# Patient Record
Sex: FEMALE | Race: OTHER | HISPANIC OR LATINO | URBAN - METROPOLITAN AREA
[De-identification: names, ages, dates, MRNs, and addresses within clinical notes are randomized per-mention and may not be internally consistent; named-entity substitution may affect disease eponyms.]

---

## 2023-03-10 ENCOUNTER — INPATIENT (INPATIENT)
Facility: HOSPITAL | Age: 53
LOS: 13 days | Discharge: HOME HEALTH | DRG: 622 | End: 2023-03-24
Attending: NEUROLOGICAL SURGERY | Admitting: NEUROLOGICAL SURGERY
Payer: MEDICAID

## 2023-03-10 VITALS
OXYGEN SATURATION: 97 % | HEIGHT: 63 IN | DIASTOLIC BLOOD PRESSURE: 64 MMHG | HEART RATE: 87 BPM | TEMPERATURE: 99 F | RESPIRATION RATE: 18 BRPM | WEIGHT: 154.98 LBS | SYSTOLIC BLOOD PRESSURE: 131 MMHG

## 2023-03-10 DIAGNOSIS — G93.89 OTHER SPECIFIED DISORDERS OF BRAIN: ICD-10-CM

## 2023-03-10 DIAGNOSIS — E11.9 TYPE 2 DIABETES MELLITUS WITHOUT COMPLICATIONS: ICD-10-CM

## 2023-03-10 DIAGNOSIS — I10 ESSENTIAL (PRIMARY) HYPERTENSION: ICD-10-CM

## 2023-03-10 DIAGNOSIS — Z01.818 ENCOUNTER FOR OTHER PREPROCEDURAL EXAMINATION: ICD-10-CM

## 2023-03-10 DIAGNOSIS — E78.5 HYPERLIPIDEMIA, UNSPECIFIED: ICD-10-CM

## 2023-03-10 DIAGNOSIS — R07.9 CHEST PAIN, UNSPECIFIED: ICD-10-CM

## 2023-03-10 LAB
ALBUMIN SERPL ELPH-MCNC: 4.9 G/DL — SIGNIFICANT CHANGE UP (ref 3.3–5)
ALP SERPL-CCNC: 63 U/L — SIGNIFICANT CHANGE UP (ref 40–120)
ALT FLD-CCNC: 29 U/L — SIGNIFICANT CHANGE UP (ref 10–45)
ANION GAP SERPL CALC-SCNC: 10 MMOL/L — SIGNIFICANT CHANGE UP (ref 5–17)
ANION GAP SERPL CALC-SCNC: 13 MMOL/L — SIGNIFICANT CHANGE UP (ref 5–17)
APTT BLD: 37 SEC — HIGH (ref 27.5–35.5)
AST SERPL-CCNC: 44 U/L — HIGH (ref 10–40)
BASOPHILS # BLD AUTO: 0.06 K/UL — SIGNIFICANT CHANGE UP (ref 0–0.2)
BASOPHILS NFR BLD AUTO: 0.8 % — SIGNIFICANT CHANGE UP (ref 0–2)
BILIRUB SERPL-MCNC: 0.5 MG/DL — SIGNIFICANT CHANGE UP (ref 0.2–1.2)
BLD GP AB SCN SERPL QL: NEGATIVE — SIGNIFICANT CHANGE UP
BUN SERPL-MCNC: 14 MG/DL — SIGNIFICANT CHANGE UP (ref 7–23)
BUN SERPL-MCNC: 15 MG/DL — SIGNIFICANT CHANGE UP (ref 7–23)
CALCIUM SERPL-MCNC: 10 MG/DL — SIGNIFICANT CHANGE UP (ref 8.4–10.5)
CALCIUM SERPL-MCNC: 9.6 MG/DL — SIGNIFICANT CHANGE UP (ref 8.4–10.5)
CHLORIDE SERPL-SCNC: 102 MMOL/L — SIGNIFICANT CHANGE UP (ref 96–108)
CHLORIDE SERPL-SCNC: 105 MMOL/L — SIGNIFICANT CHANGE UP (ref 96–108)
CK MB BLD-MCNC: 1.2 % — SIGNIFICANT CHANGE UP (ref 0–3.5)
CK MB CFR SERPL CALC: 5.8 NG/ML — HIGH (ref 0–3.8)
CK SERPL-CCNC: 473 U/L — HIGH (ref 25–170)
CO2 SERPL-SCNC: 26 MMOL/L — SIGNIFICANT CHANGE UP (ref 22–31)
CO2 SERPL-SCNC: 27 MMOL/L — SIGNIFICANT CHANGE UP (ref 22–31)
CREAT SERPL-MCNC: 0.67 MG/DL — SIGNIFICANT CHANGE UP (ref 0.5–1.3)
CREAT SERPL-MCNC: 0.69 MG/DL — SIGNIFICANT CHANGE UP (ref 0.5–1.3)
EGFR: 104 ML/MIN/1.73M2 — SIGNIFICANT CHANGE UP
EGFR: 104 ML/MIN/1.73M2 — SIGNIFICANT CHANGE UP
EOSINOPHIL # BLD AUTO: 0.45 K/UL — SIGNIFICANT CHANGE UP (ref 0–0.5)
EOSINOPHIL NFR BLD AUTO: 5.9 % — SIGNIFICANT CHANGE UP (ref 0–6)
GH SERPL-MCNC: 0.26 NG/ML — SIGNIFICANT CHANGE UP (ref 0.12–9.88)
GLUCOSE BLDC GLUCOMTR-MCNC: 112 MG/DL — HIGH (ref 70–99)
GLUCOSE BLDC GLUCOMTR-MCNC: 162 MG/DL — HIGH (ref 70–99)
GLUCOSE SERPL-MCNC: 125 MG/DL — HIGH (ref 70–99)
GLUCOSE SERPL-MCNC: 161 MG/DL — HIGH (ref 70–99)
HCG SERPL-ACNC: <2 MIU/ML — SIGNIFICANT CHANGE UP
HCT VFR BLD CALC: 37.4 % — SIGNIFICANT CHANGE UP (ref 34.5–45)
HCT VFR BLD CALC: 43.2 % — SIGNIFICANT CHANGE UP (ref 34.5–45)
HGB BLD-MCNC: 12 G/DL — SIGNIFICANT CHANGE UP (ref 11.5–15.5)
HGB BLD-MCNC: 13.8 G/DL — SIGNIFICANT CHANGE UP (ref 11.5–15.5)
IMM GRANULOCYTES NFR BLD AUTO: 0.3 % — SIGNIFICANT CHANGE UP (ref 0–0.9)
INR BLD: 1.14 RATIO — SIGNIFICANT CHANGE UP (ref 0.88–1.16)
LYMPHOCYTES # BLD AUTO: 2.74 K/UL — SIGNIFICANT CHANGE UP (ref 1–3.3)
LYMPHOCYTES # BLD AUTO: 35.8 % — SIGNIFICANT CHANGE UP (ref 13–44)
MCHC RBC-ENTMCNC: 28 PG — SIGNIFICANT CHANGE UP (ref 27–34)
MCHC RBC-ENTMCNC: 28.2 PG — SIGNIFICANT CHANGE UP (ref 27–34)
MCHC RBC-ENTMCNC: 31.9 GM/DL — LOW (ref 32–36)
MCHC RBC-ENTMCNC: 32.1 GM/DL — SIGNIFICANT CHANGE UP (ref 32–36)
MCV RBC AUTO: 87.2 FL — SIGNIFICANT CHANGE UP (ref 80–100)
MCV RBC AUTO: 88.3 FL — SIGNIFICANT CHANGE UP (ref 80–100)
MONOCYTES # BLD AUTO: 0.49 K/UL — SIGNIFICANT CHANGE UP (ref 0–0.9)
MONOCYTES NFR BLD AUTO: 6.4 % — SIGNIFICANT CHANGE UP (ref 2–14)
NEUTROPHILS # BLD AUTO: 3.9 K/UL — SIGNIFICANT CHANGE UP (ref 1.8–7.4)
NEUTROPHILS NFR BLD AUTO: 50.8 % — SIGNIFICANT CHANGE UP (ref 43–77)
NRBC # BLD: 0 /100 WBCS — SIGNIFICANT CHANGE UP (ref 0–0)
NRBC # BLD: 0 /100 WBCS — SIGNIFICANT CHANGE UP (ref 0–0)
PLATELET # BLD AUTO: 254 K/UL — SIGNIFICANT CHANGE UP (ref 150–400)
PLATELET # BLD AUTO: 285 K/UL — SIGNIFICANT CHANGE UP (ref 150–400)
POTASSIUM SERPL-MCNC: 3.6 MMOL/L — SIGNIFICANT CHANGE UP (ref 3.5–5.3)
POTASSIUM SERPL-MCNC: 4.4 MMOL/L — SIGNIFICANT CHANGE UP (ref 3.5–5.3)
POTASSIUM SERPL-SCNC: 3.6 MMOL/L — SIGNIFICANT CHANGE UP (ref 3.5–5.3)
POTASSIUM SERPL-SCNC: 4.4 MMOL/L — SIGNIFICANT CHANGE UP (ref 3.5–5.3)
PROT SERPL-MCNC: 8.1 G/DL — SIGNIFICANT CHANGE UP (ref 6–8.3)
PROTHROM AB SERPL-ACNC: 13.1 SEC — SIGNIFICANT CHANGE UP (ref 10.5–13.4)
RBC # BLD: 4.29 M/UL — SIGNIFICANT CHANGE UP (ref 3.8–5.2)
RBC # BLD: 4.89 M/UL — SIGNIFICANT CHANGE UP (ref 3.8–5.2)
RBC # FLD: 12.8 % — SIGNIFICANT CHANGE UP (ref 10.3–14.5)
RBC # FLD: 12.9 % — SIGNIFICANT CHANGE UP (ref 10.3–14.5)
RH IG SCN BLD-IMP: POSITIVE — SIGNIFICANT CHANGE UP
SARS-COV-2 RNA SPEC QL NAA+PROBE: SIGNIFICANT CHANGE UP
SODIUM SERPL-SCNC: 141 MMOL/L — SIGNIFICANT CHANGE UP (ref 135–145)
SODIUM SERPL-SCNC: 142 MMOL/L — SIGNIFICANT CHANGE UP (ref 135–145)
TROPONIN T, HIGH SENSITIVITY RESULT: 31 NG/L — SIGNIFICANT CHANGE UP (ref 0–51)
WBC # BLD: 7.48 K/UL — SIGNIFICANT CHANGE UP (ref 3.8–10.5)
WBC # BLD: 7.66 K/UL — SIGNIFICANT CHANGE UP (ref 3.8–10.5)
WBC # FLD AUTO: 7.48 K/UL — SIGNIFICANT CHANGE UP (ref 3.8–10.5)
WBC # FLD AUTO: 7.66 K/UL — SIGNIFICANT CHANGE UP (ref 3.8–10.5)

## 2023-03-10 PROCEDURE — 71045 X-RAY EXAM CHEST 1 VIEW: CPT | Mod: 26

## 2023-03-10 PROCEDURE — 99222 1ST HOSP IP/OBS MODERATE 55: CPT

## 2023-03-10 PROCEDURE — 99223 1ST HOSP IP/OBS HIGH 75: CPT

## 2023-03-10 PROCEDURE — 99285 EMERGENCY DEPT VISIT HI MDM: CPT

## 2023-03-10 PROCEDURE — 99254 IP/OBS CNSLTJ NEW/EST MOD 60: CPT | Mod: GC

## 2023-03-10 PROCEDURE — 70553 MRI BRAIN STEM W/O & W/DYE: CPT | Mod: 26,77

## 2023-03-10 PROCEDURE — 70553 MRI BRAIN STEM W/O & W/DYE: CPT | Mod: 26

## 2023-03-10 RX ORDER — INSULIN LISPRO 100/ML
VIAL (ML) SUBCUTANEOUS
Refills: 0 | Status: DISCONTINUED | OUTPATIENT
Start: 2023-03-10 | End: 2023-03-14

## 2023-03-10 RX ORDER — ACETAMINOPHEN 500 MG
650 TABLET ORAL ONCE
Refills: 0 | Status: COMPLETED | OUTPATIENT
Start: 2023-03-10 | End: 2023-03-10

## 2023-03-10 RX ORDER — ATORVASTATIN CALCIUM 80 MG/1
80 TABLET, FILM COATED ORAL AT BEDTIME
Refills: 0 | Status: DISCONTINUED | OUTPATIENT
Start: 2023-03-10 | End: 2023-03-14

## 2023-03-10 RX ADMIN — ATORVASTATIN CALCIUM 80 MILLIGRAM(S): 80 TABLET, FILM COATED ORAL at 21:35

## 2023-03-10 RX ADMIN — Medication 650 MILLIGRAM(S): at 10:59

## 2023-03-10 RX ADMIN — Medication 650 MILLIGRAM(S): at 10:29

## 2023-03-10 RX ADMIN — Medication 2: at 21:35

## 2023-03-10 NOTE — CONSULT NOTE ADULT - ASSESSMENT
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

## 2023-03-10 NOTE — ED ADULT TRIAGE NOTE - CHIEF COMPLAINT QUOTE
Referred my opthalmology for MRI for findings of a "sellar mass" as per outpatient paperwork. 3 months of pressure behind her eyes.

## 2023-03-10 NOTE — CONSULT NOTE ADULT - NSCONSULTADDITIONALINFOA_GEN_ALL_CORE
Ej Agosto MD  [facility] Division of Hospital Medicine  Available via MS Teams  Pager: [phone]

## 2023-03-10 NOTE — CONSULT NOTE ADULT - PROBLEM SELECTOR RECOMMENDATION 4
DENIED Rx Clozaril 100mg   Pharmacy requested TOO SOON.  Last Rx renewed: 7/20/2022 90day/1RF.     f/u a1c in AM  -ISS for now   -nutrition consult  -holding home metformin 1000mg BID

## 2023-03-10 NOTE — H&P ADULT - HISTORY OF PRESENT ILLNESS
52 yr old female with a history of hypertension, hyperlipidemia, dm type 2, no surgeries ever presents with a month of headaches and 3 weeks of blurred vision.  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  Patient was at [facility].  Patient was sent to [facility].  No endocrine work up done.

## 2023-03-10 NOTE — CONSULT NOTE ADULT - PROBLEM SELECTOR RECOMMENDATION 9
Cardiovascular Risk Stratification - RCRI is 0 pts = 3.9% at risk for cardiac death, nonfatal myocardial infarction, and nonfatal cardiac arrest perioperatively  -patient has risk factors for CAD and now states she has chest pain on exertion. EKG is non-ischemic.   -obtain TTE (ordered) to start prior to any medical clearance   -please obtain cardiac clearance for surgery

## 2023-03-10 NOTE — CONSULT NOTE ADULT - ASSESSMENT
52 yr old female with a history of hypertension, hyperlipidemia, dm type 2, no surgeries ever presents with a month of headaches and 3 weeks of blurred vision. Endocrinology consulted for evaluation of sellar mass.    #Sellar Mass    T2DM with hyperglycemia  - HbA1c:  - Home Regimen:   - Endocrinologist:  PLAN  - Hold oral DM agents while inpatient  - Start Lantus  units at bedtime. DO NOT HOLD IF NPO.  - Start Admelog  units TID pre-meal. HOLD IF NPO.  - Use moderate/Use low dose Admelog correction scale pre-meal  - Use moderate/Use low dose Admelog correction scale at bedtime  - Fingerstick BG before meals and bedtime  - Goal -180  - Carbohydrate consistent diet  - RD consult  Discharge plan:  - Discharge medications: ************************  - Patient to call doctor with persistent high or low BG at home.   - Ensure patient has glucometer, test strips and lancets on discharge.  - Recommend routine outpatient ophthalmology, podiatry and endocrinology f/u    HTN  - Home regimen:  PLAN  - Can check urine microalbumin outpatient  - Outpatient goal BP <130/80. Management per primary team.    HLD  - Home regimen:  PLAN  - Continue  - Can check lipid profile if not done recently    Discussed with primary team.    Denny Bishop MD, Endocrinology Fellow  Pager [phone] from 9am to 5pm. After hours and on weekends, please call [phone].   52 yr old female with a history of hypertension, hyperlipidemia, dm type 2, no surgeries ever presents with a month of headaches and 3 weeks of blurred vision. Endocrinology consulted for evaluation of sellar mass.    #Sellar Mass  - reported sellar mass on MRI imaging outpatient after weeks of blurry vision  - report not in system  - no hormonal workup  - patient HD stable  - endorses weight loss and nausea with prior galactorrhea  - no other signs of thyroid disease, AI, growth hormone excess or cushings  PLAN  - if can obtain MRI imaging outpatient to assist in determining if micro or macroadenoma  - f/u prolactin  - please obtain TSH, free thyroxine (not serum T4), 8am SERUM NOT free cortisol, ACTH, LH, FSH, estradiol, IGF-1  - f/u neurosurgery recommendations  - f/u ophthalmology recommendations    T2DM with hyperglycemia  - HbA1c: none in computer  - Home Regimen: metformin 1000mg bid  - Endocrinologist: does not have  PLAN  - Hold oral DM agents while inpatient  - please check Hba1c  - Use low dose Admelog correction scale pre-meal  - Use low dose Admelog correction scale at bedtime  - Fingerstick BG before meals and bedtime  - Goal -180  - Carbohydrate consistent diet  - RD consult  Discharge plan:  - Discharge medications: likely metformin 1000mg bid pending HbA1c  - Patient to call doctor with persistent high or low BG at home.   - Ensure patient has glucometer, test strips and lancets on discharge.  - Recommend routine outpatient ophthalmology, podiatry and endocrinology f/u    HTN  - Home regimen: lisinopril 2.5mg daily  PLAN  - Can check urine microalbumin outpatient  - Outpatient goal BP <130/80. Management per primary team.    HLD  - Home regimen: atorvastatin 80mg daily  PLAN  - resume statin when able  - Can check lipid profile if not done recently    Discussed with primary team.    Denny Bishop MD, Endocrinology Fellow  Pager [phone] from 9am to 5pm. After hours and on weekends, please call [phone].   52 yr old female with a history of hypertension, hyperlipidemia, dm type 2, no surgeries ever presents with a month of headaches and 3 weeks of blurred vision. Endocrinology consulted for evaluation of sellar mass.    #Sellar Mass  - reported sellar mass on MRI imaging outpatient after weeks of blurry vision  - report not in system  - no hormonal workup  - patient HD stable  - endorses weight loss and nausea with prior galactorrhea  - no other signs of thyroid disease, AI, growth hormone excess or cushings  PLAN  - if can obtain MRI imaging outpatient to assist in determining if micro or macroadenoma  - f/u prolactin  - please obtain TSH, free thyroxine (not serum T4), 8am SERUM NOT free cortisol, ACTH, LH, FSH, estradiol, IGF-1  - will consider dexamethasone suppression test pending cortisol/acth  - f/u neurosurgery recommendations  - f/u ophthalmology recommendations (formal consult if not done) given visual deficits    T2DM with hyperglycemia  - HbA1c: none in computer  - Home Regimen: metformin 1000mg bid  - Endocrinologist: does not have  PLAN  - Hold oral DM agents while inpatient  - please check Hba1c  - Use low dose Admelog correction scale pre-meal  - Use low dose Admelog correction scale at bedtime  - Fingerstick BG before meals and bedtime  - Goal -180  - Carbohydrate consistent diet  - RD consult  Discharge plan:  - Discharge medications: likely metformin 1000mg bid pending HbA1c  - Patient to call doctor with persistent high or low BG at home.   - Ensure patient has glucometer, test strips and lancets on discharge.  - Recommend routine outpatient ophthalmology, podiatry and endocrinology f/u    HTN  - Home regimen: lisinopril 2.5mg daily  PLAN  - Can check urine microalbumin outpatient  - Outpatient goal BP <130/80. Management per primary team.    HLD  - Home regimen: atorvastatin 80mg daily  PLAN  - resume statin when able  - Can check lipid profile if not done recently    Discussed with primary team.    Denny Bishop MD, Endocrinology Fellow  Pager [phone] from 9am to 5pm. After hours and on weekends, please call [phone].

## 2023-03-10 NOTE — CONSULT NOTE ADULT - PROBLEM SELECTOR RECOMMENDATION 2
unclear if this is anginal chest pain, but seems atypical in nature (right sided, no radiation)  -has cardiac risk factors (t2dm, hld, htn) and so would start with TTE  -cardiology consult  -ekg with NSR, nonspecific t wave changes unclear if this is anginal chest pain, but seems atypical in nature (right sided, no radiation)  -has cardiac risk factors (t2dm, hld, htn) and so would start with TTE  -cardiology consult  -ekg with NSR, nonspecific t wave changes  -obtain troponin and trend if elevated

## 2023-03-10 NOTE — ED PROVIDER NOTE - CLINICAL SUMMARY MEDICAL DECISION MAKING FREE TEXT BOX
52-year-old female.  Presents for evaluation by neurosurgery for a brain tumor diagnosed on March 3 at [facility].  Patient has had several months of blurry vision and headache.  Case discussed with neurosurgery attending and would like patient admitted for further work-up and evaluation and management

## 2023-03-10 NOTE — ED ADULT NURSE NOTE - NSIMPLEMENTINTERV_GEN_ALL_ED
Implemented All Universal Safety Interventions:  [place] to call system. Call bell, personal items and telephone within reach. Instruct patient to call for assistance. Room bathroom lighting operational. Non-slip footwear when patient is off stretcher. Physically safe environment: no spills, clutter or unnecessary equipment. Stretcher in lowest position, wheels locked, appropriate side rails in place.

## 2023-03-10 NOTE — CONSULT NOTE ADULT - SUBJECTIVE AND OBJECTIVE BOX
LORRIE REZA  52y  Female    [place]  Jeny ID #[id number]     Patient is a 52 yof w/ hx pf HTN, HLD, T2Dm, who presents with headaches, blurry vision, admitted after being found to have suprasellar mass. Patient has been having headaches for the past 6 months, mostly on the left side, reaching 8/10 in intensity. also has been having nausea during this time. About 3 months ago, she started having blurry vision on the left eye, and has been losing vision in that eye. She went to her opthalmologist, who performed an MRI, which showed a suprasellar mass and was admitted.     Pt states she does not use insulin, has no hx of chf, stroke, cad. However, she states that she has been having chest pain on the right side nearly every day. chest pain occurs when she walks, but also occasionally when she is sleeping, waking her up. States that there is no radiation, nothing worsens the pain, but does state taht when she takes her blood pressure medication, her pain improves. Denies any shortness of breath, cough, LE edema.     PAST MEDICAL HISTORY  -Hypertension  -Hyperlipidemia  -Diabetes mellitus    PAST SURGICAL HISTORY: NONE    SOCIAL HISTORY:  Living situation: lives athome, able to perform all adls  Occupation:    Tobacco Use: denies any tobacco use  Alcohol Use: denies etoh intake  Drug use: denies marijuana, cocaine, heroine and other illicit drug use     family history: none      REVIEW OF SYSTEMS:  CONSTITUTIONAL: No fever. + 10 lb weight loss and mild fatigue  EYES: No eye pain, visual disturbances, or discharge  ENMT:  hearing loss on the left side, no  tinnitus, vertigo; No sinus or throat pain  NECK: No pain or stiffness  BREASTS: No pain, masses, or nipple discharge  RESPIRATORY: No cough, wheezing, chills or hemoptysis; No shortness of breath  CARDIOVASCULAR: +chest pain as above, palpitations, dizziness, or leg swelling  GASTROINTESTINAL: No abdominal or epigastric pain. No nausea, vomiting, or hematemesis; No diarrhea or constipation. No melena or hematochezia.  GENITOURINARY: No dysuria, frequency, hematuria, or incontinence  NEUROLOGICAL: +headaches as above, memory loss, loss of strength, numbness, or tremors  SKIN: No itching, burning, rashes, or lesions   LYMPH NODES: No enlarged glands  ENDOCRINE: No heat or cold intolerance; No hair loss  MUSCULOSKELETAL: No joint pain or swelling; No muscle, back, or extremity pain  PSYCHIATRIC: No depression, anxiety, mood swings, or difficulty sleeping  HEME/LYMPH: No easy bruising, or bleeding gums  ALLERY AND IMMUNOLOGIC: No hives or eczema    T(C): 36.7 (03-10-23 @ 13:25), Max: 37.3 (03-10-23 @ 09:07)  HR: 60 (03-10-23 @ 13:25) (60 - 87)  BP: 143/82 (03-10-23 @ 13:25) (126/83 - 143/82)  RR: 16 (03-10-23 @ 13:25) (16 - 18)  SpO2: 96% (03-10-23 @ 13:25) (96% - 98%)  Wt(kg): --Vital Signs Last 24 Hrs  T(C): 36.7 (10 Mar 2023 13:25), Max: 37.3 (10 Mar 2023 09:07)  T(F): 98.1 (10 Mar 2023 13:25), Max: 99.1 (10 Mar 2023 09:07)  HR: 60 (10 Mar 2023 13:25) (60 - 87)  BP: 143/82 (10 Mar 2023 13:25) (126/83 - 143/82)  BP(mean): 97 (10 Mar 2023 11:30) (97 - 97)  RR: 16 (10 Mar 2023 13:25) (16 - 18)  SpO2: 96% (10 Mar 2023 13:25) (96% - 98%)    Parameters below as of 10 Mar 2023 13:25  Patient On (Oxygen Delivery Method): room air        PHYSICAL EXAM:  GENERAL: NAD, well-groomed, well-developed  HEAD:  Atraumatic, Normocephalic  EYES: EOMI, PERRLA, conjunctiva and sclera clear  ENMT: No tonsillar erythema, exudates, or enlargement; Moist mucous membranes, Good dentition, No lesions  NECK: Supple, No JVD, Normal thyroid  NERVOUS SYSTEM:  Alert & Oriented X3, Good concentration; Motor Strength 5/5 B/L upper and lower extremities; DTRs 2+ intact and symmetric  CHEST/LUNG: Clear to percussion bilaterally; No rales, rhonchi, wheezing, or rubs  HEART: Regular rate and rhythm; No murmurs, rubs, or gallops  ABDOMEN: Soft, Nontender, Nondistended; Bowel sounds present  EXTREMITIES:  2+ Peripheral Pulses, No clubbing, cyanosis, or edema  LYMPH: No lymphadenopathy noted  SKIN: No rashes or lesions    Consultant(s) Notes Reviewed:  [x ] YES  [ ] NO  Care Discussed with Consultants/Other Providers [ x] YES  [ ] NO    LABS:  The Labs were reviewed by me   CXR personally reviewed: clear lungs, no focal consolidation, no congestion     03-10    142  |  105  |  14  ----------------------------<  125<H>  3.6   |  27  |  0.67  03-10    141  |  102  |  15  ----------------------------<  161<H>  4.4   |  26  |  0.69    Ca    9.6      10 Mar 2023 13:28  Ca    10.0      10 Mar 2023 10:24    TPro  8.1  /  Alb  4.9  /  TBili  0.5  /  DBili  x   /  AST  44<H>  /  ALT  29  /  AlkPhos  63  03-10          PT/INR - ( 10 Mar 2023 10:24 )   PT: 13.1 sec;   INR: 1.14 ratio         PTT - ( 10 Mar 2023 10:24 )  PTT:37.0 sec                                        12.0   7.48  )-----------( 254      ( 10 Mar 2023 13:28 )             37.4                         13.8   7.66  )-----------( 285      ( 10 Mar 2023 10:24 )             43.2     CAPILLARY BLOOD GLUCOSE    EKG personally reviewed: NSR, nonspecific t wave inversion in lateral lead, no st elevation,

## 2023-03-10 NOTE — ED PROVIDER NOTE - PHYSICAL EXAMINATION
Patient awake and alert with no acute distress.  Pupils are equal round and reactive bilaterally.  Neck is supple with no midline tenderness.  Lungs are clear bilaterally.  Heart is regular rate and rhythm with no murmur.  Abdomen is soft nontender nondistended.  Extremities with no edema and skin with no rashes.  There is equal strength in the upper and lower extremities 5 out of 5.  Sensation is normal throughout.

## 2023-03-10 NOTE — ED PROVIDER NOTE - OBJECTIVE STATEMENT
52-year-old female with past medical history for hypertension diabetes and hyperlipidemia presents for neurosurgical evaluation for brain tumor.  Patient was seen at an outside hospital on March 3 had an MRI which showed a sellar/suprasellar mass.  Patient has complained of blurry vision times several months.  Patient denies any difficulty walking or weakness or numbness in any of her extremities.  She complains of headache.     Karin # [id number]

## 2023-03-10 NOTE — ED ADULT NURSE NOTE - OBJECTIVE STATEMENT
Patient is a 52 yr old female with pmh of DM referred by opthalmology for a sellar mass. Pt states she has had headache, head pressure, and blurry vision in the left eye for three months. Pt went to [facility] first and had an MRI there which showed the mass- and was now referred to [facility] for follow up and according to her to "see the surgeon". PT is a/ox 3- [place] speaking- vitals stable.

## 2023-03-10 NOTE — CONSULT NOTE ADULT - SUBJECTIVE AND OBJECTIVE BOX
ENDOCRINE INITIAL CONSULT - sellar mass    HPI:  52 yr old female with a history of hypertension, hyperlipidemia, dm type 2, no surgeries ever presents with a month of headaches and 3 weeks of blurred vision.  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  Patient was at [facility].  [place]  No endocrine work up done. (10 Mar 2023 11:57)      ENDOCRINE HISTORY   Diagnosed with DM:   Last HbA1c:   Endocrinologist:   Home DM Meds:  Adherence:  Microvascular complications: Renal, opthalmologic, neuropathy  Macrovascular complications:  SMBG:  Symptoms:  Hypoglycemia episodes:  BG at home:  Diet at home:  Appetite in hospital:  Exercise:  PMHx:  PSHx:  Family hx:  Social hx:  Insurance:  Resides in:      PAST MEDICAL & SURGICAL HISTORY:  Hypertension      Hyperlipidemia      Diabetes mellitus          FAMILY HISTORY:      Social History:      Home Medications:      MEDICATIONS  (STANDING):  atorvastatin 80 milliGRAM(s) Oral at bedtime  insulin lispro (ADMELOG) corrective regimen sliding scale   SubCutaneous Before meals and at bedtime    MEDICATIONS  (PRN):      Allergies    No Known Allergies    Intolerances        REVIEW OF SYSTEMS  Constitutional: No fever  Eyes: No blurry vision  Neuro: No tremors  HEENT: No pain  Cardiovascular: No chest pain, palpitations  Respiratory: No SOB, no cough  GI: No nausea, vomiting, abdominal pain  : No dysuria  Skin: no rash  Psych: no depression  Endocrine: no polyuria, polydipsia  Hem/lymph: no swelling  Osteoporosis: no fractures  ALL OTHER SYSTEMS REVIEWED AND NEGATIVE     UNABLE TO OBTAIN     PHYSICAL EXAM   Vital Signs Last 24 Hrs  T(C): 36.7 (10 Mar 2023 13:25), Max: 37.3 (10 Mar 2023 09:07)  T(F): 98.1 (10 Mar 2023 13:25), Max: 99.1 (10 Mar 2023 09:07)  HR: 60 (10 Mar 2023 13:25) (60 - 87)  BP: 143/82 (10 Mar 2023 13:25) (126/83 - 143/82)  BP(mean): 97 (10 Mar 2023 11:30) (97 - 97)  RR: 16 (10 Mar 2023 13:25) (16 - 18)  SpO2: 96% (10 Mar 2023 13:25) (96% - 98%)    Parameters below as of 10 Mar 2023 13:25  Patient On (Oxygen Delivery Method): room air      GENERAL: NAD, well-groomed, well-developed  EYES: No proptosis, no lid lag, anicteric  HEENT:  Atraumatic, Normocephalic, moist mucous membranes  THYROID: Normal size, no palpable nodules  RESPIRATORY: Clear to auscultation bilaterally; No rales, rhonchi, wheezing  CARDIOVASCULAR: Regular rate and rhythm; No murmurs; no peripheral edema  GI: Soft, nontender, non distended, normal bowel sounds  SKIN: Dry, intact, No rashes or lesions  MUSCULOSKELETAL: Full range of motion, normal strength  NEURO: sensation intact, extraocular movements intact, no tremor  PSYCH: Alert and oriented x 3, normal affect, normal mood  CUSHING'S SIGNS: no striae    CAPILLARY BLOOD GLUCOSE                                    12.0   7.48  )-----------( 254      ( 10 Mar 2023 13:28 )             37.4       03-10    142  |  105  |  14  ----------------------------<  125<H>  3.6   |  27  |  0.67    Ca    9.6      10 Mar 2023 13:28    TPro  8.1  /  Alb  4.9  /  TBili  0.5  /  DBili  x   /  AST  44<H>  /  ALT  29  /  AlkPhos  63  03-10          LIPIDS    RADIOLOGY ENDOCRINE INITIAL CONSULT - sellar mass    HPI:  52 yr old female with a history of hypertension, hyperlipidemia, dm type 2, no surgeries ever presents with a month of headaches and 3 weeks of blurred vision.  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  Patient was at [facility].  Patient was sent to [facility].  No endocrine work up done. (10 Mar 2023 11:57)      ENDOCRINE HISTORY   [place]  [place] ID [id number] used.  The patient reports one month of headaches and 3 weeks of blurry vision, thought she had glaucoma, saw ophthalmologist, had MRI that showed had a tumor. She denies history of brain issue. She endorses mild fatigue, weight loss (170 to 157lb), mild nipple discharge (wasn't clear)(reports had mammogram which was normal), sensitivity to cold weather, nausea, denies palpitations, tremors, diarrhea, constipation, skin changes, hair loss, hirsutism, acanthosis, nipple discharge currently, heat or cold intolerance, change in hat size, ring size, vomiting.    Diagnosed with DM: 2, for 10 years  Last HbA1c: none in computer system  Endocrinologist: no endocrinologist  Home DM Meds: metformin 1000mg bid, lisinopril 2.5mg daily, atorvastatin 80mg daily  Adherence: endorses compliance  Microvascular complications: denies retinopathy, neuropathy, nephropathy  Macrovascular complications: denies MI or CVA  SMBG: does not check sugars  Symptoms: denies polyuria, polydipsia, numbness or tingling  Diet at home: chicken, vegetables, beans  Appetite in hospital: good  Exercise: denies  PMHx: as above  PSHx: as above  Family hx: endorses sister with diabetes, brother with an adrenal gland disorder (does not know specific), denies family history of pituitary disorder, thyroid disorder  Social hx: denies tobacco use, alcohol use, or recreational drug  Insurance: Medicaid  Resides in: [place]      PAST MEDICAL & SURGICAL HISTORY:  Hypertension      Hyperlipidemia      Diabetes mellitus          FAMILY HISTORY:      Social History:      Home Medications:      MEDICATIONS  (STANDING):  atorvastatin 80 milliGRAM(s) Oral at bedtime  insulin lispro (ADMELOG) corrective regimen sliding scale   SubCutaneous Before meals and at bedtime    MEDICATIONS  (PRN):      Allergies    No Known Allergies    Intolerances        REVIEW OF SYSTEMS  Constitutional: No fever  Eyes: endorses blurry vision  Neuro: No tremors  HEENT: No pain  Cardiovascular: endorses chest pain, palpitations  Respiratory: No SOB, no cough  GI: endorses nausea, denies vomiting, abdominal pain, diarrhea, constipation  : No dysuria  Skin: no rash  Psych: no depression  Endocrine: no polyuria, polydipsia  Hem/lymph: no swelling  Osteoporosis: no fractures  ALL OTHER SYSTEMS REVIEWED AND NEGATIVE     PHYSICAL EXAM   Vital Signs Last 24 Hrs  T(C): 36.7 (10 Mar 2023 13:25), Max: 37.3 (10 Mar 2023 09:07)  T(F): 98.1 (10 Mar 2023 13:25), Max: 99.1 (10 Mar 2023 09:07)  HR: 60 (10 Mar 2023 13:25) (60 - 87)  BP: 143/82 (10 Mar 2023 13:25) (126/83 - 143/82)  BP(mean): 97 (10 Mar 2023 11:30) (97 - 97)  RR: 16 (10 Mar 2023 13:25) (16 - 18)  SpO2: 96% (10 Mar 2023 13:25) (96% - 98%)    Parameters below as of 10 Mar 2023 13:25  Patient On (Oxygen Delivery Method): room air      GENERAL: NAD, well-groomed, well-developed  EYES: No proptosis, no lid lag, anicteric  HEENT:  Atraumatic, Normocephalic, moist mucous membranes  THYROID: Normal size, no palpable nodules  RESPIRATORY: Clear to auscultation bilaterally; No rales, rhonchi, wheezing  CARDIOVASCULAR: Regular rate and rhythm; No murmurs; no peripheral edema  GI: Soft, nontender, non distended, normal bowel sounds  SKIN: Dry, intact, No rashes or lesions  MUSCULOSKELETAL: Full range of motion, normal strength  NEURO: sensation intact, extraocular movements intact, no tremor  PSYCH: Alert and oriented x 3, normal affect, normal mood  CUSHING'S SIGNS: no striae    CAPILLARY BLOOD GLUCOSE                                    12.0   7.48  )-----------( 254      ( 10 Mar 2023 13:28 )             37.4       03-10    142  |  105  |  14  ----------------------------<  125<H>  3.6   |  27  |  0.67    Ca    9.6      10 Mar 2023 13:28    TPro  8.1  /  Alb  4.9  /  TBili  0.5  /  DBili  x   /  AST  44<H>  /  ALT  29  /  AlkPhos  63  03-10          LIPIDS    RADIOLOGY ENDOCRINE INITIAL CONSULT - sellar mass    HPI:  52 yr old female with a history of hypertension, hyperlipidemia, dm type 2, no surgeries ever presents with a month of headaches and 3 weeks of blurred vision.  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  Patient was at [facility].  Patient was sent to [facility].  No endocrine work up done. (10 Mar 2023 11:57)      ENDOCRINE HISTORY   [place]  Regan ID [id number] used.  The patient reports one month of headaches and 3 weeks of blurry vision, thought she had glaucoma, saw ophthalmologist, had MRI that showed had a tumor. She denies history of brain issue. She endorses mild fatigue, weight loss (170 to 157lb), mild nipple discharge (wasn't clear)(reports had mammogram which was normal), sensitivity to cold weather, nausea, denies palpitations, tremors, diarrhea, constipation, skin changes, hair loss, hirsutism, acanthosis, nipple discharge currently, heat or cold intolerance, change in hat size, ring size, vomiting.    Diagnosed with DM: 2, for 10 years  Last HbA1c: none in computer system  Endocrinologist: no endocrinologist  Home DM Meds: metformin 1000mg bid, lisinopril 2.5mg daily, atorvastatin 80mg daily  Adherence: endorses compliance  Microvascular complications: denies retinopathy, neuropathy, nephropathy  Macrovascular complications: denies MI or CVA  SMBG: does not check sugars  Symptoms: denies polyuria, polydipsia, numbness or tingling  Diet at home: chicken, vegetables, beans  Appetite in hospital: good  Exercise: denies  PMHx: as above  PSHx: as above  Family hx: endorses sister with diabetes, brother with an adrenal gland disorder (does not know specific), denies family history of pituitary disorder, thyroid disorder  Social hx: denies tobacco use, alcohol use, or recreational drug  Insurance: Medicaid  Resides in: [place]      PAST MEDICAL & SURGICAL HISTORY:  Hypertension      Hyperlipidemia      Diabetes mellitus          FAMILY HISTORY:      Social History:      Home Medications:      MEDICATIONS  (STANDING):  atorvastatin 80 milliGRAM(s) Oral at bedtime  insulin lispro (ADMELOG) corrective regimen sliding scale   SubCutaneous Before meals and at bedtime    MEDICATIONS  (PRN):      Allergies    No Known Allergies    Intolerances        REVIEW OF SYSTEMS  Constitutional: No fever  Eyes: endorses blurry vision  Neuro: No tremors  HEENT: No pain  Cardiovascular: endorses chest pain, palpitations  Respiratory: No SOB, no cough  GI: endorses nausea, denies vomiting, abdominal pain, diarrhea, constipation  : No dysuria  Skin: no rash  Psych: no depression  Endocrine: no polyuria, polydipsia  Hem/lymph: no swelling  Osteoporosis: no fractures  ALL OTHER SYSTEMS REVIEWED AND NEGATIVE     PHYSICAL EXAM   Vital Signs Last 24 Hrs  T(C): 36.7 (10 Mar 2023 13:25), Max: 37.3 (10 Mar 2023 09:07)  T(F): 98.1 (10 Mar 2023 13:25), Max: 99.1 (10 Mar 2023 09:07)  HR: 60 (10 Mar 2023 13:25) (60 - 87)  BP: 143/82 (10 Mar 2023 13:25) (126/83 - 143/82)  BP(mean): 97 (10 Mar 2023 11:30) (97 - 97)  RR: 16 (10 Mar 2023 13:25) (16 - 18)  SpO2: 96% (10 Mar 2023 13:25) (96% - 98%)    Parameters below as of 10 Mar 2023 13:25  Patient On (Oxygen Delivery Method): room air      GENERAL: NAD, lying in bed  EYES: No proptosis, no lid lag, anicteric  HEENT:  Atraumatic, Normocephalic, moist mucous membranes  THYROID: Normal size, no palpable nodules  RESPIRATORY: Clear to auscultation bilaterally; No rales, rhonchi, wheezing  CARDIOVASCULAR: Regular rate and rhythm; No murmurs; no peripheral edema  GI: Soft, nontender, non distended, normal bowel sounds  SKIN: Dry, intact, No rashes or lesions  MUSCULOSKELETAL: Full range of motion, moving extremities spontaneously  NEURO: sensation intact, extraocular movements intact, reports blrury vision on temporal fields bilaterally no tremor  PSYCH: Answering questions appropriately normal affect, normal mood  CUSHING'S SIGNS: no acanthsosis, no dorsocervical fat pad    CAPILLARY BLOOD GLUCOSE                                    12.0   7.48  )-----------( 254      ( 10 Mar 2023 13:28 )             37.4       03-10    142  |  105  |  14  ----------------------------<  125<H>  3.6   |  27  |  0.67    Ca    9.6      10 Mar 2023 13:28    TPro  8.1  /  Alb  4.9  /  TBili  0.5  /  DBili  x   /  AST  44<H>  /  ALT  29  /  AlkPhos  63  03-10          LIPIDS    RADIOLOGY

## 2023-03-10 NOTE — H&P ADULT - ASSESSMENT
52 yr old female with htn, hld and dm type 2 now with sella mass.  admit to [facility]  endocrine consult  pre op labs   medicine for clearance  opthalmology

## 2023-03-10 NOTE — ED ADULT NURSE NOTE - IN THE PAST 12 MONTHS HAVE YOU USED DRUGS OTHER THAN THOSE REQUIRED FOR MEDICAL REASON?
No
Principal Discharge DX:	Acute cystitis without hematuria  Secondary Diagnosis:	Uterine leiomyoma, unspecified location

## 2023-03-11 DIAGNOSIS — E03.9 HYPOTHYROIDISM, UNSPECIFIED: ICD-10-CM

## 2023-03-11 LAB
ESTRADIOL FREE SERPL-MCNC: <5 PG/ML — SIGNIFICANT CHANGE UP
GLUCOSE BLDC GLUCOMTR-MCNC: 108 MG/DL — HIGH (ref 70–99)
GLUCOSE BLDC GLUCOMTR-MCNC: 109 MG/DL — HIGH (ref 70–99)
GLUCOSE BLDC GLUCOMTR-MCNC: 115 MG/DL — HIGH (ref 70–99)
GLUCOSE BLDC GLUCOMTR-MCNC: 146 MG/DL — HIGH (ref 70–99)
HCG SERPL-ACNC: <2 MIU/ML — SIGNIFICANT CHANGE UP
LH SERPL-ACNC: 2.6 IU/L — SIGNIFICANT CHANGE UP
MRSA PCR RESULT.: SIGNIFICANT CHANGE UP
PROLACTIN SERPL-MCNC: 103 NG/ML — HIGH (ref 3.4–24.1)
PROLACTIN SERPL-MCNC: 107 NG/ML — HIGH (ref 3.4–24.1)
S AUREUS DNA NOSE QL NAA+PROBE: SIGNIFICANT CHANGE UP
T4 FREE SERPL-MCNC: 0.5 NG/DL — LOW (ref 0.9–1.8)
TSH SERPL-MCNC: 8.85 UIU/ML — HIGH (ref 0.27–4.2)

## 2023-03-11 PROCEDURE — 70450 CT HEAD/BRAIN W/O DYE: CPT | Mod: 26

## 2023-03-11 PROCEDURE — 99232 SBSQ HOSP IP/OBS MODERATE 35: CPT

## 2023-03-11 PROCEDURE — 93970 EXTREMITY STUDY: CPT | Mod: 26

## 2023-03-11 RX ORDER — ONDANSETRON 8 MG/1
4 TABLET, FILM COATED ORAL EVERY 6 HOURS
Refills: 0 | Status: DISCONTINUED | OUTPATIENT
Start: 2023-03-11 | End: 2023-03-14

## 2023-03-11 RX ORDER — SENNA PLUS 8.6 MG/1
2 TABLET ORAL AT BEDTIME
Refills: 0 | Status: DISCONTINUED | OUTPATIENT
Start: 2023-03-11 | End: 2023-03-14

## 2023-03-11 RX ORDER — LEVOTHYROXINE SODIUM 125 MCG
75 TABLET ORAL DAILY
Refills: 0 | Status: DISCONTINUED | OUTPATIENT
Start: 2023-03-11 | End: 2023-03-14

## 2023-03-11 RX ORDER — ACETAMINOPHEN 500 MG
650 TABLET ORAL EVERY 6 HOURS
Refills: 0 | Status: DISCONTINUED | OUTPATIENT
Start: 2023-03-11 | End: 2023-03-14

## 2023-03-11 RX ADMIN — Medication 650 MILLIGRAM(S): at 09:04

## 2023-03-11 RX ADMIN — Medication 650 MILLIGRAM(S): at 02:37

## 2023-03-11 RX ADMIN — Medication 650 MILLIGRAM(S): at 02:07

## 2023-03-11 RX ADMIN — Medication 650 MILLIGRAM(S): at 08:34

## 2023-03-11 RX ADMIN — SENNA PLUS 2 TABLET(S): 8.6 TABLET ORAL at 21:09

## 2023-03-11 RX ADMIN — ATORVASTATIN CALCIUM 80 MILLIGRAM(S): 80 TABLET, FILM COATED ORAL at 21:08

## 2023-03-11 NOTE — PROGRESS NOTE ADULT - SUBJECTIVE AND OBJECTIVE BOX
SUBJECTIVE:  Patient seen and examined with family at bedside. No acute distress.     OVERNIGHT EVENTS: Admitted yesterday    Vital Signs Last 24 Hrs  T(C): 36.6 (11 Mar 2023 16:10), Max: 37.1 (11 Mar 2023 13:14)  T(F): 97.9 (11 Mar 2023 16:10), Max: 98.8 (11 Mar 2023 13:14)  HR: 75 (11 Mar 2023 16:10) (56 - 75)  BP: 126/78 (11 Mar 2023 16:10) (107/73 - 142/83)  BP(mean): --  RR: 18 (11 Mar 2023 16:10) (16 - 18)  SpO2: 99% (11 Mar 2023 16:10) (94% - 99%)    Parameters below as of 11 Mar 2023 16:10  Patient On (Oxygen Delivery Method): room air        PHYSICAL EXAM:    General: No Acute Distress     Neurological: Awake, alert oriented to person, place and time, Following Commands, PERRL, EOMI, RT superior Quad deficit, Face Symmetrical, Speech Fluent, Moving all extremities, Muscle Strength normal in all four extremities, No Drift, Sensation to Light Touch Intact    Pulmonary: Clear to Auscultation, No Rales, No Rhonchi, No Wheezes     Cardiovascular: Regular Rate and Rhythm     Gastrointestinal: Soft, Nontender, Nondistended       LABS:                        12.0   7.48  )-----------( 254      ( 10 Mar 2023 13:28 )             37.4    03-10    142  |  105  |  14  ----------------------------<  125<H>  3.6   |  27  |  0.67    Ca    9.6      10 Mar 2023 13:28    TPro  8.1  /  Alb  4.9  /  TBili  0.5  /  DBili  x   /  AST  44<H>  /  ALT  29  /  AlkPhos  63  03-10  PT/INR - ( 10 Mar 2023 10:24 )   PT: 13.1 sec;   INR: 1.14 ratio         PTT - ( 10 Mar 2023 10:24 )  PTT:37.0 sec      03-11 @ 06:01  -  03-11 @ 17:18  --------------------------------------------------------  IN: 280 mL / OUT: 0 mL / NET: 280 mL      DRAINS:     MEDICATIONS:  Antibiotics:    Neuro:  acetaminophen     Tablet .. 650 milliGRAM(s) Oral every 6 hours PRN Temp greater or equal to 38C (100.4F), Mild Pain (1 - 3)  ondansetron Injectable 4 milliGRAM(s) IV Push every 6 hours PRN Nausea and/or Vomiting    Cardiac:    Pulm:    GI/:  senna 2 Tablet(s) Oral at bedtime    Other:   atorvastatin 80 milliGRAM(s) Oral at bedtime  insulin lispro (ADMELOG) corrective regimen sliding scale   SubCutaneous Before meals and at bedtime  levothyroxine 75 MICROGram(s) Oral daily    IMAGING:     < from: MR Sella alone w/wo IV Cont (03.10.23 @ 20:51) >  IMPRESSION:    1. PITUITARY:  Complex predominantly cystic mass lesion  extends into the   suprasellar cistern and elevates and distorts the optic chiasm and   ventral forebrain. Favor craniopharyngioma over cystic pituitary adenoma   or other disease. The lesion is noted to have focal low signal intensity   in its right inferior aspect, possibly calcification, associated with a   more solid component of tumor.  No direct compromise of the cavernous   carotid arteries is demonstrated. However, consider noncontrast CT with   CT angiography in evaluation of these findings    2. BRAIN:  Few scattered cerebral hemispheric white matter lesions   suggest an early manifestation of ischemic white matter disease.    < end of copied text >

## 2023-03-11 NOTE — PROGRESS NOTE ADULT - ASSESSMENT
52 yr old female with a history of hypertension, hyperlipidemia, dm type 2, no surgeries ever presents with a month of headaches and 3 weeks of blurred vision. Endocrinology consulted for evaluation of sellar mass.    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 Too low to be prolactinoma given size of the sellar mass. Suspect stalk effect.   -Secondary hypogonadism can be addressed as outpatient.  -Awaiting IGF-1 and cortisol levels  - will consider dexamethasone suppression test pending cortisol/acth  - f/u neurosurgery recommendations  - f/u ophthalmology recommendations (formal consult if not done) given visual deficits    Primary Hypothyroidism: Pt. with elevated TSH and Low Free T4. Start levothyroxine 75 mcg daily.     T2DM with hyperglycemia  - HbA1c: none in computer  - Home Regimen: metformin 1000mg bid  - Endocrinologist: does not have  PLAN  - Hold oral DM agents while inpatient  - please check Hba1c  - Use low dose Admelog correction scale pre-meal  - Use low dose Admelog correction scale at bedtime  - Fingerstick BG before meals and bedtime  - Goal -180  - Carbohydrate consistent diet  - RD consult  Discharge plan:  - Discharge medications: likely metformin 1000mg bid pending HbA1c  - Patient to call doctor with persistent high or low BG at home.   - Ensure patient has glucometer, test strips and lancets on discharge.  - Recommend routine outpatient ophthalmology, podiatry and endocrinology f/u    HTN  - Home regimen: lisinopril 2.5mg daily  PLAN  - Can check urine microalbumin outpatient  - Outpatient goal BP <130/80. Management per primary team.    HLD  - Home regimen: atorvastatin 80mg daily  PLAN  - resume statin when able  - Can check lipid profile if not done recently          René Arenas D.O  [phone]

## 2023-03-11 NOTE — PROGRESS NOTE ADULT - ASSESSMENT
52 yr old female with a history of hypertension, hyperlipidemia, dm type 2, no surgeries ever presents with a month of headaches and 3 weeks of blurred vision.  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     Neuro:  Imaging reviewed  Awaiting ophthalmology eval  Preop planning.     Cards:  -160  Cont atorvastatin for HLD    Pulm:  Incentive spirometry as tolerated  RA sats >92%    Renal:  IVL  Voiding  Replete electrolytes PRN    GI:  Tolerating diet  Bowel regimen    Endo:  Maintain Euglycemia  ISS  Start synthroid 75mcg daily per Endo  Endo following  Labs ordered will followup    ID:  Afebrile  No leukocytosis    Heme:  DVT PPX: SCDs, SQL    Dispo:  PT/OT Rec: TBD    Discussed with patient and family wound care, follow up plans, activity, and medications. Questions answered, and they verbalized understanding. Time Spent:    d.w dr gonzales  [id number]

## 2023-03-11 NOTE — PROGRESS NOTE ADULT - SUBJECTIVE AND OBJECTIVE BOX
Chief Complaint: Evaluating this 51 y/o F for sellar mass likely craniopharyingioma      Interval History: Feeling well aside from some vision deficits. No worsening headache.     MEDICATIONS  (STANDING):  atorvastatin 80 milliGRAM(s) Oral at bedtime  insulin lispro (ADMELOG) corrective regimen sliding scale   SubCutaneous Before meals and at bedtime  senna 2 Tablet(s) Oral at bedtime    MEDICATIONS  (PRN):  acetaminophen     Tablet .. 650 milliGRAM(s) Oral every 6 hours PRN Temp greater or equal to 38C (100.4F), Mild Pain (1 - 3)  ondansetron Injectable 4 milliGRAM(s) IV Push every 6 hours PRN Nausea and/or Vomiting      Allergies    No Known Allergies    Intolerances      Review of Systems:  Constitutional: No fever  Eyes: +changes in vision  Cardiovascular: No chest pain  Respiratory: No SOB  GI: No abdominal pain, No nausea, No vomiting  Endocrine: as noted in HPI    All other negative      PHYSICAL EXAM:  VITALS: T(C): 36.6 (03-11-23 @ 16:10)  T(F): 97.9 (03-11-23 @ 16:10), Max: 98.8 (03-11-23 @ 13:14)  HR: 75 (03-11-23 @ 16:10) (56 - 75)  BP: 126/78 (03-11-23 @ 16:10) (107/73 - 142/83)  RR:  (16 - 18)  SpO2:  (94% - 99%)  Wt(kg): --  GENERAL: NAD at this time  EYES: EOMI, No proptosis  HEENT:  Atraumatic, Normocephalic,   RESPIRATORY: Clear to auscultation bilaterally, full excursion, non labored  CARDIOVASCULAR: Regular rhythm; normal S1/S2, no peripheral edema  GI: Soft, nontender, non distended, normal bowel sounds  SKIN: Warm and dry  PSYCH: normal affect, normal mood      POCT Blood Glucose.: 109 mg/dL (03-11-23 @ 11:49)  POCT Blood Glucose.: 108 mg/dL (03-11-23 @ 07:23)  POCT Blood Glucose.: 162 mg/dL (03-10-23 @ 21:32)  POCT Blood Glucose.: 112 mg/dL (03-10-23 @ 18:16)        03-10    142  |  105  |  14  ----------------------------<  125<H>  3.6   |  27  |  0.67    eGFR: 104    Ca    9.6      03-10    TPro  8.1  /  Alb  4.9  /  TBili  0.5  /  DBili  x   /  AST  44<H>  /  ALT  29  /  AlkPhos  63  03-10        Thyroid Function Tests:  03-11 @ 05:39 TSH 8.85 FreeT4 -- T3 -- Anti TPO -- Anti Thyroglobulin Ab -- TSI --  03-11 @ 05:38 TSH -- FreeT4 0.5 T3 -- Anti TPO -- Anti Thyroglobulin Ab -- TSI --

## 2023-03-12 LAB
ACTH SER-ACNC: 21.5 PG/ML — SIGNIFICANT CHANGE UP (ref 7.2–63.3)
CHOLEST SERPL-MCNC: 236 MG/DL — HIGH
CORTIS AM PEAK SERPL-MCNC: 5.9 UG/DL — LOW (ref 6–18.4)
GLUCOSE BLDC GLUCOMTR-MCNC: 105 MG/DL — HIGH (ref 70–99)
GLUCOSE BLDC GLUCOMTR-MCNC: 105 MG/DL — HIGH (ref 70–99)
GLUCOSE BLDC GLUCOMTR-MCNC: 134 MG/DL — HIGH (ref 70–99)
GLUCOSE BLDC GLUCOMTR-MCNC: 151 MG/DL — HIGH (ref 70–99)
HDLC SERPL-MCNC: 37 MG/DL — LOW
LIPID PNL WITH DIRECT LDL SERPL: 177 MG/DL — HIGH
NON HDL CHOLESTEROL: 199 MG/DL — HIGH
TRIGL SERPL-MCNC: 111 MG/DL — SIGNIFICANT CHANGE UP

## 2023-03-12 PROCEDURE — 99233 SBSQ HOSP IP/OBS HIGH 50: CPT

## 2023-03-12 RX ORDER — ENOXAPARIN SODIUM 100 MG/ML
40 INJECTION SUBCUTANEOUS
Refills: 0 | Status: DISCONTINUED | OUTPATIENT
Start: 2023-03-12 | End: 2023-03-13

## 2023-03-12 RX ADMIN — ENOXAPARIN SODIUM 40 MILLIGRAM(S): 100 INJECTION SUBCUTANEOUS at 17:10

## 2023-03-12 RX ADMIN — Medication 650 MILLIGRAM(S): at 22:18

## 2023-03-12 RX ADMIN — Medication 650 MILLIGRAM(S): at 06:10

## 2023-03-12 RX ADMIN — Medication 650 MILLIGRAM(S): at 11:44

## 2023-03-12 RX ADMIN — Medication 75 MICROGRAM(S): at 05:40

## 2023-03-12 RX ADMIN — Medication 650 MILLIGRAM(S): at 05:40

## 2023-03-12 RX ADMIN — Medication 650 MILLIGRAM(S): at 21:48

## 2023-03-12 RX ADMIN — Medication 650 MILLIGRAM(S): at 12:20

## 2023-03-12 RX ADMIN — ATORVASTATIN CALCIUM 80 MILLIGRAM(S): 80 TABLET, FILM COATED ORAL at 21:49

## 2023-03-12 RX ADMIN — Medication 2: at 21:49

## 2023-03-12 NOTE — PROGRESS NOTE ADULT - PROBLEM SELECTOR PLAN 1
Cardiovascular Risk Stratification - RCRI is 0 pts = 3.9% at risk for cardiac death, nonfatal myocardial infarction, and nonfatal cardiac arrest perioperatively  -patient has risk factors for CAD, especially with elevated LDL of 177 despite atorva, and other risk factors and now states she has chest pain on exertion. EKG is non-ischemic.   -obtain TTE (ordered) to start prior to any medical clearance   -please obtain cardiac clearance for surgery.

## 2023-03-12 NOTE — PROGRESS NOTE ADULT - PROBLEM SELECTOR PLAN 4
f/u a1c in AM (ordered)   -ISS for now. sugar controlled, less than 150  -nutrition consult  -holding home metformin 1000mg BID.

## 2023-03-12 NOTE — PROGRESS NOTE ADULT - SUBJECTIVE AND OBJECTIVE BOX
[facility] Division of Hospital Medicine  Ej Agosto MD  Available via MS Teams  Pager: [phone]    SUBJECTIVE / OVERNIGHT EVENTS:  - [place] pacific interprettor Cornelius ID [id number] used   - no events overnight, denies any nausea, vomiting, headaches, shortness of breath, cough. states visual changes have not improved yet. denies any current headaches.     ADDITIONAL REVIEW OF SYSTEMS:    MEDICATIONS  (STANDING):  atorvastatin 80 milliGRAM(s) Oral at bedtime  enoxaparin Injectable 40 milliGRAM(s) SubCutaneous <User Schedule>  insulin lispro (ADMELOG) corrective regimen sliding scale   SubCutaneous Before meals and at bedtime  levothyroxine 75 MICROGram(s) Oral daily  senna 2 Tablet(s) Oral at bedtime    MEDICATIONS  (PRN):  acetaminophen     Tablet .. 650 milliGRAM(s) Oral every 6 hours PRN Temp greater or equal to 38C (100.4F), Mild Pain (1 - 3)  ondansetron Injectable 4 milliGRAM(s) IV Push every 6 hours PRN Nausea and/or Vomiting      I&O's Summary    11 Mar 2023 06:01  -  12 Mar 2023 07:00  --------------------------------------------------------  IN: 660 mL / OUT: 0 mL / NET: 660 mL        PHYSICAL EXAM:  Vital Signs Last 24 Hrs  T(C): 36.7 (12 Mar 2023 12:00), Max: 37 (11 Mar 2023 20:41)  T(F): 98.1 (12 Mar 2023 12:00), Max: 98.6 (11 Mar 2023 20:41)  HR: 93 (12 Mar 2023 12:00) (57 - 93)  BP: 129/75 (12 Mar 2023 12:00) (102/67 - 129/75)  BP(mean): --  RR: 16 (12 Mar 2023 12:00) (16 - 18)  SpO2: 96% (12 Mar 2023 12:00) (96% - 99%)    Parameters below as of 12 Mar 2023 12:00  Patient On (Oxygen Delivery Method): room air    PHYSICAL EXAM:  GENERAL: NAD, well-groomed, well-developed  HEAD:  Atraumatic, Normocephalic  EYES: EOMI, PERRLA, conjunctiva and sclera clear  ENMT: No tonsillar erythema, exudates, or enlargement; Moist mucous membranes, Good dentition, No lesions  NECK: Supple, No JVD, Normal thyroid  NERVOUS SYSTEM:  Alert & Oriented X3, Good concentration; Motor Strength 5/5 B/L upper and lower extremities; DTRs 2+ intact and symmetric  CHEST/LUNG: Clear to percussion bilaterally; No rales, rhonchi, wheezing, or rubs  HEART: Regular rate and rhythm; No murmurs, rubs, or gallops  ABDOMEN: Soft, Nontender, Nondistended; Bowel sounds present  EXTREMITIES:  2+ Peripheral Pulses, No clubbing, cyanosis, or edema  LYMPH: No lymphadenopathy noted  SKIN: No rashes or lesions    LABS:      COVID-19 PCR: NotDetec (10 Mar 2023 10:23)      RADIOLOGY & ADDITIONAL TESTS:  New Results Reviewed Today:   New Imaging Personally Reviewed Today:  New Electrocardiogram Personally Reviewed Today:  Prior or Outpatient Records Reviewed Today:    COMMUNICATION:  Care Discussed with Consultants/Other Providers and Details of Discussion:  Discussions with Patient/Family:  PCP Communication:

## 2023-03-12 NOTE — PROGRESS NOTE ADULT - PROBLEM SELECTOR PLAN 2
unclear if this is anginal chest pain, but seems atypical in nature (right sided, no radiation)  -has cardiac risk factors (t2dm, hld, ldl 177, htn) and so would start with TTE  -cardiology consult  -ekg with NSR, nonspecific t wave changes

## 2023-03-13 ENCOUNTER — TRANSCRIPTION ENCOUNTER (OUTPATIENT)
Age: 53
End: 2023-03-13

## 2023-03-13 DIAGNOSIS — Z29.9 ENCOUNTER FOR PROPHYLACTIC MEASURES, UNSPECIFIED: ICD-10-CM

## 2023-03-13 LAB
A1C WITH ESTIMATED AVERAGE GLUCOSE RESULT: 8 % — HIGH (ref 4–5.6)
APTT BLD: 35.5 SEC — SIGNIFICANT CHANGE UP (ref 27.5–35.5)
BLD GP AB SCN SERPL QL: NEGATIVE — SIGNIFICANT CHANGE UP
CORTIS AM PEAK SERPL-MCNC: 6.9 UG/DL — SIGNIFICANT CHANGE UP (ref 6–18.4)
ESTIMATED AVERAGE GLUCOSE: 183 MG/DL — HIGH (ref 68–114)
GLUCOSE BLDC GLUCOMTR-MCNC: 106 MG/DL — HIGH (ref 70–99)
GLUCOSE BLDC GLUCOMTR-MCNC: 114 MG/DL — HIGH (ref 70–99)
GLUCOSE BLDC GLUCOMTR-MCNC: 139 MG/DL — HIGH (ref 70–99)
GLUCOSE BLDC GLUCOMTR-MCNC: 153 MG/DL — HIGH (ref 70–99)
HCG SERPL-ACNC: <2 MIU/ML — SIGNIFICANT CHANGE UP
HCT VFR BLD CALC: 42.8 % — SIGNIFICANT CHANGE UP (ref 34.5–45)
HGB BLD-MCNC: 13.6 G/DL — SIGNIFICANT CHANGE UP (ref 11.5–15.5)
INR BLD: 1.2 RATIO — HIGH (ref 0.88–1.16)
INSULIN-LIKE GROWTH FACTOR 1 INTERPRETATION: SIGNIFICANT CHANGE UP
INSULIN-LIKE GROWTH FACTOR 1: 89 NG/ML — SIGNIFICANT CHANGE UP (ref 52–233)
MCHC RBC-ENTMCNC: 28.2 PG — SIGNIFICANT CHANGE UP (ref 27–34)
MCHC RBC-ENTMCNC: 31.8 GM/DL — LOW (ref 32–36)
MCV RBC AUTO: 88.8 FL — SIGNIFICANT CHANGE UP (ref 80–100)
MRSA PCR RESULT.: SIGNIFICANT CHANGE UP
NRBC # BLD: 0 /100 WBCS — SIGNIFICANT CHANGE UP (ref 0–0)
PLATELET # BLD AUTO: 272 K/UL — SIGNIFICANT CHANGE UP (ref 150–400)
PROTHROM AB SERPL-ACNC: 13.9 SEC — HIGH (ref 10.5–13.4)
RBC # BLD: 4.82 M/UL — SIGNIFICANT CHANGE UP (ref 3.8–5.2)
RBC # FLD: 12.9 % — SIGNIFICANT CHANGE UP (ref 10.3–14.5)
RH IG SCN BLD-IMP: POSITIVE — SIGNIFICANT CHANGE UP
S AUREUS DNA NOSE QL NAA+PROBE: SIGNIFICANT CHANGE UP
SARS-COV-2 RNA SPEC QL NAA+PROBE: SIGNIFICANT CHANGE UP
TROPONIN T, HIGH SENSITIVITY RESULT: 7 NG/L — SIGNIFICANT CHANGE UP (ref 0–51)
WBC # BLD: 6.85 K/UL — SIGNIFICANT CHANGE UP (ref 3.8–10.5)
WBC # FLD AUTO: 6.85 K/UL — SIGNIFICANT CHANGE UP (ref 3.8–10.5)

## 2023-03-13 PROCEDURE — 93306 TTE W/DOPPLER COMPLETE: CPT | Mod: 26

## 2023-03-13 PROCEDURE — 99255 IP/OBS CONSLTJ NEW/EST HI 80: CPT | Mod: 57

## 2023-03-13 PROCEDURE — 93356 MYOCRD STRAIN IMG SPCKL TRCK: CPT

## 2023-03-13 PROCEDURE — 99232 SBSQ HOSP IP/OBS MODERATE 35: CPT

## 2023-03-13 PROCEDURE — 99254 IP/OBS CNSLTJ NEW/EST MOD 60: CPT

## 2023-03-13 PROCEDURE — 75574 CT ANGIO HRT W/3D IMAGE: CPT | Mod: 26

## 2023-03-13 PROCEDURE — 99222 1ST HOSP IP/OBS MODERATE 55: CPT | Mod: 57

## 2023-03-13 PROCEDURE — 99233 SBSQ HOSP IP/OBS HIGH 50: CPT

## 2023-03-13 RX ORDER — CHLORHEXIDINE GLUCONATE 213 G/1000ML
1 SOLUTION TOPICAL ONCE
Refills: 0 | Status: DISCONTINUED | OUTPATIENT
Start: 2023-03-13 | End: 2023-03-14

## 2023-03-13 RX ORDER — SODIUM CHLORIDE 9 MG/ML
1000 INJECTION INTRAMUSCULAR; INTRAVENOUS; SUBCUTANEOUS
Refills: 0 | Status: DISCONTINUED | OUTPATIENT
Start: 2023-03-13 | End: 2023-03-14

## 2023-03-13 RX ORDER — POLYETHYLENE GLYCOL 3350 17 G/17G
17 POWDER, FOR SOLUTION ORAL
Refills: 0 | Status: DISCONTINUED | OUTPATIENT
Start: 2023-03-13 | End: 2023-03-14

## 2023-03-13 RX ORDER — COSYNTROPIN 0.25 MG/ML
0.25 INJECTION, SOLUTION INTRAVENOUS ONCE
Refills: 0 | Status: COMPLETED | OUTPATIENT
Start: 2023-03-14 | End: 2023-03-14

## 2023-03-13 RX ORDER — SODIUM CHLORIDE 9 MG/ML
1000 INJECTION INTRAMUSCULAR; INTRAVENOUS; SUBCUTANEOUS
Refills: 0 | Status: DISCONTINUED | OUTPATIENT
Start: 2023-03-13 | End: 2023-03-13

## 2023-03-13 RX ADMIN — POLYETHYLENE GLYCOL 3350 17 GRAM(S): 17 POWDER, FOR SOLUTION ORAL at 17:24

## 2023-03-13 RX ADMIN — Medication 75 MICROGRAM(S): at 06:13

## 2023-03-13 RX ADMIN — Medication 650 MILLIGRAM(S): at 07:35

## 2023-03-13 RX ADMIN — Medication 2: at 12:38

## 2023-03-13 RX ADMIN — Medication 650 MILLIGRAM(S): at 08:35

## 2023-03-13 RX ADMIN — ATORVASTATIN CALCIUM 80 MILLIGRAM(S): 80 TABLET, FILM COATED ORAL at 22:05

## 2023-03-13 NOTE — PROGRESS NOTE ADULT - PROBLEM SELECTOR PLAN 7
new diagnosis.  - elevated TSH to 8.85 with low free T4 0.5  - started on levothyroxine 75mcg daily as per Endo recs - will need rx on discharge  - repeat TFTs as outpatient

## 2023-03-13 NOTE — STUDENT SIGN OFF DOCUMENT - DOCUMENTS STUDENTS ARE SIGNED OFF ON
DERMATOLOGY VISIT NOTE        Verbal permission granted by patient to leave a detailed message with medical information on answering machine at phone number listed in Epic? yes    If female, are you pregnant, trying to become pregnant, or breastfeeding? No      COVID-19 Screening:    Does the patient OR patient’s household members have any of the following symptoms?  • Temperature: Fever >100.4°F or >38.0°C?  No  • Respiratory symptoms: New or worsening cough, shortness of breath, or sore throat? No  • GI symptoms: New onset of nausea, vomiting or diarrhea?  No  • Miscellaneous: New onset of loss of taste or smell, chills, repeated shaking with chills, muscle pain or headache?  No  Has the patient or a household member tested positive for COVID-19 in the last 14 days?  No               Vital Signs/Plan of Care/Assessment and Intervention Occupational Therapy Evaluation

## 2023-03-13 NOTE — PROGRESS NOTE ADULT - TIME BILLING
More than 55 minutes spent on total encounter: more than 50% of the visit was spent on educating the patient and family regarding condition, medications, follow up plans, signs and symptoms to be concerned with, preparing paperwork, and questions answered regarding discharge.

## 2023-03-13 NOTE — CONSULT NOTE ADULT - SUBJECTIVE AND OBJECTIVE BOX
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 650 milliGRAM(s) Oral every 6 hours PRN  ondansetron Injectable 4 milliGRAM(s) IV Push every 6 hours PRN    polyethylene glycol 3350 17 Gram(s) Oral two times a day  senna 2 Tablet(s) Oral at bedtime    atorvastatin 80 milliGRAM(s) Oral at bedtime  insulin lispro (ADMELOG) corrective regimen sliding scale   SubCutaneous Before meals and at bedtime  levothyroxine 75 MICROGram(s) Oral daily        FAMILY HISTORY:      Non-contributory    SOCIAL HISTORY:    No tobacco, drugs or etoh    Allergies    No Known Allergies    Intolerances    	    REVIEW OF SYSTEMS:  as above  The rest of the 14 points ROS reviewed and except above they are unremarkable.        PHYSICAL EXAM:  T(C): 36.4 (03-13-23 @ 04:54), Max: 36.8 (03-12-23 @ 20:20)  HR: 62 (03-13-23 @ 04:54) (60 - 93)  BP: 105/71 (03-13-23 @ 04:54) (100/66 - 129/75)  RR: 18 (03-13-23 @ 04:54) (16 - 18)  SpO2: 97% (03-13-23 @ 04:54) (96% - 97%)  Wt(kg): --  I&O's Summary    12 Mar 2023 07:01  -  13 Mar 2023 07:00  --------------------------------------------------------  IN: 550 mL / OUT: 0 mL / NET: 550 mL      JVP: Normal  Neck: supple  Lung: clear   CV: S1 S2 , Murmur:  Abd: soft  Ext: No edema  neuro: Awake / alert  Psych: flat affect  Skin: normal      LABS/DATA:    TELEMETRY: 	    ECG:  	sinus, anterior T wave abn    	  CARDIAC MARKERS:                        proBNP:   Lipid Profile:   HgA1c:   TSH:

## 2023-03-13 NOTE — PROGRESS NOTE ADULT - TIME BILLING
- Ordering, reviewing, and interpreting labs, testing, and imaging.  - Independently obtaining a review of systems and performing a physical exam  - Reviewing consultant documentation/recommendations in addition to discussing plan of care with consultants.  - Counselling and educating patient and family regarding interpretation of aforementioned items and plan of care.

## 2023-03-13 NOTE — PHYSICAL THERAPY INITIAL EVALUATION ADULT - ADDITIONAL COMMENTS
Pt lives in a private home with son there are 0 steps to enter. Pt performed ADL/IADLs independently. Ambulates with no AD. Telugu speaking. Pt lives in a private home with son there are 0 steps to enter, bedroom on first floor. Pt performed ADL/IADLs independently. Ambulates with no AD. Ukrainian speaking.

## 2023-03-13 NOTE — PROGRESS NOTE ADULT - ASSESSMENT
52 yr old female with a history of hypertension, hyperlipidemia, dm type 2, no surgeries ever presents with a month of headaches and 3 weeks of blurred vision.  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  Patient was at [facility].  Patient was sent to [facility].  No endocrine work up done. (10 Mar 2023 11:57)    PROCEDURE:  Adm 3/10 Surra sellar mass  POD#N/A    PLAN:  Neuro: Preop for poss OR 3/14 or 3/15-Hold SQL, Labs-P, IVF, NPO MN, TTE and CT Coroneries-P. Inc activity/OOB.     Cardio/Dr Zhang aware to see pt today, Coronary CT and TTE-P FU    Optho Cons-P FU    Endo note of 3/12-Discharge plan:- Discharge medications: likely metformin 1000mg bid pending HbA1c- Patient to call doctor with persistent high or low BG at home. - Ensure patient has glucometer, test strips and lancets on discharge.- Recommend routine outpatient ophthalmology, podiatry and endocrinology f/u HTN - Home regimen: lisinopril 2.5mg daily  PLAN - Can check urine microalbumin outpatient - Outpatient goal BP <130/80. Management per primary team. HLD- Home regimen: atorvastatin 80mg daily PLAN - resume statin when able- Can check lipid profile if not done recently    Hosp note of 3/12-medicine consulted for pre-operative optimization and management.  Problem/Plan - 1:·  Problem: Pre-operative clearance. ·  Plan: Cardiovascular Risk Stratification - RCRI is 0 pts = 3.9% at risk for cardiac death, nonfatal myocardial infarction, and nonfatal cardiac arrest perioperatively-patient has risk factors for CAD, especially with elevated LDL of 177 despite atorva, and other risk factors and now states she has chest pain on exertion. EKG is non-ischemic. -obtain TTE (ordered) to start prior to any medical clearance -please obtain cardiac clearance for surgery. Problem/Plan - 2:·  Problem: Chest pain. ·  Plan: unclear if this is anginal chest pain, but seems atypical in nature (right sided, no radiation)-has cardiac risk factors (t2dm, hld, ldl 177, htn) and so would start with TTE-cardiology consult-ekg with NSR, nonspecific t wave changes. Problem/Plan - 3:·  Problem: Sellar or suprasellar mass. ·  Plan: management per nsgy-appreciate endo consult. Problem/Plan - 4:·  Problem: T2DM (type 2 diabetes mellitus). ·  Plan: f/u a1c in AM (ordered) -ISS for now. sugar controlled, less than 150-nutrition consult-holding home metformin 1000mg BID. Problem/Plan - 5:·  Problem: HLD (hyperlipidemia). ·  Plan: c/w atorva 80 (home dose). Problem/Plan - 6:·  Problem: HTN (hypertension). ·  Plan: c/w home lisinopril 2.5mg daily.    Respiratory: Patient instructed to use incentive spirometer [ ] YES [X ] NO              DVT ppx: [X ] SQ3/13 Held for OR [ ] SQH and Venodynes [ ] Left [ ] Right [ X] Bilateral    Discharge Planning:  The patient was evaluated by PT/OT and recommended >>>.   She was subsequently DC on >>> in stable condition.    More than 30 minutes spent on total encounter: more than 50% of the visit was spent on educating the patient and family regarding condition, medications, follow up plans, signs and symptoms to be concerned with, preparing paperwork, and questions answered regarding discharge.

## 2023-03-13 NOTE — CONSULT NOTE ADULT - ATTENDING COMMENTS
52 yoF with bitemporal VF deficits due to suprasellar mass. Baseline Va in hospital poor OS. Unclear if secondary to mass effect vs glaucoma vs other etiology. Will need further outpatient follow-up. Agree with remainder of plan as above.
Agree with assessment and plan as above by Dr. Bishop. Reviewed all pertinent labs, glucose values, and imaging studies. Modifications made as indicated above. Pt. with sellar mass awaiting resection. Following imaging studies to assess size and characteristics. Follow-up pituitary labs. Will monitor post-op for risk of pituitary hormone dysfunction. Optho mino.     René Arenas D.O  [phone]

## 2023-03-13 NOTE — PROGRESS NOTE ADULT - PROBLEM SELECTOR PLAN 1
Cardiovascular Risk Stratification - RCRI is 0 pts = 3.9% at risk for cardiac death, nonfatal myocardial infarction, and nonfatal cardiac arrest perioperatively  - patient has risk factors for CAD, especially with elevated LDL of 177 despite atorva, and other risk factors and now states she has chest pain on exertion. EKG NSR with non-specific T wave abnormalities  - trop 31-->7, no need to further trend  - f/u TTE  - f/u CT coronaries  - Cardiology consult recs appreciated  - medical clearance will be dependent on above results Cardiovascular Risk Stratification - RCRI is 0 pts = 3.9% at risk for cardiac death, nonfatal myocardial infarction, and nonfatal cardiac arrest perioperatively  - patient has risk factors for CAD, especially with elevated LDL of 177 despite atorva, and other risk factors and now states she has chest pain on exertion. EKG NSR with non-specific T wave abnormalities  - trop 31-->7, no need to further trend  - f/u TTE  - CT Coronaries with minimal non-obstructive CAD, calcium score = 0  - Cardiology consult recs appreciated  - no contraindication from medicine standpoint for OR pending TTE results

## 2023-03-13 NOTE — PROGRESS NOTE ADULT - PROBLEM SELECTOR PLAN 2
unclear if this is anginal chest pain, but seems atypical in nature (right sided, no radiation)  also reproducible on exam  - EKG with non-specific T wave abnormalities  - trop 31-->7, no need to further trend  - f/u TTE  - f/u CT coronaries  - Cardiology consulted, recs appreciated unclear if this is anginal chest pain, but seems atypical in nature (right sided, no radiation)  also reproducible on exam  - EKG with non-specific T wave abnormalities  - trop 31-->7, no need to further trend  - f/u TTE  - CT Coronaries with minimal non-obstructive CAD, calcium score = 0  - Cardiology consulted, recs appreciated

## 2023-03-13 NOTE — PROGRESS NOTE ADULT - SUBJECTIVE AND OBJECTIVE BOX
SUBJECTIVE: This is my initial contact with this pt. Appears comfortable w/o complaints. No CP/SOB. NAD    OVERNIGHT EVENTS: None    Vital Signs Last 24 Hrs  T(C): 36.4 (13 Mar 2023 04:54), Max: 36.8 (12 Mar 2023 20:20)  T(F): 97.6 (13 Mar 2023 04:54), Max: 98.2 (12 Mar 2023 20:20)  HR: 62 (13 Mar 2023 04:54) (60 - 93)  BP: 105/71 (13 Mar 2023 04:54) (100/66 - 129/75)  BP(mean): --  RR: 18 (13 Mar 2023 04:54) (16 - 18)  SpO2: 97% (13 Mar 2023 04:54) (96% - 97%)    Parameters below as of 13 Mar 2023 04:54  Patient On (Oxygen Delivery Method): room air      IVF: [ X] IVL [ ] NS+K@   DIET: [ ] Regular [ X] CCD [ ] Renal [ ] Puree [ ] Dysphagia [ ] Tube Feeds:   PCA: [ ] YES X[ ] NO   PHAM: [ ] YES [ X] NO [X ] VOID   BM: [ X] YES-on 3/12     DRAINS: NA    PHYSICAL EXAM:    General: No Acute Distress     Neurological: Swazi speaking. Awake, alert oriented to person, place and time, Following Commands, PERRL, EOMI, RT superior Quad deficit, Face Symmetrical, Speech Fluent.  Muscle Strength normal in all four extremities, No Drift, Sensation to Light Touch Intact    Pulmonary: Clear to Auscultation, No Rales, No Rhonchi, No Wheezes     Cardiovascular: S1, S2, Regular Rate and Rhythm     Gastrointestinal: Soft, Nontender, Nondistended     Incision: NA    LABS:    Troponin T, High Sensitivity (03.10.23 @ 13:28)    Troponin T, High Sensitivity Result: 31: Specimen not hemolyzed    CKMB Mass Assay (03.10.23 @ 13:28)    CKMB Units: 5.8 ng/mL    CPK Mass Assay %: 1.2 %    COVID-19 PCR: NotDetec (10 Mar 2023 10:23)      03-12 @ 07:01  -  03-13 @ 07:00  --------------------------------------------------------  IN: 550 mL / OUT: 0 mL / NET: 550 mL      IMAGING:   < from: CT Head No Cont (03.11.23 @ 13:57) >  IMPRESSION: Heterogeneous mass in the suprasellar cistern separate from   the pituitary gland consistent with craniopharyngioma.    < end of copied text >    < from: VA Duplex Lower Ext Vein Scan, Bilat (03.11.23 @ 13:44) >  IMPRESSION:  No evidence of deep venous thrombosis in either lower extremity.    < end of copied text >    < from: MR Sella alone w/wo IV Cont (03.10.23 @ 20:51) >  1. PITUITARY:  Complex predominantly cystic mass lesion  extends into the   suprasellar cistern and elevates and distorts the optic chiasm and   ventral forebrain. Favor craniopharyngioma over cystic pituitary adenoma   or other disease. The lesion is noted to have focal low signal intensity   in its right inferior aspect, possibly calcification, associated with a   more solid component of tumor.  No direct compromise of the cavernous   carotid arteries is demonstrated. However, consider noncontrast CT with   CT angiography in evaluation of these findings    2. BRAIN:  Few scattered cerebral hemispheric white matter lesions   suggest an early manifestation of ischemic white matter disease.    < end of copied text >    MEDICATIONS  (STANDING):  atorvastatin 80 milliGRAM(s) Oral at bedtime  enoxaparin Injectable 40 milliGRAM(s) SubCutaneous <User Schedule>  insulin lispro (ADMELOG) corrective regimen sliding scale   SubCutaneous Before meals and at bedtime  levothyroxine 75 MICROGram(s) Oral daily  senna 2 Tablet(s) Oral at bedtime    MEDICATIONS  (PRN):  acetaminophen     Tablet .. 650 milliGRAM(s) Oral every 6 hours PRN Temp greater or equal to 38C (100.4F), Mild Pain (1 - 3)  ondansetron Injectable 4 milliGRAM(s) IV Push every 6 hours PRN Nausea and/or Vomiting

## 2023-03-13 NOTE — PROGRESS NOTE ADULT - PROBLEM SELECTOR PLAN 3
- plan for OR tentatively tomorrow 3/14  - Endo consult appreciated - plan for OR tentatively tomorrow 3/14  - Endo consult appreciated  - no contraindication from medicine standpoint for OR pending TTE results

## 2023-03-13 NOTE — CONSULT NOTE ADULT - SUBJECTIVE AND OBJECTIVE BOX
[facility] DEPARTMENT OF OPHTHALMOLOGY - INITIAL ADULT CONSULT  -----------------------------------------------------------------------------------------------------------------  Julius Velásquez MD  PGY 2  Contact on Teams  -----------------------------------------------------------------------------------------------------------------    HPI:  52 yr old female with a history of hypertension, hyperlipidemia, dm type 2, no surgeries ever presents with a month of headaches and 3 weeks of blurred vision.  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  Patient was at [facility].  Patient was sent to [facility].  No endocrine work up done. (10 Mar 2023 11:57)    Interval History: Ophtho consulted for limited baseline exam in inpatient setting prior to surgery. Patient reports that left eye is worse than her right and she has noticed some gaps in her vision.     PAST MEDICAL & SURGICAL HISTORY:  Hypertension      Hyperlipidemia      Diabetes mellitus        Past Ocular History: Glaucoma, on xal 1,1  Ophthalmic Medications: xal 1,1  FAMILY HISTORY:        MEDICATIONS  (STANDING):  atorvastatin 80 milliGRAM(s) Oral at bedtime  chlorhexidine 4% Liquid 1 Application(s) Topical once  insulin lispro (ADMELOG) corrective regimen sliding scale   SubCutaneous Before meals and at bedtime  levothyroxine 75 MICROGram(s) Oral daily  polyethylene glycol 3350 17 Gram(s) Oral two times a day  senna 2 Tablet(s) Oral at bedtime  sodium chloride 0.9%. 1000 milliLiter(s) (75 mL/Hr) IV Continuous <Continuous>    MEDICATIONS  (PRN):  acetaminophen     Tablet .. 650 milliGRAM(s) Oral every 6 hours PRN Temp greater or equal to 38C (100.4F), Mild Pain (1 - 3)  ondansetron Injectable 4 milliGRAM(s) IV Push every 6 hours PRN Nausea and/or Vomiting    Allergies & Intolerances:     Review of Systems:  Constitutional: No fever, chills  Eyes: No blurry vision, flashes, floaters, FBS, erythema, discharge, double vision, OU  Neuro: No tremors  Cardiovascular: No chest pain, palpitations  Respiratory: No SOB, no cough  GI: No nausea, vomiting, abdominal pain  : No dysuria  Skin: no rash  Psych: no depression  Endocrine: no polyuria, polydipsia  Heme/lymph: no swelling    VITALS: T(C): 36.4 (03-13-23 @ 04:54)  T(F): 97.6 (03-13-23 @ 04:54), Max: 98.2 (03-12-23 @ 20:20)  HR: 62 (03-13-23 @ 04:54) (60 - 93)  BP: 105/71 (03-13-23 @ 04:54) (100/66 - 129/75)  RR:  (16 - 18)  SpO2:  (96% - 97%)  Wt(kg): --  General: AAO x 3, appropriate mood and affect    Ophthalmology Exam:  Visual acuity (cc): 20/70 OD PH 20/20; OS 20/70 PH 20/50  Pupils: PERRL OU, no APD  Ttono: 16 OU  Extraocular movements (EOMs): Full OU, no pain, no diplopia  Confrontational Visual Field (CVF): gross inferotemporal deficits OU  Color Plates: 12/12 OD 0/12 OS    Pen Light Exam (PLE)  External: Flat OU  Lids/Lashes/Lacrimal Ducts: Flat OU    Sclera/Conjunctiva: W+Q OU  Cornea: Cl OU  Anterior Chamber: D+F OU    Iris: Flat OU  Lens: NS OU    Fundus Exam: dilated with 1% tropicamide and 2.5% phenylephrine  Approval obtained from primary team for dilation  Patient aware that pupils can remained dilated for at least 4-6 hours  Exam performed with 20D lens    Vitreous: wnl OU  Disc, cup/disc: sharp and pink, 0.85 OU  Macula: wnl OU  Vessels: wnl OU  Periphery: wnl OU    Labs/Imaging:  MR HEAD  1.   Heterogeneous lobulated sellar mass measuring 2.1 cm AP by 1.5   cm TRV by 2.1 cm CC with suprasellar extension with compression of the   optic chiasm, extension into the LEFT cavernous sinus as well as between   the BILATERAL hypodense thalami. The cavernous LEFT internal carotid   artery is patent. 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        2.   Minimal periventricular and bifrontal subcortical white matter   ischemia.  .    MR sella    1. PITUITARY:  Complex predominantly cystic mass lesion  extends into the   suprasellar cistern and elevates and distorts the optic chiasm and   ventral forebrain. Favor craniopharyngioma over cystic pituitary adenoma   or other disease. The lesion is noted to have focal low signal intensity   in its right inferior aspect, possibly calcification, associated with a   more solid component of tumor.  No direct compromise of the cavernous   carotid arteries is demonstrated. However, consider noncontrast CT with   CT angiography in evaluation of these findings    2. BRAIN:  Few scattered cerebral hemispheric white matter lesions   suggest an early manifestation of ischemic white matter disease.

## 2023-03-13 NOTE — CONSULT NOTE ADULT - ASSESSMENT
Assessment and Recommendations:  52y female with a past medical history/ocular history of glaucoma and supraseller mass consulted for gross baseline ophtho exam, found to have bitemporal inferior field defects and decreased color vison OS.   Patient with vision 20/70 PH 20/20 right eye, 20/70 PH 20/50 left eye, pupils equal, round and reactive, intraocular pressure within normal limits, extraocular movements. Dilated fundus exam shows symmetric enlarged cup/disc ratios of about 0.85.     #Bitemporal inferior field defect on gross bedside exam   - Consistent with suprasellar mass   - Patient would need outpatient visual field testing for formal evaluation fo visual fields (as discussed with team)   - Appreciate management per endocrine and neurosurgery    #Hx of glaucoma   - IOP under control on exam today   - Cw xalatan a night in both eyes   - Patient needs to be followed outpatiet.      Outpatient Follow-up: Patient should follow-up with his/her ophthalmologist or with [facility] Department of Ophthalmology within 1 week of after discharge at:    [address]  [place] [address]  [phone]    Julius Velásquez MD, PGY-2  Also available on Microsoft Teams     Assessment and Recommendations:  52y female with a past medical history/ocular history of glaucoma and supraseller mass consulted for gross baseline ophtho exam, found to have bitemporal inferior field defects and decreased color vison OS.   Patient with vision 20/70 PH 20/20 right eye, 20/70 PH 20/50 left eye, pupils equal, round and reactive, intraocular pressure within normal limits, extraocular movements. Dilated fundus exam shows symmetric enlarged cup/disc ratios of about 0.85.     #Bitemporal inferior field defect on gross bedside exam   - Consistent with suprasellar mass   - Patient would need outpatient visual field testing for formal evaluation fo visual fields (as discussed with team)   - Appreciate management per endocrine and neurosurgery    #Hx of glaucoma   - IOP under control on exam today   - Cw xalatan a night in both eyes   - Patient needs to be followed outpatient with OCT ON, HVF, pachymetry    Seen and discussed with Dr. Abraham, PGY-2 and Dr. Carmona attending.     Outpatient Follow-up: Patient should follow-up with his/her ophthalmologist or with [facility] Department of Ophthalmology within 1 week of after discharge at:    [address] [facility] [address] [facility]  [place] [address]  [phone]    Julius Velásquez MD, PGY-2  Also available on Microsoft Teams     Assessment and Recommendations:  52y female with a past medical history/ocular history of glaucoma and supraseller mass consulted for gross baseline ophtho exam, found to have bitemporal inferior field defects and decreased color vison OS.   Patient with vision 20/70 PH 20/20 right eye, 20/70 PH 20/50 left eye, pupils equal, round and reactive, intraocular pressure within normal limits, extraocular movements. Dilated fundus exam shows symmetric enlarged cup/disc ratios of about 0.85.     #Bitemporal inferior field defect on gross bedside exam   - Consistent with suprasellar mass   - Patient will need outpatient visual field testing for evaluation after surgery   - Appreciate management per endocrine and neurosurgery    #Hx of glaucoma   - IOP under control on exam today   - Cw xalatan a night in both eyes   - Patient needs to be followed outpatient with OCT ON, HVF, pachymetry    Seen and discussed with Dr. Abraham, PGY-2 and Dr. Carmona attending.     Outpatient Follow-up: Patient should follow-up with his/her ophthalmologist or with [facility] Department of Ophthalmology within 1 week of after discharge at:    [address] [facility]  [place] [address]  [phone]    Julius Velásquez MD, PGY-2  Also available on Microsoft Teams

## 2023-03-13 NOTE — PROGRESS NOTE ADULT - SUBJECTIVE AND OBJECTIVE BOX
ENDOCRINE FOLLOW UP     Chief Complaint: craniopharyngioma    History:     MEDICATIONS  (STANDING):  atorvastatin 80 milliGRAM(s) Oral at bedtime  chlorhexidine 4% Liquid 1 Application(s) Topical once  insulin lispro (ADMELOG) corrective regimen sliding scale   SubCutaneous Before meals and at bedtime  levothyroxine 75 MICROGram(s) Oral daily  polyethylene glycol 3350 17 Gram(s) Oral two times a day  senna 2 Tablet(s) Oral at bedtime  sodium chloride 0.9%. 1000 milliLiter(s) (75 mL/Hr) IV Continuous <Continuous>    MEDICATIONS  (PRN):  acetaminophen     Tablet .. 650 milliGRAM(s) Oral every 6 hours PRN Temp greater or equal to 38C (100.4F), Mild Pain (1 - 3)  ondansetron Injectable 4 milliGRAM(s) IV Push every 6 hours PRN Nausea and/or Vomiting      Allergies    No Known Allergies    Intolerances        ROS: All other systems reviewed and negative    PHYSICAL EXAM:  VITALS: T(C): 36.4 (03-13-23 @ 04:54)  T(F): 97.6 (03-13-23 @ 04:54), Max: 98.2 (03-12-23 @ 20:20)  HR: 62 (03-13-23 @ 04:54) (60 - 93)  BP: 105/71 (03-13-23 @ 04:54) (100/66 - 129/75)  RR:  (16 - 18)  SpO2:  (96% - 97%)  Wt(kg): --  GENERAL: NAD, resting comfortably   EYES: No proptosis,  anicteric  HEENT:  Atraumatic, Normocephalic, moist mucous membranes  RESPIRATORY: Nonlabored respirations on room air, normal rate/effort   CARDIOVASCULAR: Did not appear cyanotic, no lower extremity swelling visualized  GI: Soft, nontender, non distended  NEURO: Answering questions appropriately, moves all extremities spontaneously  PSYCH:  reactive affect, euthymic mood    POCT Blood Glucose.: 106 mg/dL (03-13-23 @ 07:43)  POCT Blood Glucose.: 151 mg/dL (03-12-23 @ 21:16)  POCT Blood Glucose.: 105 mg/dL (03-12-23 @ 16:31)  POCT Blood Glucose.: 134 mg/dL (03-12-23 @ 11:25)  POCT Blood Glucose.: 105 mg/dL (03-12-23 @ 07:50)  POCT Blood Glucose.: 146 mg/dL (03-11-23 @ 21:00)  POCT Blood Glucose.: 115 mg/dL (03-11-23 @ 16:21)  POCT Blood Glucose.: 109 mg/dL (03-11-23 @ 11:49)  POCT Blood Glucose.: 108 mg/dL (03-11-23 @ 07:23)  POCT Blood Glucose.: 162 mg/dL (03-10-23 @ 21:32)  POCT Blood Glucose.: 112 mg/dL (03-10-23 @ 18:16)      03-10    142  |  105  |  14  ----------------------------<  125<H>  3.6   |  27  |  0.67    eGFR: 104    Ca    9.6      03-10        A1C with Estimated Average Glucose Result: 8.0 % (03-13-23 @ 06:23)      Thyroid Stimulating Hormone, Serum: 8.85 uIU/mL (03-11-23 @ 05:39)   ENDOCRINE FOLLOW UP     Chief Complaint: craniopharyngioma    History:   [employer]  ID [id number] Oksana Used. Patient reports having a mild headache and dizziness. Reports feeling better, denies complaints.    MEDICATIONS  (STANDING):  atorvastatin 80 milliGRAM(s) Oral at bedtime  chlorhexidine 4% Liquid 1 Application(s) Topical once  insulin lispro (ADMELOG) corrective regimen sliding scale   SubCutaneous Before meals and at bedtime  levothyroxine 75 MICROGram(s) Oral daily  polyethylene glycol 3350 17 Gram(s) Oral two times a day  senna 2 Tablet(s) Oral at bedtime  sodium chloride 0.9%. 1000 milliLiter(s) (75 mL/Hr) IV Continuous <Continuous>    MEDICATIONS  (PRN):  acetaminophen     Tablet .. 650 milliGRAM(s) Oral every 6 hours PRN Temp greater or equal to 38C (100.4F), Mild Pain (1 - 3)  ondansetron Injectable 4 milliGRAM(s) IV Push every 6 hours PRN Nausea and/or Vomiting      Allergies    No Known Allergies    Intolerances        ROS: All other systems reviewed and negative    PHYSICAL EXAM:  VITALS: T(C): 36.4 (03-13-23 @ 04:54)  T(F): 97.6 (03-13-23 @ 04:54), Max: 98.2 (03-12-23 @ 20:20)  HR: 62 (03-13-23 @ 04:54) (60 - 93)  BP: 105/71 (03-13-23 @ 04:54) (100/66 - 129/75)  RR:  (16 - 18)  SpO2:  (96% - 97%)  Wt(kg): --  GENERAL: NAD, resting comfortably in chair, eating lunch  EYES: No proptosis,  anicteric  HEENT:  Atraumatic, Normocephalic, mildly dry mucous membranes  RESPIRATORY: Nonlabored respirations on room air, normal rate/effort   CARDIOVASCULAR: Did not appear cyanotic  NEURO: Answering questions appropriately, moves all extremities spontaneously  PSYCH:  reactive affect, euthymic mood    POCT Blood Glucose.: 106 mg/dL (03-13-23 @ 07:43)  POCT Blood Glucose.: 151 mg/dL (03-12-23 @ 21:16)  POCT Blood Glucose.: 105 mg/dL (03-12-23 @ 16:31)  POCT Blood Glucose.: 134 mg/dL (03-12-23 @ 11:25)  POCT Blood Glucose.: 105 mg/dL (03-12-23 @ 07:50)  POCT Blood Glucose.: 146 mg/dL (03-11-23 @ 21:00)  POCT Blood Glucose.: 115 mg/dL (03-11-23 @ 16:21)  POCT Blood Glucose.: 109 mg/dL (03-11-23 @ 11:49)  POCT Blood Glucose.: 108 mg/dL (03-11-23 @ 07:23)  POCT Blood Glucose.: 162 mg/dL (03-10-23 @ 21:32)  POCT Blood Glucose.: 112 mg/dL (03-10-23 @ 18:16)      03-10    142  |  105  |  14  ----------------------------<  125<H>  3.6   |  27  |  0.67    eGFR: 104    Ca    9.6      03-10        A1C with Estimated Average Glucose Result: 8.0 % (03-13-23 @ 06:23)      Thyroid Stimulating Hormone, Serum: 8.85 uIU/mL (03-11-23 @ 05:39)

## 2023-03-13 NOTE — PHYSICAL THERAPY INITIAL EVALUATION ADULT - LEVEL OF INDEPENDENCE: GAIT, REHAB EVAL
Spiritual Plan of Care    Pt Name: Colette Vega  Pt : 1932  Date: 2021    Visit Type: In person    Referral Source:      Reason for Visit: Trigger    Visited With: Patient not available    Spiritual Care Consult Needed: Spiritual Care eval completed    Patient asleep at time of visit.   provided tent card.  Spiritual Care Services will continue to follow up as requested.    Rev. Radha Ya MDiv, [facility]  Senior Staff   [facility]  [phone]     independent

## 2023-03-13 NOTE — PROGRESS NOTE ADULT - ASSESSMENT
51 yo Welsh-speaking female with PMH of HTN, HLD, T2DM, who presents with headaches, blurry vision, admitted after being found to have suprasellar mass, admitted for further workup, medicine consulted for pre-operative optimization and management

## 2023-03-13 NOTE — PROGRESS NOTE ADULT - ASSESSMENT
52 yr old female with a history of hypertension, hyperlipidemia, dm type 2, no surgeries ever presents with a month of headaches and 3 weeks of blurred vision. Endocrinology consulted for evaluation of sellar mass.    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 Too low to be prolactinoma given size of the sellar mass. Suspect stalk effect. (prolactin diluted 103, undiluted 107)  -Secondary hypogonadism can be addressed as outpatient. (Lh 2.6, estradiol <5)  -3/12 AM ayush 5.9, ACTH 21.5, 3/13 am cortisol (6am) 6.9  PLAN  - f/u IGF-1  - f/u neurosurgery recommendations  - f/u ophthalmology recommendations (formal consult if not done) given visual deficits    Primary Hypothyroidism: Pt. with elevated TSH (8)and Low Free T4 (0.5). Start levothyroxine 75 mcg daily.     T2DM with hyperglycemia  - HbA1c: 8  - Home Regimen: metformin 1000mg bid  - Endocrinologist: does not have  PLAN  - Hold oral DM agents while inpatient  - Use low dose Admelog correction scale pre-meal  - Use low dose Admelog correction scale at bedtime  - Fingerstick BG before meals and bedtime  - Goal -180  - Carbohydrate consistent diet  - RD consult  Discharge plan:  - Discharge medications: likely metformin 1000mg bid   - Patient to call doctor with persistent high or low BG at home.   - Ensure patient has glucometer, test strips and lancets on discharge.  - Recommend routine outpatient ophthalmology, podiatry and endocrinology f/u    HTN  - Home regimen: lisinopril 2.5mg daily  PLAN  - Can check urine microalbumin outpatient  - Outpatient goal BP <130/80. Management per primary team.    HLD  - Home regimen: atorvastatin 80mg daily  -   PLAN  - resume statin when able    Discussed with primary team.     Thank you for the interesting consult.    Denny Bishop MD, Endocrinology Fellow  Pager [phone] from 9am to 5pm. After hours and on weekends, please call [phone]. 52 yr old female with a history of hypertension, hyperlipidemia, dm type 2, no surgeries ever presents with a month of headaches and 3 weeks of blurred vision. Endocrinology consulted for evaluation of sellar mass.    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 Too low to be prolactinoma given size of the sellar mass. Suspect stalk effect. (prolactin diluted 103, undiluted 107)  -Secondary hypogonadism can be addressed as outpatient. (Lh 2.6, estradiol <5)  -3/12 AM ayush 5.9, ACTH 21.5, 3/13 am cortisol (6am) 6.9  PLAN  - f/u IGF-1  - f/u neurosurgery recommendations  - f/u ophthalmology recommendations (formal consult if not done) given visual deficits  - would perform co-syntropin stim test on patient tomorrow morning 3/14 at 8am, if results back can reach out to endocrinology about stress dose steroids, if patient getting TSP on 3/14 and results not back, would give 100mg IV hydrocortisone on call to OR and the start 50mg q8h thereafter  To perform a co-syntropin stim test, measure a baseline ACTH and cortisol level at 8am. Then administer 250mcg co-syntropin. Thirty minutes after administration of co-syntropin, measure the cortisol level. Sixty minutes after administration of co-syntropin, measure the cortisol again. If the cortisol level after stimulation is >14.5 adrenal insufficiency is unlikely, and if >18 it is definitively ruled out. No need for steroids at this time, but if at any time patient becomes hemodynamically unstable, please initiate stress dose steroids (hydrocortisone 50mg q8h)    Primary Hypothyroidism: Pt. with elevated TSH (8)and Low Free T4 (0.5). Start levothyroxine 75 mcg daily.     T2DM with hyperglycemia  - HbA1c: 8  - Home Regimen: metformin 1000mg bid  - Endocrinologist: does not have  PLAN  - Hold oral DM agents while inpatient  - Use low dose Admelog correction scale pre-meal  - Use low dose Admelog correction scale at bedtime  - Fingerstick BG before meals and bedtime  - Goal -180  - Carbohydrate consistent diet  - RD consult  Discharge plan:  - Discharge medications: metformin 1000mg bid + GLP-1 agonist  - Patient to call doctor with persistent high or low BG at home.   - Ensure patient has glucometer, test strips and lancets on discharge.  - Recommend routine outpatient ophthalmology, podiatry and endocrinology f/u    HTN  - Home regimen: lisinopril 2.5mg daily  PLAN  - Can check urine microalbumin outpatient  - Outpatient goal BP <130/80. Management per primary team.    HLD  - Home regimen: atorvastatin 80mg daily  -   PLAN  - resume statin when able    Will schedule an appointment for the patient to follow up.  [address] [place] [address]  Phone Number: [phone]    Discussed with primary team.     Thank you for the interesting consult.    Denny Bishop MD, Endocrinology Fellow  Pager [phone] from 9am to 5pm. After hours and on weekends, please call [phone].

## 2023-03-13 NOTE — PROGRESS NOTE ADULT - PROBLEM SELECTOR PLAN 4
take metformin 1000mg BID at home  - HbA1c 8.0%  - FS controlled  - c/w sliding scale  - nutrition consult

## 2023-03-13 NOTE — PROGRESS NOTE ADULT - SUBJECTIVE AND OBJECTIVE BOX
Yaritza Hickman MD  Division of Hospital Medicine  [facility]  Spectra: [phone]      Patient is a 52y old  Female who presents with a chief complaint of Craniopharyngioma (11 Mar 2023 16:11)      SUBJECTIVE / OVERNIGHT EVENTS:  #[id number] used for translation. no acute events overnight. no fever, chills, dizziness, dyspnea, abd pain, n/v/d/c, nor dysuria. chest pain resolved. still has pressure behind the eyes and headache but states headache improved today compared to day prior.  ADDITIONAL REVIEW OF SYSTEMS:    MEDICATIONS  (STANDING):  atorvastatin 80 milliGRAM(s) Oral at bedtime  chlorhexidine 4% Liquid 1 Application(s) Topical once  insulin lispro (ADMELOG) corrective regimen sliding scale   SubCutaneous Before meals and at bedtime  levothyroxine 75 MICROGram(s) Oral daily  polyethylene glycol 3350 17 Gram(s) Oral two times a day  senna 2 Tablet(s) Oral at bedtime  sodium chloride 0.9%. 1000 milliLiter(s) (75 mL/Hr) IV Continuous <Continuous>    MEDICATIONS  (PRN):  acetaminophen     Tablet .. 650 milliGRAM(s) Oral every 6 hours PRN Temp greater or equal to 38C (100.4F), Mild Pain (1 - 3)  ondansetron Injectable 4 milliGRAM(s) IV Push every 6 hours PRN Nausea and/or Vomiting      CAPILLARY BLOOD GLUCOSE      POCT Blood Glucose.: 153 mg/dL (13 Mar 2023 12:28)  POCT Blood Glucose.: 106 mg/dL (13 Mar 2023 07:43)  POCT Blood Glucose.: 151 mg/dL (12 Mar 2023 21:16)  POCT Blood Glucose.: 105 mg/dL (12 Mar 2023 16:31)    I&O's Summary    12 Mar 2023 07:01  -  13 Mar 2023 07:00  --------------------------------------------------------  IN: 550 mL / OUT: 0 mL / NET: 550 mL        PHYSICAL EXAM:  Vital Signs Last 24 Hrs  T(C): 36.5 (13 Mar 2023 13:01), Max: 36.9 (13 Mar 2023 09:52)  T(F): 97.7 (13 Mar 2023 13:01), Max: 98.4 (13 Mar 2023 09:52)  HR: 65 (13 Mar 2023 13:01) (60 - 70)  BP: 107/73 (13 Mar 2023 13:01) (100/66 - 126/76)  BP(mean): --  RR: 19 (13 Mar 2023 13:01) (17 - 19)  SpO2: 98% (13 Mar 2023 13:01) (96% - 98%)    Parameters below as of 13 Mar 2023 13:01  Patient On (Oxygen Delivery Method): room air    CONSTITUTIONAL: NAD, well-developed, well-groomed  EYES: PERRLA; conjunctiva and sclera clear, +EOMI but has difficulty with leftward gaze  ENMT: Moist oral mucosa, no pharyngeal injection or exudates; normal dentition  NECK: Supple, no palpable masses; no thyromegaly  RESPIRATORY: Normal respiratory effort; lungs are clear to auscultation bilaterally  CARDIOVASCULAR: Regular rate and rhythm, normal S1 and S2, no murmur/rub/gallop; No lower extremity edema; Peripheral pulses are 2+ bilaterally  ABDOMEN: Soft, Nondistended, Nontender to palpation, normoactive bowel sounds  MUSCULOSKELETAL: No clubbing or cyanosis of digits; no joint swelling or tenderness to palpation  PSYCH: A+O to person, place, and time; affect appropriate  NEUROLOGY: CN 2-12 are intact and symmetric; no gross sensory deficits, 5/5 strength throughout  SKIN: No rashes; no palpable lesions      LABS:                        13.6   6.85  )-----------( 272      ( 13 Mar 2023 10:52 )             42.8         PT/INR - ( 13 Mar 2023 10:52 )   PT: 13.9 sec;   INR: 1.20 ratio         PTT - ( 13 Mar 2023 10:52 )  PTT:35.5 sec        RADIOLOGY & ADDITIONAL TESTS:  Results Reviewed: no leukocytosis, H/H stable, repeat trop negative  Imaging Personally Reviewed:  Electrocardiogram Personally Reviewed:    COORDINATION OF CARE:  Care Discussed with Consultants/Other Providers [Y]: Neurosurgery JALEESA Pope  Prior or Outpatient Records Reviewed [Y/N]:   Yaritza Hickman MD  Division of Hospital Medicine  [facility]  Spectra: [phone]      Patient is a 52y old  Female who presents with a chief complaint of Craniopharyngioma (11 Mar 2023 16:11)      SUBJECTIVE / OVERNIGHT EVENTS:  #[id number] used for translation. no acute events overnight. no fever, chills, dizziness, dyspnea, abd pain, n/v/d/c, nor dysuria. chest pain resolved. still has pressure behind the eyes and headache but states headache improved today compared to day prior.  ADDITIONAL REVIEW OF SYSTEMS:    MEDICATIONS  (STANDING):  atorvastatin 80 milliGRAM(s) Oral at bedtime  chlorhexidine 4% Liquid 1 Application(s) Topical once  insulin lispro (ADMELOG) corrective regimen sliding scale   SubCutaneous Before meals and at bedtime  levothyroxine 75 MICROGram(s) Oral daily  polyethylene glycol 3350 17 Gram(s) Oral two times a day  senna 2 Tablet(s) Oral at bedtime  sodium chloride 0.9%. 1000 milliLiter(s) (75 mL/Hr) IV Continuous <Continuous>    MEDICATIONS  (PRN):  acetaminophen     Tablet .. 650 milliGRAM(s) Oral every 6 hours PRN Temp greater or equal to 38C (100.4F), Mild Pain (1 - 3)  ondansetron Injectable 4 milliGRAM(s) IV Push every 6 hours PRN Nausea and/or Vomiting      CAPILLARY BLOOD GLUCOSE      POCT Blood Glucose.: 153 mg/dL (13 Mar 2023 12:28)  POCT Blood Glucose.: 106 mg/dL (13 Mar 2023 07:43)  POCT Blood Glucose.: 151 mg/dL (12 Mar 2023 21:16)  POCT Blood Glucose.: 105 mg/dL (12 Mar 2023 16:31)    I&O's Summary    12 Mar 2023 07:01  -  13 Mar 2023 07:00  --------------------------------------------------------  IN: 550 mL / OUT: 0 mL / NET: 550 mL        PHYSICAL EXAM:  Vital Signs Last 24 Hrs  T(C): 36.5 (13 Mar 2023 13:01), Max: 36.9 (13 Mar 2023 09:52)  T(F): 97.7 (13 Mar 2023 13:01), Max: 98.4 (13 Mar 2023 09:52)  HR: 65 (13 Mar 2023 13:01) (60 - 70)  BP: 107/73 (13 Mar 2023 13:01) (100/66 - 126/76)  BP(mean): --  RR: 19 (13 Mar 2023 13:01) (17 - 19)  SpO2: 98% (13 Mar 2023 13:01) (96% - 98%)    Parameters below as of 13 Mar 2023 13:01  Patient On (Oxygen Delivery Method): room air    CONSTITUTIONAL: NAD, well-developed, well-groomed  EYES: PERRLA; conjunctiva and sclera clear, +EOMI but has difficulty with leftward gaze  ENMT: Moist oral mucosa, no pharyngeal injection or exudates; normal dentition  NECK: Supple, no palpable masses; no thyromegaly  RESPIRATORY: Normal respiratory effort; lungs are clear to auscultation bilaterally  CARDIOVASCULAR: Regular rate and rhythm, normal S1 and S2, no murmur/rub/gallop; No lower extremity edema; Peripheral pulses are 2+ bilaterally  ABDOMEN: Soft, Nondistended, Nontender to palpation, normoactive bowel sounds  MUSCULOSKELETAL: No clubbing or cyanosis of digits; no joint swelling or tenderness to palpation  PSYCH: A+O to person, place, and time; affect appropriate  NEUROLOGY: CN 2-12 are intact and symmetric; no gross sensory deficits, 5/5 strength throughout  SKIN: No rashes; no palpable lesions      LABS:                        13.6   6.85  )-----------( 272      ( 13 Mar 2023 10:52 )             42.8         PT/INR - ( 13 Mar 2023 10:52 )   PT: 13.9 sec;   INR: 1.20 ratio         PTT - ( 13 Mar 2023 10:52 )  PTT:35.5 sec        RADIOLOGY & ADDITIONAL TESTS:  Results Reviewed: no leukocytosis, H/H stable, repeat trop negative  Imaging Personally Reviewed:  3/13/23 CT Coronaries:  FINDINGS:    Cardiovascular:    Coronary arteries:  Coronary artery calcium Agatston score:  Left main (LM) coronary artery:  0  Left anterior descending (LAD) coronary artery:  0  Left circumflex (LCX) coronary artery:  0  Right coronary artery (RCA):  0  Total:  0    Right-dominant coronary circulation.    The LM coronary artery appears angiographically normal.    The LAD coronary artery has minimal (<30%) noncalcified plaque in the   proximal segment. The mid to distal vessel is small with myocardial   bridging in the mid segment. The lumen of the tunneled segment is not   well visualized. The diagonal branch appears angiographically normal.    The LCX coronary artery has minimal (<30%) noncalcified plaque   proximally. The first obtuse marginal (OM) branch has minimal (<30%)   noncalcified plaque. The second OM branch appears angiographically normal.    The large RCA has minimal (<30%) noncalcified plaque in the proximal and   mid segments. The right posterior descending artery (PDA) and the right   posterolateral (RPL) branch appear angiographically normal. The PDA   extends to the left ventricular apex.    Aorta:  No thoracic aortic dissection noted in the visualized thoracic aorta.    Mild noncalcified plaque is present in the imaged aorta.    Pericardium:  There is no pericardial effusion.    Chambers:  The cardiac chambers are qualitatively normal in size.    No filling defect noted in the left atrial appendage.    Non-cardiac:  Evaluation of the imaged portions of the lungs demonstrate 2 mm right   upper lobe calcified granuloma. Degenerative changes of the spine.    IMPRESSION:  1.  Minimal nonobstructive coronary artery disease in the visualized   epicardial coronary arteries as reported above.    2.  Coronary artery calcium Agatston score = 0.    Electrocardiogram Personally Reviewed:    COORDINATION OF CARE:  Care Discussed with Consultants/Other Providers [Y]: Neurosurgery JALEESA Pope  Prior or Outpatient Records Reviewed [Y/N]:

## 2023-03-13 NOTE — CONSULT NOTE ADULT - ASSESSMENT
Chest pain   abnormal EKG  HLD  Pre-Operative Cardiac Risk Stratification and Optimization       mildly elevated troponin   repeat trop  Echo   coronary cta

## 2023-03-14 ENCOUNTER — RESULT REVIEW (OUTPATIENT)
Age: 53
End: 2023-03-14

## 2023-03-14 ENCOUNTER — APPOINTMENT (OUTPATIENT)
Dept: NEUROSURGERY | Facility: HOSPITAL | Age: 53
End: 2023-03-14

## 2023-03-14 ENCOUNTER — TRANSCRIPTION ENCOUNTER (OUTPATIENT)
Age: 53
End: 2023-03-14

## 2023-03-14 ENCOUNTER — APPOINTMENT (OUTPATIENT)
Dept: OTOLARYNGOLOGY | Facility: HOSPITAL | Age: 53
End: 2023-03-14

## 2023-03-14 PROBLEM — E78.5 HYPERLIPIDEMIA, UNSPECIFIED: Chronic | Status: ACTIVE | Noted: 2023-03-10

## 2023-03-14 PROBLEM — I10 ESSENTIAL (PRIMARY) HYPERTENSION: Chronic | Status: ACTIVE | Noted: 2023-03-10

## 2023-03-14 PROBLEM — E11.9 TYPE 2 DIABETES MELLITUS WITHOUT COMPLICATIONS: Chronic | Status: ACTIVE | Noted: 2023-03-10

## 2023-03-14 LAB
ACTH STIM ACTH BASELINE: 22.7 PG/ML — SIGNIFICANT CHANGE UP (ref 7.2–63.3)
ACTH STIM CORTISOL 30 MIN: 16.9 UG/DL — SIGNIFICANT CHANGE UP
ACTH STIM CORTISOL 60 MIN: 20.7 UG/DL — SIGNIFICANT CHANGE UP
ACTH STIM CORTISOL BASELINE: 5.1 UG/DL — LOW (ref 6–18.4)
ANION GAP SERPL CALC-SCNC: 11 MMOL/L — SIGNIFICANT CHANGE UP (ref 5–17)
BUN SERPL-MCNC: 18 MG/DL — SIGNIFICANT CHANGE UP (ref 7–23)
CALCIUM SERPL-MCNC: 9.5 MG/DL — SIGNIFICANT CHANGE UP (ref 8.4–10.5)
CHLORIDE SERPL-SCNC: 105 MMOL/L — SIGNIFICANT CHANGE UP (ref 96–108)
CO2 SERPL-SCNC: 25 MMOL/L — SIGNIFICANT CHANGE UP (ref 22–31)
CREAT SERPL-MCNC: 0.67 MG/DL — SIGNIFICANT CHANGE UP (ref 0.5–1.3)
EGFR: 104 ML/MIN/1.73M2 — SIGNIFICANT CHANGE UP
GLUCOSE BLDC GLUCOMTR-MCNC: 104 MG/DL — HIGH (ref 70–99)
GLUCOSE BLDC GLUCOMTR-MCNC: 119 MG/DL — HIGH (ref 70–99)
GLUCOSE BLDC GLUCOMTR-MCNC: 133 MG/DL — HIGH (ref 70–99)
GLUCOSE SERPL-MCNC: 121 MG/DL — HIGH (ref 70–99)
HCT VFR BLD CALC: 38.1 % — SIGNIFICANT CHANGE UP (ref 34.5–45)
HGB BLD-MCNC: 12.1 G/DL — SIGNIFICANT CHANGE UP (ref 11.5–15.5)
MCHC RBC-ENTMCNC: 27.9 PG — SIGNIFICANT CHANGE UP (ref 27–34)
MCHC RBC-ENTMCNC: 31.8 GM/DL — LOW (ref 32–36)
MCV RBC AUTO: 88 FL — SIGNIFICANT CHANGE UP (ref 80–100)
NRBC # BLD: 0 /100 WBCS — SIGNIFICANT CHANGE UP (ref 0–0)
PLATELET # BLD AUTO: 243 K/UL — SIGNIFICANT CHANGE UP (ref 150–400)
POTASSIUM SERPL-MCNC: 3.9 MMOL/L — SIGNIFICANT CHANGE UP (ref 3.5–5.3)
POTASSIUM SERPL-SCNC: 3.9 MMOL/L — SIGNIFICANT CHANGE UP (ref 3.5–5.3)
RBC # BLD: 4.33 M/UL — SIGNIFICANT CHANGE UP (ref 3.8–5.2)
RBC # FLD: 12.8 % — SIGNIFICANT CHANGE UP (ref 10.3–14.5)
SODIUM SERPL-SCNC: 141 MMOL/L — SIGNIFICANT CHANGE UP (ref 135–145)
WBC # BLD: 6.59 K/UL — SIGNIFICANT CHANGE UP (ref 3.8–10.5)
WBC # FLD AUTO: 6.59 K/UL — SIGNIFICANT CHANGE UP (ref 3.8–10.5)

## 2023-03-14 PROCEDURE — 88305 TISSUE EXAM BY PATHOLOGIST: CPT | Mod: 26

## 2023-03-14 PROCEDURE — 20922 REMOVAL OF FASCIA FOR GRAFT: CPT

## 2023-03-14 PROCEDURE — 15740 ISLAND PEDICLE FLAP GRAFT: CPT

## 2023-03-14 PROCEDURE — 99232 SBSQ HOSP IP/OBS MODERATE 35: CPT

## 2023-03-14 PROCEDURE — 61782 SCAN PROC CRANIAL EXTRA: CPT

## 2023-03-14 PROCEDURE — 64999E: CUSTOM | Mod: 62

## 2023-03-14 PROCEDURE — 64999E: CUSTOM | Mod: 62,22

## 2023-03-14 PROCEDURE — 99233 SBSQ HOSP IP/OBS HIGH 50: CPT

## 2023-03-14 PROCEDURE — 62272 THER SPI PNXR DRG CSF: CPT

## 2023-03-14 DEVICE — DVC ADHERUS AUTOSPRAY W/ DURAL SEALANT EXT TIP: Type: IMPLANTABLE DEVICE | Status: FUNCTIONAL

## 2023-03-14 DEVICE — SURGIFOAM PAD 8CM X 12.5CM X 10MM (100): Type: IMPLANTABLE DEVICE | Status: FUNCTIONAL

## 2023-03-14 DEVICE — CATH EDM LUMBAR CLOSED TIP BARIUM IMPREGNATED 80CM: Type: IMPLANTABLE DEVICE | Status: FUNCTIONAL

## 2023-03-14 DEVICE — GRAFT DURAL MATRX BOVINE 2.5X7.5CM: Type: IMPLANTABLE DEVICE | Status: FUNCTIONAL

## 2023-03-14 DEVICE — SURGICEL 2 X 14": Type: IMPLANTABLE DEVICE | Status: FUNCTIONAL

## 2023-03-14 DEVICE — IMPLANTABLE DEVICE: Type: IMPLANTABLE DEVICE | Status: FUNCTIONAL

## 2023-03-14 DEVICE — KIT A-LINE 1LUM 20G X 12CM SAFE KIT: Type: IMPLANTABLE DEVICE | Status: FUNCTIONAL

## 2023-03-14 DEVICE — SURGIFLO MATRIX WITH THROMBIN KIT: Type: IMPLANTABLE DEVICE | Status: FUNCTIONAL

## 2023-03-14 RX ORDER — HYDROCORTISONE 20 MG
100 TABLET ORAL ONCE
Refills: 0 | Status: DISCONTINUED | OUTPATIENT
Start: 2023-03-14 | End: 2023-03-14

## 2023-03-14 RX ADMIN — COSYNTROPIN 0.25 MILLIGRAM(S): 0.25 INJECTION, SOLUTION INTRAVENOUS at 08:15

## 2023-03-14 RX ADMIN — SODIUM CHLORIDE 75 MILLILITER(S): 9 INJECTION INTRAMUSCULAR; INTRAVENOUS; SUBCUTANEOUS at 17:22

## 2023-03-14 RX ADMIN — Medication 75 MICROGRAM(S): at 06:13

## 2023-03-14 RX ADMIN — SODIUM CHLORIDE 75 MILLILITER(S): 9 INJECTION INTRAMUSCULAR; INTRAVENOUS; SUBCUTANEOUS at 08:11

## 2023-03-14 NOTE — PRE-OP CHECKLIST - 1.
Emotional support and pre op teaching provided to patient and son Dg at bedside using  services ID# [id number] Damir with verbalized understanding.

## 2023-03-14 NOTE — PRE-ANESTHESIA EVALUATION ADULT - SPO2 (%)
96 DISPLAY PLAN FREE TEXT DISPLAY PLAN FREE TEXT DISPLAY PLAN FREE TEXT DISPLAY PLAN FREE TEXT DISPLAY PLAN FREE TEXT DISPLAY PLAN FREE TEXT DISPLAY PLAN FREE TEXT DISPLAY PLAN FREE TEXT DISPLAY PLAN FREE TEXT DISPLAY PLAN FREE TEXT DISPLAY PLAN FREE TEXT DISPLAY PLAN FREE TEXT DISPLAY PLAN FREE TEXT DISPLAY PLAN FREE TEXT DISPLAY PLAN FREE TEXT DISPLAY PLAN FREE TEXT DISPLAY PLAN FREE TEXT DISPLAY PLAN FREE TEXT DISPLAY PLAN FREE TEXT DISPLAY PLAN FREE TEXT

## 2023-03-14 NOTE — PROGRESS NOTE ADULT - SUBJECTIVE AND OBJECTIVE BOX
Yaritza Hickman MD  Division of Hospital Medicine  [facility]  Spectra: [phone]      Patient is a 52y old  Female who presents with a chief complaint of Craniopharyngioma (11 Mar 2023 16:11)      SUBJECTIVE / OVERNIGHT EVENTS:  #[id number] used for translation. no acute events overnight. no fever, chills, chest pain, dyspnea, abd pain, n/v. feels well. awaiting surgery today  ADDITIONAL REVIEW OF SYSTEMS:    MEDICATIONS  (STANDING):  atorvastatin 80 milliGRAM(s) Oral at bedtime  chlorhexidine 4% Liquid 1 Application(s) Topical once  insulin lispro (ADMELOG) corrective regimen sliding scale   SubCutaneous Before meals and at bedtime  levothyroxine 75 MICROGram(s) Oral daily  polyethylene glycol 3350 17 Gram(s) Oral two times a day  senna 2 Tablet(s) Oral at bedtime  sodium chloride 0.9%. 1000 milliLiter(s) (75 mL/Hr) IV Continuous <Continuous>    MEDICATIONS  (PRN):  acetaminophen     Tablet .. 650 milliGRAM(s) Oral every 6 hours PRN Temp greater or equal to 38C (100.4F), Mild Pain (1 - 3)  ondansetron Injectable 4 milliGRAM(s) IV Push every 6 hours PRN Nausea and/or Vomiting      CAPILLARY BLOOD GLUCOSE      POCT Blood Glucose.: 133 mg/dL (14 Mar 2023 11:37)  POCT Blood Glucose.: 119 mg/dL (14 Mar 2023 07:46)  POCT Blood Glucose.: 139 mg/dL (13 Mar 2023 21:57)  POCT Blood Glucose.: 114 mg/dL (13 Mar 2023 16:31)    I&O's Summary    13 Mar 2023 07:01  -  14 Mar 2023 07:00  --------------------------------------------------------  IN: 600 mL / OUT: 0 mL / NET: 600 mL        PHYSICAL EXAM:  Vital Signs Last 24 Hrs  T(C): 36.6 (14 Mar 2023 09:12), Max: 37.2 (13 Mar 2023 17:57)  T(F): 97.8 (14 Mar 2023 09:12), Max: 98.9 (13 Mar 2023 17:57)  HR: 62 (14 Mar 2023 09:12) (59 - 70)  BP: 103/69 (14 Mar 2023 09:12) (102/66 - 113/74)  BP(mean): --  RR: 16 (14 Mar 2023 09:12) (16 - 19)  SpO2: 96% (14 Mar 2023 09:12) (95% - 98%)    Parameters below as of 14 Mar 2023 09:12  Patient On (Oxygen Delivery Method): room air        CONSTITUTIONAL: NAD, well-developed, well-groomed  EYES: PERRLA; conjunctiva and sclera clear  ENMT: Moist oral mucosa, no pharyngeal injection or exudates; normal dentition  NECK: Supple, no palpable masses; no thyromegaly  RESPIRATORY: Normal respiratory effort; lungs are clear to auscultation bilaterally  CARDIOVASCULAR: Regular rate and rhythm, normal S1 and S2, no murmur/rub/gallop; No lower extremity edema; Peripheral pulses are 2+ bilaterally  ABDOMEN: Soft, Nondistended, Nontender to palpation, normoactive bowel sounds  MUSCULOSKELETAL: No clubbing or cyanosis of digits; no joint swelling or tenderness to palpation  PSYCH: A+O to person, place, and time; affect appropriate  NEUROLOGY: CN 2-12 are intact and symmetric; no gross sensory deficits   SKIN: No rashes; no palpable lesions    LABS:                        12.1   6.59  )-----------( 243      ( 14 Mar 2023 06:02 )             38.1     03-14    141  |  105  |  18  ----------------------------<  121<H>  3.9   |  25  |  0.67    Ca    9.5      14 Mar 2023 06:02      PT/INR - ( 13 Mar 2023 10:52 )   PT: 13.9 sec;   INR: 1.20 ratio         PTT - ( 13 Mar 2023 10:52 )  PTT:35.5 sec            RADIOLOGY & ADDITIONAL TESTS:  Results Reviewed:   Imaging Personally Reviewed:  Electrocardiogram Personally Reviewed:    COORDINATION OF CARE:  Care Discussed with Consultants/Other Providers [Y/N]:  Prior or Outpatient Records Reviewed [Y/N]:   Yaritza Hickman MD  Division of Hospital Medicine  [facility]  Spectra: [phone]      Patient is a 52y old  Female who presents with a chief complaint of Craniopharyngioma (11 Mar 2023 16:11)      SUBJECTIVE / OVERNIGHT EVENTS:  #[id number] used for translation. no acute events overnight. no fever, chills, chest pain, dyspnea, abd pain, n/v. feels well. awaiting surgery today  ADDITIONAL REVIEW OF SYSTEMS:    MEDICATIONS  (STANDING):  atorvastatin 80 milliGRAM(s) Oral at bedtime  chlorhexidine 4% Liquid 1 Application(s) Topical once  insulin lispro (ADMELOG) corrective regimen sliding scale   SubCutaneous Before meals and at bedtime  levothyroxine 75 MICROGram(s) Oral daily  polyethylene glycol 3350 17 Gram(s) Oral two times a day  senna 2 Tablet(s) Oral at bedtime  sodium chloride 0.9%. 1000 milliLiter(s) (75 mL/Hr) IV Continuous <Continuous>    MEDICATIONS  (PRN):  acetaminophen     Tablet .. 650 milliGRAM(s) Oral every 6 hours PRN Temp greater or equal to 38C (100.4F), Mild Pain (1 - 3)  ondansetron Injectable 4 milliGRAM(s) IV Push every 6 hours PRN Nausea and/or Vomiting      CAPILLARY BLOOD GLUCOSE      POCT Blood Glucose.: 133 mg/dL (14 Mar 2023 11:37)  POCT Blood Glucose.: 119 mg/dL (14 Mar 2023 07:46)  POCT Blood Glucose.: 139 mg/dL (13 Mar 2023 21:57)  POCT Blood Glucose.: 114 mg/dL (13 Mar 2023 16:31)    I&O's Summary    13 Mar 2023 07:01  -  14 Mar 2023 07:00  --------------------------------------------------------  IN: 600 mL / OUT: 0 mL / NET: 600 mL        PHYSICAL EXAM:  Vital Signs Last 24 Hrs  T(C): 36.6 (14 Mar 2023 09:12), Max: 37.2 (13 Mar 2023 17:57)  T(F): 97.8 (14 Mar 2023 09:12), Max: 98.9 (13 Mar 2023 17:57)  HR: 62 (14 Mar 2023 09:12) (59 - 70)  BP: 103/69 (14 Mar 2023 09:12) (102/66 - 113/74)  BP(mean): --  RR: 16 (14 Mar 2023 09:12) (16 - 19)  SpO2: 96% (14 Mar 2023 09:12) (95% - 98%)    Parameters below as of 14 Mar 2023 09:12  Patient On (Oxygen Delivery Method): room air      CONSTITUTIONAL: NAD, well-developed, well-groomed  EYES: PERRLA; conjunctiva and sclera clear, +EOMI but has difficulty with leftward gaze  ENMT: Moist oral mucosa, no pharyngeal injection or exudates; normal dentition  NECK: Supple, no palpable masses; no thyromegaly  RESPIRATORY: Normal respiratory effort; lungs are clear to auscultation bilaterally  CARDIOVASCULAR: Regular rate and rhythm, normal S1 and S2, no murmur/rub/gallop; No lower extremity edema; Peripheral pulses are 2+ bilaterally  ABDOMEN: Soft, Nondistended, Nontender to palpation, normoactive bowel sounds  MUSCULOSKELETAL: No clubbing or cyanosis of digits; no joint swelling or tenderness to palpation  PSYCH: A+O to person, place, and time; affect appropriate  NEUROLOGY: CN 2-12 are intact and symmetric; no gross sensory deficits, 5/5 strength throughout  SKIN: No rashes; no palpable lesions    LABS:                        12.1   6.59  )-----------( 243      ( 14 Mar 2023 06:02 )             38.1     03-14    141  |  105  |  18  ----------------------------<  121<H>  3.9   |  25  |  0.67    Ca    9.5      14 Mar 2023 06:02      PT/INR - ( 13 Mar 2023 10:52 )   PT: 13.9 sec;   INR: 1.20 ratio         PTT - ( 13 Mar 2023 10:52 )  PTT:35.5 sec            RADIOLOGY & ADDITIONAL TESTS:  Results Reviewed:   Imaging Personally Reviewed:  3/14/23 TTE:  Observations:  Mitral Valve: Normal mitral valve.  Aortic Valve/Aorta: Normal trileaflet aortic valve. Peak  transaortic valve gradient equals 5 mm Hg. Peak left  ventricular outflow tract gradient equals 2 mm Hg.  Aortic Root: 2.9 cm.  Ascending Aorta: 3 cm.  LVOT diameter: 1.9 cm.  Left Atrium: Normal left atrium.  LA volume index = 24  cc/m2.  Left Ventricle: Normal left ventricular systolic function.  No segmental wall motion abnormalities. Normal left  ventricular internal dimensions and wall thicknesses.  Normal diastolic function.  Right Heart: Normal right atrium. The right ventricle is  not well visualized; grossly normal right ventricular  systolic function. Normal tricuspid valve. Minimal  tricuspid regurgitation. Normal pulmonic valve.  Pericardium/Pleura: Normal pericardium with no pericardial  effusion.  Hemodynamic: Estimated right atrial pressure is 8 mm Hg.  Estimated right ventricular systolic pressure equals 21 mm  Hg, assuming right atrial pressure equals 8 mm Hg,  consistent with normal pulmonary pressures.  ------------------------------------------------------------------------  Conclusions:  1. Normal left ventricular internal dimensions and wall  thicknesses.  2. Normal left ventricular systolic function. No segmental  wall motion abnormalities.  3. Strain imaging was performed with a HR of 61bpm and a BP  of 105/71mmHg. Global L. Strain= -22.5% (normal).  4. The right ventricle is not well visualized; grossly  normal right ventricular systolic function.  Electrocardiogram Personally Reviewed:    COORDINATION OF CARE:  Care Discussed with Consultants/Other Providers [Y]: Neurosurgery JALEESA Pope  Prior or Outpatient Records Reviewed [Y/N]:

## 2023-03-14 NOTE — PROGRESS NOTE ADULT - ASSESSMENT
53 yo Tajik-speaking female with PMH of HTN, HLD, T2DM, who presents with headaches, blurry vision, admitted after being found to have suprasellar mass, admitted for further workup, medicine consulted for pre-operative optimization and management

## 2023-03-14 NOTE — PROGRESS NOTE ADULT - ASSESSMENT
Chest pain   abnormal EKG  HLD  Pre-Operative Cardiac Risk Stratification and Optimization       mildly elevated troponin   CP resolved   repeat trop is normal   Echo shows normal LV function   coronary cta shows no significant CAD  can proceed to OR from CV standpoint

## 2023-03-14 NOTE — PROGRESS NOTE ADULT - NSPROGADDITIONALINFOA_GEN_ALL_CORE
d/w pascale Agosto MD  [facility] Division of Hospital Medicine  Available via MS Teams  Pager: [phone]
.  Yaritza Hickman MD  Division of Hospital Medicine  [facility]   Spectra: [phone]    Medically clear for OR today. Will follow periodically post-op. Please call [phone] with questions/concerns.    Plan discussed with patient via , son bedside, and neurosurgery JALEESA Pope.
.  Yaritza Hickman MD  Division of Hospital Medicine  [facility]   Spectra: [phone]    Plan discussed with patient via  and neurosurgery JALEESA Pope.

## 2023-03-14 NOTE — PROGRESS NOTE ADULT - PROBLEM SELECTOR PLAN 2
unclear if this is anginal chest pain, but seems atypical in nature (right sided, no radiation)  also reproducible on exam  - EKG with non-specific T wave abnormalities  - trop 31-->7, no need to further trend  - TTE with LVEF 75% with normal LVSF, no WMA, normal RV size and function  - CT Coronaries with minimal non-obstructive CAD, calcium score = 0  - Cardiology consulted, recs appreciated

## 2023-03-14 NOTE — PROGRESS NOTE ADULT - SUBJECTIVE AND OBJECTIVE BOX
DATE OF SERVICE: 03-14-23 @ 08:34    Subjective: Patient seen and examined. No new events except as noted.     SUBJECTIVE/ROS:  feels ok       MEDICATIONS:  MEDICATIONS  (STANDING):  atorvastatin 80 milliGRAM(s) Oral at bedtime  chlorhexidine 4% Liquid 1 Application(s) Topical once  insulin lispro (ADMELOG) corrective regimen sliding scale   SubCutaneous Before meals and at bedtime  levothyroxine 75 MICROGram(s) Oral daily  polyethylene glycol 3350 17 Gram(s) Oral two times a day  senna 2 Tablet(s) Oral at bedtime  sodium chloride 0.9%. 1000 milliLiter(s) (75 mL/Hr) IV Continuous <Continuous>      PHYSICAL EXAM:  T(C): 36.2 (03-14-23 @ 04:45), Max: 37.2 (03-13-23 @ 17:57)  HR: 63 (03-14-23 @ 04:45) (59 - 70)  BP: 112/67 (03-14-23 @ 04:45) (102/66 - 113/74)  RR: 18 (03-14-23 @ 04:45) (17 - 19)  SpO2: 97% (03-14-23 @ 04:45) (95% - 98%)  Wt(kg): --  I&O's Summary    13 Mar 2023 07:01  -  14 Mar 2023 07:00  --------------------------------------------------------  IN: 600 mL / OUT: 0 mL / NET: 600 mL            JVP: Normal  Neck: supple  Lung: clear   CV: S1 S2 , Murmur:  Abd: soft  Ext: No edema  neuro: Awake / alert  Psych: flat affect  Skin: normal``    LABS/DATA:    CARDIAC MARKERS:                                12.1   6.59  )-----------( 243      ( 14 Mar 2023 06:02 )             38.1     03-14    141  |  105  |  18  ----------------------------<  121<H>  3.9   |  25  |  0.67    Ca    9.5      14 Mar 2023 06:02      proBNP:   Lipid Profile:   HgA1c:   TSH:     TELE:  EKG:

## 2023-03-14 NOTE — PROGRESS NOTE ADULT - PROBLEM SELECTOR PLAN 3
Detail Level: Detailed Size Of Lesion In Cm (Optional): 0.8 X Size Of Lesion In Cm (Optional): 1 Morphology Per Location (Optional): Darker portion Size Of Lesion In Cm (Optional): 0.3 X Size Of Lesion In Cm (Optional): 0.5 Morphology Per Location (Optional): Lighter portion - plan for OR tentatively tomorrow 3/14  - Endo consult appreciated  - no contraindication from medicine standpoint for OR pending TTE results  - f/u ACTH stim test results to decide re: stress dose steroids

## 2023-03-14 NOTE — PROGRESS NOTE ADULT - PROBLEM SELECTOR PLAN 1
Cardiovascular Risk Stratification - RCRI is 0 pts = 3.9% at risk for cardiac death, nonfatal myocardial infarction, and nonfatal cardiac arrest perioperatively  - patient has risk factors for CAD, especially with elevated LDL of 177 despite atorva, and other risk factors and now states she has chest pain on exertion. EKG NSR with non-specific T wave abnormalities  - trop 31-->7, no need to further trend  - TTE with LVEF 75% with normal LVSF, no WMA, normal RV size and function  - CT Coronaries with minimal non-obstructive CAD, calcium score = 0  - Cardiology consult recs appreciated  - no contraindication from medicine standpoint for OR

## 2023-03-14 NOTE — PROGRESS NOTE ADULT - SUBJECTIVE AND OBJECTIVE BOX
ENDOCRINE FOLLOW UP     Chief Complaint: craniopharyngioma    History:     MEDICATIONS  (STANDING):  atorvastatin 80 milliGRAM(s) Oral at bedtime  chlorhexidine 4% Liquid 1 Application(s) Topical once  insulin lispro (ADMELOG) corrective regimen sliding scale   SubCutaneous Before meals and at bedtime  levothyroxine 75 MICROGram(s) Oral daily  polyethylene glycol 3350 17 Gram(s) Oral two times a day  senna 2 Tablet(s) Oral at bedtime  sodium chloride 0.9%. 1000 milliLiter(s) (75 mL/Hr) IV Continuous <Continuous>    MEDICATIONS  (PRN):  acetaminophen     Tablet .. 650 milliGRAM(s) Oral every 6 hours PRN Temp greater or equal to 38C (100.4F), Mild Pain (1 - 3)  ondansetron Injectable 4 milliGRAM(s) IV Push every 6 hours PRN Nausea and/or Vomiting      Allergies    No Known Allergies    Intolerances        ROS: All other systems reviewed and negative    PHYSICAL EXAM:  VITALS: T(C): 36.6 (03-14-23 @ 09:12)  T(F): 97.8 (03-14-23 @ 09:12), Max: 98.9 (03-13-23 @ 17:57)  HR: 62 (03-14-23 @ 09:12) (59 - 70)  BP: 103/69 (03-14-23 @ 09:12) (102/66 - 113/74)  RR:  (16 - 19)  SpO2:  (95% - 98%)  Wt(kg): --  GENERAL: NAD, resting comfortably   EYES: No proptosis,  anicteric  HEENT:  Atraumatic, Normocephalic, moist mucous membranes  RESPIRATORY: Nonlabored respirations on room air, normal rate/effort   CARDIOVASCULAR: Did not appear cyanotic, no lower extremity swelling visualized  GI: Soft, nontender, non distended  NEURO: Answering questions appropriately, moves all extremities spontaneously  PSYCH:  reactive affect, euthymic mood    POCT Blood Glucose.: 119 mg/dL (03-14-23 @ 07:46)  POCT Blood Glucose.: 139 mg/dL (03-13-23 @ 21:57)  POCT Blood Glucose.: 114 mg/dL (03-13-23 @ 16:31)  POCT Blood Glucose.: 153 mg/dL (03-13-23 @ 12:28)  POCT Blood Glucose.: 106 mg/dL (03-13-23 @ 07:43)  POCT Blood Glucose.: 151 mg/dL (03-12-23 @ 21:16)  POCT Blood Glucose.: 105 mg/dL (03-12-23 @ 16:31)  POCT Blood Glucose.: 134 mg/dL (03-12-23 @ 11:25)  POCT Blood Glucose.: 105 mg/dL (03-12-23 @ 07:50)  POCT Blood Glucose.: 146 mg/dL (03-11-23 @ 21:00)  POCT Blood Glucose.: 115 mg/dL (03-11-23 @ 16:21)  POCT Blood Glucose.: 109 mg/dL (03-11-23 @ 11:49)      03-14    141  |  105  |  18  ----------------------------<  121<H>  3.9   |  25  |  0.67    eGFR: 104    Ca    9.5      03-14        A1C with Estimated Average Glucose Result: 8.0 % (03-13-23 @ 06:23)      Thyroid Stimulating Hormone, Serum: 8.85 uIU/mL (03-11-23 @ 05:39)   ENDOCRINE FOLLOW UP     Chief Complaint: craniopharyngioma    History:   [employer]  [place] ID [id number] used. The patient denied complaints, anticipating surgery around 4pm today.    MEDICATIONS  (STANDING):  atorvastatin 80 milliGRAM(s) Oral at bedtime  chlorhexidine 4% Liquid 1 Application(s) Topical once  insulin lispro (ADMELOG) corrective regimen sliding scale   SubCutaneous Before meals and at bedtime  levothyroxine 75 MICROGram(s) Oral daily  polyethylene glycol 3350 17 Gram(s) Oral two times a day  senna 2 Tablet(s) Oral at bedtime  sodium chloride 0.9%. 1000 milliLiter(s) (75 mL/Hr) IV Continuous <Continuous>    MEDICATIONS  (PRN):  acetaminophen     Tablet .. 650 milliGRAM(s) Oral every 6 hours PRN Temp greater or equal to 38C (100.4F), Mild Pain (1 - 3)  ondansetron Injectable 4 milliGRAM(s) IV Push every 6 hours PRN Nausea and/or Vomiting      Allergies    No Known Allergies    Intolerances        ROS: All other systems reviewed and negative    PHYSICAL EXAM:  VITALS: T(C): 36.6 (03-14-23 @ 09:12)  T(F): 97.8 (03-14-23 @ 09:12), Max: 98.9 (03-13-23 @ 17:57)  HR: 62 (03-14-23 @ 09:12) (59 - 70)  BP: 103/69 (03-14-23 @ 09:12) (102/66 - 113/74)  RR:  (16 - 19)  SpO2:  (95% - 98%)  Wt(kg): --  GENERAL: NAD, resting comfortably   EYES: No proptosis,  anicteric  HEENT:  Atraumatic, Normocephalic  RESPIRATORY: Nonlabored respirations on room air, normal rate/effort   CARDIOVASCULAR: Did not appear cyanotic  GI: did not appear distended  NEURO: Answering questions appropriately, moves all extremities spontaneously  PSYCH:  reactive affect, euthymic mood    POCT Blood Glucose.: 119 mg/dL (03-14-23 @ 07:46)  POCT Blood Glucose.: 139 mg/dL (03-13-23 @ 21:57)  POCT Blood Glucose.: 114 mg/dL (03-13-23 @ 16:31)  POCT Blood Glucose.: 153 mg/dL (03-13-23 @ 12:28)  POCT Blood Glucose.: 106 mg/dL (03-13-23 @ 07:43)  POCT Blood Glucose.: 151 mg/dL (03-12-23 @ 21:16)  POCT Blood Glucose.: 105 mg/dL (03-12-23 @ 16:31)  POCT Blood Glucose.: 134 mg/dL (03-12-23 @ 11:25)  POCT Blood Glucose.: 105 mg/dL (03-12-23 @ 07:50)  POCT Blood Glucose.: 146 mg/dL (03-11-23 @ 21:00)  POCT Blood Glucose.: 115 mg/dL (03-11-23 @ 16:21)  POCT Blood Glucose.: 109 mg/dL (03-11-23 @ 11:49)      03-14    141  |  105  |  18  ----------------------------<  121<H>  3.9   |  25  |  0.67    eGFR: 104    Ca    9.5      03-14        A1C with Estimated Average Glucose Result: 8.0 % (03-13-23 @ 06:23)      Thyroid Stimulating Hormone, Serum: 8.85 uIU/mL (03-11-23 @ 05:39)

## 2023-03-14 NOTE — PROGRESS NOTE ADULT - ASSESSMENT
52 yr old female with a history of hypertension, hyperlipidemia, dm type 2, no surgeries ever presents with a month of headaches and 3 weeks of blurred vision. Endocrinology consulted for evaluation of sellar mass.    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 Too low to be prolactinoma given size of the sellar mass. Suspect stalk effect. (prolactin diluted 103, undiluted 107)  -Secondary hypogonadism can be addressed as outpatient. (Lh 2.6, estradiol <5)  -3/12 AM ayush 5.9, ACTH 21.5, 3/13 am cortisol (6am) 6.9  PLAN  - f/u IGF-1  - f/u neurosurgery recommendations  - f/u ophthalmology recommendations (formal consult if not done) given visual deficits  - would perform co-syntropin stim test on patient tomorrow morning 3/14 at 8am, if results back can reach out to endocrinology about stress dose steroids, if patient getting TSP on 3/14 and results not back, would give 100mg IV hydrocortisone on call to OR and the start 50mg q8h thereafter  To perform a co-syntropin stim test, measure a baseline ACTH and cortisol level at 8am. Then administer 250mcg co-syntropin. Thirty minutes after administration of co-syntropin, measure the cortisol level. Sixty minutes after administration of co-syntropin, measure the cortisol again. If the cortisol level after stimulation is >14.5 adrenal insufficiency is unlikely, and if >18 it is definitively ruled out. No need for steroids at this time, but if at any time patient becomes hemodynamically unstable, please initiate stress dose steroids (hydrocortisone 50mg q8h)    Primary Hypothyroidism: Pt. with elevated TSH (8)and Low Free T4 (0.5). Start levothyroxine 75 mcg daily.     T2DM with hyperglycemia  - HbA1c: 8  - Home Regimen: metformin 1000mg bid  - Endocrinologist: does not have  PLAN  - Hold oral DM agents while inpatient  - Use low dose Admelog correction scale pre-meal  - Use low dose Admelog correction scale at bedtime  - Fingerstick BG before meals and bedtime  - Goal -180  - Carbohydrate consistent diet  - RD consult  Discharge plan:  - Discharge medications: metformin 1000mg bid + GLP-1 agonist  - Patient to call doctor with persistent high or low BG at home.   - Ensure patient has glucometer, test strips and lancets on discharge.  - Recommend routine outpatient ophthalmology, podiatry and endocrinology f/u    HTN  - Home regimen: lisinopril 2.5mg daily  PLAN  - Can check urine microalbumin outpatient  - Outpatient goal BP <130/80. Management per primary team.    HLD  - Home regimen: atorvastatin 80mg daily  -   PLAN  - resume statin when able    Will schedule an appointment for the patient to follow up.  [address] [place] [address]  Phone Number: [phone]    Discussed with primary team.     Thank you for the interesting consult.    Denny Bishop MD, Endocrinology Fellow  Pager [phone] from 9am to 5pm. After hours and on weekends, please call [phone]. 52 yr old female with a history of hypertension, hyperlipidemia, dm type 2, no surgeries ever presents with a month of headaches and 3 weeks of blurred vision. Endocrinology consulted for evaluation of sellar mass.    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 Too low to be prolactinoma given size of the sellar mass. Suspect stalk effect. (prolactin diluted 103, undiluted 107)  -Secondary hypogonadism can be addressed as outpatient. (Lh 2.6, estradiol <5)  -3/12 AM ayush 5.9, ACTH 21.5, 3/13 am cortisol (6am) 6.9  - 3/14 AM cortisol at 8:15am 5.1, cosyntropin at 8:15, 8:50am cortisol 16.9  PLAN  - f/u IGF-1  - f/u neurosurgery recommendations  - f/u ophthalmology recommendations (formal consult if not done) given visual deficits  - f/u cosyntropin stim test    Primary Hypothyroidism: Pt. with elevated TSH (8)and Low Free T4 (0.5). Start levothyroxine 75 mcg daily.     T2DM with hyperglycemia  - HbA1c: 8  - Home Regimen: metformin 1000mg bid  - Endocrinologist: does not have  PLAN  - Hold oral DM agents while inpatient  - Use low dose Admelog correction scale pre-meal  - Use low dose Admelog correction scale at bedtime  - Fingerstick BG before meals and bedtime  - Goal -180  - Carbohydrate consistent diet  - RD consult  Discharge plan:  - Discharge medications: metformin 1000mg bid + GLP-1 agonist  - Patient to call doctor with persistent high or low BG at home.   - Ensure patient has glucometer, test strips and lancets on discharge.  - Recommend routine outpatient ophthalmology, podiatry and endocrinology f/u    HTN  - Home regimen: lisinopril 2.5mg daily  PLAN  - Can check urine microalbumin outpatient  - Outpatient goal BP <130/80. Management per primary team.    HLD  - Home regimen: atorvastatin 80mg daily  -   PLAN  - resume statin when able    Will schedule an appointment for the patient to follow up.  [address] [place] [address]  Phone Number: [phone]    Discussed with primary team.     Thank you for the interesting consult.    Denny Bishop MD, Endocrinology Fellow  Pager [phone] from 9am to 5pm. After hours and on weekends, please call [phone]. 52 yr old female with a history of hypertension, hyperlipidemia, dm type 2, no surgeries ever presents with a month of headaches and 3 weeks of blurred vision. Endocrinology consulted for evaluation of sellar mass.    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 Too low to be prolactinoma given size of the sellar mass. Suspect stalk effect. (prolactin diluted 103, undiluted 107)  -Secondary hypogonadism can be addressed as outpatient. (Lh 2.6, estradiol <5)  -3/12 AM ayush 5.9, ACTH 21.5, 3/13 am cortisol (6am) 6.9  - cosyntropin stim test: 3/14 AM cortisol at 8:15am 5.1, cosyntropin at 8:15, 8:50am cortisol 16.9, 9:25am 20.7  PLAN  - f/u IGF-1  - f/u neurosurgery recommendations  - f/u ophthalmology recommendations (formal consult if not done) given visual deficits  - would give 100mg IV hydrocortisone on call to OR and then initiate 50mg IV hydrocortisone q8h post op and will taper and re test post op    DI/SIADH Watch POST OP  - Please eval for signs of DI vs SIADH postop  - Please check post-op BMP  -  Please check sodium q6-8h post op with BMP   - Strict I/Os, daily weights. if UO >250cc/hour, please check BMP and urine osm, serum osm.  If consistent with DI, patient may require DDAVP.  Please call endocrine if this occurs.    - May require DDAVP IVP x1 if meets any of these criteria: Na > 145, UO > 250cc/hr, Urine osm < 100 or urine specific gravity < 1.005.  - Signs and symptoms of DI and SIADH post op:  may occur in the 2-3 weeks following surgery.  Patient should check daily weights and if they experience weight gain of 2-3 pounds to call her endocrinologist.  Signs of DI include increased thirst with high urine output.    Primary Hypothyroidism   - Patient with elevated TSH (8)and Low Free T4 (0.5)  PLAN  - continue levothyroxine 75 mcg daily (maintain on empty stomach (at least 1 hour before meals), separate by 4 hours from PPI or calcium supplementation which can inhibit its absorption)    T2DM with hyperglycemia  - HbA1c: 8  - Home Regimen: metformin 1000mg bid  - Endocrinologist: does not have  PLAN  - Hold oral DM agents while inpatient  - Use low dose Admelog correction scale pre-meal  - Use low dose Admelog correction scale at bedtime  - Fingerstick BG before meals and bedtime  - Goal -180  - Carbohydrate consistent diet  - RD consult  Discharge plan:  - Discharge medications: metformin 1000mg bid + GLP-1 agonist  - Patient to call doctor with persistent high or low BG at home.   - Ensure patient has glucometer, test strips and lancets on discharge.  - Recommend routine outpatient ophthalmology, podiatry and endocrinology f/u    HTN  - Home regimen: lisinopril 2.5mg daily  PLAN  - Can check urine microalbumin outpatient  - Outpatient goal BP <130/80. Management per primary team.    HLD  - Home regimen: atorvastatin 80mg daily  -   PLAN  - resume statin when able    Will schedule an appointment for the patient to follow up.  [address] [place] [address]  Phone Number: [phone]    Discussed with primary team.     Thank you for the interesting consult.    Denny Bishop MD, Endocrinology Fellow  Pager [phone] from 9am to 5pm. After hours and on weekends, please call [phone].

## 2023-03-15 LAB
ANION GAP SERPL CALC-SCNC: 10 MMOL/L — SIGNIFICANT CHANGE UP (ref 5–17)
ANION GAP SERPL CALC-SCNC: 15 MMOL/L — SIGNIFICANT CHANGE UP (ref 5–17)
ANION GAP SERPL CALC-SCNC: 17 MMOL/L — SIGNIFICANT CHANGE UP (ref 5–17)
BUN SERPL-MCNC: 12 MG/DL — SIGNIFICANT CHANGE UP (ref 7–23)
BUN SERPL-MCNC: 12 MG/DL — SIGNIFICANT CHANGE UP (ref 7–23)
BUN SERPL-MCNC: 9 MG/DL — SIGNIFICANT CHANGE UP (ref 7–23)
CALCIUM SERPL-MCNC: 9 MG/DL — SIGNIFICANT CHANGE UP (ref 8.4–10.5)
CALCIUM SERPL-MCNC: 9.2 MG/DL — SIGNIFICANT CHANGE UP (ref 8.4–10.5)
CALCIUM SERPL-MCNC: 9.2 MG/DL — SIGNIFICANT CHANGE UP (ref 8.4–10.5)
CHLORIDE SERPL-SCNC: 112 MMOL/L — HIGH (ref 96–108)
CHLORIDE SERPL-SCNC: 120 MMOL/L — HIGH (ref 96–108)
CHLORIDE SERPL-SCNC: 125 MMOL/L — HIGH (ref 96–108)
CO2 SERPL-SCNC: 17 MMOL/L — LOW (ref 22–31)
CO2 SERPL-SCNC: 20 MMOL/L — LOW (ref 22–31)
CO2 SERPL-SCNC: 22 MMOL/L — SIGNIFICANT CHANGE UP (ref 22–31)
CREAT SERPL-MCNC: 0.64 MG/DL — SIGNIFICANT CHANGE UP (ref 0.5–1.3)
CREAT SERPL-MCNC: 0.68 MG/DL — SIGNIFICANT CHANGE UP (ref 0.5–1.3)
CREAT SERPL-MCNC: 0.69 MG/DL — SIGNIFICANT CHANGE UP (ref 0.5–1.3)
EGFR: 104 ML/MIN/1.73M2 — SIGNIFICANT CHANGE UP
EGFR: 104 ML/MIN/1.73M2 — SIGNIFICANT CHANGE UP
EGFR: 106 ML/MIN/1.73M2 — SIGNIFICANT CHANGE UP
FSH SERPL-MCNC: 3.4 IU/L — SIGNIFICANT CHANGE UP
GLUCOSE BLDC GLUCOMTR-MCNC: 108 MG/DL — HIGH (ref 70–99)
GLUCOSE BLDC GLUCOMTR-MCNC: 111 MG/DL — HIGH (ref 70–99)
GLUCOSE BLDC GLUCOMTR-MCNC: 111 MG/DL — HIGH (ref 70–99)
GLUCOSE BLDC GLUCOMTR-MCNC: 123 MG/DL — HIGH (ref 70–99)
GLUCOSE BLDC GLUCOMTR-MCNC: 124 MG/DL — HIGH (ref 70–99)
GLUCOSE BLDC GLUCOMTR-MCNC: 129 MG/DL — HIGH (ref 70–99)
GLUCOSE BLDC GLUCOMTR-MCNC: 169 MG/DL — HIGH (ref 70–99)
GLUCOSE BLDC GLUCOMTR-MCNC: 169 MG/DL — HIGH (ref 70–99)
GLUCOSE BLDC GLUCOMTR-MCNC: 215 MG/DL — HIGH (ref 70–99)
GLUCOSE BLDC GLUCOMTR-MCNC: 265 MG/DL — HIGH (ref 70–99)
GLUCOSE BLDC GLUCOMTR-MCNC: 287 MG/DL — HIGH (ref 70–99)
GLUCOSE BLDC GLUCOMTR-MCNC: 298 MG/DL — HIGH (ref 70–99)
GLUCOSE SERPL-MCNC: 170 MG/DL — HIGH (ref 70–99)
GLUCOSE SERPL-MCNC: 261 MG/DL — HIGH (ref 70–99)
GLUCOSE SERPL-MCNC: 373 MG/DL — HIGH (ref 70–99)
HCT VFR BLD CALC: 38.4 % — SIGNIFICANT CHANGE UP (ref 34.5–45)
HGB BLD-MCNC: 12.2 G/DL — SIGNIFICANT CHANGE UP (ref 11.5–15.5)
LH SERPL-ACNC: 1.1 IU/L — SIGNIFICANT CHANGE UP
MAGNESIUM SERPL-MCNC: 2.1 MG/DL — SIGNIFICANT CHANGE UP (ref 1.6–2.6)
MAGNESIUM SERPL-MCNC: 2.2 MG/DL — SIGNIFICANT CHANGE UP (ref 1.6–2.6)
MAGNESIUM SERPL-MCNC: 2.4 MG/DL — SIGNIFICANT CHANGE UP (ref 1.6–2.6)
MCHC RBC-ENTMCNC: 28.1 PG — SIGNIFICANT CHANGE UP (ref 27–34)
MCHC RBC-ENTMCNC: 31.8 GM/DL — LOW (ref 32–36)
MCV RBC AUTO: 88.5 FL — SIGNIFICANT CHANGE UP (ref 80–100)
NRBC # BLD: 0 /100 WBCS — SIGNIFICANT CHANGE UP (ref 0–0)
OSMOLALITY SERPL: 310 MOSMOL/KG — HIGH (ref 275–300)
OSMOLALITY SERPL: 324 MOSMOL/KG — HIGH (ref 275–300)
OSMOLALITY UR: 101 MOS/KG — LOW (ref 300–900)
OSMOLALITY UR: 254 MOS/KG — LOW (ref 300–900)
OSMOLALITY UR: 551 MOS/KG — SIGNIFICANT CHANGE UP (ref 300–900)
PHOSPHATE SERPL-MCNC: 2 MG/DL — LOW (ref 2.5–4.5)
PHOSPHATE SERPL-MCNC: 3 MG/DL — SIGNIFICANT CHANGE UP (ref 2.5–4.5)
PHOSPHATE SERPL-MCNC: 3.8 MG/DL — SIGNIFICANT CHANGE UP (ref 2.5–4.5)
PLATELET # BLD AUTO: 349 K/UL — SIGNIFICANT CHANGE UP (ref 150–400)
POTASSIUM SERPL-MCNC: 3.1 MMOL/L — LOW (ref 3.5–5.3)
POTASSIUM SERPL-MCNC: 3.5 MMOL/L — SIGNIFICANT CHANGE UP (ref 3.5–5.3)
POTASSIUM SERPL-MCNC: 3.9 MMOL/L — SIGNIFICANT CHANGE UP (ref 3.5–5.3)
POTASSIUM SERPL-SCNC: 3.1 MMOL/L — LOW (ref 3.5–5.3)
POTASSIUM SERPL-SCNC: 3.5 MMOL/L — SIGNIFICANT CHANGE UP (ref 3.5–5.3)
POTASSIUM SERPL-SCNC: 3.9 MMOL/L — SIGNIFICANT CHANGE UP (ref 3.5–5.3)
PROLACTIN SERPL-MCNC: 42.1 NG/ML — HIGH (ref 3.4–24.1)
RBC # BLD: 4.34 M/UL — SIGNIFICANT CHANGE UP (ref 3.8–5.2)
RBC # FLD: 12.8 % — SIGNIFICANT CHANGE UP (ref 10.3–14.5)
SODIUM SERPL-SCNC: 146 MMOL/L — HIGH (ref 135–145)
SODIUM SERPL-SCNC: 155 MMOL/L — HIGH (ref 135–145)
SODIUM SERPL-SCNC: 157 MMOL/L — HIGH (ref 135–145)
SP GR UR STRIP: 1 — LOW (ref 1.01–1.02)
SP GR UR STRIP: 1.01 — SIGNIFICANT CHANGE UP (ref 1.01–1.02)
SP GR UR STRIP: 1.01 — SIGNIFICANT CHANGE UP (ref 1.01–1.02)
T3 SERPL-MCNC: 41 NG/DL — LOW (ref 80–200)
T4 AB SER-ACNC: 5.4 UG/DL — SIGNIFICANT CHANGE UP (ref 4.6–12)
T4 AB SER-ACNC: 5.8 UG/DL — SIGNIFICANT CHANGE UP (ref 4.6–12)
T4 FREE SERPL-MCNC: 0.8 NG/DL — LOW (ref 0.9–1.8)
TSH SERPL-MCNC: 3.46 UIU/ML — SIGNIFICANT CHANGE UP (ref 0.27–4.2)
WBC # BLD: 16.92 K/UL — HIGH (ref 3.8–10.5)
WBC # FLD AUTO: 16.92 K/UL — HIGH (ref 3.8–10.5)

## 2023-03-15 PROCEDURE — 99291 CRITICAL CARE FIRST HOUR: CPT

## 2023-03-15 PROCEDURE — 99232 SBSQ HOSP IP/OBS MODERATE 35: CPT | Mod: GC

## 2023-03-15 PROCEDURE — 70450 CT HEAD/BRAIN W/O DYE: CPT | Mod: 26

## 2023-03-15 RX ORDER — LEVOTHYROXINE SODIUM 125 MCG
37 TABLET ORAL ONCE
Refills: 0 | Status: COMPLETED | OUTPATIENT
Start: 2023-03-15 | End: 2023-03-15

## 2023-03-15 RX ORDER — ONDANSETRON 8 MG/1
4 TABLET, FILM COATED ORAL EVERY 6 HOURS
Refills: 0 | Status: DISCONTINUED | OUTPATIENT
Start: 2023-03-15 | End: 2023-03-22

## 2023-03-15 RX ORDER — SENNA PLUS 8.6 MG/1
2 TABLET ORAL AT BEDTIME
Refills: 0 | Status: DISCONTINUED | OUTPATIENT
Start: 2023-03-15 | End: 2023-03-24

## 2023-03-15 RX ORDER — POTASSIUM CHLORIDE 20 MEQ
20 PACKET (EA) ORAL
Refills: 0 | Status: COMPLETED | OUTPATIENT
Start: 2023-03-15 | End: 2023-03-15

## 2023-03-15 RX ORDER — HYDROCORTISONE 20 MG
50 TABLET ORAL EVERY 8 HOURS
Refills: 0 | Status: DISCONTINUED | OUTPATIENT
Start: 2023-03-15 | End: 2023-03-15

## 2023-03-15 RX ORDER — VANCOMYCIN HCL 1 G
1000 VIAL (EA) INTRAVENOUS EVERY 12 HOURS
Refills: 0 | Status: DISCONTINUED | OUTPATIENT
Start: 2023-03-15 | End: 2023-03-16

## 2023-03-15 RX ORDER — CEFTRIAXONE 500 MG/1
2000 INJECTION, POWDER, FOR SOLUTION INTRAMUSCULAR; INTRAVENOUS EVERY 12 HOURS
Refills: 0 | Status: DISCONTINUED | OUTPATIENT
Start: 2023-03-15 | End: 2023-03-16

## 2023-03-15 RX ORDER — POTASSIUM CHLORIDE 20 MEQ
40 PACKET (EA) ORAL ONCE
Refills: 0 | Status: DISCONTINUED | OUTPATIENT
Start: 2023-03-15 | End: 2023-03-15

## 2023-03-15 RX ORDER — SODIUM CHLORIDE 9 MG/ML
1000 INJECTION INTRAMUSCULAR; INTRAVENOUS; SUBCUTANEOUS
Refills: 0 | Status: DISCONTINUED | OUTPATIENT
Start: 2023-03-15 | End: 2023-03-15

## 2023-03-15 RX ORDER — ACETAMINOPHEN 500 MG
650 TABLET ORAL EVERY 6 HOURS
Refills: 0 | Status: DISCONTINUED | OUTPATIENT
Start: 2023-03-15 | End: 2023-03-18

## 2023-03-15 RX ORDER — INSULIN HUMAN 100 [IU]/ML
0.5 INJECTION, SOLUTION SUBCUTANEOUS
Qty: 100 | Refills: 0 | Status: DISCONTINUED | OUTPATIENT
Start: 2023-03-15 | End: 2023-03-16

## 2023-03-15 RX ORDER — POLYETHYLENE GLYCOL 3350 17 G/17G
17 POWDER, FOR SOLUTION ORAL DAILY
Refills: 0 | Status: DISCONTINUED | OUTPATIENT
Start: 2023-03-15 | End: 2023-03-22

## 2023-03-15 RX ORDER — PANTOPRAZOLE SODIUM 20 MG/1
40 TABLET, DELAYED RELEASE ORAL DAILY
Refills: 0 | Status: DISCONTINUED | OUTPATIENT
Start: 2023-03-15 | End: 2023-03-17

## 2023-03-15 RX ORDER — DEXTROSE 50 % IN WATER 50 %
50 SYRINGE (ML) INTRAVENOUS
Refills: 0 | Status: DISCONTINUED | OUTPATIENT
Start: 2023-03-15 | End: 2023-03-16

## 2023-03-15 RX ORDER — ACETAMINOPHEN 500 MG
1000 TABLET ORAL ONCE
Refills: 0 | Status: COMPLETED | OUTPATIENT
Start: 2023-03-15 | End: 2023-03-15

## 2023-03-15 RX ORDER — CHLORHEXIDINE GLUCONATE 213 G/1000ML
1 SOLUTION TOPICAL DAILY
Refills: 0 | Status: DISCONTINUED | OUTPATIENT
Start: 2023-03-15 | End: 2023-03-22

## 2023-03-15 RX ORDER — POTASSIUM PHOSPHATE, MONOBASIC POTASSIUM PHOSPHATE, DIBASIC 236; 224 MG/ML; MG/ML
15 INJECTION, SOLUTION INTRAVENOUS ONCE
Refills: 0 | Status: COMPLETED | OUTPATIENT
Start: 2023-03-15 | End: 2023-03-15

## 2023-03-15 RX ORDER — ATORVASTATIN CALCIUM 80 MG/1
80 TABLET, FILM COATED ORAL AT BEDTIME
Refills: 0 | Status: DISCONTINUED | OUTPATIENT
Start: 2023-03-15 | End: 2023-03-18

## 2023-03-15 RX ORDER — LEVOTHYROXINE SODIUM 125 MCG
75 TABLET ORAL DAILY
Refills: 0 | Status: DISCONTINUED | OUTPATIENT
Start: 2023-03-15 | End: 2023-03-15

## 2023-03-15 RX ORDER — DESMOPRESSIN ACETATE 0.1 MG/1
0.5 TABLET ORAL ONCE
Refills: 0 | Status: COMPLETED | OUTPATIENT
Start: 2023-03-15 | End: 2023-03-15

## 2023-03-15 RX ORDER — METRONIDAZOLE 500 MG
500 TABLET ORAL EVERY 8 HOURS
Refills: 0 | Status: DISCONTINUED | OUTPATIENT
Start: 2023-03-15 | End: 2023-03-16

## 2023-03-15 RX ORDER — LEVOTHYROXINE SODIUM 125 MCG
75 TABLET ORAL DAILY
Refills: 0 | Status: DISCONTINUED | OUTPATIENT
Start: 2023-03-16 | End: 2023-03-17

## 2023-03-15 RX ORDER — SODIUM CHLORIDE 0.65 %
1 AEROSOL, SPRAY (ML) NASAL THREE TIMES A DAY
Refills: 0 | Status: DISCONTINUED | OUTPATIENT
Start: 2023-03-15 | End: 2023-03-24

## 2023-03-15 RX ORDER — OXYCODONE HYDROCHLORIDE 5 MG/1
5 TABLET ORAL EVERY 4 HOURS
Refills: 0 | Status: DISCONTINUED | OUTPATIENT
Start: 2023-03-15 | End: 2023-03-17

## 2023-03-15 RX ORDER — HYDROCORTISONE 20 MG
50 TABLET ORAL EVERY 8 HOURS
Refills: 0 | Status: DISCONTINUED | OUTPATIENT
Start: 2023-03-15 | End: 2023-03-16

## 2023-03-15 RX ORDER — SODIUM CHLORIDE 9 MG/ML
1000 INJECTION, SOLUTION INTRAVENOUS
Refills: 0 | Status: DISCONTINUED | OUTPATIENT
Start: 2023-03-15 | End: 2023-03-15

## 2023-03-15 RX ADMIN — DESMOPRESSIN ACETATE 0.5 MICROGRAM(S): 0.1 TABLET ORAL at 06:40

## 2023-03-15 RX ADMIN — Medication 100 MILLIGRAM(S): at 05:04

## 2023-03-15 RX ADMIN — INSULIN HUMAN 0.5 UNIT(S)/HR: 100 INJECTION, SOLUTION SUBCUTANEOUS at 19:32

## 2023-03-15 RX ADMIN — Medication 250 MILLIGRAM(S): at 21:41

## 2023-03-15 RX ADMIN — ONDANSETRON 4 MILLIGRAM(S): 8 TABLET, FILM COATED ORAL at 08:00

## 2023-03-15 RX ADMIN — INSULIN HUMAN 0.5 UNIT(S)/HR: 100 INJECTION, SOLUTION SUBCUTANEOUS at 12:34

## 2023-03-15 RX ADMIN — Medication 250 MILLIGRAM(S): at 10:30

## 2023-03-15 RX ADMIN — Medication 50 MILLIGRAM(S): at 21:42

## 2023-03-15 RX ADMIN — Medication 650 MILLIGRAM(S): at 21:49

## 2023-03-15 RX ADMIN — Medication 100 MILLIGRAM(S): at 14:17

## 2023-03-15 RX ADMIN — SENNA PLUS 2 TABLET(S): 8.6 TABLET ORAL at 21:42

## 2023-03-15 RX ADMIN — CEFTRIAXONE 100 MILLIGRAM(S): 500 INJECTION, POWDER, FOR SOLUTION INTRAMUSCULAR; INTRAVENOUS at 05:04

## 2023-03-15 RX ADMIN — PANTOPRAZOLE SODIUM 40 MILLIGRAM(S): 20 TABLET, DELAYED RELEASE ORAL at 11:46

## 2023-03-15 RX ADMIN — Medication 50 MILLIEQUIVALENT(S): at 12:42

## 2023-03-15 RX ADMIN — SODIUM CHLORIDE 70 MILLILITER(S): 9 INJECTION INTRAMUSCULAR; INTRAVENOUS; SUBCUTANEOUS at 04:58

## 2023-03-15 RX ADMIN — Medication 37 MICROGRAM(S): at 06:40

## 2023-03-15 RX ADMIN — Medication 400 MILLIGRAM(S): at 05:04

## 2023-03-15 RX ADMIN — Medication 650 MILLIGRAM(S): at 22:49

## 2023-03-15 RX ADMIN — Medication 100 MILLIGRAM(S): at 21:41

## 2023-03-15 RX ADMIN — POTASSIUM PHOSPHATE, MONOBASIC POTASSIUM PHOSPHATE, DIBASIC 62.5 MILLIMOLE(S): 236; 224 INJECTION, SOLUTION INTRAVENOUS at 19:40

## 2023-03-15 RX ADMIN — CEFTRIAXONE 100 MILLIGRAM(S): 500 INJECTION, POWDER, FOR SOLUTION INTRAMUSCULAR; INTRAVENOUS at 17:27

## 2023-03-15 RX ADMIN — CHLORHEXIDINE GLUCONATE 1 APPLICATION(S): 213 SOLUTION TOPICAL at 11:46

## 2023-03-15 RX ADMIN — Medication 50 MILLIEQUIVALENT(S): at 14:17

## 2023-03-15 RX ADMIN — Medication 50 MILLIGRAM(S): at 05:17

## 2023-03-15 RX ADMIN — Medication 50 MILLIGRAM(S): at 14:27

## 2023-03-15 RX ADMIN — ATORVASTATIN CALCIUM 80 MILLIGRAM(S): 80 TABLET, FILM COATED ORAL at 21:42

## 2023-03-15 RX ADMIN — Medication 1000 MILLIGRAM(S): at 05:34

## 2023-03-15 RX ADMIN — SODIUM CHLORIDE 50 MILLILITER(S): 9 INJECTION, SOLUTION INTRAVENOUS at 19:32

## 2023-03-15 RX ADMIN — SODIUM CHLORIDE 50 MILLILITER(S): 9 INJECTION, SOLUTION INTRAVENOUS at 11:46

## 2023-03-15 NOTE — PROGRESS NOTE ADULT - SUBJECTIVE AND OBJECTIVE BOX
SUMMARY: 51yo woman transferred from [facility] on 3/10 for suprasellar mass found on MRI for opthalmology work up--etta HA and 3wk of blurry vision. PMH HTN, DM2, HLD.     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     REVIEW OF SYSTEMS: [] Unable to Assess due to neurologic exam   [ x] All ROS addressed below are non-contributory, except:  Neuro: [ x] Headache [ ] Back pain [ ] Numbness [ ] Weakness [ ] Ataxia [ ] Dizziness [ ] Aphasia [ ] Dysarthria [ x] Visual disturbance  Resp: [ ] Shortness of breath/dyspnea [ ] Orthopnea [ ] Cough  CV: [ ] Chest pain [ ] Palpitation [ ] Lightheadedness [ ] Syncope  Renal: [ ] Thirst [ ] Edema  GI: [ ] Nausea [ ] Emesis [ ] Abdominal pain [ ] Constipation [ ] Diarrhea  Hem: [ ] Hematemesis [ ] bBright red blood per rectum  ID: [ ] Fever [ ] Chills [ ] Dysuria  ENT: [ ] Rhinorrhea    VITALS: [x] Reviewed    IMAGING/DATA: [x] Reviewed    IVF FLUIDS/MEDICATIONS: [x] Reviewed    ALLERGIES: Allergies    No Known Allergies    Intolerances      DEVICES:   [] Restraints [x] PIVs [] ET tube [] central line [] PICC [x] arterial line [x] humphries [] NGT/OGT [] EVD [] LD [] TIM/HMV [] LiCOX [] ICP monitor [] Trach [] PEG [] Chest Tube [] other:    EXAMINATION:  General: No acute distress  HEENT: Anicteric sclerae  Cardiac: [id number]  Lungs: Clear  Abdomen: Soft, non-tender, +BS  Extremities: No c/c/e  Skin/Incision Site: Clean, dry and intact  Neurologic: ***  Awake, alert, fully oriented, follows commands, PERRL, EOMI, face symmetric, tongue midline, no drift, full strength

## 2023-03-15 NOTE — PROGRESS NOTE ADULT - SUBJECTIVE AND OBJECTIVE BOX
ENDOCRINE FOLLOW UP     Chief Complaint: craniopharyngioma    History:   S/p TSP, desmopressin 0.5 mcg IV given this am for hypernatremia and polyuria.    MEDICATIONS  (STANDING):  atorvastatin 80 milliGRAM(s) Oral at bedtime  cefTRIAXone   IVPB 2000 milliGRAM(s) IV Intermittent every 12 hours  chlorhexidine 4% Liquid 1 Application(s) Topical daily  hydrocortisone sodium succinate Injectable 50 milliGRAM(s) IV Push every 8 hours  metroNIDAZOLE  IVPB 500 milliGRAM(s) IV Intermittent every 8 hours  pantoprazole  Injectable 40 milliGRAM(s) IV Push daily  polyethylene glycol 3350 17 Gram(s) Oral daily  senna 2 Tablet(s) Oral at bedtime  sodium chloride 0.9%. 1000 milliLiter(s) (70 mL/Hr) IV Continuous <Continuous>  vancomycin  IVPB 1000 milliGRAM(s) IV Intermittent every 12 hours    MEDICATIONS  (PRN):  acetaminophen     Tablet .. 650 milliGRAM(s) Oral every 6 hours PRN Temp greater or equal to 38C (100.4F), Mild Pain (1 - 3)  ondansetron Injectable 4 milliGRAM(s) IV Push every 6 hours PRN Nausea and/or Vomiting  oxyCODONE    IR 5 milliGRAM(s) Oral every 4 hours PRN Moderate Pain (4 - 6)  sodium chloride 0.65% Nasal 1 Spray(s) Both Nostrils three times a day PRN Nasal Congestion      Allergies    No Known Drug Allergies  S/P TRANSSPHENOIDAL SURGERY. SINONASAL PRECAUTIONS! NO NASAL SWABS OR NG TUBES. (Unknown)    Intolerances        ROS: All other systems reviewed and negative    PHYSICAL EXAM:  VITALS: T(C): 36.8 (03-15-23 @ 08:00)  T(F): 98.2 (03-15-23 @ 08:00), Max: 98.6 (03-14-23 @ 19:10)  HR: 62 (03-15-23 @ 10:00) (55 - 88)  BP: 128/57 (03-15-23 @ 10:00) (104/65 - 128/57)  RR:  (12 - 19)  SpO2:  (93% - 100%)  Wt(kg): --  GENERAL: NAD, resting comfortably   EYES: No proptosis,  anicteric  HEENT:  Atraumatic, Normocephalic, moist mucous membranes  RESPIRATORY: Nonlabored respirations on room air, normal rate/effort   CARDIOVASCULAR: Did not appear cyanotic, no lower extremity swelling visualized  GI: Soft, nontender, non distended  NEURO: Answering questions appropriately, moves all extremities spontaneously  PSYCH:  reactive affect, euthymic mood    POCT Blood Glucose.: 104 mg/dL (03-14-23 @ 16:25)  POCT Blood Glucose.: 133 mg/dL (03-14-23 @ 11:37)  POCT Blood Glucose.: 119 mg/dL (03-14-23 @ 07:46)  POCT Blood Glucose.: 139 mg/dL (03-13-23 @ 21:57)  POCT Blood Glucose.: 114 mg/dL (03-13-23 @ 16:31)  POCT Blood Glucose.: 153 mg/dL (03-13-23 @ 12:28)  POCT Blood Glucose.: 106 mg/dL (03-13-23 @ 07:43)  POCT Blood Glucose.: 151 mg/dL (03-12-23 @ 21:16)  POCT Blood Glucose.: 105 mg/dL (03-12-23 @ 16:31)  POCT Blood Glucose.: 134 mg/dL (03-12-23 @ 11:25)      03-15    155<H>  |  120<H>  |  12  ----------------------------<  373<H>  3.5   |  20<L>  |  0.69    eGFR: 104    Ca    9.2      03-15  Mg     2.2     03-15  Phos  3.0     03-15        A1C with Estimated Average Glucose Result: 8.0 % (03-13-23 @ 06:23)      Thyroid Stimulating Hormone, Serum: 8.85 uIU/mL (03-11-23 @ 05:39)   ENDOCRINE FOLLOW UP     Chief Complaint: craniopharyngioma    History:   S/p TSP, desmopressin 0.5 mcg IV given this am for hypernatremia and polyuria. Patient lethargic, lying in bed.    MEDICATIONS  (STANDING):  atorvastatin 80 milliGRAM(s) Oral at bedtime  cefTRIAXone   IVPB 2000 milliGRAM(s) IV Intermittent every 12 hours  chlorhexidine 4% Liquid 1 Application(s) Topical daily  hydrocortisone sodium succinate Injectable 50 milliGRAM(s) IV Push every 8 hours  metroNIDAZOLE  IVPB 500 milliGRAM(s) IV Intermittent every 8 hours  pantoprazole  Injectable 40 milliGRAM(s) IV Push daily  polyethylene glycol 3350 17 Gram(s) Oral daily  senna 2 Tablet(s) Oral at bedtime  sodium chloride 0.9%. 1000 milliLiter(s) (70 mL/Hr) IV Continuous <Continuous>  vancomycin  IVPB 1000 milliGRAM(s) IV Intermittent every 12 hours    MEDICATIONS  (PRN):  acetaminophen     Tablet .. 650 milliGRAM(s) Oral every 6 hours PRN Temp greater or equal to 38C (100.4F), Mild Pain (1 - 3)  ondansetron Injectable 4 milliGRAM(s) IV Push every 6 hours PRN Nausea and/or Vomiting  oxyCODONE    IR 5 milliGRAM(s) Oral every 4 hours PRN Moderate Pain (4 - 6)  sodium chloride 0.65% Nasal 1 Spray(s) Both Nostrils three times a day PRN Nasal Congestion      Allergies    No Known Drug Allergies  S/P TRANSSPHENOIDAL SURGERY. SINONASAL PRECAUTIONS! NO NASAL SWABS OR NG TUBES. (Unknown)    Intolerances        ROS: All other systems reviewed and negative    PHYSICAL EXAM:  VITALS: T(C): 36.8 (03-15-23 @ 08:00)  T(F): 98.2 (03-15-23 @ 08:00), Max: 98.6 (03-14-23 @ 19:10)  HR: 62 (03-15-23 @ 10:00) (55 - 88)  BP: 128/57 (03-15-23 @ 10:00) (104/65 - 128/57)  RR:  (12 - 19)  SpO2:  (93% - 100%)  Wt(kg): --  GENERAL: tired appearing, lying in bed  EYES: No proptosis  HEENT:  dry mucus membranes, dried drainage from nose  RESPIRATORY: Nonlabored respirations on face tent, normal rate/effort   CARDIOVASCULAR: Did not appear cyanotic  GI: Soft, nontender, non distended  NEURO: sleeping, occasionally moving extremities spontaneously    POCT Blood Glucose.: 104 mg/dL (03-14-23 @ 16:25)  POCT Blood Glucose.: 133 mg/dL (03-14-23 @ 11:37)  POCT Blood Glucose.: 119 mg/dL (03-14-23 @ 07:46)  POCT Blood Glucose.: 139 mg/dL (03-13-23 @ 21:57)  POCT Blood Glucose.: 114 mg/dL (03-13-23 @ 16:31)  POCT Blood Glucose.: 153 mg/dL (03-13-23 @ 12:28)  POCT Blood Glucose.: 106 mg/dL (03-13-23 @ 07:43)  POCT Blood Glucose.: 151 mg/dL (03-12-23 @ 21:16)  POCT Blood Glucose.: 105 mg/dL (03-12-23 @ 16:31)  POCT Blood Glucose.: 134 mg/dL (03-12-23 @ 11:25)      03-15    155<H>  |  120<H>  |  12  ----------------------------<  373<H>  3.5   |  20<L>  |  0.69    eGFR: 104    Ca    9.2      03-15  Mg     2.2     03-15  Phos  3.0     03-15        A1C with Estimated Average Glucose Result: 8.0 % (03-13-23 @ 06:23)      Thyroid Stimulating Hormone, Serum: 8.85 uIU/mL (03-11-23 @ 05:39)

## 2023-03-15 NOTE — PROGRESS NOTE ADULT - SUBJECTIVE AND OBJECTIVE BOX
[facility] DEPARTMENT OF OPHTHALMOLOGY  ------------------------------------------------------------------------------  Lisseth Abraham MD, PGY-2  Contact: TEAMS  ------------------------------------------------------------------------------  NOTE STARTED PRIOR TO SEEING PATIENT    Interval History: No acute events overnight.     MEDICATIONS  (STANDING):  atorvastatin 80 milliGRAM(s) Oral at bedtime  cefTRIAXone   IVPB 2000 milliGRAM(s) IV Intermittent every 12 hours  chlorhexidine 4% Liquid 1 Application(s) Topical daily  hydrocortisone sodium succinate Injectable 50 milliGRAM(s) IV Push every 8 hours  metroNIDAZOLE  IVPB 500 milliGRAM(s) IV Intermittent every 8 hours  pantoprazole  Injectable 40 milliGRAM(s) IV Push daily  polyethylene glycol 3350 17 Gram(s) Oral daily  senna 2 Tablet(s) Oral at bedtime  sodium chloride 0.9%. 1000 milliLiter(s) (70 mL/Hr) IV Continuous <Continuous>  vancomycin  IVPB 1000 milliGRAM(s) IV Intermittent every 12 hours    MEDICATIONS  (PRN):  acetaminophen     Tablet .. 650 milliGRAM(s) Oral every 6 hours PRN Temp greater or equal to 38C (100.4F), Mild Pain (1 - 3)  ondansetron Injectable 4 milliGRAM(s) IV Push every 6 hours PRN Nausea and/or Vomiting  oxyCODONE    IR 5 milliGRAM(s) Oral every 4 hours PRN Moderate Pain (4 - 6)  sodium chloride 0.65% Nasal 1 Spray(s) Both Nostrils three times a day PRN Nasal Congestion      VITALS: T(C): 36.8 (03-15-23 @ 08:00)  T(F): 98.2 (03-15-23 @ 08:00), Max: 98.6 (03-14-23 @ 19:10)  HR: 62 (03-15-23 @ 10:00) (55 - 88)  BP: 128/57 (03-15-23 @ 10:00) (104/65 - 128/57)  RR:  (12 - 19)  SpO2:  (93% - 100%)  Wt(kg): --  General: AAO x 3, appropriate mood and affect    Ophthalmology Exam:  Visual acuity (cc): 20/70 OD PH 20/20; OS 20/70 PH 20/50  Pupils: PERRL OU, no APD  Ttono: 16 OU  Extraocular movements (EOMs): Full OU, no pain, no diplopia  Confrontational Visual Field (CVF): gross inferotemporal deficits OU  Color Plates: 12/12 OD 0/12 OS    Pen Light Exam (PLE)  External: Flat OU  Lids/Lashes/Lacrimal Ducts: Flat OU    Sclera/Conjunctiva: W+Q OU  Cornea: Cl OU  Anterior Chamber: D+F OU    Iris: Flat OU  Lens: NS OU   Patient underwent procedure overnight and unable to be examined today as patient is lethargic. Ophthalmology team will return on Saturday for an eye exam post operatively. Please page ophthalmology if there in any acute concern for vision change.     Veda Corona MD

## 2023-03-15 NOTE — PROGRESS NOTE ADULT - SUBJECTIVE AND OBJECTIVE BOX
NSCU ATTENDING -- ADDITIONAL PROGRESS NOTE    Nighttime rounds were performed -- please refer to earlier Progress Note for HPI details.    T(C): 37.5 (03-15-23 @ 19:00), Max: 37.5 (03-15-23 @ 19:00)  HR: 60 (03-15-23 @ 19:00) (54 - 83)  BP: 128/53 (03-15-23 @ 11:00) (115/52 - 128/57)  RR: 20 (03-15-23 @ 19:00) (12 - 20)  SpO2: 100% (03-15-23 @ 19:00) (93% - 100%)  Wt(kg): --    Relevant labwork and imaging reviewed.    Patient remains critically ill.    [A/P]    Additional 30 minutes of critical care time. NSCU ATTENDING -- ADDITIONAL PROGRESS NOTE    Nighttime rounds were performed -- please refer to earlier Progress Note for HPI details.    T(C): 37.5 (03-15-23 @ 19:00), Max: 37.5 (03-15-23 @ 19:00)  HR: 60 (03-15-23 @ 19:00) (54 - 83)  BP: 128/53 (03-15-23 @ 11:00) (115/52 - 128/57)  RR: 20 (03-15-23 @ 19:00) (12 - 20)  SpO2: 100% (03-15-23 @ 19:00) (93% - 100%)  Wt(kg): --    Relevant labwork and imaging reviewed.    given 1x ddavp early am   epistaxis this pm  hypernatremia, encourage PO water intake  recheck BMP at 3am  UOP>250cc/hr x2hr then check urine lytes/labs stat   H/H monitor   MRI pending   LD 5cc/hr   abx per ENT     35minutes spent

## 2023-03-15 NOTE — PHYSICAL THERAPY INITIAL EVALUATION ADULT - MANUAL MUSCLE TESTING RESULTS, REHAB EVAL
at least 3+/5 throughout 4 extremities/grossly assessed due to
at least 3+/5 throughout 4 extremities/grossly assessed due to

## 2023-03-15 NOTE — PROGRESS NOTE ADULT - ASSESSMENT
52 yr old female with a history of hypertension, hyperlipidemia, dm type 2, no surgeries ever presents with a month of headaches and 3 weeks of blurred vision. Endocrinology consulted for evaluation of sellar mass.    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 Too low to be prolactinoma given size of the sellar mass. Suspect stalk effect. (prolactin diluted 103, undiluted 107)  -Secondary hypogonadism can be addressed as outpatient. (Lh 2.6, estradiol <5)  -3/12 AM ayush 5.9, ACTH 21.5, 3/13 am cortisol (6am) 6.9  - cosyntropin stim test: 3/14 AM cortisol at 8:15am 5.1, cosyntropin at 8:15, 8:50am cortisol 16.9, 9:25am 20.7  - s/p tsp 3/15  PLAN  - f/u IGF-1  - f/u neurosurgery recommendations  - f/u ophthalmology recommendations (formal consult if not done) given visual deficits  - would give 100mg IV hydrocortisone on call to OR and then initiate 50mg IV hydrocortisone q8h post op and will taper and re test post op    DI/SIADH Watch POST OP  - Please eval for signs of DI vs SIADH postop  - Please check post-op BMP  -  Please check sodium q6-8h post op with BMP   - Strict I/Os, daily weights. if UO >250cc/hour, please check BMP and urine osm, serum osm.  If consistent with DI, patient may require DDAVP.  Please call endocrine if this occurs.    - May require DDAVP IVP x1 if meets any of these criteria: Na > 145, UO > 250cc/hr, Urine osm < 100 or urine specific gravity < 1.005.  - Signs and symptoms of DI and SIADH post op:  may occur in the 2-3 weeks following surgery.  Patient should check daily weights and if they experience weight gain of 2-3 pounds to call her endocrinologist.  Signs of DI include increased thirst with high urine output.    Primary Hypothyroidism   - Patient with elevated TSH (8)and Low Free T4 (0.5)  PLAN  - continue levothyroxine 75 mcg daily (maintain on empty stomach (at least 1 hour before meals), separate by 4 hours from PPI or calcium supplementation which can inhibit its absorption)    T2DM with hyperglycemia  - HbA1c: 8  - Home Regimen: metformin 1000mg bid  - Endocrinologist: does not have  PLAN  - Hold oral DM agents while inpatient  - Use low dose Admelog correction scale pre-meal  - Use low dose Admelog correction scale at bedtime  - Fingerstick BG before meals and bedtime  - Goal -180  - Carbohydrate consistent diet  - RD consult  Discharge plan:  - Discharge medications: metformin 1000mg bid + GLP-1 agonist  - Patient to call doctor with persistent high or low BG at home.   - Ensure patient has glucometer, test strips and lancets on discharge.  - Recommend routine outpatient ophthalmology, podiatry and endocrinology f/u    HTN  - Home regimen: lisinopril 2.5mg daily  PLAN  - Can check urine microalbumin outpatient  - Outpatient goal BP <130/80. Management per primary team.    HLD  - Home regimen: atorvastatin 80mg daily  -   PLAN  - resume statin when able    Will schedule an appointment for the patient to follow up.  [address] [place] [address]  Phone Number: [phone]    Discussed with primary team.     Thank you for the interesting consult.    Denny Bishop MD, Endocrinology Fellow  Pager [phone] from 9am to 5pm. After hours and on weekends, please call [phone]. 52 yr old female with a history of hypertension, hyperlipidemia, dm type 2, no surgeries ever presents with a month of headaches and 3 weeks of blurred vision. Endocrinology consulted for evaluation of sellar mass.    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 Too low to be prolactinoma given size of the sellar mass. Suspect stalk effect. (prolactin diluted 103, undiluted 107)  -Secondary hypogonadism can be addressed as outpatient. (Lh 2.6, estradiol <5)  -3/12 AM ayush 5.9, ACTH 21.5, 3/13 am cortisol (6am) 6.9  - cosyntropin stim test: 3/14 AM cortisol at 8:15am 5.1, cosyntropin at 8:15, 8:50am cortisol 16.9, 9:25am 20.7  - s/p tsp 3/15  PLAN  - f/u IGF-1  - f/u neurosurgery recommendations  - f/u ophthalmology recommendations (formal consult if not done) given visual deficits  - continue hydrocortisone 50mg q8h IV    DI/SIADH Watch POST OP  - Please eval for signs of DI vs SIADH postop  -  Please check sodium q6-8h post op with BMP   - Strict I/Os, daily weights. if UO >250cc/hour, please check BMP and urine osm, serum osm.  If consistent with DI, patient may require DDAVP.   - May require DDAVP IVP x1 if meets any of these criteria: Na > 145, UO > 250cc/hr, Urine osm < 100 or urine specific gravity < 1.005.  - Signs and symptoms of DI and SIADH post op:  may occur in the 2-3 weeks following surgery.  Patient should check daily weights and if they experience weight gain of 2-3 pounds to call her endocrinologist.  Signs of DI include increased thirst with high urine output.    Primary Hypothyroidism   - Patient with elevated TSH (8)and Low Free T4 (0.5)  PLAN  - continue levothyroxine 75 mcg daily (maintain on empty stomach (at least 1 hour before meals), separate by 4 hours from PPI or calcium supplementation which can inhibit its absorption)    T2DM with hyperglycemia  - HbA1c: 8  - Home Regimen: metformin 1000mg bid  - Endocrinologist: does not have  PLAN  - Hold oral DM agents while inpatient  - Use low dose Admelog correction scale pre-meal  - Use low dose Admelog correction scale at bedtime  - Fingerstick BG before meals and bedtime  - Goal -180  - Carbohydrate consistent diet  - RD consult  Discharge plan:  - Discharge medications: metformin 1000mg bid + GLP-1 agonist  - Patient to call doctor with persistent high or low BG at home.   - Ensure patient has glucometer, test strips and lancets on discharge.  - Recommend routine outpatient ophthalmology, podiatry and endocrinology f/u    HTN  - Home regimen: lisinopril 2.5mg daily  PLAN  - Can check urine microalbumin outpatient  - Outpatient goal BP <130/80. Management per primary team.    HLD  - Home regimen: atorvastatin 80mg daily  -   PLAN  - resume statin when able    Will schedule an appointment for the patient to follow up.  [address] [place] [address]  Phone Number: [phone]    Discussed with primary team.     Thank you for the interesting consult.    Denny Bishop MD, Endocrinology Fellow  Pager [phone] from 9am to 5pm. After hours and on weekends, please call [phone]. 52 yr old female with a history of hypertension, hyperlipidemia, dm type 2, no surgeries ever presents with a month of headaches and 3 weeks of blurred vision. Endocrinology consulted for evaluation of sellar mass.    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 Too low to be prolactinoma given size of the sellar mass. Suspect stalk effect. (prolactin diluted 103, undiluted 107)  -Secondary hypogonadism can be addressed as outpatient. (Lh 2.6, estradiol <5)  -3/12 AM ayush 5.9, ACTH 21.5, 3/13 am cortisol (6am) 6.9  - cosyntropin stim test: 3/14 AM cortisol at 8:15am 5.1, cosyntropin at 8:15, 8:50am cortisol 16.9, 9:25am 20.7  - s/p tsp 3/15  PLAN  - f/u IGF-1  - f/u neurosurgery recommendations  - f/u ophthalmology recommendations (formal consult if not done) given visual deficits  - continue hydrocortisone 50mg q8h IV    DI/SIADH Watch POST OP  - Please eval for signs of DI vs SIADH postop  -  Please check sodium q6-8h post op with BMP   - Strict I/Os, daily weights. if UO >250cc/hour, please check BMP and urine osm, serum osm.  If consistent with DI, patient may require DDAVP.   - May require DDAVP IVP x1 if meets any of these criteria: Na > 145, UO > 250cc/hr, Urine osm < 100 or urine specific gravity < 1.005.  - Signs and symptoms of DI and SIADH post op:  may occur in the 2-3 weeks following surgery.  Patient should check daily weights and if they experience weight gain of 2-3 pounds to call her endocrinologist.  Signs of DI include increased thirst with high urine output.    Primary Hypothyroidism   - Patient with elevated TSH (8)and Low Free T4 (0.5)  PLAN  - continue levothyroxine 75 mcg daily (maintain on empty stomach (at least 1 hour before meals), separate by 4 hours from PPI or calcium supplementation which can inhibit its absorption)    Steroid hyperglycemia  T2DM with hyperglycemia  - HbA1c: 8  - Home Regimen: metformin 1000mg bid  - Endocrinologist: does not have  - patient with hyperglycemia on stress dose steroids  PLAN  - continue insulin gtt while hyperglycemic on stress dose steroids, q1h fingersticks  - Goal -180  - Carbohydrate consistent diet  Discharge plan:  - Discharge medications: metformin 1000mg bid + GLP-1 agonist  - Patient to call doctor with persistent high or low BG at home.   - Ensure patient has glucometer, test strips and lancets on discharge.  - Recommend routine outpatient ophthalmology, podiatry and endocrinology f/u    HTN  - Home regimen: lisinopril 2.5mg daily  PLAN  - Can check urine microalbumin outpatient  - Outpatient goal BP <130/80. Management per primary team.    HLD  - Home regimen: atorvastatin 80mg daily  -   PLAN  - resume statin when able    Will schedule an appointment for the patient to follow up.  [address] [place] [address]  Phone Number: [phone]    Discussed with primary team.     Thank you for the interesting consult.    Denny Bishop MD, Endocrinology Fellow  Pager [phone] from 9am to 5pm. After hours and on weekends, please call [phone]. 52 yr old female with a history of hypertension, hyperlipidemia, dm type 2, no surgeries ever presents with a month of headaches and 3 weeks of blurred vision. Endocrinology consulted for evaluation of sellar mass.    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 Too low to be prolactinoma given size of the sellar mass. Suspect stalk effect. (prolactin diluted 103, undiluted 107)  -Secondary hypogonadism can be addressed as outpatient. (Lh 2.6, estradiol <5)  -3/12 AM ayush 5.9, ACTH 21.5, 3/13 am cortisol (6am) 6.9  - cosyntropin stim test: 3/14 AM cortisol at 8:15am 5.1, cosyntropin at 8:15, 8:50am cortisol 16.9, 9:25am 20.7  - s/p tsp 3/15  PLAN  - f/u IGF-1  - f/u neurosurgery recommendations  - f/u ophthalmology recommendations (formal consult if not done) given visual deficits  - continue hydrocortisone 50mg q8h IV, can change to PO 50mg q8h if no IV    DI/SIADH Watch POST OP  - Please eval for signs of DI vs SIADH postop  -  Please check sodium q6h post op with BMP   - Strict I/Os, daily weights. if UO >250cc/hour, please check BMP and urine osm, serum osm.  If consistent with DI, patient may require DDAVP.   - May require DDAVP IVP x1 if meets any of these criteria: Na > 145, UO > 250cc/hr, Urine osm < 100 or urine specific gravity < 1.005.  - Signs and symptoms of DI and SIADH post op:  may occur in the 2-3 weeks following surgery.  Patient should check daily weights and if they experience weight gain of 2-3 pounds to call her endocrinologist.  Signs of DI include increased thirst with high urine output.    Primary Hypothyroidism   - Patient with elevated TSH (8)and Low Free T4 (0.5)  PLAN  - continue levothyroxine 75 mcg daily (maintain on empty stomach (at least 1 hour before meals), separate by 4 hours from PPI or calcium supplementation which can inhibit its absorption)    Steroid hyperglycemia  T2DM with hyperglycemia  - HbA1c: 8  - Home Regimen: metformin 1000mg bid  - Endocrinologist: does not have  - patient with hyperglycemia on stress dose steroids  PLAN  - continue insulin gtt while hyperglycemic on stress dose steroids, q1h fingersticks  - Goal -180  - Carbohydrate consistent diet  Discharge plan:  - Discharge medications: metformin 1000mg bid + GLP-1 agonist  - Patient to call doctor with persistent high or low BG at home.   - Ensure patient has glucometer, test strips and lancets on discharge.  - Recommend routine outpatient ophthalmology, podiatry and endocrinology f/u    HTN  - Home regimen: lisinopril 2.5mg daily  PLAN  - Can check urine microalbumin outpatient  - Outpatient goal BP <130/80. Management per primary team.    HLD  - Home regimen: atorvastatin 80mg daily  -   PLAN  - resume statin when able    Will schedule an appointment for the patient to follow up.  [address] [place] [address]  Phone Number: [phone]    Discussed with primary team.     Thank you for the interesting consult.    Denny Bishop MD, Endocrinology Fellow  Pager [phone] from 9am to 5pm. After hours and on weekends, please call [phone].

## 2023-03-15 NOTE — PHYSICAL THERAPY INITIAL EVALUATION ADULT - DID THE PATIENT HAVE SURGERY?
endonasal transsphenoidal/transplanum approach for resection of infrachiasmatic craniopharygioma/yes
n/a

## 2023-03-15 NOTE — PHYSICAL THERAPY INITIAL EVALUATION ADULT - GENERAL OBSERVATIONS, REHAB EVAL
Pt received OOB in recliner, +tele, +IVL.
Per neurosurgery team ok for bed level mobility, Pt received semi supine +ICU monitoring lines, +IV, +nirmala, +humphries, +lumbar drain.

## 2023-03-15 NOTE — PHYSICAL THERAPY INITIAL EVALUATION ADULT - ADDITIONAL COMMENTS
Pt lives in a private home with son there are 0 steps to enter, bedroom on first floor. Pt performed ADL/IADLs independently. Ambulates with no AD. Tamazight speaking.

## 2023-03-15 NOTE — BRIEF OPERATIVE NOTE - NSICDXBRIEFPROCEDURE_GEN_ALL_CORE_FT
PROCEDURES:  Excision, mass, skull base, transsphenoidal approach 15-Mar-2023 03:51:28 EEN for Craniopharyngioma Aditi Rondon

## 2023-03-15 NOTE — PHYSICAL THERAPY INITIAL EVALUATION ADULT - NSPTDISCHREC_GEN_A_CORE
pending re-evaluation following OR 3/14 or 3/15/No skilled PT needs
TBD pending further functional assessment

## 2023-03-15 NOTE — PROGRESS NOTE ADULT - SUBJECTIVE AND OBJECTIVE BOX
SUMMARY: 53yo woman transferred from [facility] on 3/10 for suprasellar mass found on MRI for opthalmology work up--etta HA and 3wk of blurry vision. PMH HTN, DM2, HLD.     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     Overnight: Patient was given 0.5 mg of DDAVP overnight. Still waking up from the surgery.     REVIEW OF SYSTEMS: [] Unable to Assess due to neurologic exam   [ x] All ROS addressed below are non-contributory, except:  Neuro: [ x] Headache [ ] Back pain [ ] Numbness [ ] Weakness [ ] Ataxia [ ] Dizziness [ ] Aphasia [ ] Dysarthria [ x] Visual disturbance  Resp: [ ] Shortness of breath/dyspnea [ ] Orthopnea [ ] Cough  CV: [ ] Chest pain [ ] Palpitation [ ] Lightheadedness [ ] Syncope  Renal: [ ] Thirst [ ] Edema  GI: [ ] Nausea [ ] Emesis [ ] Abdominal pain [ ] Constipation [ ] Diarrhea  Hem: [ ] Hematemesis [ ] bBright red blood per rectum  ID: [ ] Fever [ ] Chills [ ] Dysuria  ENT: [ ] Rhinorrhea    VITALS: [x] Reviewed    IMAGING/DATA: [x] Reviewed    IVF FLUIDS/MEDICATIONS: [x] Reviewed    ALLERGIES: Allergies    No Known Allergies    Intolerances      DEVICES:   [] Restraints [x] PIVs [] ET tube [] central line [] PICC [x] arterial line [x] humphries [] NGT/OGT [] EVD [] LD [] TIM/HMV [] LiCOX [] ICP monitor [] Trach [] PEG [] Chest Tube [] other:    EXAMINATION:  General: No acute distress  HEENT: Anicteric sclerae  Cardiac: [id number]  Lungs: Clear  Abdomen: Soft, non-tender, +BS  Extremities: No c/c/e  Skin/Incision Site: Clean, dry and intact  Neurologic: ***  Awake, alert, fully oriented, follows commands, PERRL, EOMI, face symmetric, tongue midline, no drift, full strength SUMMARY: 53yo woman transferred from [facility] on 3/10 for suprasellar mass found on MRI for opthalmology work up--etta HA and 3wk of blurry vision. PMH HTN, DM2, HLD.     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     Overnight: Patient was given 0.5 mg of DDAVP overnight. Still waking up from the surgery.     REVIEW OF SYSTEMS: [] Unable to Assess due to neurologic exam   [ x] All ROS addressed below are non-contributory, except:  Neuro: [ x] Headache [ ] Back pain [ ] Numbness [ ] Weakness [ ] Ataxia [ ] Dizziness [ ] Aphasia [ ] Dysarthria [ x] Visual disturbance  Resp: [ ] Shortness of breath/dyspnea [ ] Orthopnea [ ] Cough  CV: [ ] Chest pain [ ] Palpitation [ ] Lightheadedness [ ] Syncope  Renal: [ ] Thirst [ ] Edema  GI: [ ] Nausea [ ] Emesis [ ] Abdominal pain [ ] Constipation [ ] Diarrhea  Hem: [ ] Hematemesis [ ] bBright red blood per rectum  ID: [ ] Fever [ ] Chills [ ] Dysuria  ENT: [ ] Rhinorrhea    VITALS: [x] Reviewed    IMAGING/DATA: [x] Reviewed    IVF FLUIDS/MEDICATIONS: [x] Reviewed    ALLERGIES: Allergies    No Known Allergies    Intolerances      DEVICES:   [] Restraints [x] PIVs [] ET tube [] central line [] PICC [x] arterial line [x] humphries [] NGT/OGT [] EVD [] LD [] TIM/HMV [] LiCOX [] ICP monitor [] Trach [] PEG [] Chest Tube [] other:    EXAMINATION:  General: No acute distress  HEENT: Anicteric sclerae  Cardiac: [id number]  Lungs: Clear  Abdomen: Soft, non-tender, +BS  Extremities: No c/c/e  Skin/Incision Site: Clean, dry and intact  Neurologic: Drowsy, grimacing to pain, responding to her name, however, not answering. Moving all extremities spontaneously. Pupils 3 mm Bilaterally reactive.

## 2023-03-15 NOTE — PROGRESS NOTE ADULT - ASSESSMENT
ASSESSMENT: TSP resection of pituitary adenoma, POD 0    NEURO:  CT Head in AM, MRI post-op, Pain control  LD 10cc/hr per neurosurgery   DI watch   Activity: [x] OOB as tolerated [] Bedrest [x] PT [x] OT [] PMNR    PULM:  Pituitary precautions (no incentive spirometry, no straws, no BIPAP)    CV:  -150mmHg, d/c a-line in AM if hemodynamically stable    RENAL:  Pena for strict I+Os  IVF until good PO intake  Drink to thirst    GI:  Diet: Dysphagia screen and then advance diet as tolerated  GI prophylaxis [x] not indicated [] PPI [] other:  Bowel regimen [] colace [x] senna [x] other: miralax     ENDO:   Goal euglycemia (-180)  hydrocortisone 50mg IV Q8  cont synthroid  endo following   Pituitary labs as needed  Monitor for DI    HEME/ONC:  VTE prophylaxis: [x] SCDs [] chemoprophylaxis [x] hold chemoprophylaxis due to: fresh post op [x] high risk of DVT/PE on admission due to: tumor     ID:  Silvia-op antibiotics  ENT input with abx for ?lumbar drain  MISC:    SOCIAL/FAMILY:  [x] awaiting [] updated at bedside [] family meeting    CODE STATUS:  [x] Full Code [] DNR [] DNI [] Palliative/Comfort Care    DISPOSITION:  [x] ICU [] Stroke Unit [] Floor [] EMU [] RCU [] PCU    [x] Patient is at high risk of neurologic deterioration/death due to: post op hemorrhage, DI, pan hypopituitarism    ASSESSMENT: TSP resection of pituitary adenoma, POD 0    NEURO:  CT Head in AM, MRI post-op, Pain control  LD 10cc/hr per neurosurgery   DI watch   Activity: [x] OOB as tolerated [] Bedrest [x] PT [x] OT [] PMNR    PULM:  Pituitary precautions (no incentive spirometry, no straws, no BIPAP)    CV:  -150mmHg, d/c a-line in AM if hemodynamically stable    RENAL:  Pena for strict I+Os  IVF until good PO intake  Drink to thirst    GI:  Diet: Dysphagia screen and then advance diet as tolerated  GI prophylaxis [x] not indicated [] PPI [] other:  Bowel regimen [] colace [x] senna [x] other: miralax     ENDO:   Goal euglycemia (-180)  hydrocortisone 50mg IV Q8  cont synthroid  endo following   Pituitary labs as needed  Monitor for DI    HEME/ONC:  VTE prophylaxis: [x] SCDs [] chemoprophylaxis [x] hold chemoprophylaxis due to: fresh post op [x] high risk of DVT/PE on admission due to: tumor     ID:  vanc/ceftriaxone/flagyl per ENT for potential CSF leak/hx sinusitis     MISC:    SOCIAL/FAMILY:  [x] awaiting [] updated at bedside [] family meeting    CODE STATUS:  [x] Full Code [] DNR [] DNI [] Palliative/Comfort Care    DISPOSITION:  [x] ICU [] Stroke Unit [] Floor [] EMU [] RCU [] PCU    [x] Patient is at high risk of neurologic deterioration/death due to: post op hemorrhage, DI, pan hypopituitarism

## 2023-03-15 NOTE — PROGRESS NOTE ADULT - ASSESSMENT
52y POD#0 from 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, postop lumbar drain.  Pt doing well still with blurry vision, slight h/a, relieved with pain meds. Pt denies any bleeding, no leaking, no salty or metallic taste in mouth, no PND.    52y POD#0 from 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, postop lumbar drain, splints in place.  Pt doing well still with blurry vision, slight h/a, relieved with pain meds. Pt denies any bleeding, no leaking, no salty or metallic taste in mouth, no PND.

## 2023-03-15 NOTE — PHYSICAL THERAPY INITIAL EVALUATION ADULT - PLANNED THERAPY INTERVENTIONS, PT EVAL
balance training/bed mobility training/gait training/strengthening/transfer training
balance training/gait training/strengthening

## 2023-03-15 NOTE — BRIEF OPERATIVE NOTE - OPERATION/FINDINGS
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, postop lumbar drain.

## 2023-03-15 NOTE — PROGRESS NOTE ADULT - ASSESSMENT
ASSESSMENT: TSP resection of pituitary adenoma, POD 0    NEURO:  CT Head in AM, MRI post-op, Pain control  LD 10cc/hr per neurosurgery   DI watch   Activity: [x] OOB as tolerated [] Bedrest [x] PT [x] OT [] PMNR    PULM:  Pituitary precautions (no incentive spirometry, no straws, no BIPAP)    CV:  -150mmHg, d/c a-line in AM if hemodynamically stable    RENAL:  Pena for strict I+Os  IVF until good PO intake  Drink to thirst    GI:  Diet: Dysphagia screen and then advance diet as tolerated  GI prophylaxis [x] not indicated [] PPI [] other:  Bowel regimen [] colace [x] senna [x] other: miralax     ENDO:   Goal euglycemia (-180)  hydrocortisone 50mg IV Q8  cont synthroid  endo following   Pituitary labs as needed  Monitor for DI    HEME/ONC:  VTE prophylaxis: [x] SCDs [] chemoprophylaxis [x] hold chemoprophylaxis due to: fresh post op [x] high risk of DVT/PE on admission due to: tumor     ID:  vanc/ceftriaxone/flagyl per ENT for potential CSF leak/hx sinusitis     MISC:    SOCIAL/FAMILY:  [x] awaiting [] updated at bedside [] family meeting    CODE STATUS:  [x] Full Code [] DNR [] DNI [] Palliative/Comfort Care    DISPOSITION:  [x] ICU [] Stroke Unit [] Floor [] EMU [] RCU [] PCU    [x] Patient is at high risk of neurologic deterioration/death due to: post op hemorrhage, DI, pan hypopituitarism    ASSESSMENT: TSP resection of pituitary adenoma, POD 0    NEURO:  Neurochecks Q1  CTh with pnemo, keep flat, face tent at [id number], LD to 5cc/hour  MRI post-op, Pain control  LD 5cc/hr per neurosurgery   DI watch   Activity: [x] OOB as tolerated [] Bedrest [x] PT [x] OT [] PMNR    PULM:  Pituitary precautions (no incentive spirometry, no straws, no BIPAP)    CV:  -160mmHg, a-line in AM if hemodynamically stable    RENAL: Plasmalyte at 50 cc/hour  Pena for strict I+Os  IVF until good PO intake  Drink to thirst    GI:  Diet: NPO   GI prophylaxis [] not indicated [x] PPI [] other:  Bowel regimen [] colace [x] senna [x] other: miralax     ENDO:   Hypernatremia, hyperglycemia   Goal euglycemia (-180)  hydrocortisone 50mg IV Q8  cont synthroid  endo following   insulin gtt  Pituitary labs as needed  May need DDAVP standing dose  Monitor for DI    HEME/ONC:  VTE prophylaxis: [x] SCDs [] chemoprophylaxis [x] hold chemoprophylaxis due to: fresh post op, will  [x] high risk of DVT/PE on admission due to: tumor     ID:  vanc/ceftriaxone/flagyl per ENT for potential CSF leak/hx sinusitis   ENT following, will consult ID     MISC:    SOCIAL/FAMILY:  [x] awaiting [] updated at bedside [] family meeting    CODE STATUS:  [x] Full Code [] DNR [] DNI [] Palliative/Comfort Care    DISPOSITION:  [x] ICU [] Stroke Unit [] Floor [] EMU [] RCU [] PCU    [x] Patient is at high risk of neurologic deterioration/death due to: post op hemorrhage, DI, pan hypopituitarism

## 2023-03-15 NOTE — PHYSICAL THERAPY INITIAL EVALUATION ADULT - FOLLOWS COMMANDS/ANSWERS QUESTIONS, REHAB EVAL
75% of the time/able to follow single-step instructions
100% of the time/able to follow multistep instructions

## 2023-03-15 NOTE — PHYSICAL THERAPY INITIAL EVALUATION ADULT - PATIENT/FAMILY AGREES WITH PLAN
no skilled PT Needs pending re-evaluation following OR 3/14 or 3/15/yes
TBD pending further functional assessment

## 2023-03-15 NOTE — PHYSICAL THERAPY INITIAL EVALUATION ADULT - PERTINENT HX OF CURRENT PROBLEM, REHAB EVAL
52 yof w/ hx pf HTN, HLD, T2Dm, who presents with headaches, blurry vision, admitted after being found to have suprasellar mass, admitted for further workup, medicine consulted for pre-operative optimization and management. 3/10 CXR clear lungs. MRI Head Heterogeneous lobulated sellar mass measuring 2.1 cm AP by 1.5 cm TRV by 2.1 cm CC with suprasellar extension with compression of the optic chiasm, extension into the LEFT cavernous sinus as well as between the BILATERAL hypodense thalami. The cavernous LEFT internal carotid artery is patent. 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 Minimal periventricular and bifrontal subcortical white matter ischemia. MR sella PITUITARY:  Complex predominantly cystic mass lesion  extends into the suprasellar cistern and elevates and distorts the optic chiasm and ventral forebrain. Favor raniopharyngioma over cystic pituitary adenoma or other disease. The lesion is noted to have focal low signal intensity in its right inferior aspect, possibly calcification, associated with a more solid component of tumor.  No direct compromise of the cavernous carotid arteries is demonstrated. BRAIN:  Few scattered cerebral hemispheric white matter lesions suggest an early manifestation of ischemic white matter disease. 3/11 doppler (-). CTH heterogeneous mass in the suprasellar cistern separate from the pituitary gland consistent with craniopharyngioma.
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  Pt doing well still with blurry vision, slight h/a, relieved with pain meds. Pt denies any bleeding, no leaking, no salty or metallic taste in mouth, no PND.  3/10 CXR clear lungs. MRI Head Heterogeneous lobulated sellar mass measuring 2.1 cm AP by 1.5 cm TRV by 2.1 cm CC with suprasellar extension with compression of the optic chiasm, extension into the LEFT cavernous sinus as well as between the BILATERAL hypodense thalami. The cavernous LEFT internal carotid artery is patent. 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 Minimal periventricular and bifrontal subcortical white matter ischemia. MR sella PITUITARY:  Complex predominantly cystic mass lesion  extends into the suprasellar cistern and elevates and distorts the optic chiasm and ventral forebrain. Favor raniopharyngioma over cystic pituitary adenoma or other disease. The lesion is noted to have focal low signal intensity in its right inferior aspect, possibly calcification, associated with a more solid component of tumor.  No direct compromise of the cavernous carotid arteries is demonstrated. BRAIN:  Few scattered cerebral hemispheric white matter lesions suggest an early manifestation of ischemic white matter disease. 3/11 doppler (-). CTH heterogeneous mass in the suprasellar cistern separate from the pituitary gland consistent with craniopharyngioma. 3/15 CTH  Status post transsphenoidal resection of craniopharyngioma with expected postoperative changes.

## 2023-03-15 NOTE — PROGRESS NOTE ADULT - ASSESSMENT
Assessment and Recommendations:  52y female with a past medical history/ocular history of glaucoma and supraseller mass consulted for gross baseline ophtho exam, found to have bitemporal inferior field defects and decreased color vison OS.     #Bitemporal inferior field defect on gross bedside exam   - Consistent with suprasellar mass   - Patient will need outpatient visual field testing for evaluation after surgery   - Appreciate management per endocrine and neurosurgery    #Hx of glaucoma   - IOP under control on exam today   - Cw xalatan a night in both eyes   - Patient needs to be followed outpatient with OCT ON, HVF, pachymetry    Seen and discussed with Dr. Corona, attending.    Outpatient Follow-up: Patient should follow-up with his/her ophthalmologist or with [facility] Department of Ophthalmology within 1 week of after discharge at:    [address] [facility] [address]. [address]  [place], [place] [id number] [phone]    Lisseth Abraham MD, PGY-2  Also available on [employer] Teams

## 2023-03-15 NOTE — PROGRESS NOTE ADULT - SUBJECTIVE AND OBJECTIVE BOX
ENT ISSUE/POD: POD#0 from 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    HPI: 52y POD#0 from expanded endonasal transsphenoidal/transplanum 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 karhtikeyan graft, postop lumbar drain.  Pt doing well still with burry vision, slight h/a, relieved with pain meds. Pt denies any bleeding, no leaking, no salty or metallic taste in mouth, no PND.       PAST MEDICAL & SURGICAL HISTORY:  Hypertension      Hyperlipidemia      Diabetes mellitus        Allergies    No Known Drug Allergies  S/P TRANSSPHENOIDAL SURGERY. SINONASAL PRECAUTIONS! NO NASAL SWABS OR NG TUBES. (Unknown)    Intolerances      MEDICATIONS  (STANDING):  atorvastatin 80 milliGRAM(s) Oral at bedtime  cefTRIAXone   IVPB 2000 milliGRAM(s) IV Intermittent every 12 hours  desmopressin Injectable 0.5 MICROGram(s) IV Push once  hydrocortisone sodium succinate Injectable 50 milliGRAM(s) IV Push every 8 hours  levothyroxine Injectable 37 MICROGram(s) IV Push once  metroNIDAZOLE  IVPB 500 milliGRAM(s) IV Intermittent every 8 hours  pantoprazole  Injectable 40 milliGRAM(s) IV Push daily  polyethylene glycol 3350 17 Gram(s) Oral daily  senna 2 Tablet(s) Oral at bedtime  sodium chloride 0.9%. 1000 milliLiter(s) (70 mL/Hr) IV Continuous <Continuous>  vancomycin  IVPB 1000 milliGRAM(s) IV Intermittent every 12 hours    MEDICATIONS  (PRN):  acetaminophen     Tablet .. 650 milliGRAM(s) Oral every 6 hours PRN Temp greater or equal to 38C (100.4F), Mild Pain (1 - 3)  ondansetron Injectable 4 milliGRAM(s) IV Push every 6 hours PRN Nausea and/or Vomiting  oxyCODONE    IR 5 milliGRAM(s) Oral every 4 hours PRN Moderate Pain (4 - 6)  sodium chloride 0.65% Nasal 1 Spray(s) Both Nostrils three times a day PRN Nasal Congestion      social history: see consult     family history: see consult     ROS:   ENT: all negative except as noted in HPI   Pulm: denies SOB, cough, hemoptysis  Neuro: denies numbness/tingling, loss of sensation  Endo: denies heat/cold intolerance, excessive sweating      Vital Signs Last 24 Hrs  T(C): 34 (15 Mar 2023 03:00), Max: 37 (14 Mar 2023 19:10)  T(F): 93.2 (15 Mar 2023 03:00), Max: 98.6 (14 Mar 2023 19:10)  HR: 70 (15 Mar 2023 05:00) (55 - 88)  BP: 122/79 (14 Mar 2023 19:10) (103/69 - 122/79)  BP(mean): 97 (14 Mar 2023 18:00) (97 - 97)  RR: 12 (15 Mar 2023 05:00) (12 - 19)  SpO2: 100% (15 Mar 2023 05:00) (95% - 100%)    Parameters below as of 15 Mar 2023 03:00  Patient On (Oxygen Delivery Method): mask, nonrebreather    O2 Concentration (%): 100                          12.2   16.92 )-----------( 349      ( 15 Mar 2023 03:31 )             38.4    03-15    146<H>  |  112<H>  |  12  ----------------------------<  261<H>  3.1<L>   |  17<L>  |  0.64    Ca    9.0      15 Mar 2023 03:31  Phos  3.8     03-15  Mg     2.1     03-15     PT/INR - ( 13 Mar 2023 10:52 )   PT: 13.9 sec;   INR: 1.20 ratio         PTT - ( 13 Mar 2023 10:52 )  PTT:35.5 sec    PHYSICAL EXAM:  Gen: NAD  Skin: No rashes, bruises, or lesions  Head: Normocephalic, Atraumatic  Face: no edema, erythema, or fluctuance. Parotid glands soft without mass  Eyes: no scleral injection  Ears: Right - ear canal clear, TM intact without effusion or erythema. No evidence of any fluid drainage. No mastoid tenderness, erythema, or ear bulging            Left - ear canal clear, TM intact without effusion or erythema. No evidence of any fluid drainage. No mastoid tenderness, erythema, or ear bulging  Nose: Nares bilaterally patent, no discharge  Mouth: No Stridor / Drooling / Trismus.  Mucosa moist, tongue/uvula midline, oropharynx clear  Neck: Flat, supple, no lymphadenopathy, trachea midline, no masses  Lymphatic: No lymphadenopathy  Resp: breathing easily, no stridor  Neuro: facial nerve intact, no facial droop         ENT ISSUE/POD: POD#0 from 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    HPI: 52y POD#0 from 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, postop lumbar drain splints in place.  Pt doing well still with burry vision, slight h/a, relieved with pain meds. Pt denies any bleeding, no leaking, no salty or metallic taste in mouth, no PND.       PAST MEDICAL & SURGICAL HISTORY:  Hypertension      Hyperlipidemia      Diabetes mellitus        Allergies    No Known Drug Allergies  S/P TRANSSPHENOIDAL SURGERY. SINONASAL PRECAUTIONS! NO NASAL SWABS OR NG TUBES. (Unknown)    Intolerances      MEDICATIONS  (STANDING):  atorvastatin 80 milliGRAM(s) Oral at bedtime  cefTRIAXone   IVPB 2000 milliGRAM(s) IV Intermittent every 12 hours  desmopressin Injectable 0.5 MICROGram(s) IV Push once  hydrocortisone sodium succinate Injectable 50 milliGRAM(s) IV Push every 8 hours  levothyroxine Injectable 37 MICROGram(s) IV Push once  metroNIDAZOLE  IVPB 500 milliGRAM(s) IV Intermittent every 8 hours  pantoprazole  Injectable 40 milliGRAM(s) IV Push daily  polyethylene glycol 3350 17 Gram(s) Oral daily  senna 2 Tablet(s) Oral at bedtime  sodium chloride 0.9%. 1000 milliLiter(s) (70 mL/Hr) IV Continuous <Continuous>  vancomycin  IVPB 1000 milliGRAM(s) IV Intermittent every 12 hours    MEDICATIONS  (PRN):  acetaminophen     Tablet .. 650 milliGRAM(s) Oral every 6 hours PRN Temp greater or equal to 38C (100.4F), Mild Pain (1 - 3)  ondansetron Injectable 4 milliGRAM(s) IV Push every 6 hours PRN Nausea and/or Vomiting  oxyCODONE    IR 5 milliGRAM(s) Oral every 4 hours PRN Moderate Pain (4 - 6)  sodium chloride 0.65% Nasal 1 Spray(s) Both Nostrils three times a day PRN Nasal Congestion      social history: see consult     family history: see consult     ROS:   ENT: all negative except as noted in HPI   Pulm: denies SOB, cough, hemoptysis  Neuro: denies numbness/tingling, loss of sensation  Endo: denies heat/cold intolerance, excessive sweating      Vital Signs Last 24 Hrs  T(C): 34 (15 Mar 2023 03:00), Max: 37 (14 Mar 2023 19:10)  T(F): 93.2 (15 Mar 2023 03:00), Max: 98.6 (14 Mar 2023 19:10)  HR: 70 (15 Mar 2023 05:00) (55 - 88)  BP: 122/79 (14 Mar 2023 19:10) (103/69 - 122/79)  BP(mean): 97 (14 Mar 2023 18:00) (97 - 97)  RR: 12 (15 Mar 2023 05:00) (12 - 19)  SpO2: 100% (15 Mar 2023 05:00) (95% - 100%)    Parameters below as of 15 Mar 2023 03:00  Patient On (Oxygen Delivery Method): mask, nonrebreather    O2 Concentration (%): 100                          12.2   16.92 )-----------( 349      ( 15 Mar 2023 03:31 )             38.4    03-15    146<H>  |  112<H>  |  12  ----------------------------<  261<H>  3.1<L>   |  17<L>  |  0.64    Ca    9.0      15 Mar 2023 03:31  Phos  3.8     03-15  Mg     2.1     03-15     PT/INR - ( 13 Mar 2023 10:52 )   PT: 13.9 sec;   INR: 1.20 ratio         PTT - ( 13 Mar 2023 10:52 )  PTT:35.5 sec    PHYSICAL EXAM:  Gen: NAD  Skin: No rashes, bruises, or lesions  Head: Normocephalic, Atraumatic  Face: no edema, erythema, or fluctuance. Parotid glands soft without mass  Eyes: no scleral injection  Nose: Nares bilaterally patent, no discharge, splints in place  Mouth: No Stridor / Drooling / Trismus.  Mucosa moist, tongue/uvula midline, oropharynx clear  Neck: Flat, supple, no lymphadenopathy, trachea midline, no masses  Lymphatic: No lymphadenopathy  Resp: breathing easily, no stridor  Neuro: facial nerve intact, no facial droop

## 2023-03-16 LAB
ANION GAP SERPL CALC-SCNC: 10 MMOL/L — SIGNIFICANT CHANGE UP (ref 5–17)
ANION GAP SERPL CALC-SCNC: 11 MMOL/L — SIGNIFICANT CHANGE UP (ref 5–17)
ANION GAP SERPL CALC-SCNC: 11 MMOL/L — SIGNIFICANT CHANGE UP (ref 5–17)
ANION GAP SERPL CALC-SCNC: 13 MMOL/L — SIGNIFICANT CHANGE UP (ref 5–17)
ANION GAP SERPL CALC-SCNC: 16 MMOL/L — SIGNIFICANT CHANGE UP (ref 5–17)
ANION GAP SERPL CALC-SCNC: 8 MMOL/L — SIGNIFICANT CHANGE UP (ref 5–17)
B-OH-BUTYR SERPL-SCNC: 0 MMOL/L — SIGNIFICANT CHANGE UP
BASE EXCESS BLDV CALC-SCNC: -4.6 MMOL/L — LOW (ref -2–3)
BUN SERPL-MCNC: 10 MG/DL — SIGNIFICANT CHANGE UP (ref 7–23)
BUN SERPL-MCNC: 11 MG/DL — SIGNIFICANT CHANGE UP (ref 7–23)
BUN SERPL-MCNC: 8 MG/DL — SIGNIFICANT CHANGE UP (ref 7–23)
BUN SERPL-MCNC: 9 MG/DL — SIGNIFICANT CHANGE UP (ref 7–23)
CALCIUM SERPL-MCNC: 10 MG/DL — SIGNIFICANT CHANGE UP (ref 8.4–10.5)
CALCIUM SERPL-MCNC: 9 MG/DL — SIGNIFICANT CHANGE UP (ref 8.4–10.5)
CALCIUM SERPL-MCNC: 9.3 MG/DL — SIGNIFICANT CHANGE UP (ref 8.4–10.5)
CALCIUM SERPL-MCNC: 9.3 MG/DL — SIGNIFICANT CHANGE UP (ref 8.4–10.5)
CALCIUM SERPL-MCNC: 9.5 MG/DL — SIGNIFICANT CHANGE UP (ref 8.4–10.5)
CALCIUM SERPL-MCNC: 9.6 MG/DL — SIGNIFICANT CHANGE UP (ref 8.4–10.5)
CALCIUM SERPL-MCNC: 9.7 MG/DL — SIGNIFICANT CHANGE UP (ref 8.4–10.5)
CALCIUM SERPL-MCNC: 9.8 MG/DL — SIGNIFICANT CHANGE UP (ref 8.4–10.5)
CHLORIDE SERPL-SCNC: 127 MMOL/L — HIGH (ref 96–108)
CHLORIDE SERPL-SCNC: 128 MMOL/L — HIGH (ref 96–108)
CHLORIDE SERPL-SCNC: 130 MMOL/L — HIGH (ref 96–108)
CHLORIDE SERPL-SCNC: 131 MMOL/L — HIGH (ref 96–108)
CHLORIDE SERPL-SCNC: 134 MMOL/L — HIGH (ref 96–108)
CHLORIDE SERPL-SCNC: >135 MMOL/L — HIGH (ref 96–108)
CO2 BLDV-SCNC: 25 MMOL/L — SIGNIFICANT CHANGE UP (ref 22–26)
CO2 SERPL-SCNC: 21 MMOL/L — LOW (ref 22–31)
CO2 SERPL-SCNC: 23 MMOL/L — SIGNIFICANT CHANGE UP (ref 22–31)
CO2 SERPL-SCNC: 25 MMOL/L — SIGNIFICANT CHANGE UP (ref 22–31)
CREAT SERPL-MCNC: 0.65 MG/DL — SIGNIFICANT CHANGE UP (ref 0.5–1.3)
CREAT SERPL-MCNC: 0.66 MG/DL — SIGNIFICANT CHANGE UP (ref 0.5–1.3)
CREAT SERPL-MCNC: 0.67 MG/DL — SIGNIFICANT CHANGE UP (ref 0.5–1.3)
CREAT SERPL-MCNC: 0.69 MG/DL — SIGNIFICANT CHANGE UP (ref 0.5–1.3)
CREAT SERPL-MCNC: 0.7 MG/DL — SIGNIFICANT CHANGE UP (ref 0.5–1.3)
CREAT SERPL-MCNC: 0.74 MG/DL — SIGNIFICANT CHANGE UP (ref 0.5–1.3)
EGFR: 103 ML/MIN/1.73M2 — SIGNIFICANT CHANGE UP
EGFR: 104 ML/MIN/1.73M2 — SIGNIFICANT CHANGE UP
EGFR: 104 ML/MIN/1.73M2 — SIGNIFICANT CHANGE UP
EGFR: 105 ML/MIN/1.73M2 — SIGNIFICANT CHANGE UP
EGFR: 105 ML/MIN/1.73M2 — SIGNIFICANT CHANGE UP
EGFR: 97 ML/MIN/1.73M2 — SIGNIFICANT CHANGE UP
GLUCOSE BLDC GLUCOMTR-MCNC: 107 MG/DL — HIGH (ref 70–99)
GLUCOSE BLDC GLUCOMTR-MCNC: 107 MG/DL — HIGH (ref 70–99)
GLUCOSE BLDC GLUCOMTR-MCNC: 111 MG/DL — HIGH (ref 70–99)
GLUCOSE BLDC GLUCOMTR-MCNC: 112 MG/DL — HIGH (ref 70–99)
GLUCOSE BLDC GLUCOMTR-MCNC: 127 MG/DL — HIGH (ref 70–99)
GLUCOSE BLDC GLUCOMTR-MCNC: 190 MG/DL — HIGH (ref 70–99)
GLUCOSE BLDC GLUCOMTR-MCNC: 338 MG/DL — HIGH (ref 70–99)
GLUCOSE BLDC GLUCOMTR-MCNC: 358 MG/DL — HIGH (ref 70–99)
GLUCOSE BLDC GLUCOMTR-MCNC: 373 MG/DL — HIGH (ref 70–99)
GLUCOSE BLDC GLUCOMTR-MCNC: 464 MG/DL — CRITICAL HIGH (ref 70–99)
GLUCOSE SERPL-MCNC: 130 MG/DL — HIGH (ref 70–99)
GLUCOSE SERPL-MCNC: 169 MG/DL — HIGH (ref 70–99)
GLUCOSE SERPL-MCNC: 290 MG/DL — HIGH (ref 70–99)
GLUCOSE SERPL-MCNC: 322 MG/DL — HIGH (ref 70–99)
GLUCOSE SERPL-MCNC: 336 MG/DL — HIGH (ref 70–99)
GLUCOSE SERPL-MCNC: 379 MG/DL — HIGH (ref 70–99)
GLUCOSE SERPL-MCNC: 396 MG/DL — HIGH (ref 70–99)
GLUCOSE SERPL-MCNC: 447 MG/DL — HIGH (ref 70–99)
HCO3 BLDV-SCNC: 23 MMOL/L — SIGNIFICANT CHANGE UP (ref 22–29)
HCT VFR BLD CALC: 37 % — SIGNIFICANT CHANGE UP (ref 34.5–45)
HCT VFR BLD CALC: 37.1 % — SIGNIFICANT CHANGE UP (ref 34.5–45)
HGB BLD-MCNC: 11.3 G/DL — LOW (ref 11.5–15.5)
HGB BLD-MCNC: 11.8 G/DL — SIGNIFICANT CHANGE UP (ref 11.5–15.5)
INSULIN-LIKE GROWTH FACTOR 1 INTERPRETATION: SIGNIFICANT CHANGE UP
INSULIN-LIKE GROWTH FACTOR 1: 46 NG/ML — LOW (ref 52–233)
MAGNESIUM SERPL-MCNC: 2.4 MG/DL — SIGNIFICANT CHANGE UP (ref 1.6–2.6)
MAGNESIUM SERPL-MCNC: 2.5 MG/DL — SIGNIFICANT CHANGE UP (ref 1.6–2.6)
MAGNESIUM SERPL-MCNC: 2.6 MG/DL — SIGNIFICANT CHANGE UP (ref 1.6–2.6)
MAGNESIUM SERPL-MCNC: 2.7 MG/DL — HIGH (ref 1.6–2.6)
MCHC RBC-ENTMCNC: 27.8 PG — SIGNIFICANT CHANGE UP (ref 27–34)
MCHC RBC-ENTMCNC: 28.6 PG — SIGNIFICANT CHANGE UP (ref 27–34)
MCHC RBC-ENTMCNC: 30.5 GM/DL — LOW (ref 32–36)
MCHC RBC-ENTMCNC: 31.9 GM/DL — LOW (ref 32–36)
MCV RBC AUTO: 89.8 FL — SIGNIFICANT CHANGE UP (ref 80–100)
MCV RBC AUTO: 91.2 FL — SIGNIFICANT CHANGE UP (ref 80–100)
NRBC # BLD: 0 /100 WBCS — SIGNIFICANT CHANGE UP (ref 0–0)
NRBC # BLD: 0 /100 WBCS — SIGNIFICANT CHANGE UP (ref 0–0)
OSMOLALITY SERPL: 329 MOSMOL/KG — HIGH (ref 275–300)
OSMOLALITY SERPL: 347 MOSMOL/KG — HIGH (ref 275–300)
OSMOLALITY SERPL: 375 MOSMOL/KG — HIGH (ref 275–300)
OSMOLALITY UR: 136 MOS/KG — LOW (ref 300–900)
OSMOLALITY UR: 324 MOS/KG — SIGNIFICANT CHANGE UP (ref 300–900)
PCO2 BLDV: 53 MMHG — HIGH (ref 39–42)
PH BLDV: 7.25 — LOW (ref 7.32–7.43)
PHOSPHATE SERPL-MCNC: 1.3 MG/DL — LOW (ref 2.5–4.5)
PHOSPHATE SERPL-MCNC: 1.6 MG/DL — LOW (ref 2.5–4.5)
PHOSPHATE SERPL-MCNC: 1.7 MG/DL — LOW (ref 2.5–4.5)
PHOSPHATE SERPL-MCNC: 2.3 MG/DL — LOW (ref 2.5–4.5)
PHOSPHATE SERPL-MCNC: 2.6 MG/DL — SIGNIFICANT CHANGE UP (ref 2.5–4.5)
PLATELET # BLD AUTO: 230 K/UL — SIGNIFICANT CHANGE UP (ref 150–400)
PLATELET # BLD AUTO: 262 K/UL — SIGNIFICANT CHANGE UP (ref 150–400)
PO2 BLDV: 42 MMHG — SIGNIFICANT CHANGE UP (ref 25–45)
POTASSIUM SERPL-MCNC: 3.2 MMOL/L — LOW (ref 3.5–5.3)
POTASSIUM SERPL-MCNC: 3.3 MMOL/L — LOW (ref 3.5–5.3)
POTASSIUM SERPL-MCNC: 3.6 MMOL/L — SIGNIFICANT CHANGE UP (ref 3.5–5.3)
POTASSIUM SERPL-MCNC: 3.8 MMOL/L — SIGNIFICANT CHANGE UP (ref 3.5–5.3)
POTASSIUM SERPL-MCNC: 3.9 MMOL/L — SIGNIFICANT CHANGE UP (ref 3.5–5.3)
POTASSIUM SERPL-MCNC: 4.2 MMOL/L — SIGNIFICANT CHANGE UP (ref 3.5–5.3)
POTASSIUM SERPL-MCNC: 4.2 MMOL/L — SIGNIFICANT CHANGE UP (ref 3.5–5.3)
POTASSIUM SERPL-MCNC: 4.3 MMOL/L — SIGNIFICANT CHANGE UP (ref 3.5–5.3)
POTASSIUM SERPL-SCNC: 3.2 MMOL/L — LOW (ref 3.5–5.3)
POTASSIUM SERPL-SCNC: 3.3 MMOL/L — LOW (ref 3.5–5.3)
POTASSIUM SERPL-SCNC: 3.6 MMOL/L — SIGNIFICANT CHANGE UP (ref 3.5–5.3)
POTASSIUM SERPL-SCNC: 3.8 MMOL/L — SIGNIFICANT CHANGE UP (ref 3.5–5.3)
POTASSIUM SERPL-SCNC: 3.9 MMOL/L — SIGNIFICANT CHANGE UP (ref 3.5–5.3)
POTASSIUM SERPL-SCNC: 4.2 MMOL/L — SIGNIFICANT CHANGE UP (ref 3.5–5.3)
POTASSIUM SERPL-SCNC: 4.2 MMOL/L — SIGNIFICANT CHANGE UP (ref 3.5–5.3)
POTASSIUM SERPL-SCNC: 4.3 MMOL/L — SIGNIFICANT CHANGE UP (ref 3.5–5.3)
PROLACTIN SERPL-MCNC: 38.7 NG/ML — HIGH (ref 3.4–24.1)
RBC # BLD: 4.07 M/UL — SIGNIFICANT CHANGE UP (ref 3.8–5.2)
RBC # BLD: 4.12 M/UL — SIGNIFICANT CHANGE UP (ref 3.8–5.2)
RBC # FLD: 13.5 % — SIGNIFICANT CHANGE UP (ref 10.3–14.5)
RBC # FLD: 14 % — SIGNIFICANT CHANGE UP (ref 10.3–14.5)
SAO2 % BLDV: 67.6 % — SIGNIFICANT CHANGE UP (ref 67–88)
SODIUM SERPL-SCNC: 160 MMOL/L — CRITICAL HIGH (ref 135–145)
SODIUM SERPL-SCNC: 163 MMOL/L — CRITICAL HIGH (ref 135–145)
SODIUM SERPL-SCNC: 164 MMOL/L — CRITICAL HIGH (ref 135–145)
SODIUM SERPL-SCNC: 168 MMOL/L — CRITICAL HIGH (ref 135–145)
SODIUM SERPL-SCNC: 170 MMOL/L — CRITICAL HIGH (ref 135–145)
SODIUM SERPL-SCNC: >170 MMOL/L — CRITICAL HIGH (ref 135–145)
SP GR UR STRIP: 1 — LOW (ref 1.01–1.02)
SP GR UR STRIP: 1 — LOW (ref 1.01–1.02)
SP GR UR STRIP: 1.01 — SIGNIFICANT CHANGE UP (ref 1.01–1.02)
TSH SERPL-MCNC: 4.43 UIU/ML — HIGH (ref 0.27–4.2)
VANCOMYCIN TROUGH SERPL-MCNC: 12.9 UG/ML — SIGNIFICANT CHANGE UP (ref 10–20)
VANCOMYCIN TROUGH SERPL-MCNC: 7.8 UG/ML — LOW (ref 10–20)
WBC # BLD: 14.1 K/UL — HIGH (ref 3.8–10.5)
WBC # BLD: 14.98 K/UL — HIGH (ref 3.8–10.5)
WBC # FLD AUTO: 14.1 K/UL — HIGH (ref 3.8–10.5)
WBC # FLD AUTO: 14.98 K/UL — HIGH (ref 3.8–10.5)

## 2023-03-16 PROCEDURE — 70450 CT HEAD/BRAIN W/O DYE: CPT | Mod: 26,76

## 2023-03-16 PROCEDURE — 99233 SBSQ HOSP IP/OBS HIGH 50: CPT

## 2023-03-16 PROCEDURE — 70551 MRI BRAIN STEM W/O DYE: CPT | Mod: 26

## 2023-03-16 PROCEDURE — 99232 SBSQ HOSP IP/OBS MODERATE 35: CPT | Mod: GC

## 2023-03-16 RX ORDER — POTASSIUM CHLORIDE 20 MEQ
40 PACKET (EA) ORAL ONCE
Refills: 0 | Status: COMPLETED | OUTPATIENT
Start: 2023-03-16 | End: 2023-03-16

## 2023-03-16 RX ORDER — DESMOPRESSIN ACETATE 0.1 MG/1
0.5 TABLET ORAL ONCE
Refills: 0 | Status: COMPLETED | OUTPATIENT
Start: 2023-03-16 | End: 2023-03-16

## 2023-03-16 RX ORDER — VASOPRESSIN 20 [USP'U]/ML
0.02 INJECTION INTRAVENOUS
Qty: 40 | Refills: 0 | Status: DISCONTINUED | OUTPATIENT
Start: 2023-03-16 | End: 2023-03-16

## 2023-03-16 RX ORDER — POTASSIUM PHOSPHATE, MONOBASIC POTASSIUM PHOSPHATE, DIBASIC 236; 224 MG/ML; MG/ML
30 INJECTION, SOLUTION INTRAVENOUS ONCE
Refills: 0 | Status: COMPLETED | OUTPATIENT
Start: 2023-03-16 | End: 2023-03-17

## 2023-03-16 RX ORDER — DESMOPRESSIN ACETATE 0.1 MG/1
1 TABLET ORAL ONCE
Refills: 0 | Status: COMPLETED | OUTPATIENT
Start: 2023-03-16 | End: 2023-03-16

## 2023-03-16 RX ORDER — ACETAMINOPHEN 500 MG
1000 TABLET ORAL ONCE
Refills: 0 | Status: COMPLETED | OUTPATIENT
Start: 2023-03-16 | End: 2023-03-16

## 2023-03-16 RX ORDER — HUMAN INSULIN 100 [IU]/ML
6 INJECTION, SUSPENSION SUBCUTANEOUS EVERY 6 HOURS
Refills: 0 | Status: DISCONTINUED | OUTPATIENT
Start: 2023-03-16 | End: 2023-03-16

## 2023-03-16 RX ORDER — INSULIN HUMAN 100 [IU]/ML
5 INJECTION, SOLUTION SUBCUTANEOUS
Qty: 100 | Refills: 0 | Status: DISCONTINUED | OUTPATIENT
Start: 2023-03-16 | End: 2023-03-16

## 2023-03-16 RX ORDER — DEXTROSE 50 % IN WATER 50 %
15 SYRINGE (ML) INTRAVENOUS ONCE
Refills: 0 | Status: DISCONTINUED | OUTPATIENT
Start: 2023-03-16 | End: 2023-03-16

## 2023-03-16 RX ORDER — DESMOPRESSIN ACETATE 0.1 MG/1
0.5 TABLET ORAL ONCE
Refills: 0 | Status: DISCONTINUED | OUTPATIENT
Start: 2023-03-16 | End: 2023-03-16

## 2023-03-16 RX ORDER — POTASSIUM CHLORIDE 20 MEQ
20 PACKET (EA) ORAL ONCE
Refills: 0 | Status: DISCONTINUED | OUTPATIENT
Start: 2023-03-16 | End: 2023-03-16

## 2023-03-16 RX ORDER — LEVETIRACETAM 250 MG/1
1000 TABLET, FILM COATED ORAL ONCE
Refills: 0 | Status: COMPLETED | OUTPATIENT
Start: 2023-03-16 | End: 2023-03-16

## 2023-03-16 RX ORDER — SODIUM CHLORIDE 9 MG/ML
1000 INJECTION, SOLUTION INTRAVENOUS
Refills: 0 | Status: COMPLETED | OUTPATIENT
Start: 2023-03-16 | End: 2023-03-16

## 2023-03-16 RX ORDER — DEXTROSE 50 % IN WATER 50 %
50 SYRINGE (ML) INTRAVENOUS
Refills: 0 | Status: DISCONTINUED | OUTPATIENT
Start: 2023-03-16 | End: 2023-03-19

## 2023-03-16 RX ORDER — AMLODIPINE BESYLATE 2.5 MG/1
10 TABLET ORAL DAILY
Refills: 0 | Status: DISCONTINUED | OUTPATIENT
Start: 2023-03-16 | End: 2023-03-22

## 2023-03-16 RX ORDER — GLUCAGON INJECTION, SOLUTION 0.5 MG/.1ML
1 INJECTION, SOLUTION SUBCUTANEOUS ONCE
Refills: 0 | Status: DISCONTINUED | OUTPATIENT
Start: 2023-03-16 | End: 2023-03-19

## 2023-03-16 RX ORDER — HUMAN INSULIN 100 [IU]/ML
10 INJECTION, SUSPENSION SUBCUTANEOUS EVERY 6 HOURS
Refills: 0 | Status: DISCONTINUED | OUTPATIENT
Start: 2023-03-16 | End: 2023-03-16

## 2023-03-16 RX ORDER — HYDROCORTISONE 20 MG
50 TABLET ORAL EVERY 8 HOURS
Refills: 0 | Status: DISCONTINUED | OUTPATIENT
Start: 2023-03-16 | End: 2023-03-19

## 2023-03-16 RX ORDER — POTASSIUM CHLORIDE 20 MEQ
20 PACKET (EA) ORAL ONCE
Refills: 0 | Status: COMPLETED | OUTPATIENT
Start: 2023-03-16 | End: 2023-03-16

## 2023-03-16 RX ORDER — SODIUM CHLORIDE 9 MG/ML
1500 INJECTION, SOLUTION INTRAVENOUS
Refills: 0 | Status: DISCONTINUED | OUTPATIENT
Start: 2023-03-16 | End: 2023-03-16

## 2023-03-16 RX ORDER — INSULIN HUMAN 100 [IU]/ML
2 INJECTION, SOLUTION SUBCUTANEOUS
Qty: 100 | Refills: 0 | Status: DISCONTINUED | OUTPATIENT
Start: 2023-03-16 | End: 2023-03-18

## 2023-03-16 RX ORDER — DESMOPRESSIN ACETATE 0.1 MG/1
0.05 TABLET ORAL ONCE
Refills: 0 | Status: DISCONTINUED | OUTPATIENT
Start: 2023-03-16 | End: 2023-03-16

## 2023-03-16 RX ORDER — INSULIN LISPRO 100/ML
VIAL (ML) SUBCUTANEOUS
Refills: 0 | Status: DISCONTINUED | OUTPATIENT
Start: 2023-03-16 | End: 2023-03-16

## 2023-03-16 RX ORDER — POTASSIUM PHOSPHATE, MONOBASIC POTASSIUM PHOSPHATE, DIBASIC 236; 224 MG/ML; MG/ML
15 INJECTION, SOLUTION INTRAVENOUS ONCE
Refills: 0 | Status: COMPLETED | OUTPATIENT
Start: 2023-03-16 | End: 2023-03-16

## 2023-03-16 RX ORDER — METRONIDAZOLE 500 MG
500 TABLET ORAL EVERY 8 HOURS
Refills: 0 | Status: DISCONTINUED | OUTPATIENT
Start: 2023-03-16 | End: 2023-03-18

## 2023-03-16 RX ORDER — DESMOPRESSIN ACETATE 0.1 MG/1
0.1 TABLET ORAL ONCE
Refills: 0 | Status: DISCONTINUED | OUTPATIENT
Start: 2023-03-16 | End: 2023-03-16

## 2023-03-16 RX ORDER — SODIUM CHLORIDE 9 MG/ML
1000 INJECTION, SOLUTION INTRAVENOUS
Refills: 0 | Status: DISCONTINUED | OUTPATIENT
Start: 2023-03-16 | End: 2023-03-16

## 2023-03-16 RX ORDER — VASOPRESSIN 20 [USP'U]/ML
0.02 INJECTION INTRAVENOUS
Qty: 40 | Refills: 0 | Status: DISCONTINUED | OUTPATIENT
Start: 2023-03-16 | End: 2023-03-17

## 2023-03-16 RX ORDER — SODIUM CHLORIDE 9 MG/ML
250 INJECTION, SOLUTION INTRAVENOUS
Refills: 0 | Status: COMPLETED | OUTPATIENT
Start: 2023-03-16 | End: 2023-03-16

## 2023-03-16 RX ORDER — SODIUM CHLORIDE 9 MG/ML
1000 INJECTION, SOLUTION INTRAVENOUS
Refills: 0 | Status: DISCONTINUED | OUTPATIENT
Start: 2023-03-16 | End: 2023-03-17

## 2023-03-16 RX ORDER — POTASSIUM CHLORIDE 20 MEQ
10 PACKET (EA) ORAL
Refills: 0 | Status: COMPLETED | OUTPATIENT
Start: 2023-03-16 | End: 2023-03-17

## 2023-03-16 RX ORDER — VANCOMYCIN HCL 1 G
1250 VIAL (EA) INTRAVENOUS EVERY 12 HOURS
Refills: 0 | Status: DISCONTINUED | OUTPATIENT
Start: 2023-03-16 | End: 2023-03-18

## 2023-03-16 RX ORDER — SODIUM CHLORIDE 9 MG/ML
750 INJECTION, SOLUTION INTRAVENOUS
Refills: 0 | Status: DISCONTINUED | OUTPATIENT
Start: 2023-03-16 | End: 2023-03-16

## 2023-03-16 RX ORDER — CEFTRIAXONE 500 MG/1
1000 INJECTION, POWDER, FOR SOLUTION INTRAMUSCULAR; INTRAVENOUS EVERY 24 HOURS
Refills: 0 | Status: DISCONTINUED | OUTPATIENT
Start: 2023-03-16 | End: 2023-03-18

## 2023-03-16 RX ADMIN — Medication 100 MILLIGRAM(S): at 06:00

## 2023-03-16 RX ADMIN — SODIUM CHLORIDE 150 MILLILITER(S): 9 INJECTION, SOLUTION INTRAVENOUS at 20:35

## 2023-03-16 RX ADMIN — Medication 400 MILLIGRAM(S): at 04:33

## 2023-03-16 RX ADMIN — PANTOPRAZOLE SODIUM 40 MILLIGRAM(S): 20 TABLET, DELAYED RELEASE ORAL at 11:19

## 2023-03-16 RX ADMIN — CHLORHEXIDINE GLUCONATE 1 APPLICATION(S): 213 SOLUTION TOPICAL at 11:19

## 2023-03-16 RX ADMIN — Medication 100 MILLIGRAM(S): at 22:20

## 2023-03-16 RX ADMIN — SODIUM CHLORIDE 100 MILLILITER(S): 9 INJECTION, SOLUTION INTRAVENOUS at 20:45

## 2023-03-16 RX ADMIN — Medication 100 MILLIGRAM(S): at 14:24

## 2023-03-16 RX ADMIN — VASOPRESSIN 3 UNIT(S)/MIN: 20 INJECTION INTRAVENOUS at 17:42

## 2023-03-16 RX ADMIN — SODIUM CHLORIDE 1000 MILLILITER(S): 9 INJECTION, SOLUTION INTRAVENOUS at 05:58

## 2023-03-16 RX ADMIN — SODIUM CHLORIDE 150 MILLILITER(S): 9 INJECTION, SOLUTION INTRAVENOUS at 14:27

## 2023-03-16 RX ADMIN — Medication 1: at 18:31

## 2023-03-16 RX ADMIN — POTASSIUM PHOSPHATE, MONOBASIC POTASSIUM PHOSPHATE, DIBASIC 62.5 MILLIMOLE(S): 236; 224 INJECTION, SOLUTION INTRAVENOUS at 15:00

## 2023-03-16 RX ADMIN — CEFTRIAXONE 100 MILLIGRAM(S): 500 INJECTION, POWDER, FOR SOLUTION INTRAMUSCULAR; INTRAVENOUS at 06:01

## 2023-03-16 RX ADMIN — Medication 75 MICROGRAM(S): at 06:02

## 2023-03-16 RX ADMIN — DESMOPRESSIN ACETATE 0.5 MICROGRAM(S): 0.1 TABLET ORAL at 06:00

## 2023-03-16 RX ADMIN — Medication 100 MILLIEQUIVALENT(S): at 23:47

## 2023-03-16 RX ADMIN — CEFTRIAXONE 100 MILLIGRAM(S): 500 INJECTION, POWDER, FOR SOLUTION INTRAMUSCULAR; INTRAVENOUS at 23:04

## 2023-03-16 RX ADMIN — OXYCODONE HYDROCHLORIDE 5 MILLIGRAM(S): 5 TABLET ORAL at 01:33

## 2023-03-16 RX ADMIN — POLYETHYLENE GLYCOL 3350 17 GRAM(S): 17 POWDER, FOR SOLUTION ORAL at 11:18

## 2023-03-16 RX ADMIN — Medication 50 MILLIEQUIVALENT(S): at 15:00

## 2023-03-16 RX ADMIN — Medication 250 MILLIGRAM(S): at 11:17

## 2023-03-16 RX ADMIN — VASOPRESSIN 3 UNIT(S)/MIN: 20 INJECTION INTRAVENOUS at 19:26

## 2023-03-16 RX ADMIN — LEVETIRACETAM 400 MILLIGRAM(S): 250 TABLET, FILM COATED ORAL at 22:19

## 2023-03-16 RX ADMIN — Medication 166.67 MILLIGRAM(S): at 23:00

## 2023-03-16 RX ADMIN — OXYCODONE HYDROCHLORIDE 5 MILLIGRAM(S): 5 TABLET ORAL at 02:33

## 2023-03-16 RX ADMIN — Medication 40 MILLIEQUIVALENT(S): at 06:01

## 2023-03-16 RX ADMIN — CEFTRIAXONE 100 MILLIGRAM(S): 500 INJECTION, POWDER, FOR SOLUTION INTRAMUSCULAR; INTRAVENOUS at 17:15

## 2023-03-16 RX ADMIN — HUMAN INSULIN 6 UNIT(S): 100 INJECTION, SUSPENSION SUBCUTANEOUS at 03:38

## 2023-03-16 RX ADMIN — Medication 50 MILLIGRAM(S): at 06:01

## 2023-03-16 RX ADMIN — Medication 50 MILLIGRAM(S): at 22:59

## 2023-03-16 RX ADMIN — Medication 50 MILLIGRAM(S): at 14:28

## 2023-03-16 RX ADMIN — Medication 1000 MILLIGRAM(S): at 04:48

## 2023-03-16 RX ADMIN — HUMAN INSULIN 6 UNIT(S): 100 INJECTION, SUSPENSION SUBCUTANEOUS at 18:30

## 2023-03-16 RX ADMIN — HUMAN INSULIN 6 UNIT(S): 100 INJECTION, SUSPENSION SUBCUTANEOUS at 11:30

## 2023-03-16 RX ADMIN — INSULIN HUMAN 0.5 UNIT(S)/HR: 100 INJECTION, SOLUTION SUBCUTANEOUS at 21:15

## 2023-03-16 RX ADMIN — SODIUM CHLORIDE 100 MILLILITER(S): 9 INJECTION, SOLUTION INTRAVENOUS at 19:26

## 2023-03-16 RX ADMIN — DESMOPRESSIN ACETATE 1 MICROGRAM(S): 0.1 TABLET ORAL at 16:47

## 2023-03-16 NOTE — PROGRESS NOTE ADULT - ASSESSMENT
52y POD#1 from expanded endonasal transphenoidal/transplanum 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 karthikeyan graft, postop lumbar drain splints in place.  Pt doing well still with burry vision, slight h/a, relieved with pain meds.

## 2023-03-16 NOTE — PROGRESS NOTE ADULT - PROBLEM SELECTOR PLAN 1
- continue humidified face tent  - nasal saline, 2 sprays to b/l nares 4 times a day.  - monitor for signs of Epistaxis and CSF leak  - avoid nasal trauma, heavy lifting and bending at waist.  - Care a/p neurosurgery.

## 2023-03-16 NOTE — PROGRESS NOTE ADULT - SUBJECTIVE AND OBJECTIVE BOX
ENT ISSUE/POD: POD#1 from 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    HPI: 52y POD#1 from expanded endonasal transphenoidal/transplanum 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 karthikeyan graft, postop lumbar drain splints in place.  Pt doing well still with burry vision, slight h/a, relieved with pain meds. Pt denies any bleeding, no leaking, no salty or metallic taste in mouth, no PND.       PAST MEDICAL & SURGICAL HISTORY:  Hypertension      Hyperlipidemia      Diabetes mellitus        Allergies    No Known Drug Allergies  S/P TRANSSPHENOIDAL SURGERY. SINONASAL PRECAUTIONS! NO NASAL SWABS OR NG TUBES. (Unknown)    Intolerances      MEDICATIONS  (STANDING):  atorvastatin 80 milliGRAM(s) Oral at bedtime  cefTRIAXone   IVPB 2000 milliGRAM(s) IV Intermittent every 12 hours  chlorhexidine 4% Liquid 1 Application(s) Topical daily  dextrose 50% Injectable 50 milliLiter(s) IV Push every 15 minutes  hydrocortisone 50 milliGRAM(s) Oral every 8 hours  insulin NPH human recombinant 6 Unit(s) SubCutaneous every 6 hours  levothyroxine 75 MICROGram(s) Oral daily  metroNIDAZOLE  IVPB 500 milliGRAM(s) IV Intermittent every 8 hours  pantoprazole  Injectable 40 milliGRAM(s) IV Push daily  polyethylene glycol 3350 17 Gram(s) Oral daily  senna 2 Tablet(s) Oral at bedtime  vancomycin  IVPB 1000 milliGRAM(s) IV Intermittent every 12 hours    MEDICATIONS  (PRN):  acetaminophen     Tablet .. 650 milliGRAM(s) Oral every 6 hours PRN Temp greater or equal to 38C (100.4F), Mild Pain (1 - 3)  ondansetron Injectable 4 milliGRAM(s) IV Push every 6 hours PRN Nausea and/or Vomiting  oxyCODONE    IR 5 milliGRAM(s) Oral every 4 hours PRN Moderate Pain (4 - 6)  sodium chloride 0.65% Nasal 1 Spray(s) Both Nostrils three times a day PRN Nasal Congestion      social history: see consult     family history: see consult     ROS:   ENT: all negative except as noted in HPI   Pulm: denies SOB, cough, hemoptysis  Neuro: denies numbness/tingling, loss of sensation  Endo: denies heat/cold intolerance, excessive sweating      Vital Signs Last 24 Hrs  T(C): 36.9 (16 Mar 2023 07:00), Max: 38 (16 Mar 2023 03:00)  T(F): 98.4 (16 Mar 2023 07:00), Max: 100.4 (16 Mar 2023 03:00)  HR: 60 (16 Mar 2023 08:00) (54 - 72)  BP: 108/61 (16 Mar 2023 08:00) (97/54 - 136/57)  BP(mean): 75 (16 Mar 2023 08:00) (67 - 82)  RR: 18 (16 Mar 2023 08:00) (15 - 24)  SpO2: 99% (16 Mar 2023 08:00) (93% - 100%)    Parameters below as of 15 Mar 2023 19:00  Patient On (Oxygen Delivery Method): face tent    O2 Concentration (%): 100                          11.8   14.98 )-----------( 230      ( 16 Mar 2023 04:02 )             37.0    03-16    160<HH>  |  127<H>  |  8   ----------------------------<  130<H>  3.6   |  23  |  0.70    Ca    9.5      16 Mar 2023 04:02  Phos  2.0     03-15  Mg     2.4     03-15         PHYSICAL EXAM:  Gen: NAD  Skin: No rashes, bruises, or lesions  Head: Normocephalic, Atraumatic  Face: no edema, erythema, or fluctuance. Parotid glands soft without mass  Eyes: no scleral injection  Nose: Nares bilaterally patent, no discharge  Mouth: No Stridor / Drooling / Trismus.  Mucosa moist, tongue/uvula midline, oropharynx clear  Neck: Flat, supple, no lymphadenopathy, trachea midline, no masses  Lymphatic: No lymphadenopathy  Resp: breathing easily, no stridor  Neuro: facial nerve intact, no facial droop

## 2023-03-16 NOTE — PROGRESS NOTE ADULT - SUBJECTIVE AND OBJECTIVE BOX
SUMMARY: 51yo woman transferred from [facility] on 3/10 for suprasellar mass found on MRI for opthalmology work up--etta HA and 3wk of blurry vision. PMH HTN, DM2, HLD.     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     Overnight EVENTS: 3/16- Patient was given 0.5 mg of DDAVP . Still waking up from the surgery.     3/17- Na climbing up, mental status same. Urine output not copious. Not a DI picture.    REVIEW OF SYSTEMS: [] Unable to Assess due to neurologic exam   [ x] All ROS addressed below are non-contributory, except:  Neuro: [ x] Headache [ ] Back pain [ ] Numbness [ ] Weakness [ ] Ataxia [ ] Dizziness [ ] Aphasia [ ] Dysarthria [ x] Visual disturbance  Resp: [ ] Shortness of breath/dyspnea [ ] Orthopnea [ ] Cough  CV: [ ] Chest pain [ ] Palpitation [ ] Lightheadedness [ ] Syncope  Renal: [ ] Thirst [ ] Edema  GI: [ ] Nausea [ ] Emesis [ ] Abdominal pain [ ] Constipation [ ] Diarrhea  Hem: [ ] Hematemesis [ ] bBright red blood per rectum  ID: [ ] Fever [ ] Chills [ ] Dysuria  ENT: [ ] Rhinorrhea    VITALS: [x] Reviewed    IMAGING/DATA: [x] Reviewed    IVF FLUIDS/MEDICATIONS: [x] Reviewed    ALLERGIES: Allergies    No Known Allergies    Intolerances      DEVICES:   [] Restraints [x] PIVs [] ET tube [] central line [] PICC [x] arterial line [x] humphries [] NGT/OGT [] EVD [] LD [] TIM/HMV [] LiCOX [] ICP monitor [] Trach [] PEG [] Chest Tube [] other:    EXAMINATION:  General: No acute distress  HEENT: Anicteric sclerae  Cardiac: [id number]  Lungs: Clear  Abdomen: Soft, non-tender, +BS  Extremities: No c/c/e  Skin/Incision Site: Clean, dry and intact  Neurologic: Drowsy, grimacing to pain, responding to her name, however, not answering. Moving all extremities spontaneously. Pupils 3 mm Bilaterally reactive.  SUMMARY: 51yo woman transferred from [facility] on 3/10 for suprasellar mass found on MRI for opthalmology work up--etta HA and 3wk of blurry vision. PMH HTN, DM2, HLD.     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     Overnight EVENTS: 3/16- Patient was given 0.5 mg of DDAVP . Still waking up from the surgery.     3/17- Na TRENDING up, mental status same. Urine output not copious. Not a DI picture.    REVIEW OF SYSTEMS: [] Unable to Assess due to neurologic exam   [ x] All ROS addressed below are non-contributory, except:  Neuro: [ x] Headache [ ] Back pain [ ] Numbness [ ] Weakness [ ] Ataxia [ ] Dizziness [ ] Aphasia [ ] Dysarthria [ x] Visual disturbance  Resp: [ ] Shortness of breath/dyspnea [ ] Orthopnea [ ] Cough  CV: [ ] Chest pain [ ] Palpitation [ ] Lightheadedness [ ] Syncope  Renal: [ ] Thirst [ ] Edema  GI: [ ] Nausea [ ] Emesis [ ] Abdominal pain [ ] Constipation [ ] Diarrhea  Hem: [ ] Hematemesis [ ] bBright red blood per rectum  ID: [ ] Fever [ ] Chills [ ] Dysuria  ENT: [ ] Rhinorrhea    VITALS: [x] Reviewed    IMAGING/DATA: [x] Reviewed    IVF FLUIDS/MEDICATIONS: [x] Reviewed    ALLERGIES: Allergies    No Known Allergies    Intolerances      DEVICES:   [] Restraints [x] PIVs [] ET tube [] central line [] PICC [x] arterial line [x] humphries [] NGT/OGT [] EVD [] LD [] TIM/HMV [] LiCOX [] ICP monitor [] Trach [] PEG [] Chest Tube [] other:    EXAMINATION:  General: No acute distress  HEENT: Anicteric sclerae  Cardiac: [id number]  Lungs: Clear  Abdomen: Soft, non-tender, +BS  Extremities: No c/c/e  Skin/Incision Site: Clean, dry and intact  Neurologic: Drowsy, grimacing to pain, responding to her name, however, not answering. Moving all extremities spontaneously. Pupils 3 mm Bilaterally reactive.

## 2023-03-16 NOTE — PROGRESS NOTE ADULT - SUBJECTIVE AND OBJECTIVE BOX
DATE OF SERVICE: 03-16-23 @ 10:17    Subjective: Patient seen and examined. No new events except as noted.     SUBJECTIVE/ROS:      MEDICATIONS:  MEDICATIONS  (STANDING):  atorvastatin 80 milliGRAM(s) Oral at bedtime  cefTRIAXone   IVPB 2000 milliGRAM(s) IV Intermittent every 12 hours  chlorhexidine 4% Liquid 1 Application(s) Topical daily  dextrose 50% Injectable 50 milliLiter(s) IV Push every 15 minutes  hydrocortisone 50 milliGRAM(s) Oral every 8 hours  insulin NPH human recombinant 6 Unit(s) SubCutaneous every 6 hours  levothyroxine 75 MICROGram(s) Oral daily  metroNIDAZOLE  IVPB 500 milliGRAM(s) IV Intermittent every 8 hours  pantoprazole  Injectable 40 milliGRAM(s) IV Push daily  polyethylene glycol 3350 17 Gram(s) Oral daily  senna 2 Tablet(s) Oral at bedtime  vancomycin  IVPB 1000 milliGRAM(s) IV Intermittent every 12 hours      PHYSICAL EXAM:  T(C): 36.9 (03-16-23 @ 07:00), Max: 38 (03-16-23 @ 03:00)  HR: 86 (03-16-23 @ 09:00) (54 - 86)  BP: 127/68 (03-16-23 @ 09:00) (97/54 - 136/57)  RR: 23 (03-16-23 @ 09:00) (15 - 24)  SpO2: 97% (03-16-23 @ 09:00) (93% - 100%)  Wt(kg): --  I&O's Summary    15 Mar 2023 07:01  -  16 Mar 2023 07:00  --------------------------------------------------------  IN: 3978 mL / OUT: 3605 mL / NET: 373 mL    16 Mar 2023 07:01  -  16 Mar 2023 10:17  --------------------------------------------------------  IN: 0 mL / OUT: 840 mL / NET: -840 mL            JVP: Normal  Neck: supple  Lung: clear   CV: S1 S2 , Murmur:  Abd: soft  Ext: No edema  neuro: Awake / alert  Psych: flat affect  Skin: normal``    LABS/DATA:    CARDIAC MARKERS:                                11.8   14.98 )-----------( 230      ( 16 Mar 2023 04:02 )             37.0     03-16    160<HH>  |  127<H>  |  8   ----------------------------<  130<H>  3.6   |  23  |  0.70    Ca    9.5      16 Mar 2023 04:02  Phos  2.0     03-15  Mg     2.4     03-15      proBNP:   Lipid Profile:   HgA1c:   TSH: Thyroid Stimulating Hormone, Serum: 4.43 uIU/mL (03-16 @ 04:02)      TELE:  EKG:

## 2023-03-16 NOTE — PROVIDER CONTACT NOTE (CHANGE IN STATUS NOTIFICATION) - ACTION/TREATMENT ORDERED:
MD Leola Green and JALEESA Rankin at bedside. STAT CT ordered. Neurosurgery residents made aware. Keep lumbar drain clamped. DDAVP to be given as ordered. Vasopressin to be started as ordered. q1h BMP x 3. Will continue to monitor

## 2023-03-16 NOTE — PROVIDER CONTACT NOTE (CHANGE IN STATUS NOTIFICATION) - BACKGROUND
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

## 2023-03-16 NOTE — PROGRESS NOTE ADULT - SUBJECTIVE AND OBJECTIVE BOX
Interval events:  During the day with L arm and leg weakness, MRI brain without stroke, MRI and CTH with significant frontal pneumocephalus.   Urine output improved with vasopressin.   Na >170, given 250cc D5W bolus, Na improved to 170. Given another 750 cc of D5W (total free water deficit to bring Na down to 160 is 1 L).   Patient denies any headache and says she is "mariluz."     VITALS:  T(C): , Max: 38 (03-16-23 @ 03:00)  HR:  (51 - 86)  BP:  (97/54 - 159/65)  ABP:  (94/69 - 153/70)  RR:  (15 - 24)  SpO2:  (93% - 100%)  Wt(kg): --      03-15-23 @ 07:01  -  03-16-23 @ 07:00  --------------------------------------------------------  IN: 3978 mL / OUT: 3605 mL / NET: 373 mL    03-16-23 @ 07:01  -  03-16-23 @ 21:23  --------------------------------------------------------  IN: 1821.5 mL / OUT: 3000 mL / NET: -1178.5 mL      LABS:  Na: 170 (03-16 @ 20:29), >170 (03-16 @ 19:45), >170 (03-16 @ 18:32), >170 (03-16 @ 17:23), 168 (03-16 @ 11:17), 160 (03-16 @ 04:02), 157 (03-15 @ 17:25), 155 (03-15 @ 09:09), 146 (03-15 @ 03:31), 141 (03-14 @ 06:02)  K: 3.9 (03-16 @ 20:29), 4.2 (03-16 @ 19:45), 4.2 (03-16 @ 18:32), 4.3 (03-16 @ 17:23), 3.8 (03-16 @ 11:17), 3.6 (03-16 @ 04:02), 3.9 (03-15 @ 17:25), 3.5 (03-15 @ 09:09), 3.1 (03-15 @ 03:31), 3.9 (03-14 @ 06:02)  Cl: 134 (03-16 @ 20:29), >135 (03-16 @ 19:45), >135 (03-16 @ 18:32), >135 (03-16 @ 17:23), 131 (03-16 @ 11:17), 127 (03-16 @ 04:02), 125 (03-15 @ 17:25), 120 (03-15 @ 09:09), 112 (03-15 @ 03:31), 105 (03-14 @ 06:02)  CO2: 25 (03-16 @ 20:29), 25 (03-16 @ 19:45), 25 (03-16 @ 18:32), 23 (03-16 @ 17:23), 21 (03-16 @ 11:17), 23 (03-16 @ 04:02), 22 (03-15 @ 17:25), 20 (03-15 @ 09:09), 17 (03-15 @ 03:31), 25 (03-14 @ 06:02)  BUN: 10 (03-16 @ 20:29), 11 (03-16 @ 19:45), 10 (03-16 @ 18:32), 10 (03-16 @ 17:23), 9 (03-16 @ 11:17), 8 (03-16 @ 04:02), 9 (03-15 @ 17:25), 12 (03-15 @ 09:09), 12 (03-15 @ 03:31), 18 (03-14 @ 06:02)  Cr: 0.70 (03-16 @ 20:29), 0.67 (03-16 @ 19:45), 0.70 (03-16 @ 18:32), 0.69 (03-16 @ 17:23), 0.74 (03-16 @ 11:17), 0.70 (03-16 @ 04:02), 0.68 (03-15 @ 17:25), 0.69 (03-15 @ 09:09), 0.64 (03-15 @ 03:31), 0.67 (03-14 @ 06:02)  Glu: 396(03-16 @ 20:29), 336(03-16 @ 19:45), 322(03-16 @ 18:32), 290(03-16 @ 17:23), 169(03-16 @ 11:17), 130(03-16 @ 04:02), 170(03-15 @ 17:25), 373(03-15 @ 09:09), 261(03-15 @ 03:31), 121(03-14 @ 06:02)    Hgb: 11.8 (03-16 @ 04:02), 12.2 (03-15 @ 03:31), 12.1 (03-14 @ 06:02)  Hct: 37.0 (03-16 @ 04:02), 38.4 (03-15 @ 03:31), 38.1 (03-14 @ 06:02)  WBC: 14.98 (03-16 @ 04:02), 16.92 (03-15 @ 03:31), 6.59 (03-14 @ 06:02)  Plt: 230 (03-16 @ 04:02), 349 (03-15 @ 03:31), 243 (03-14 @ 06:02)    MEDICATIONS:  acetaminophen     Tablet .. 650 milliGRAM(s) Oral every 6 hours PRN  amLODIPine   Tablet 10 milliGRAM(s) Oral daily  atorvastatin 80 milliGRAM(s) Oral at bedtime  cefTRIAXone   IVPB 2000 milliGRAM(s) IV Intermittent every 12 hours  chlorhexidine 4% Liquid 1 Application(s) Topical daily  dextrose 5%. 750 milliLiter(s) IV Continuous <Continuous>  dextrose 5%. 1000 milliLiter(s) IV Continuous <Continuous>  dextrose 50% Injectable 50 milliLiter(s) IV Push every 15 minutes  glucagon  Injectable 1 milliGRAM(s) IntraMuscular once  hydrocortisone sodium succinate Injectable 50 milliGRAM(s) IV Push every 8 hours  insulin regular Infusion 0.5 Unit(s)/Hr IV Continuous <Continuous>  levothyroxine 75 MICROGram(s) Oral daily  metroNIDAZOLE  IVPB 500 milliGRAM(s) IV Intermittent every 8 hours  ondansetron Injectable 4 milliGRAM(s) IV Push every 6 hours PRN  oxyCODONE    IR 5 milliGRAM(s) Oral every 4 hours PRN  pantoprazole  Injectable 40 milliGRAM(s) IV Push daily  polyethylene glycol 3350 17 Gram(s) Oral daily  senna 2 Tablet(s) Oral at bedtime  sodium chloride 0.65% Nasal 1 Spray(s) Both Nostrils three times a day PRN  vancomycin  IVPB 1000 milliGRAM(s) IV Intermittent every 12 hours  vasopressin Infusion 0.02 Unit(s)/Min IV Continuous <Continuous>    EXAMINATION:  General:  in NAD  HEENT:  MMM  Neuro:  lethargic, oriented to her first name, does not follow commands but does respond to some questions, PERRL, gaze midline, face symmetric, minimal PD b/l but able to hold b/l arms AG, briskly withdraws b/l LEs in plane of bed   Cards:  RRR  Respiratory:  no respiratory distress  Abdomen:  soft  Extremities:  no LE edema    Assessment/Plan:   53 yo woman with h/o HTN, DM and HLD, 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     HOB flat with non-rebreather for pneumocephalus, c/w LD clamped   pituitary precautions (unable to place NG for free water boluses)   start VEEG for episode of L sided weakness, give 1g of Keppra  q1h BMP, c/w D5Q at 100cc/hr   c/w hydrocort IV  increase vasopressin to 0.04 mg/hr  c/w vanc, ceftriaxone and flagyl as per nrsg (vanc dose increased for low trough)  start insulin drip (make sure abx are in D5W)    c/w kristel    critical care time spent: 45 min    Kelsi Nayak  Neurocritical Care Attending

## 2023-03-16 NOTE — PROGRESS NOTE ADULT - ASSESSMENT
ASSESSMENT: TSP resection of pituitary adenoma, POD 1    NEURO:  Neurochecks Q1  CTh with pnemo, keep flat, face tent at [id number], LD to 5cc/hour  MRI post-op, Pain control  LD 5cc/hr per neurosurgery   DI watch   Activity: [x] OOB as tolerated [] Bedrest [x] PT [x] OT [] PMNR    PULM:  Pituitary precautions (no incentive spirometry, no straws, no BIPAP)    CV:  -160mmHg, a-line in AM if hemodynamically stable    RENAL: Plasmalyte at 50 cc/hour  Pena for strict I+Os  IVF until good PO intake  Drink to thirst    GI:  Diet: --  GI prophylaxis [] not indicated [x] PPI [] other:  Bowel regimen [] colace [x] senna [x] other: miralax     ENDO:   Hypernatremia, hyperglycemia   Goal euglycemia (-180)  hydrocortisone 50mg IV Q8  cont synthroid  endo following   insulin gtt  Pituitary labs as needed  May need DDAVP standing dose  Monitor for DI    HEME/ONC:  VTE prophylaxis: [x] SCDs [] chemoprophylaxis [x] hold chemoprophylaxis due to: fresh post op, will  [x] high risk of DVT/PE on admission due to: tumor     ID:  vanc/ceftriaxone/flagyl per ENT for potential CSF leak/hx sinusitis   ENT following, will consult ID     MISC:    SOCIAL/FAMILY:  [x] awaiting [] updated at bedside [] family meeting    CODE STATUS:  [x] Full Code [] DNR [] DNI [] Palliative/Comfort Care    DISPOSITION:  [x] ICU [] Stroke Unit [] Floor [] EMU [] RCU [] PCU    [x] Patient is at high risk of neurologic deterioration/death due to: post op hemorrhage, DI, pan hypopituitarism    ASSESSMENT: TSP resection of pituitary adenoma, POD 1    NEURO:  Neurochecks Q2  CTh with pnemo, keep flat, face tent at [id number], LD to 5cc/hour  MRI post-op patient did not tolerate it, was agitated, aborted  Pain control  DI watch   Activity: [x] OOB as tolerated [] Bedrest [x] PT [x] OT [] PMNR    PULM:  RA, face tent on  Pituitary precautions (no incentive spirometry, no straws, no BIPAP)    CV:  -160mmHg    RENAL: Plasmalyte at 50 cc/hour  Pena for strict I+Os  IVF until good PO intake  Drink to thirst    GI:  Diet: ADAT  GI prophylaxis [] not indicated [x] PPI [] other:  Bowel regimen [] colace [x] senna [x] other: miralax     ENDO:   Hypernatremia, hyperglycemia   Goal euglycemia (-180)  - cosyntropin stim test: 3/14 AM cortisol at 8:15am 5.1, cosyntropin at 8:15, 8:50am cortisol 16.9, 9:25am 20.7  hydrocortisone 50mg IV Q8, will follow up with endo to wean off  cont synthroid  endo following   insulin gtt  Pituitary labs as needed  May need DDAVP standing dose  Monitor for DI    HEME/ONC:  VTE prophylaxis: [x] SCDs [x] chemoprophylaxis [] hold chemoprophylaxis due to: fresh post op, will  [x] high risk of DVT/PE on admission due to: tumor     ID:  vanc/ceftriaxone/flagyl per ENT for potential CSF leak/hx sinusitis   ENT following, will consult ID     MISC:    SOCIAL/FAMILY:  [x] awaiting [] updated at bedside [] family meeting    CODE STATUS:  [x] Full Code [] DNR [] DNI [] Palliative/Comfort Care    DISPOSITION:  [x] ICU [] Stroke Unit [] Floor [] EMU [] RCU [] PCU    [x] Patient is at high risk of neurologic deterioration/death due to: post op hemorrhage, DI, pan hypopituitarism    ASSESSMENT: TSP resection of pituitary adenoma, POD 1    NEURO:  Neurochecks Q2  CTh with pnemo, keep flat, face tent at [id number], LD to 5cc/hour  MRI post-op patient did not tolerate it, was agitated, aborted  Pain control  DI watch   Activity: [x] OOB as tolerated [] Bedrest [x] PT [x] OT [] PMNR    PULM:  RA, face tent on  Pituitary precautions (no incentive spirometry, no straws, no BIPAP)    CV:  -160mmHg    RENAL: IVL   Pena for strict I+Os  IVF until good PO intake  Drink to thirst    GI:  Diet: ADAT  GI prophylaxis [] not indicated [x] PPI [] other:  Bowel regimen [] colace [x] senna [x] other: miralax     ENDO:   Hypernatremia, hyperglycemia   Goal euglycemia (-180)  - cosyntropin stim test: 3/14 AM cortisol at 8:15am 5.1, cosyntropin at 8:15, 8:50am cortisol 16.9, 9:25am 20.7  hydrocortisone 50mg IV Q8, will follow up with endo to wean off  cont synthroid  endo following   insulin gtt  Pituitary labs as needed  May need DDAVP standing dose  Monitor for DI    HEME/ONC:  VTE prophylaxis: [x] SCDs [x] chemoprophylaxis [] hold chemoprophylaxis due to:  [x] high risk of DVT/PE on admission due to: tumor     ID:  vanc/ceftriaxone/flagyl per ENT for potential CSF leak/hx sinusitis   ENT following    MISC:    SOCIAL/FAMILY:  [x] awaiting [] updated at bedside [] family meeting    CODE STATUS:  [x] Full Code [] DNR [] DNI [] Palliative/Comfort Care    DISPOSITION:  [x] ICU [] Stroke Unit [] Floor [] EMU [] RCU [] PCU    [x] Patient is at high risk of neurologic deterioration/death due to: post op hemorrhage, DI, pan hypopituitarism

## 2023-03-16 NOTE — PROGRESS NOTE ADULT - ASSESSMENT
52 yr old female with a history of hypertension, hyperlipidemia, dm type 2, no surgeries ever presents with a month of headaches and 3 weeks of blurred vision. Endocrinology consulted for evaluation of sellar mass.    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 Too low to be prolactinoma given size of the sellar mass. Suspect stalk effect. (prolactin diluted 103, undiluted 107)  -Secondary hypogonadism can be addressed as outpatient. (Lh 2.6, estradiol <5)  -3/12 AM ayush 5.9, ACTH 21.5, 3/13 am cortisol (6am) 6.9  - cosyntropin stim test: 3/14 AM cortisol at 8:15am 5.1, cosyntropin at 8:15, 8:50am cortisol 16.9, 9:25am 20.7  - s/p tsp 3/15  PLAN  - f/u IGF-1  - f/u neurosurgery recommendations  - f/u ophthalmology recommendations (formal consult if not done) given visual deficits  - continue hydrocortisone 50mg q8h IV, can change to PO 50mg q8h if no IV    DI/SIADH Watch POST OP  - Please eval for signs of DI vs SIADH postop  -  Please check sodium q6h post op with BMP   - Strict I/Os, daily weights. if UO >250cc/hour, please check BMP and urine osm, serum osm.  If consistent with DI, patient may require DDAVP.   - May require DDAVP IVP x1 if meets any of these criteria: Na > 145, UO > 250cc/hr, Urine osm < 100 or urine specific gravity < 1.005.  - Signs and symptoms of DI and SIADH post op:  may occur in the 2-3 weeks following surgery.  Patient should check daily weights and if they experience weight gain of 2-3 pounds to call her endocrinologist.  Signs of DI include increased thirst with high urine output.    Primary Hypothyroidism   - Patient with elevated TSH (8)and Low Free T4 (0.5)  PLAN  - continue levothyroxine 75 mcg daily (maintain on empty stomach (at least 1 hour before meals), separate by 4 hours from PPI or calcium supplementation which can inhibit its absorption)    Steroid hyperglycemia  T2DM with hyperglycemia  - HbA1c: 8  - Home Regimen: metformin 1000mg bid  - Endocrinologist: does not have  - patient with hyperglycemia on stress dose steroids  PLAN  - continue insulin gtt while hyperglycemic on stress dose steroids, q1h fingersticks  - Goal -180  - Carbohydrate consistent diet  Discharge plan:  - Discharge medications: metformin 1000mg bid + GLP-1 agonist  - Patient to call doctor with persistent high or low BG at home.   - Ensure patient has glucometer, test strips and lancets on discharge.  - Recommend routine outpatient ophthalmology, podiatry and endocrinology f/u    HTN  - Home regimen: lisinopril 2.5mg daily  PLAN  - Can check urine microalbumin outpatient  - Outpatient goal BP <130/80. Management per primary team.    HLD  - Home regimen: atorvastatin 80mg daily  -   PLAN  - resume statin when able    Will schedule an appointment for the patient to follow up.  [address] [place] [address]  Phone Number: [phone]    Discussed with primary team.     Thank you for the interesting consult.    Denny Bishop MD, Endocrinology Fellow  Pager [phone] from 9am to 5pm. After hours and on weekends, please call [phone]. 52 yr old female with a history of hypertension, hyperlipidemia, dm type 2, no surgeries ever presents with a month of headaches and 3 weeks of blurred vision. Endocrinology consulted for evaluation of sellar mass.    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 Too low to be prolactinoma given size of the sellar mass. Suspect stalk effect. (prolactin diluted 103, undiluted 107)  -Secondary hypogonadism can be addressed as outpatient. (Lh 2.6, estradiol <5)  -3/12 AM ayush 5.9, ACTH 21.5, 3/13 am cortisol (6am) 6.9  - cosyntropin stim test: 3/14 AM cortisol at 8:15am 5.1, cosyntropin at 8:15, 8:50am cortisol 16.9, 9:25am 20.7  - s/p tsp 3/15  PLAN  - f/u IGF-1  - f/u neurosurgery recommendations  - f/u ophthalmology recommendations (formal consult if not done) given visual deficits  - continue hydrocortisone 50mg q8h IV, can change to PO 50mg q8h if no IV    DI/SIADH Watch POST OP  - Please eval for signs of DI vs SIADH postop  - given Na 168, start D5 at 150cc/hr for 12 hr, repeat BMP in 6 hr, will consider additional DDAVP  -  Please check sodium q6h post op with BMP   - Strict I/Os, daily weights. if UO >250cc/hour, please check BMP and urine osm, serum osm.  If consistent with DI, patient may require DDAVP.   - May require DDAVP IVP x1 if meets any of these criteria: Na > 145, UO > 250cc/hr, Urine osm < 100 or urine specific gravity < 1.005.  - Signs and symptoms of DI and SIADH post op:  may occur in the 2-3 weeks following surgery.  Patient should check daily weights and if they experience weight gain of 2-3 pounds to call her endocrinologist.  Signs of DI include increased thirst with high urine output.    Primary Hypothyroidism   - Patient with elevated TSH (8)and Low Free T4 (0.5)  PLAN  - continue levothyroxine 75 mcg daily (maintain on empty stomach (at least 1 hour before meals), separate by 4 hours from PPI or calcium supplementation which can inhibit its absorption), convert to IV if unable to tolerate PO    Steroid hyperglycemia  T2DM with hyperglycemia  - HbA1c: 8  - Home Regimen: metformin 1000mg bid  - Endocrinologist: does not have  - patient with hyperglycemia on stress dose steroids  PLAN  - off insulin gtt, received NPH 6 units last night, can monitor on moderate admelog correction scale q4h  - Goal -180  - Carbohydrate consistent diet  Discharge plan:  - Discharge medications: metformin 1000mg bid + GLP-1 agonist  - Patient to call doctor with persistent high or low BG at home.   - Ensure patient has glucometer, test strips and lancets on discharge.  - Recommend routine outpatient ophthalmology, podiatry and endocrinology f/u    HTN  - Home regimen: lisinopril 2.5mg daily  PLAN  - Can check urine microalbumin outpatient  - Outpatient goal BP <130/80. Management per primary team.    HLD  - Home regimen: atorvastatin 80mg daily  -   PLAN  - resume statin when able    Will schedule an appointment for the patient to follow up.  [address] [place] [address]  Phone Number: [phone]    Discussed with primary team.     Thank you for the interesting consult.    Denny Bishop MD, Endocrinology Fellow  Pager [phone] from 9am to 5pm. After hours and on weekends, please call [phone]. 52 yr old female with a history of hypertension, hyperlipidemia, dm type 2, no surgeries ever presents with a month of headaches and 3 weeks of blurred vision. Endocrinology consulted for evaluation of sellar mass.    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 Too low to be prolactinoma given size of the sellar mass. Suspect stalk effect. (prolactin diluted 103, undiluted 107)  -Secondary hypogonadism can be addressed as outpatient. (Lh 2.6, estradiol <5)  -3/12 AM ayush 5.9, ACTH 21.5, 3/13 am cortisol (6am) 6.9  - cosyntropin stim test: 3/14 AM cortisol at 8:15am 5.1, cosyntropin at 8:15, 8:50am cortisol 16.9, 9:25am 20.7  - s/p tsp 3/15  PLAN  - f/u IGF-1  - f/u neurosurgery recommendations  - f/u ophthalmology recommendations (formal consult if not done) given visual deficits  - continue wean to hydrocortisone 50mg q12h on 3/17 if hemodynamically stable    DI/SIADH Watch POST OP  - Please eval for signs of DI vs SIADH postop  - given Na 168, start D5 at 150cc/hr for 12 hr (started around 1pm), repeat BMP around 6mp , will consider additional DDAVP if Na the same or increasing, or if dumping dilute urine  -  Please check sodium q6h post op with BMP   - Strict I/Os, daily weights. if UO >250cc/hour, please check BMP and urine osm, serum osm.  If consistent with DI, patient may require DDAVP.   - May require DDAVP IVP x1 if meets any of these criteria: Na > 145, UO > 250cc/hr, Urine osm < 100 or urine specific gravity < 1.005.  - Signs and symptoms of DI and SIADH post op:  may occur in the 2-3 weeks following surgery.  Patient should check daily weights and if they experience weight gain of 2-3 pounds to call her endocrinologist.  Signs of DI include increased thirst with high urine output.    Primary Hypothyroidism   - Patient with elevated TSH (8)and Low Free T4 (0.5)  PLAN  - continue levothyroxine 75 mcg daily (maintain on empty stomach (at least 1 hour before meals), separate by 4 hours from PPI or calcium supplementation which can inhibit its absorption), convert to IV if unable to tolerate PO    Steroid hyperglycemia  T2DM with hyperglycemia  - HbA1c: 8  - Home Regimen: metformin 1000mg bid  - Endocrinologist: does not have  - patient with hyperglycemia on stress dose steroids  PLAN  - off insulin gtt, received NPH 6 units last night, can monitor on moderate admelog correction scale q4h  - Goal -180  - Carbohydrate consistent diet  Discharge plan:  - Discharge medications: metformin 1000mg bid + GLP-1 agonist  - Patient to call doctor with persistent high or low BG at home.   - Ensure patient has glucometer, test strips and lancets on discharge.  - Recommend routine outpatient ophthalmology, podiatry and endocrinology f/u    HTN  - Home regimen: lisinopril 2.5mg daily  PLAN  - Can check urine microalbumin outpatient  - Outpatient goal BP <130/80. Management per primary team.    HLD  - Home regimen: atorvastatin 80mg daily  -   PLAN  - resume statin when able    Will schedule an appointment for the patient to follow up.  [address] [place] [address]  Phone Number: [phone]    Discussed with primary team.     Thank you for the interesting consult.    Denny Bishop MD, Endocrinology Fellow  Pager [phone] from 9am to 5pm. After hours and on weekends, please call [phone].

## 2023-03-16 NOTE — PROGRESS NOTE ADULT - SUBJECTIVE AND OBJECTIVE BOX
ENDOCRINE FOLLOW UP     Chief Complaint: craniopharyngioma    History:   Received DDAVP 0.5 mcg IV this am when Na 160.    MEDICATIONS  (STANDING):  atorvastatin 80 milliGRAM(s) Oral at bedtime  cefTRIAXone   IVPB 2000 milliGRAM(s) IV Intermittent every 12 hours  chlorhexidine 4% Liquid 1 Application(s) Topical daily  dextrose 50% Injectable 50 milliLiter(s) IV Push every 15 minutes  hydrocortisone 50 milliGRAM(s) Oral every 8 hours  insulin NPH human recombinant 6 Unit(s) SubCutaneous every 6 hours  levothyroxine 75 MICROGram(s) Oral daily  metroNIDAZOLE  IVPB 500 milliGRAM(s) IV Intermittent every 8 hours  pantoprazole  Injectable 40 milliGRAM(s) IV Push daily  polyethylene glycol 3350 17 Gram(s) Oral daily  senna 2 Tablet(s) Oral at bedtime  vancomycin  IVPB 1000 milliGRAM(s) IV Intermittent every 12 hours    MEDICATIONS  (PRN):  acetaminophen     Tablet .. 650 milliGRAM(s) Oral every 6 hours PRN Temp greater or equal to 38C (100.4F), Mild Pain (1 - 3)  ondansetron Injectable 4 milliGRAM(s) IV Push every 6 hours PRN Nausea and/or Vomiting  oxyCODONE    IR 5 milliGRAM(s) Oral every 4 hours PRN Moderate Pain (4 - 6)  sodium chloride 0.65% Nasal 1 Spray(s) Both Nostrils three times a day PRN Nasal Congestion      Allergies    No Known Drug Allergies  S/P TRANSSPHENOIDAL SURGERY. SINONASAL PRECAUTIONS! NO NASAL SWABS OR NG TUBES. (Unknown)    Intolerances        ROS: All other systems reviewed and negative    PHYSICAL EXAM:  VITALS: T(C): 36.9 (03-16-23 @ 07:00)  T(F): 98.4 (03-16-23 @ 07:00), Max: 100.4 (03-16-23 @ 03:00)  HR: 86 (03-16-23 @ 09:00) (54 - 86)  BP: 127/68 (03-16-23 @ 09:00) (97/54 - 136/57)  RR:  (15 - 24)  SpO2:  (93% - 100%)  Wt(kg): --  GENERAL: NAD, resting comfortably   EYES: No proptosis,  anicteric  HEENT:  Atraumatic, Normocephalic, moist mucous membranes  RESPIRATORY: Nonlabored respirations on room air, normal rate/effort   CARDIOVASCULAR: Did not appear cyanotic, no lower extremity swelling visualized  GI: Soft, nontender, non distended  NEURO: Answering questions appropriately, moves all extremities spontaneously  PSYCH:  reactive affect, euthymic mood    POCT Blood Glucose.: 107 mg/dL (03-16-23 @ 05:04)  POCT Blood Glucose.: 111 mg/dL (03-16-23 @ 03:10)  POCT Blood Glucose.: 127 mg/dL (03-16-23 @ 02:02)  POCT Blood Glucose.: 112 mg/dL (03-16-23 @ 01:13)  POCT Blood Glucose.: 107 mg/dL (03-15-23 @ 23:59)  POCT Blood Glucose.: 124 mg/dL (03-15-23 @ 23:08)  POCT Blood Glucose.: 108 mg/dL (03-15-23 @ 22:05)  POCT Blood Glucose.: 123 mg/dL (03-15-23 @ 21:08)  POCT Blood Glucose.: 111 mg/dL (03-15-23 @ 20:03)  POCT Blood Glucose.: 111 mg/dL (03-15-23 @ 18:53)  POCT Blood Glucose.: 129 mg/dL (03-15-23 @ 18:00)  POCT Blood Glucose.: 169 mg/dL (03-15-23 @ 16:59)  POCT Blood Glucose.: 169 mg/dL (03-15-23 @ 16:05)  POCT Blood Glucose.: 215 mg/dL (03-15-23 @ 14:58)  POCT Blood Glucose.: 287 mg/dL (03-15-23 @ 13:54)  POCT Blood Glucose.: 265 mg/dL (03-15-23 @ 13:02)  POCT Blood Glucose.: 298 mg/dL (03-15-23 @ 12:14)  POCT Blood Glucose.: 104 mg/dL (03-14-23 @ 16:25)  POCT Blood Glucose.: 133 mg/dL (03-14-23 @ 11:37)  POCT Blood Glucose.: 119 mg/dL (03-14-23 @ 07:46)  POCT Blood Glucose.: 139 mg/dL (03-13-23 @ 21:57)  POCT Blood Glucose.: 114 mg/dL (03-13-23 @ 16:31)  POCT Blood Glucose.: 153 mg/dL (03-13-23 @ 12:28)      03-16    160<HH>  |  127<H>  |  8   ----------------------------<  130<H>  3.6   |  23  |  0.70    eGFR: 103    Ca    9.5      03-16  Mg     2.4     03-15  Phos  2.0     03-15        A1C with Estimated Average Glucose Result: 8.0 % (03-13-23 @ 06:23)      Thyroid Stimulating Hormone, Serum: 4.43 uIU/mL (03-16-23 @ 04:02)  Thyroid Stimulating Hormone, Serum: 3.46 uIU/mL (03-15-23 @ 09:09)  Thyroid Stimulating Hormone, Serum: 8.85 uIU/mL (03-11-23 @ 05:39)   ENDOCRINE FOLLOW UP     Chief Complaint: craniopharyngioma    History:   Received DDAVP 0.5 mcg IV this am when Na 160. Patient lethargic at bedside, snoring, not answering questions.    MEDICATIONS  (STANDING):  atorvastatin 80 milliGRAM(s) Oral at bedtime  cefTRIAXone   IVPB 2000 milliGRAM(s) IV Intermittent every 12 hours  chlorhexidine 4% Liquid 1 Application(s) Topical daily  dextrose 50% Injectable 50 milliLiter(s) IV Push every 15 minutes  hydrocortisone 50 milliGRAM(s) Oral every 8 hours  insulin NPH human recombinant 6 Unit(s) SubCutaneous every 6 hours  levothyroxine 75 MICROGram(s) Oral daily  metroNIDAZOLE  IVPB 500 milliGRAM(s) IV Intermittent every 8 hours  pantoprazole  Injectable 40 milliGRAM(s) IV Push daily  polyethylene glycol 3350 17 Gram(s) Oral daily  senna 2 Tablet(s) Oral at bedtime  vancomycin  IVPB 1000 milliGRAM(s) IV Intermittent every 12 hours    MEDICATIONS  (PRN):  acetaminophen     Tablet .. 650 milliGRAM(s) Oral every 6 hours PRN Temp greater or equal to 38C (100.4F), Mild Pain (1 - 3)  ondansetron Injectable 4 milliGRAM(s) IV Push every 6 hours PRN Nausea and/or Vomiting  oxyCODONE    IR 5 milliGRAM(s) Oral every 4 hours PRN Moderate Pain (4 - 6)  sodium chloride 0.65% Nasal 1 Spray(s) Both Nostrils three times a day PRN Nasal Congestion      Allergies    No Known Drug Allergies  S/P TRANSSPHENOIDAL SURGERY. SINONASAL PRECAUTIONS! NO NASAL SWABS OR NG TUBES. (Unknown)    Intolerances        ROS: All other systems reviewed and negative    PHYSICAL EXAM:  VITALS: T(C): 36.9 (03-16-23 @ 07:00)  T(F): 98.4 (03-16-23 @ 07:00), Max: 100.4 (03-16-23 @ 03:00)  HR: 86 (03-16-23 @ 09:00) (54 - 86)  BP: 127/68 (03-16-23 @ 09:00) (97/54 - 136/57)  RR:  (15 - 24)  SpO2:  (93% - 100%)  Wt(kg): --  GENERAL: tired appearing, lying in bed  EYES: No proptosis  HEENT:  Atraumatic, Normocephalic, dry mucous membranes  RESPIRATORY: Nonlabored respirations on room air, normal rate/effort   CARDIOVASCULAR: Did not appear cyanotic, bradycardic,  no lower extremity swelling visualized  GI: Soft, nontender, non distended  NEURO: sleeping, not answering questions, minimal movement of extremities    POCT Blood Glucose.: 107 mg/dL (03-16-23 @ 05:04)  POCT Blood Glucose.: 111 mg/dL (03-16-23 @ 03:10)  POCT Blood Glucose.: 127 mg/dL (03-16-23 @ 02:02)  POCT Blood Glucose.: 112 mg/dL (03-16-23 @ 01:13)  POCT Blood Glucose.: 107 mg/dL (03-15-23 @ 23:59)  POCT Blood Glucose.: 124 mg/dL (03-15-23 @ 23:08)  POCT Blood Glucose.: 108 mg/dL (03-15-23 @ 22:05)  POCT Blood Glucose.: 123 mg/dL (03-15-23 @ 21:08)  POCT Blood Glucose.: 111 mg/dL (03-15-23 @ 20:03)  POCT Blood Glucose.: 111 mg/dL (03-15-23 @ 18:53)  POCT Blood Glucose.: 129 mg/dL (03-15-23 @ 18:00)  POCT Blood Glucose.: 169 mg/dL (03-15-23 @ 16:59)  POCT Blood Glucose.: 169 mg/dL (03-15-23 @ 16:05)  POCT Blood Glucose.: 215 mg/dL (03-15-23 @ 14:58)  POCT Blood Glucose.: 287 mg/dL (03-15-23 @ 13:54)  POCT Blood Glucose.: 265 mg/dL (03-15-23 @ 13:02)  POCT Blood Glucose.: 298 mg/dL (03-15-23 @ 12:14)  POCT Blood Glucose.: 104 mg/dL (03-14-23 @ 16:25)  POCT Blood Glucose.: 133 mg/dL (03-14-23 @ 11:37)  POCT Blood Glucose.: 119 mg/dL (03-14-23 @ 07:46)  POCT Blood Glucose.: 139 mg/dL (03-13-23 @ 21:57)  POCT Blood Glucose.: 114 mg/dL (03-13-23 @ 16:31)  POCT Blood Glucose.: 153 mg/dL (03-13-23 @ 12:28)      03-16    160<HH>  |  127<H>  |  8   ----------------------------<  130<H>  3.6   |  23  |  0.70    eGFR: 103    Ca    9.5      03-16  Mg     2.4     03-15  Phos  2.0     03-15        A1C with Estimated Average Glucose Result: 8.0 % (03-13-23 @ 06:23)      Thyroid Stimulating Hormone, Serum: 4.43 uIU/mL (03-16-23 @ 04:02)  Thyroid Stimulating Hormone, Serum: 3.46 uIU/mL (03-15-23 @ 09:09)  Thyroid Stimulating Hormone, Serum: 8.85 uIU/mL (03-11-23 @ 05:39)

## 2023-03-17 DIAGNOSIS — E23.2 DIABETES INSIPIDUS: ICD-10-CM

## 2023-03-17 DIAGNOSIS — D44.4 NEOPLASM OF UNCERTAIN BEHAVIOR OF CRANIOPHARYNGEAL DUCT: ICD-10-CM

## 2023-03-17 LAB
ANION GAP SERPL CALC-SCNC: 10 MMOL/L — SIGNIFICANT CHANGE UP (ref 5–17)
ANION GAP SERPL CALC-SCNC: 11 MMOL/L — SIGNIFICANT CHANGE UP (ref 5–17)
ANION GAP SERPL CALC-SCNC: 7 MMOL/L — SIGNIFICANT CHANGE UP (ref 5–17)
ANION GAP SERPL CALC-SCNC: 8 MMOL/L — SIGNIFICANT CHANGE UP (ref 5–17)
ANION GAP SERPL CALC-SCNC: 9 MMOL/L — SIGNIFICANT CHANGE UP (ref 5–17)
ANION GAP SERPL CALC-SCNC: 9 MMOL/L — SIGNIFICANT CHANGE UP (ref 5–17)
BUN SERPL-MCNC: 10 MG/DL — SIGNIFICANT CHANGE UP (ref 7–23)
BUN SERPL-MCNC: 11 MG/DL — SIGNIFICANT CHANGE UP (ref 7–23)
BUN SERPL-MCNC: 9 MG/DL — SIGNIFICANT CHANGE UP (ref 7–23)
CALCIUM SERPL-MCNC: 8.2 MG/DL — LOW (ref 8.4–10.5)
CALCIUM SERPL-MCNC: 8.2 MG/DL — LOW (ref 8.4–10.5)
CALCIUM SERPL-MCNC: 8.3 MG/DL — LOW (ref 8.4–10.5)
CALCIUM SERPL-MCNC: 8.4 MG/DL — SIGNIFICANT CHANGE UP (ref 8.4–10.5)
CALCIUM SERPL-MCNC: 8.5 MG/DL — SIGNIFICANT CHANGE UP (ref 8.4–10.5)
CALCIUM SERPL-MCNC: 8.7 MG/DL — SIGNIFICANT CHANGE UP (ref 8.4–10.5)
CALCIUM SERPL-MCNC: 8.9 MG/DL — SIGNIFICANT CHANGE UP (ref 8.4–10.5)
CALCIUM SERPL-MCNC: 9 MG/DL — SIGNIFICANT CHANGE UP (ref 8.4–10.5)
CHLORIDE SERPL-SCNC: 119 MMOL/L — HIGH (ref 96–108)
CHLORIDE SERPL-SCNC: 120 MMOL/L — HIGH (ref 96–108)
CHLORIDE SERPL-SCNC: 121 MMOL/L — HIGH (ref 96–108)
CHLORIDE SERPL-SCNC: 122 MMOL/L — HIGH (ref 96–108)
CHLORIDE SERPL-SCNC: 122 MMOL/L — HIGH (ref 96–108)
CHLORIDE SERPL-SCNC: 124 MMOL/L — HIGH (ref 96–108)
CHLORIDE SERPL-SCNC: 125 MMOL/L — HIGH (ref 96–108)
CHLORIDE SERPL-SCNC: 125 MMOL/L — HIGH (ref 96–108)
CHLORIDE SERPL-SCNC: 127 MMOL/L — HIGH (ref 96–108)
CHLORIDE SERPL-SCNC: 129 MMOL/L — HIGH (ref 96–108)
CO2 SERPL-SCNC: 23 MMOL/L — SIGNIFICANT CHANGE UP (ref 22–31)
CO2 SERPL-SCNC: 23 MMOL/L — SIGNIFICANT CHANGE UP (ref 22–31)
CO2 SERPL-SCNC: 24 MMOL/L — SIGNIFICANT CHANGE UP (ref 22–31)
CO2 SERPL-SCNC: 25 MMOL/L — SIGNIFICANT CHANGE UP (ref 22–31)
CO2 SERPL-SCNC: 26 MMOL/L — SIGNIFICANT CHANGE UP (ref 22–31)
CO2 SERPL-SCNC: 26 MMOL/L — SIGNIFICANT CHANGE UP (ref 22–31)
CREAT SERPL-MCNC: 0.49 MG/DL — LOW (ref 0.5–1.3)
CREAT SERPL-MCNC: 0.51 MG/DL — SIGNIFICANT CHANGE UP (ref 0.5–1.3)
CREAT SERPL-MCNC: 0.51 MG/DL — SIGNIFICANT CHANGE UP (ref 0.5–1.3)
CREAT SERPL-MCNC: 0.52 MG/DL — SIGNIFICANT CHANGE UP (ref 0.5–1.3)
CREAT SERPL-MCNC: 0.52 MG/DL — SIGNIFICANT CHANGE UP (ref 0.5–1.3)
CREAT SERPL-MCNC: 0.53 MG/DL — SIGNIFICANT CHANGE UP (ref 0.5–1.3)
CREAT SERPL-MCNC: 0.56 MG/DL — SIGNIFICANT CHANGE UP (ref 0.5–1.3)
CREAT SERPL-MCNC: 0.58 MG/DL — SIGNIFICANT CHANGE UP (ref 0.5–1.3)
CREAT SERPL-MCNC: 0.62 MG/DL — SIGNIFICANT CHANGE UP (ref 0.5–1.3)
CREAT SERPL-MCNC: 0.64 MG/DL — SIGNIFICANT CHANGE UP (ref 0.5–1.3)
EGFR: 106 ML/MIN/1.73M2 — SIGNIFICANT CHANGE UP
EGFR: 106 ML/MIN/1.73M2 — SIGNIFICANT CHANGE UP
EGFR: 108 ML/MIN/1.73M2 — SIGNIFICANT CHANGE UP
EGFR: 109 ML/MIN/1.73M2 — SIGNIFICANT CHANGE UP
EGFR: 111 ML/MIN/1.73M2 — SIGNIFICANT CHANGE UP
EGFR: 112 ML/MIN/1.73M2 — SIGNIFICANT CHANGE UP
EGFR: 112 ML/MIN/1.73M2 — SIGNIFICANT CHANGE UP
EGFR: 113 ML/MIN/1.73M2 — SIGNIFICANT CHANGE UP
GLUCOSE BLDC GLUCOMTR-MCNC: 106 MG/DL — HIGH (ref 70–99)
GLUCOSE BLDC GLUCOMTR-MCNC: 162 MG/DL — HIGH (ref 70–99)
GLUCOSE BLDC GLUCOMTR-MCNC: 165 MG/DL — HIGH (ref 70–99)
GLUCOSE BLDC GLUCOMTR-MCNC: 165 MG/DL — HIGH (ref 70–99)
GLUCOSE BLDC GLUCOMTR-MCNC: 173 MG/DL — HIGH (ref 70–99)
GLUCOSE BLDC GLUCOMTR-MCNC: 175 MG/DL — HIGH (ref 70–99)
GLUCOSE BLDC GLUCOMTR-MCNC: 190 MG/DL — HIGH (ref 70–99)
GLUCOSE BLDC GLUCOMTR-MCNC: 202 MG/DL — HIGH (ref 70–99)
GLUCOSE BLDC GLUCOMTR-MCNC: 206 MG/DL — HIGH (ref 70–99)
GLUCOSE BLDC GLUCOMTR-MCNC: 210 MG/DL — HIGH (ref 70–99)
GLUCOSE BLDC GLUCOMTR-MCNC: 216 MG/DL — HIGH (ref 70–99)
GLUCOSE BLDC GLUCOMTR-MCNC: 219 MG/DL — HIGH (ref 70–99)
GLUCOSE BLDC GLUCOMTR-MCNC: 230 MG/DL — HIGH (ref 70–99)
GLUCOSE BLDC GLUCOMTR-MCNC: 240 MG/DL — HIGH (ref 70–99)
GLUCOSE BLDC GLUCOMTR-MCNC: 241 MG/DL — HIGH (ref 70–99)
GLUCOSE BLDC GLUCOMTR-MCNC: 298 MG/DL — HIGH (ref 70–99)
GLUCOSE BLDC GLUCOMTR-MCNC: 319 MG/DL — HIGH (ref 70–99)
GLUCOSE BLDC GLUCOMTR-MCNC: 329 MG/DL — HIGH (ref 70–99)
GLUCOSE BLDC GLUCOMTR-MCNC: 77 MG/DL — SIGNIFICANT CHANGE UP (ref 70–99)
GLUCOSE BLDC GLUCOMTR-MCNC: 99 MG/DL — SIGNIFICANT CHANGE UP (ref 70–99)
GLUCOSE SERPL-MCNC: 110 MG/DL — HIGH (ref 70–99)
GLUCOSE SERPL-MCNC: 206 MG/DL — HIGH (ref 70–99)
GLUCOSE SERPL-MCNC: 209 MG/DL — HIGH (ref 70–99)
GLUCOSE SERPL-MCNC: 218 MG/DL — HIGH (ref 70–99)
GLUCOSE SERPL-MCNC: 219 MG/DL — HIGH (ref 70–99)
GLUCOSE SERPL-MCNC: 258 MG/DL — HIGH (ref 70–99)
GLUCOSE SERPL-MCNC: 280 MG/DL — HIGH (ref 70–99)
GLUCOSE SERPL-MCNC: 281 MG/DL — HIGH (ref 70–99)
GLUCOSE SERPL-MCNC: 329 MG/DL — HIGH (ref 70–99)
GLUCOSE SERPL-MCNC: 404 MG/DL — HIGH (ref 70–99)
HCT VFR BLD CALC: 28.9 % — LOW (ref 34.5–45)
HGB BLD-MCNC: 9 G/DL — LOW (ref 11.5–15.5)
MAGNESIUM SERPL-MCNC: 1.9 MG/DL — SIGNIFICANT CHANGE UP (ref 1.6–2.6)
MAGNESIUM SERPL-MCNC: 2.2 MG/DL — SIGNIFICANT CHANGE UP (ref 1.6–2.6)
MCHC RBC-ENTMCNC: 28.2 PG — SIGNIFICANT CHANGE UP (ref 27–34)
MCHC RBC-ENTMCNC: 31.1 GM/DL — LOW (ref 32–36)
MCV RBC AUTO: 90.6 FL — SIGNIFICANT CHANGE UP (ref 80–100)
NRBC # BLD: 0 /100 WBCS — SIGNIFICANT CHANGE UP (ref 0–0)
OSMOLALITY SERPL: 316 MOSMOL/KG — HIGH (ref 275–300)
OSMOLALITY SERPL: 326 MOSMOL/KG — HIGH (ref 275–300)
OSMOLALITY SERPL: 333 MOSMOL/KG — HIGH (ref 275–300)
OSMOLALITY SERPL: 352 MOSMOL/KG — HIGH (ref 275–300)
OSMOLALITY UR: 616 MOS/KG — SIGNIFICANT CHANGE UP (ref 300–900)
OSMOLALITY UR: 662 MOS/KG — SIGNIFICANT CHANGE UP (ref 300–900)
OSMOLALITY UR: 712 MOS/KG — SIGNIFICANT CHANGE UP (ref 300–900)
OSMOLALITY UR: 819 MOS/KG — SIGNIFICANT CHANGE UP (ref 300–900)
PHOSPHATE SERPL-MCNC: 2.1 MG/DL — LOW (ref 2.5–4.5)
PHOSPHATE SERPL-MCNC: 3.2 MG/DL — SIGNIFICANT CHANGE UP (ref 2.5–4.5)
PLATELET # BLD AUTO: 200 K/UL — SIGNIFICANT CHANGE UP (ref 150–400)
POTASSIUM SERPL-MCNC: 3.3 MMOL/L — LOW (ref 3.5–5.3)
POTASSIUM SERPL-MCNC: 3.3 MMOL/L — LOW (ref 3.5–5.3)
POTASSIUM SERPL-MCNC: 3.5 MMOL/L — SIGNIFICANT CHANGE UP (ref 3.5–5.3)
POTASSIUM SERPL-MCNC: 3.7 MMOL/L — SIGNIFICANT CHANGE UP (ref 3.5–5.3)
POTASSIUM SERPL-MCNC: 4 MMOL/L — SIGNIFICANT CHANGE UP (ref 3.5–5.3)
POTASSIUM SERPL-MCNC: 4 MMOL/L — SIGNIFICANT CHANGE UP (ref 3.5–5.3)
POTASSIUM SERPL-SCNC: 3.3 MMOL/L — LOW (ref 3.5–5.3)
POTASSIUM SERPL-SCNC: 3.3 MMOL/L — LOW (ref 3.5–5.3)
POTASSIUM SERPL-SCNC: 3.5 MMOL/L — SIGNIFICANT CHANGE UP (ref 3.5–5.3)
POTASSIUM SERPL-SCNC: 3.7 MMOL/L — SIGNIFICANT CHANGE UP (ref 3.5–5.3)
POTASSIUM SERPL-SCNC: 4 MMOL/L — SIGNIFICANT CHANGE UP (ref 3.5–5.3)
POTASSIUM SERPL-SCNC: 4 MMOL/L — SIGNIFICANT CHANGE UP (ref 3.5–5.3)
RBC # BLD: 3.19 M/UL — LOW (ref 3.8–5.2)
RBC # FLD: 14.2 % — SIGNIFICANT CHANGE UP (ref 10.3–14.5)
SODIUM SERPL-SCNC: 150 MMOL/L — HIGH (ref 135–145)
SODIUM SERPL-SCNC: 152 MMOL/L — HIGH (ref 135–145)
SODIUM SERPL-SCNC: 153 MMOL/L — HIGH (ref 135–145)
SODIUM SERPL-SCNC: 154 MMOL/L — HIGH (ref 135–145)
SODIUM SERPL-SCNC: 154 MMOL/L — HIGH (ref 135–145)
SODIUM SERPL-SCNC: 156 MMOL/L — HIGH (ref 135–145)
SODIUM SERPL-SCNC: 158 MMOL/L — HIGH (ref 135–145)
SODIUM SERPL-SCNC: 159 MMOL/L — HIGH (ref 135–145)
SODIUM SERPL-SCNC: 163 MMOL/L — CRITICAL HIGH (ref 135–145)
SODIUM SERPL-SCNC: 164 MMOL/L — CRITICAL HIGH (ref 135–145)
SP GR UR STRIP: 1.02 — SIGNIFICANT CHANGE UP (ref 1.01–1.02)
SP GR UR STRIP: 1.02 — SIGNIFICANT CHANGE UP (ref 1.01–1.02)
SP GR UR STRIP: 1.03 — HIGH (ref 1.01–1.02)
SURGICAL PATHOLOGY STUDY: SIGNIFICANT CHANGE UP
WBC # BLD: 11.81 K/UL — HIGH (ref 3.8–10.5)
WBC # FLD AUTO: 11.81 K/UL — HIGH (ref 3.8–10.5)

## 2023-03-17 PROCEDURE — 93010 ELECTROCARDIOGRAM REPORT: CPT

## 2023-03-17 PROCEDURE — 99291 CRITICAL CARE FIRST HOUR: CPT

## 2023-03-17 PROCEDURE — 95720 EEG PHY/QHP EA INCR W/VEEG: CPT

## 2023-03-17 PROCEDURE — 99232 SBSQ HOSP IP/OBS MODERATE 35: CPT | Mod: GC

## 2023-03-17 PROCEDURE — 70450 CT HEAD/BRAIN W/O DYE: CPT | Mod: 26

## 2023-03-17 PROCEDURE — 71045 X-RAY EXAM CHEST 1 VIEW: CPT | Mod: 26

## 2023-03-17 RX ORDER — POTASSIUM CHLORIDE 20 MEQ
10 PACKET (EA) ORAL
Refills: 0 | Status: COMPLETED | OUTPATIENT
Start: 2023-03-17 | End: 2023-03-17

## 2023-03-17 RX ORDER — PANTOPRAZOLE SODIUM 20 MG/1
40 TABLET, DELAYED RELEASE ORAL DAILY
Refills: 0 | Status: DISCONTINUED | OUTPATIENT
Start: 2023-03-17 | End: 2023-03-24

## 2023-03-17 RX ORDER — DEXTROSE 50 % IN WATER 50 %
15 SYRINGE (ML) INTRAVENOUS ONCE
Refills: 0 | Status: DISCONTINUED | OUTPATIENT
Start: 2023-03-17 | End: 2023-03-19

## 2023-03-17 RX ORDER — HUMAN INSULIN 100 [IU]/ML
8 INJECTION, SUSPENSION SUBCUTANEOUS EVERY 6 HOURS
Refills: 0 | Status: DISCONTINUED | OUTPATIENT
Start: 2023-03-17 | End: 2023-03-18

## 2023-03-17 RX ORDER — ACETAMINOPHEN 500 MG
1000 TABLET ORAL ONCE
Refills: 0 | Status: COMPLETED | OUTPATIENT
Start: 2023-03-17 | End: 2023-03-17

## 2023-03-17 RX ORDER — LEVOTHYROXINE SODIUM 125 MCG
37.5 TABLET ORAL
Refills: 0 | Status: DISCONTINUED | OUTPATIENT
Start: 2023-03-17 | End: 2023-03-17

## 2023-03-17 RX ORDER — LEVOTHYROXINE SODIUM 125 MCG
50 TABLET ORAL
Refills: 0 | Status: DISCONTINUED | OUTPATIENT
Start: 2023-03-17 | End: 2023-03-19

## 2023-03-17 RX ORDER — SODIUM CHLORIDE 9 MG/ML
1000 INJECTION, SOLUTION INTRAVENOUS
Refills: 0 | Status: DISCONTINUED | OUTPATIENT
Start: 2023-03-17 | End: 2023-03-19

## 2023-03-17 RX ORDER — DESMOPRESSIN ACETATE 0.1 MG/1
0.5 TABLET ORAL
Refills: 0 | Status: DISCONTINUED | OUTPATIENT
Start: 2023-03-17 | End: 2023-03-20

## 2023-03-17 RX ORDER — INSULIN LISPRO 100/ML
VIAL (ML) SUBCUTANEOUS EVERY 6 HOURS
Refills: 0 | Status: DISCONTINUED | OUTPATIENT
Start: 2023-03-17 | End: 2023-03-18

## 2023-03-17 RX ORDER — SODIUM CHLORIDE 9 MG/ML
1000 INJECTION, SOLUTION INTRAVENOUS
Refills: 0 | Status: DISCONTINUED | OUTPATIENT
Start: 2023-03-17 | End: 2023-03-18

## 2023-03-17 RX ORDER — TRAMADOL HYDROCHLORIDE 50 MG/1
25 TABLET ORAL EVERY 4 HOURS
Refills: 0 | Status: DISCONTINUED | OUTPATIENT
Start: 2023-03-17 | End: 2023-03-21

## 2023-03-17 RX ORDER — TRAMADOL HYDROCHLORIDE 50 MG/1
50 TABLET ORAL EVERY 4 HOURS
Refills: 0 | Status: DISCONTINUED | OUTPATIENT
Start: 2023-03-17 | End: 2023-03-21

## 2023-03-17 RX ORDER — DEXTROSE 50 % IN WATER 50 %
12.5 SYRINGE (ML) INTRAVENOUS ONCE
Refills: 0 | Status: COMPLETED | OUTPATIENT
Start: 2023-03-17 | End: 2023-03-17

## 2023-03-17 RX ADMIN — Medication 50 MILLIGRAM(S): at 13:12

## 2023-03-17 RX ADMIN — Medication 100 MILLIEQUIVALENT(S): at 10:13

## 2023-03-17 RX ADMIN — Medication 100 MILLIEQUIVALENT(S): at 18:12

## 2023-03-17 RX ADMIN — Medication 100 MILLIEQUIVALENT(S): at 17:09

## 2023-03-17 RX ADMIN — Medication 100 MILLIEQUIVALENT(S): at 02:03

## 2023-03-17 RX ADMIN — HUMAN INSULIN 8 UNIT(S): 100 INJECTION, SUSPENSION SUBCUTANEOUS at 14:43

## 2023-03-17 RX ADMIN — ATORVASTATIN CALCIUM 80 MILLIGRAM(S): 80 TABLET, FILM COATED ORAL at 22:51

## 2023-03-17 RX ADMIN — Medication 12.5 GRAM(S): at 12:08

## 2023-03-17 RX ADMIN — Medication 400 MILLIGRAM(S): at 15:00

## 2023-03-17 RX ADMIN — Medication 50 MILLIGRAM(S): at 06:23

## 2023-03-17 RX ADMIN — SENNA PLUS 2 TABLET(S): 8.6 TABLET ORAL at 22:51

## 2023-03-17 RX ADMIN — Medication 100 MILLIGRAM(S): at 22:50

## 2023-03-17 RX ADMIN — Medication 166.67 MILLIGRAM(S): at 06:23

## 2023-03-17 RX ADMIN — Medication 4: at 23:37

## 2023-03-17 RX ADMIN — Medication 2: at 17:30

## 2023-03-17 RX ADMIN — HUMAN INSULIN 8 UNIT(S): 100 INJECTION, SUSPENSION SUBCUTANEOUS at 17:29

## 2023-03-17 RX ADMIN — Medication 100 MILLIEQUIVALENT(S): at 08:18

## 2023-03-17 RX ADMIN — Medication 100 MILLIGRAM(S): at 06:23

## 2023-03-17 RX ADMIN — Medication 100 MILLIGRAM(S): at 13:13

## 2023-03-17 RX ADMIN — Medication 50 MILLIGRAM(S): at 22:51

## 2023-03-17 RX ADMIN — Medication 100 MILLIEQUIVALENT(S): at 09:10

## 2023-03-17 RX ADMIN — DESMOPRESSIN ACETATE 0.5 MICROGRAM(S): 0.1 TABLET ORAL at 17:28

## 2023-03-17 RX ADMIN — CHLORHEXIDINE GLUCONATE 1 APPLICATION(S): 213 SOLUTION TOPICAL at 11:13

## 2023-03-17 RX ADMIN — Medication 50 MICROGRAM(S): at 17:29

## 2023-03-17 RX ADMIN — POTASSIUM PHOSPHATE, MONOBASIC POTASSIUM PHOSPHATE, DIBASIC 83.33 MILLIMOLE(S): 236; 224 INJECTION, SOLUTION INTRAVENOUS at 00:54

## 2023-03-17 RX ADMIN — INSULIN HUMAN 5 UNIT(S)/HR: 100 INJECTION, SOLUTION SUBCUTANEOUS at 09:10

## 2023-03-17 RX ADMIN — Medication 1000 MILLIGRAM(S): at 15:15

## 2023-03-17 RX ADMIN — Medication 100 MILLIEQUIVALENT(S): at 00:55

## 2023-03-17 RX ADMIN — Medication 100 MILLIEQUIVALENT(S): at 15:55

## 2023-03-17 RX ADMIN — Medication 166.67 MILLIGRAM(S): at 17:09

## 2023-03-17 RX ADMIN — CEFTRIAXONE 100 MILLIGRAM(S): 500 INJECTION, POWDER, FOR SOLUTION INTRAMUSCULAR; INTRAVENOUS at 22:51

## 2023-03-17 RX ADMIN — VASOPRESSIN 6 UNIT(S)/MIN: 20 INJECTION INTRAVENOUS at 10:13

## 2023-03-17 RX ADMIN — SODIUM CHLORIDE 75 MILLILITER(S): 9 INJECTION, SOLUTION INTRAVENOUS at 11:13

## 2023-03-17 RX ADMIN — SODIUM CHLORIDE 150 MILLILITER(S): 9 INJECTION, SOLUTION INTRAVENOUS at 02:03

## 2023-03-17 RX ADMIN — PANTOPRAZOLE SODIUM 40 MILLIGRAM(S): 20 TABLET, DELAYED RELEASE ORAL at 11:13

## 2023-03-17 RX ADMIN — SODIUM CHLORIDE 75 MILLILITER(S): 9 INJECTION, SOLUTION INTRAVENOUS at 19:25

## 2023-03-17 RX ADMIN — HUMAN INSULIN 8 UNIT(S): 100 INJECTION, SUSPENSION SUBCUTANEOUS at 23:38

## 2023-03-17 NOTE — PROGRESS NOTE ADULT - ASSESSMENT
52 yr old female with a history of hypertension, hyperlipidemia, dm type 2, no surgeries ever presents with a month of headaches and 3 weeks of blurred vision. Endocrinology consulted for evaluation of sellar mass.    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 Too low to be prolactinoma given size of the sellar mass. Suspect stalk effect. (prolactin diluted 103, undiluted 107)  -Secondary hypogonadism can be addressed as outpatient. (Lh 2.6, estradiol <5)  -3/12 AM ayush 5.9, ACTH 21.5, 3/13 am cortisol (6am) 6.9  - cosyntropin stim test: 3/14 AM cortisol at 8:15am 5.1, cosyntropin at 8:15, 8:50am cortisol 16.9, 9:25am 20.7  - s/p tsp 3/15  - IGF1 3/15 46  PLAN  - f/u IGF-1  - f/u neurosurgery recommendations  - f/u ophthalmology recommendations (formal consult if not done) given visual deficits  - continue wean to hydrocortisone 50mg q12h on 3/17 if hemodynamically stable    DI/SIADH Watch POST OP  - Please eval for signs of DI vs SIADH postop  - Na increased to >170 on 3/16 pm in [place] DI with low urine osm and spec grav placed on vasopressin gtt  - Continue q2h BMP per primary team on vasopressin gt  - Strict I/Os, daily weights. if UO >250cc/hour, please check BMP and urine osm, serum osm.  If consistent with DI, patient may require DDAVP.   - May require DDAVP IVP x1 if meets any of these criteria: Na > 145, UO > 250cc/hr, Urine osm < 100 or urine specific gravity < 1.005.  - Signs and symptoms of DI and SIADH post op:  may occur in the 2-3 weeks following surgery.  Patient should check daily weights and if they experience weight gain of 2-3 pounds to call her endocrinologist.  Signs of DI include increased thirst with high urine output.    Primary Hypothyroidism   - Patient with elevated TSH (8)and Low Free T4 (0.5)  PLAN  - increase to levothyroxine 50 mcg IV daily (closer to 75 mcg PO)    Steroid hyperglycemia  T2DM with hyperglycemia  - HbA1c: 8  - Home Regimen: metformin 1000mg bid  - Endocrinologist: does not have  - patient with hyperglycemia on stress dose steroids  PLAN  - continue insulin gtt   - Goal -180  Discharge plan:  - Discharge medications: metformin 1000mg bid + GLP-1 agonist  - Patient to call doctor with persistent high or low BG at home.   - Ensure patient has glucometer, test strips and lancets on discharge.  - Recommend routine outpatient ophthalmology, podiatry and endocrinology f/u    HTN  - Home regimen: lisinopril 2.5mg daily  PLAN  - Can check urine microalbumin outpatient  - Outpatient goal BP <130/80. Management per primary team.    HLD  - Home regimen: atorvastatin 80mg daily  -   PLAN  - resume statin when able    Will schedule an appointment for the patient to follow up.  [address] [place] [address]  Phone Number: [phone]    Discussed with primary team.     Thank you for the interesting consult.    Denny Bishop MD, Endocrinology Fellow  Pager [phone] from 9am to 5pm. After hours and on weekends, please call [phone]. 52 yr old female with a history of hypertension, hyperlipidemia, dm type 2, no surgeries ever presents with a month of headaches and 3 weeks of blurred vision. Endocrinology consulted for evaluation of sellar mass.    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 Too low to be prolactinoma given size of the sellar mass. Suspect stalk effect. (prolactin diluted 103, undiluted 107)  -Secondary hypogonadism can be addressed as outpatient. (Lh 2.6, estradiol <5)  -3/12 AM ayush 5.9, ACTH 21.5, 3/13 am cortisol (6am) 6.9  - cosyntropin stim test: 3/14 AM cortisol at 8:15am 5.1, cosyntropin at 8:15, 8:50am cortisol 16.9, 9:25am 20.7  - s/p tsp 3/15  - IGF1 3/15 46  PLAN  - f/u neurosurgery and ophthalmology recommendations  - continue wean to hydrocortisone 50mg q12h on 3/17 if hemodynamically stable    DI/SIADH Watch POST OP  - Please eval for signs of DI vs SIADH postop  - Na increased to >170 on 3/16 pm in [place] DI with low urine osm and spec grav placed on vasopressin gtt  - Continue q2h BMP per primary team on vasopressin gt  - Strict I/Os, daily weights. if UO >250cc/hour, please check BMP and urine osm, serum osm.  If consistent with DI, patient may require DDAVP.   - May require DDAVP IVP x1 if meets any of these criteria: Na > 145, UO > 250cc/hr, Urine osm < 100 or urine specific gravity < 1.005.  - Signs and symptoms of DI and SIADH post op:  may occur in the 2-3 weeks following surgery.  Patient should check daily weights and if they experience weight gain of 2-3 pounds to call her endocrinologist.  Signs of DI include increased thirst with high urine output.    Primary Hypothyroidism   - Patient with elevated TSH (8)and Low Free T4 (0.5)  PLAN  - increase to levothyroxine 50 mcg IV daily (closer to 75 mcg PO)    Steroid hyperglycemia  T2DM with hyperglycemia  - HbA1c: 8  - Home Regimen: metformin 1000mg bid  - Endocrinologist: does not have  - patient with hyperglycemia on stress dose steroids  PLAN  - continue insulin gtt   - Goal -180  Discharge plan:  - Discharge medications: metformin 1000mg bid + GLP-1 agonist  - Patient to call doctor with persistent high or low BG at home.   - Ensure patient has glucometer, test strips and lancets on discharge.  - Recommend routine outpatient ophthalmology, podiatry and endocrinology f/u    HTN  - Home regimen: lisinopril 2.5mg daily  PLAN  - Can check urine microalbumin outpatient  - Outpatient goal BP <130/80. Management per primary team.    HLD  - Home regimen: atorvastatin 80mg daily  -   PLAN  - resume statin when able    Will schedule an appointment for the patient to follow up.  [address] [place] [address]  Phone Number: [phone]    Discussed with primary team.     Thank you for the interesting consult.    Denny Bishop MD, Endocrinology Fellow  Pager [phone] from 9am to 5pm. After hours and on weekends, please call [phone]. 52 yr old female with a history of hypertension, hyperlipidemia, dm type 2, no surgeries ever presents with a month of headaches and 3 weeks of blurred vision. Endocrinology consulted for evaluation of sellar mass.    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 Too low to be prolactinoma given size of the sellar mass. Suspect stalk effect. (prolactin diluted 103, undiluted 107)  -Secondary hypogonadism can be addressed as outpatient. (Lh 2.6, estradiol <5)  -3/12 AM ayush 5.9, ACTH 21.5, 3/13 am cortisol (6am) 6.9  - cosyntropin stim test: 3/14 AM cortisol at 8:15am 5.1, cosyntropin at 8:15, 8:50am cortisol 16.9, 9:25am 20.7  - s/p tsp 3/15  - IGF1 3/15 46  PLAN  - f/u neurosurgery and ophthalmology recommendations  - continue hydrocortisone 50mg q8h    DI/SIADH Watch POST OP  - Please eval for signs of DI vs SIADH postop  - now off vasopressin gtt, monitor on DDAVP 0.5 mcg IV bid  - q6h BMP  - Strict I/Os, daily weights. if UO >250cc/hour, please check BMP and urine osm, serum osm.  If consistent with DI, patient may require DDAVP.   - May require DDAVP IVP x1 if meets any of these criteria: Na > 145, UO > 250cc/hr, Urine osm < 100 or urine specific gravity < 1.005.  - Signs and symptoms of DI and SIADH post op:  may occur in the 2-3 weeks following surgery.  Patient should check daily weights and if they experience weight gain of 2-3 pounds to call her endocrinologist.  Signs of DI include increased thirst with high urine output.    Primary Hypothyroidism   - Patient with elevated TSH (8)and Low Free T4 (0.5)  PLAN  - increase to levothyroxine 50 mcg IV daily (closer to 75 mcg PO)    Steroid hyperglycemia  T2DM with hyperglycemia  - HbA1c: 8  - Home Regimen: metformin 1000mg bid  - Endocrinologist: does not have  - patient with hyperglycemia on stress dose steroids  PLAN  - monitor on NPH 8q6h  - Goal -180  Discharge plan:  - Discharge medications: metformin 1000mg bid + GLP-1 agonist  - Patient to call doctor with persistent high or low BG at home.   - Ensure patient has glucometer, test strips and lancets on discharge.  - Recommend routine outpatient ophthalmology, podiatry and endocrinology f/u    HTN  - Home regimen: lisinopril 2.5mg daily  PLAN  - Can check urine microalbumin outpatient  - Outpatient goal BP <130/80. Management per primary team.    HLD  - Home regimen: atorvastatin 80mg daily  -   PLAN  - resume statin when able    Will schedule an appointment for the patient to follow up.  [address] [place] [address]  Phone Number: [phone]    Discussed with primary team.     Thank you for the interesting consult.    Denny Bishop MD, Endocrinology Fellow  Pager [phone] from 9am to 5pm. After hours and on weekends, please call [phone]. 52 yr old female with a history of hypertension, hyperlipidemia, dm type 2, no surgeries ever presents with a month of headaches and 3 weeks of blurred vision. Endocrinology consulted for evaluation of sellar mass.    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 Too low to be prolactinoma given size of the sellar mass. Suspect stalk effect. (prolactin diluted 103, undiluted 107)  -Secondary hypogonadism can be addressed as outpatient. (Lh 2.6, estradiol <5)  -3/12 AM ayush 5.9, ACTH 21.5, 3/13 am cortisol (6am) 6.9  - cosyntropin stim test: 3/14 AM cortisol at 8:15am 5.1, cosyntropin at 8:15, 8:50am cortisol 16.9, 9:25am 20.7  - s/p tsp 3/15  - IGF1 3/15 46  PLAN  - f/u neurosurgery and ophthalmology recommendations  - continue hydrocortisone 50mg q8h    DI/SIADH Watch POST OP  - Please eval for signs of DI vs SIADH postop  - now off vasopressin gtt, monitor on DDAVP 0.5 mcg IV bid  - q6h BMP  - Strict I/Os, daily weights. if UO >250cc/hour, please check BMP and urine osm, serum osm.  If consistent with DI, patient may require DDAVP.   - May require DDAVP IVP x1 if meets any of these criteria: Na > 145, UO > 250cc/hr, Urine osm < 100 or urine specific gravity < 1.005.  - Signs and symptoms of DI and SIADH post op:  may occur in the 2-3 weeks following surgery.  Patient should check daily weights and if they experience weight gain of 2-3 pounds to call her endocrinologist.  Signs of DI include increased thirst with high urine output.    Primary Hypothyroidism   - Patient with elevated TSH (8)and Low Free T4 (0.5)  PLAN  - increase to levothyroxine 50 mcg IV daily (closer to 75 mcg PO)    Steroid hyperglycemia  T2DM with hyperglycemia  - HbA1c: 8  - Home Regimen: metformin 1000mg bid  - Endocrinologist: does not have  - patient with hyperglycemia on stress dose steroids  PLAN  - monitor on NPH 8q6h  - moderate admelog correction scale q6h  - change IV fluids to non-dextrose containing if able  - Goal -180  Discharge plan:  - Discharge medications: metformin 1000mg bid + GLP-1 agonist  - Patient to call doctor with persistent high or low BG at home.   - Ensure patient has glucometer, test strips and lancets on discharge.  - Recommend routine outpatient ophthalmology, podiatry and endocrinology f/u    HTN  - Home regimen: lisinopril 2.5mg daily  PLAN  - Can check urine microalbumin outpatient  - Outpatient goal BP <130/80. Management per primary team.    HLD  - Home regimen: atorvastatin 80mg daily  -   PLAN  - resume statin when able    Will schedule an appointment for the patient to follow up.  [address] [place] [address]  Phone Number: [phone]    Discussed with primary team.     Thank you for the interesting consult.    Denny Bishop MD, Endocrinology Fellow  Pager [phone] from 9am to 5pm. After hours and on weekends, please call [phone]. 52 yr old female with a history of hypertension, hyperlipidemia, dm type 2, no surgeries ever presents with a month of headaches and 3 weeks of blurred vision. Endocrinology consulted for evaluation of sellar mass.    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 Too low to be prolactinoma given size of the sellar mass. Suspect stalk effect. (prolactin diluted 103, undiluted 107)  -Secondary hypogonadism can be addressed as outpatient. (Lh 2.6, estradiol <5)  -3/12 AM ayush 5.9, ACTH 21.5, 3/13 am cortisol (6am) 6.9  - cosyntropin stim test: 3/14 AM cortisol at 8:15am 5.1, cosyntropin at 8:15, 8:50am cortisol 16.9, 9:25am 20.7  - s/p tsp 3/15  - IGF1 3/15 46  PLAN  - f/u neurosurgery and ophthalmology recommendations  - continue hydrocortisone 50mg q8h    DI/SIADH Watch POST OP  - Please eval for signs of DI vs SIADH postop  - now off vasopressin gtt, monitor on DDAVP 0.5 mcg IV bid, D5 at 75 cc/hr  - q6h BMP  - Strict I/Os, daily weights. if UO >250cc/hour, please check BMP and urine osm, serum osm.  If consistent with DI, patient may require DDAVP.   - May require DDAVP IVP x1 if meets any of these criteria: Na > 145, UO > 250cc/hr, Urine osm < 100 or urine specific gravity < 1.005.  - Signs and symptoms of DI and SIADH post op:  may occur in the 2-3 weeks following surgery.  Patient should check daily weights and if they experience weight gain of 2-3 pounds to call her endocrinologist.  Signs of DI include increased thirst with high urine output.    Primary Hypothyroidism   - Patient with elevated TSH (8)and Low Free T4 (0.5)  PLAN  - increase to levothyroxine 50 mcg IV daily (closer to 75 mcg PO)    Steroid hyperglycemia  T2DM with hyperglycemia  - HbA1c: 8  - Home Regimen: metformin 1000mg bid  - Endocrinologist: does not have  - patient with hyperglycemia on stress dose steroids  - on D5  PLAN  - monitor on NPH 8q6h  - moderate admelog correction scale q6h  - Goal -180  Discharge plan:  - Discharge medications: metformin 1000mg bid + GLP-1 agonist  - Patient to call doctor with persistent high or low BG at home.   - Ensure patient has glucometer, test strips and lancets on discharge.  - Recommend routine outpatient ophthalmology, podiatry and endocrinology f/u    HTN  - Home regimen: lisinopril 2.5mg daily  PLAN  - Can check urine microalbumin outpatient  - Outpatient goal BP <130/80. Management per primary team.    HLD  - Home regimen: atorvastatin 80mg daily  -   PLAN  - resume statin when able    Will schedule an appointment for the patient to follow up.  [address] [place] [address]  Phone Number: [phone]    Discussed with primary team.     Thank you for the interesting consult.    Denny Bishop MD, Endocrinology Fellow  Pager [phone] from 9am to 5pm. After hours and on weekends, please call [phone].

## 2023-03-17 NOTE — PROCEDURE NOTE - NSPROCDETAILS_GEN_ALL_CORE
location identified, draped/prepped, sterile technique used, needle inserted/introduced/positive blood return obtained via catheter/connected to a pressurized flush line

## 2023-03-17 NOTE — PROGRESS NOTE ADULT - SUBJECTIVE AND OBJECTIVE BOX
SUMMARY: 51yo woman transferred from [facility] on 3/10 for suprasellar mass found on MRI for opthalmology work up--etta HA and 3wk of blurry vision. PMH HTN, DM2, HLD.     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     Overnight EVENTS: 3/16- Patient was given 0.5 mg of DDAVP . Still waking up from the surgery.     3/17- Na more stable, mental status same. Urine output not copious. Not a DI picture.    REVIEW OF SYSTEMS: [] Unable to Assess due to neurologic exam   [ x] All ROS addressed below are non-contributory, except:  Neuro: [ x] Headache [ ] Back pain [ ] Numbness [ ] Weakness [ ] Ataxia [ ] Dizziness [ ] Aphasia [ ] Dysarthria [ x] Visual disturbance  Resp: [ ] Shortness of breath/dyspnea [ ] Orthopnea [ ] Cough  CV: [ ] Chest pain [ ] Palpitation [ ] Lightheadedness [ ] Syncope  Renal: [ ] Thirst [ ] Edema  GI: [ ] Nausea [ ] Emesis [ ] Abdominal pain [ ] Constipation [ ] Diarrhea  Hem: [ ] Hematemesis [ ] bBright red blood per rectum  ID: [ ] Fever [ ] Chills [ ] Dysuria  ENT: [ ] Rhinorrhea    VITALS: [x] Reviewed    IMAGING/DATA: [x] Reviewed    IVF FLUIDS/MEDICATIONS: [x] Reviewed    ALLERGIES: Allergies    No Known Allergies    Intolerances      DEVICES:   [] Restraints [x] PIVs [] ET tube [] central line [] PICC [x] arterial line [x] humphries [] NGT/OGT [] EVD [] LD [] TIM/HMV [] LiCOX [] ICP monitor [] Trach [] PEG [] Chest Tube [] other:    EXAMINATION:  General: No acute distress  HEENT: Anicteric sclerae  Cardiac: [id number]  Lungs: Clear  Abdomen: Soft, non-tender, +BS  Extremities: No c/c/e  Skin/Incision Site: Clean, dry and intact  Neurologic: Drowsy, grimacing to pain, responding to her name, however, not answering. Moving all extremities spontaneously. Pupils 3 mm Bilaterally reactive.  SUMMARY: 51yo woman transferred from [facility] on 3/10 for suprasellar mass found on MRI for opthalmology work up--etta HA and 3wk of blurry vision. PMH HTN, DM2, HLD.     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     Overnight EVENTS: 3/16- Patient was given 0.5 mg of DDAVP . Still waking up from the surgery.     3/17- Na more stable, mental status improved. ON ddavp drip. D5 running.     REVIEW OF SYSTEMS: [] Unable to Assess due to neurologic exam   [ x] All ROS addressed below are non-contributory, except:  Neuro: [ x] Headache [ ] Back pain [ ] Numbness [ ] Weakness [ ] Ataxia [ ] Dizziness [ ] Aphasia [ ] Dysarthria [ x] Visual disturbance  Resp: [ ] Shortness of breath/dyspnea [ ] Orthopnea [ ] Cough  CV: [ ] Chest pain [ ] Palpitation [ ] Lightheadedness [ ] Syncope  Renal: [ ] Thirst [ ] Edema  GI: [ ] Nausea [ ] Emesis [ ] Abdominal pain [ ] Constipation [ ] Diarrhea  Hem: [ ] Hematemesis [ ] bBright red blood per rectum  ID: [ ] Fever [ ] Chills [ ] Dysuria  ENT: [ ] Rhinorrhea    VITALS: [x] Reviewed    IMAGING/DATA: [x] Reviewed    IVF FLUIDS/MEDICATIONS: [x] Reviewed    ALLERGIES: Allergies    No Known Allergies    Intolerances      DEVICES:   [] Restraints [x] PIVs [] ET tube [] central line [] PICC [x] arterial line [x] humphries [] NGT/OGT [] EVD [] LD [] TIM/HMV [] LiCOX [] ICP monitor [] Trach [] PEG [] Chest Tube [] other:    EXAMINATION:  General: No acute distress  HEENT: Anicteric sclerae  Cardiac: [id number]  Lungs: Clear  Abdomen: Soft, non-tender, +BS  Extremities: No c/c/e  Skin/Incision Site: Clean, dry and intact  Neurologic: Drowsy, grimacing to pain, responding to her name, following commands. Moving all extremities spontaneously. Pupils 3 mm Bilaterally reactive.

## 2023-03-17 NOTE — PROGRESS NOTE ADULT - ASSESSMENT
-Q1-2h bmp for DI, on vasopressin gtt, insulin gtt, D5W  -K+ repletion as necessary  -vEEG for LUE focal weakness during day  -LD currently clamped  -Repeat CTH  in Am to follow pneumocephalus  3/16 Pancx-p

## 2023-03-17 NOTE — PROGRESS NOTE ADULT - ASSESSMENT
ASSESSMENT: TSP resection of pituitary adenoma, POD3    NEURO:  Neurochecks Q2  CTh with pnemo, keep flat, face tent at [id number], LD to 5cc/hour  MRI post-op patient did not tolerate it, was agitated, aborted  Pain control  DI watch   Activity: [x] OOB as tolerated [] Bedrest [x] PT [x] OT [] PMNR    PULM:  RA, face tent on  Pituitary precautions (no incentive spirometry, no straws, no BIPAP)    CV:  -160mmHg    RENAL: On D5 at 150 cc/hour  Pena for strict I+Os  IVF until good PO intake  Drink to thirst    GI:  Diet: ADAT  GI prophylaxis [] not indicated [x] PPI [] other:  Bowel regimen [] colace [x] senna [x] other: miralax     ENDO:   Hypernatremia, hyperglycemia   Goal euglycemia (-180)  - cosyntropin stim test: 3/14 AM cortisol at 8:15am 5.1, cosyntropin at 8:15, 8:50am cortisol 16.9, 9:25am 20.7  hydrocortisone 50mg IV Q8, will follow up with endo to wean off  cont synthroid  endo following   insulin gtt   Pituitary labs as needed  Patient was started on vasopressin gtt last night, rate increased overnight, Na improved.   Monitor for DI    HEME/ONC:  VTE prophylaxis: [x] SCDs [x] chemoprophylaxis [] hold chemoprophylaxis due to:  [x] high risk of DVT/PE on admission due to: tumor     ID:  vanc/ceftriaxone/flagyl per ENT for potential CSF leak/hx sinusitis   ENT following    MISC:    SOCIAL/FAMILY:  [x] awaiting [] updated at bedside [] family meeting    CODE STATUS:  [x] Full Code [] DNR [] DNI [] Palliative/Comfort Care    DISPOSITION:  [x] ICU [] Stroke Unit [] Floor [] EMU [] RCU [] PCU    [x] Patient is at high risk of neurologic deterioration/death due to: post op hemorrhage, DI, pan hypopituitarism    ASSESSMENT: TSP resection of pituitary adenoma, POD3    NEURO:  Neurochecks Q1  CTh with pnemo, keep flat, face tent at [id number], LD clamped  HOB 30  MRI post-op patient did not tolerate it, was agitated, aborted  Pain control  DI watch   Activity: [x] OOB as tolerated [] Bedrest [x] PT [x] OT [] PMNR    PULM:  RA,  Pituitary precautions (no incentive spirometry, no straws, no BIPAP)    CV:  -160mmHg    RENAL: On D5 at 75 cc/hour  Pena for strict I+Os  IVF until good PO intake  Drink to thirst    GI:  Diet: bedside dysphagia   GI prophylaxis [] not indicated [x] PPI [] other:  Bowel regimen [] colace [x] senna [x] other: miralax   No BM yet    ENDO:   Hypernatremia, hyperglycemia   Goal euglycemia (-180)  - cosyntropin stim test: 3/14 AM cortisol at 8:15am 5.1, cosyntropin at 8:15, 8:50am cortisol 16.9, 9:25am 20.7  hydrocortisone 50mg IV Q8, will follow up with endo to wean off  cont synthroid  insulin gtt, will continue for now  Pituitary labs as needed  Patient was started on vasopressin gtt last night, rate increased overnight, Na improved.   Will continue the DDAVP gtt until the Na is in the 140's range; Will check Na Q2, will continue D5  Will need standing dose of DDAVP after gtt     HEME/ONC:  H/H stable   VTE prophylaxis: [x] SCDs [x] chemoprophylaxis [] hold chemoprophylaxis due to:  [x] high risk of DVT/PE on admission due to: tumor     ID:  afebrile  vanc/ceftriaxone/flagyl per ENT for potential CSF leak/hx sinusitis   Will follow up about the antibiotics   ENT following    MISC:    SOCIAL/FAMILY:  [x] awaiting [] updated at bedside [] family meeting    CODE STATUS:  [x] Full Code [] DNR [] DNI [] Palliative/Comfort Care    DISPOSITION:  [x] ICU [] Stroke Unit [] Floor [] EMU [] RCU [] PCU    [x] Patient is at high risk of neurologic deterioration/death due to: post op hemorrhage, DI, pan hypopituitarism

## 2023-03-17 NOTE — PROGRESS NOTE ADULT - ASSESSMENT
52y female who is s/p expanded endonasal transphenoidal/transplanum 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 karthikeyan graft, postop lumbar drain splints in place POD#2. Pt doing well. Still having a slight h/a and nasal pain that is relieved with pain meds.     Problem/Plan - 1:  ·  Problem: Sellar or suprasellar mass.   - continue humidified face tent  - nasal saline, 2 sprays to b/l nares 4 times a day.  - monitor for signs of Epistaxis and CSF leak  - avoid nasal trauma, heavy lifting and bending at waist.  - Care a/p neurosurgery.

## 2023-03-17 NOTE — PROGRESS NOTE ADULT - SUBJECTIVE AND OBJECTIVE BOX
ENT ISSUE/POD: s/p TSRP, leak, R fat graft pod 2    SUBJECTIVE: Pt seen and examined at bedside. There were no acute overnight events. She is laying in bed comfortable. The patient states she has a headache and pain in her nose that is relieved with pain meds. Pt denies any bleeding, no leaking, no salty or metallic taste in mouth, no PND.         PAST MEDICAL & SURGICAL HISTORY:  Hypertension      Hyperlipidemia      Diabetes mellitus        Allergies    No Known Drug Allergies  S/P TRANSSPHENOIDAL SURGERY. SINONASAL PRECAUTIONS! NO NASAL SWABS OR NG TUBES. (Unknown)    Intolerances      MEDICATIONS  (STANDING):  amLODIPine   Tablet 10 milliGRAM(s) Oral daily  atorvastatin 80 milliGRAM(s) Oral at bedtime  cefTRIAXone   IVPB 1000 milliGRAM(s) IV Intermittent every 24 hours  chlorhexidine 4% Liquid 1 Application(s) Topical daily  dextrose 5%. 1000 milliLiter(s) (75 mL/Hr) IV Continuous <Continuous>  dextrose 50% Injectable 50 milliLiter(s) IV Push every 15 minutes  glucagon  Injectable 1 milliGRAM(s) IntraMuscular once  hydrocortisone sodium succinate Injectable 50 milliGRAM(s) IV Push every 8 hours  insulin regular Infusion 5 Unit(s)/Hr (5 mL/Hr) IV Continuous <Continuous>  levothyroxine Injectable 37.5 MICROGram(s) IV Push <User Schedule>  metroNIDAZOLE  IVPB 500 milliGRAM(s) IV Intermittent every 8 hours  pantoprazole  Injectable 40 milliGRAM(s) IV Push daily  polyethylene glycol 3350 17 Gram(s) Oral daily  potassium chloride  10 mEq/100 mL IVPB 10 milliEquivalent(s) IV Intermittent every 1 hour  senna 2 Tablet(s) Oral at bedtime  vancomycin  IVPB 1250 milliGRAM(s) IV Intermittent every 12 hours  vasopressin Infusion 0.04 Unit(s)/Min (6 mL/Hr) IV Continuous <Continuous>    MEDICATIONS  (PRN):  acetaminophen     Tablet .. 650 milliGRAM(s) Oral every 6 hours PRN Temp greater or equal to 38C (100.4F), Mild Pain (1 - 3)  ondansetron Injectable 4 milliGRAM(s) IV Push every 6 hours PRN Nausea and/or Vomiting  oxyCODONE    IR 5 milliGRAM(s) Oral every 4 hours PRN Moderate Pain (4 - 6)  sodium chloride 0.65% Nasal 1 Spray(s) Both Nostrils three times a day PRN Nasal Congestion      Social History: see consult    Family history: see consult    ROS:   ENT: all negative except as noted in HPI   Pulm: denies SOB, cough, hemoptysis  Neuro: denies numbness/tingling, loss of sensation  Endo: denies heat/cold intolerance, excessive sweating      Vital Signs Last 24 Hrs  T(C): 37.3 (17 Mar 2023 03:00), Max: 37.3 (16 Mar 2023 23:00)  T(F): 99.2 (17 Mar 2023 03:00), Max: 99.2 (17 Mar 2023 03:00)  HR: 51 (17 Mar 2023 06:45) (45 - 86)  BP: 98/45 (17 Mar 2023 04:15) (94/55 - 159/65)  BP(mean): 61 (17 Mar 2023 04:15) (61 - 102)  RR: 12 (17 Mar 2023 06:45) (12 - 23)  SpO2: 100% (17 Mar 2023 06:45) (94% - 100%)    Parameters below as of 16 Mar 2023 19:15  Patient On (Oxygen Delivery Method): face tent                              11.3   14.10 )-----------( 262      ( 16 Mar 2023 22:15 )             37.1    03-17    159<H>  |  125<H>  |  10  ----------------------------<  258<H>  3.7   |  26  |  0.58    Ca    8.7      17 Mar 2023 05:34  Phos  3.2     03-17  Mg     2.2     03-17       PHYSICAL EXAM:  Gen: NAD  Skin: No rashes, bruises, or lesions  Head: Normocephalic, Atraumatic  Face: no edema, erythema, or fluctuance. Parotid glands soft without mass  Eyes: no scleral injection  Nose: Nares bilaterally patent, no discharge, + dry blood in b/l nares, no active bleed, no CSF leak  Mouth: No Stridor / Drooling / Trismus.  Mucosa moist, tongue/uvula midline, oropharynx clear  Neck: Flat, supple, no lymphadenopathy, trachea midline, no masses  Resp: breathing easily, no stridor  Neuro: facial nerve intact, no facial droop

## 2023-03-17 NOTE — PROGRESS NOTE ADULT - SUBJECTIVE AND OBJECTIVE BOX
Patient seen and examined at bedside.  In DI s/p TSP.     --Anticoagulation--    T(C): 36.2 (03-16-23 @ 19:15), Max: 38 (03-16-23 @ 03:00)  HR: 51 (03-16-23 @ 22:00) (51 - 86)  BP: 121/75 (03-16-23 @ 18:00) (97/54 - 159/65)  RR: 14 (03-16-23 @ 22:00) (13 - 23)  SpO2: 95% (03-16-23 @ 22:00) (94% - 100%)  Wt(kg): --    Exam: Lethargic, AOx1-2, perrl, no gaze, BUE AG w/ drift, BLE briskly wd    .  LABS:                         11.3   14.10 )-----------( 262      ( 16 Mar 2023 22:15 )             37.1     03-16    163<HH>  |  130<H>  |  10  ----------------------------<  379<H>  3.3<L>   |  25  |  0.65    Ca    9.0      16 Mar 2023 23:08  Phos  1.6     03-16  Mg     2.5     03-16                RADIOLOGY, EKG & ADDITIONAL TESTS: Reviewed.

## 2023-03-17 NOTE — PROGRESS NOTE ADULT - SUBJECTIVE AND OBJECTIVE BOX
Interval events:    VITALS:  T(C): , Max: 37.4 (03-17-23 @ 15:00)  HR:  (40 - 78)  BP:  (94/55 - 98/45)  ABP:  (36/32 - 203/200)  RR:  (12 - 20)  SpO2:  (94% - 100%)  Wt(kg): --      03-16-23 @ 07:01  -  03-17-23 @ 07:00  --------------------------------------------------------  IN: 5393.5 mL / OUT: 4050 mL / NET: 1343.5 mL    03-17-23 @ 07:01  -  03-17-23 @ 19:06  --------------------------------------------------------  IN: 1840.3 mL / OUT: 460 mL / NET: 1380.3 mL      LABS:  Na: 150 (03-17 @ 18:17), 153 (03-17 @ 16:19), 154 (03-17 @ 14:06), 154 (03-17 @ 12:30), 158 (03-17 @ 10:24), 156 (03-17 @ 08:09), 159 (03-17 @ 05:34), 163 (03-17 @ 03:10), 164 (03-17 @ 01:01), 163 (03-16 @ 23:08), 164 (03-16 @ 22:15), 170 (03-16 @ 20:29), >170 (03-16 @ 19:45), >170 (03-16 @ 18:32), >170 (03-16 @ 17:23), 168 (03-16 @ 11:17), 160 (03-16 @ 04:02), 157 (03-15 @ 17:25), 155 (03-15 @ 09:09), 146 (03-15 @ 03:31)  K: 4.0 (03-17 @ 18:17), 3.5 (03-17 @ 16:19), 3.7 (03-17 @ 14:06), 4.0 (03-17 @ 12:30), 3.7 (03-17 @ 10:24), 3.3 (03-17 @ 08:09), 3.7 (03-17 @ 05:34), 3.7 (03-17 @ 03:10), 3.3 (03-17 @ 01:01), 3.3 (03-16 @ 23:08), 3.2 (03-16 @ 22:15), 3.9 (03-16 @ 20:29), 4.2 (03-16 @ 19:45), 4.2 (03-16 @ 18:32), 4.3 (03-16 @ 17:23), 3.8 (03-16 @ 11:17), 3.6 (03-16 @ 04:02), 3.9 (03-15 @ 17:25), 3.5 (03-15 @ 09:09), 3.1 (03-15 @ 03:31)  Cl: 119 (03-17 @ 18:17), 122 (03-17 @ 16:19), 122 (03-17 @ 14:06), 121 (03-17 @ 12:30), 125 (03-17 @ 10:24), 124 (03-17 @ 08:09), 125 (03-17 @ 05:34), 127 (03-17 @ 03:10), 129 (03-17 @ 01:01), 130 (03-16 @ 23:08), 128 (03-16 @ 22:15), 134 (03-16 @ 20:29), >135 (03-16 @ 19:45), >135 (03-16 @ 18:32), >135 (03-16 @ 17:23), 131 (03-16 @ 11:17), 127 (03-16 @ 04:02), 125 (03-15 @ 17:25), 120 (03-15 @ 09:09), 112 (03-15 @ 03:31)  CO2: 23 (03-17 @ 18:17), 24 (03-17 @ 16:19), 24 (03-17 @ 14:06), 25 (03-17 @ 12:30), 24 (03-17 @ 10:24), 24 (03-17 @ 08:09), 26 (03-17 @ 05:34), 26 (03-17 @ 03:10), 24 (03-17 @ 01:01), 25 (03-16 @ 23:08), 23 (03-16 @ 22:15), 25 (03-16 @ 20:29), 25 (03-16 @ 19:45), 25 (03-16 @ 18:32), 23 (03-16 @ 17:23), 21 (03-16 @ 11:17), 23 (03-16 @ 04:02), 22 (03-15 @ 17:25), 20 (03-15 @ 09:09), 17 (03-15 @ 03:31)  BUN: 11 (03-17 @ 18:17), 11 (03-17 @ 16:19), 11 (03-17 @ 14:06), 10 (03-17 @ 12:30), 10 (03-17 @ 10:24), 9 (03-17 @ 08:09), 10 (03-17 @ 05:34), 10 (03-17 @ 03:10), 10 (03-17 @ 01:01), 10 (03-16 @ 23:08), 10 (03-16 @ 22:15), 10 (03-16 @ 20:29), 11 (03-16 @ 19:45), 10 (03-16 @ 18:32), 10 (03-16 @ 17:23), 9 (03-16 @ 11:17), 8 (03-16 @ 04:02), 9 (03-15 @ 17:25), 12 (03-15 @ 09:09), 12 (03-15 @ 03:31)  Cr: 0.52 (03-17 @ 18:17), 0.52 (03-17 @ 16:19), 0.51 (03-17 @ 14:06), 0.51 (03-17 @ 12:30), 0.53 (03-17 @ 10:24), 0.56 (03-17 @ 08:09), 0.58 (03-17 @ 05:34), 0.64 (03-17 @ 03:10), 0.62 (03-17 @ 01:01), 0.65 (03-16 @ 23:08), 0.66 (03-16 @ 22:15), 0.70 (03-16 @ 20:29), 0.67 (03-16 @ 19:45), 0.70 (03-16 @ 18:32), 0.69 (03-16 @ 17:23), 0.74 (03-16 @ 11:17), 0.70 (03-16 @ 04:02), 0.68 (03-15 @ 17:25), 0.69 (03-15 @ 09:09), 0.64 (03-15 @ 03:31)  Glu: 281(03-17 @ 18:17), 209(03-17 @ 16:19), 206(03-17 @ 14:06), 280(03-17 @ 12:30), 110(03-17 @ 10:24), 219(03-17 @ 08:09), 258(03-17 @ 05:34), 329(03-17 @ 03:10), 404(03-17 @ 01:01), 379(03-16 @ 23:08), 447(03-16 @ 22:15), 396(03-16 @ 20:29), 336(03-16 @ 19:45), 322(03-16 @ 18:32), 290(03-16 @ 17:23), 169(03-16 @ 11:17), 130(03-16 @ 04:02), 170(03-15 @ 17:25), 373(03-15 @ 09:09), 261(03-15 @ 03:31)    Hgb: 11.3 (03-16 @ 22:15), 11.8 (03-16 @ 04:02), 12.2 (03-15 @ 03:31)  Hct: 37.1 (03-16 @ 22:15), 37.0 (03-16 @ 04:02), 38.4 (03-15 @ 03:31)  WBC: 14.10 (03-16 @ 22:15), 14.98 (03-16 @ 04:02), 16.92 (03-15 @ 03:31)  Plt: 262 (03-16 @ 22:15), 230 (03-16 @ 04:02), 349 (03-15 @ 03:31)    MEDICATIONS:  acetaminophen     Tablet .. 650 milliGRAM(s) Oral every 6 hours PRN  amLODIPine   Tablet 10 milliGRAM(s) Oral daily  atorvastatin 80 milliGRAM(s) Oral at bedtime  cefTRIAXone   IVPB 1000 milliGRAM(s) IV Intermittent every 24 hours  chlorhexidine 4% Liquid 1 Application(s) Topical daily  desmopressin Injectable 0.5 MICROGram(s) IV Push <User Schedule>  dextrose 5%. 1000 milliLiter(s) IV Continuous <Continuous>  dextrose 5%. 1000 milliLiter(s) IV Continuous <Continuous>  dextrose 5%. 1000 milliLiter(s) IV Continuous <Continuous>  dextrose 50% Injectable 50 milliLiter(s) IV Push every 15 minutes  dextrose Oral Gel 15 Gram(s) Oral once PRN  glucagon  Injectable 1 milliGRAM(s) IntraMuscular once  hydrocortisone sodium succinate Injectable 50 milliGRAM(s) IV Push every 8 hours  insulin lispro (ADMELOG) corrective regimen sliding scale   SubCutaneous every 6 hours  insulin NPH human recombinant 8 Unit(s) SubCutaneous every 6 hours  insulin regular Infusion 5 Unit(s)/Hr IV Continuous <Continuous>  levothyroxine Injectable 50 MICROGram(s) IV Push <User Schedule>  metroNIDAZOLE  IVPB 500 milliGRAM(s) IV Intermittent every 8 hours  ondansetron Injectable 4 milliGRAM(s) IV Push every 6 hours PRN  oxyCODONE    IR 5 milliGRAM(s) Oral every 4 hours PRN  pantoprazole    Tablet 40 milliGRAM(s) Oral daily  polyethylene glycol 3350 17 Gram(s) Oral daily  senna 2 Tablet(s) Oral at bedtime  sodium chloride 0.65% Nasal 1 Spray(s) Both Nostrils three times a day PRN  vancomycin  IVPB 1250 milliGRAM(s) IV Intermittent every 12 hours    EXAMINATION:  General:  in NAD  HEENT:  MMM  Neuro:  lethargic, oriented to her first name, does not follow commands but does respond to some questions, PERRL, gaze midline, face symmetric, minimal PD b/l but able to hold b/l arms AG, briskly withdraws b/l LEs in plane of bed   Cards:  RRR  Respiratory:  no respiratory distress  Abdomen:  soft  Extremities:  no LE edema    Assessment/Plan:   53 yo woman with h/o HTN, DM and HLD, 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     HOB flat with non-rebreather for pneumocephalus, c/w LD clamped   pituitary precautions (unable to place NG for free water boluses)   start VEEG for episode of L sided weakness, give 1g of Keppra  q1h BMP, c/w D5Q at 100cc/hr   c/w hydrocort IV  increase vasopressin to 0.04 mg/hr  c/w vanc, ceftriaxone and flagyl as per nrsg (vanc dose increased for low trough)  start insulin drip (make sure abx are in D5W)    c/w kristel Nayak  Neurocritical Care Attending    Interval events:  CTH this AM with slight improved pneumocephalus.   Off vasopressin drip during the day.   With bradycardia during the day.   NG placed by ENT.     Patient seen on evening rounds, no acute events.   On D5W at 75cc/hr.     VITALS:  T(C): , Max: 37.4 (03-17-23 @ 15:00)  HR:  (40 - 78)  BP:  (94/55 - 98/45)  ABP:  (36/32 - 203/200)  RR:  (12 - 20)  SpO2:  (94% - 100%)  Wt(kg): --      03-16-23 @ 07:01  -  03-17-23 @ 07:00  --------------------------------------------------------  IN: 5393.5 mL / OUT: 4050 mL / NET: 1343.5 mL    03-17-23 @ 07:01  -  03-17-23 @ 19:06  --------------------------------------------------------  IN: 1840.3 mL / OUT: 460 mL / NET: 1380.3 mL      LABS:  Na: 150 (03-17 @ 18:17), 153 (03-17 @ 16:19), 154 (03-17 @ 14:06), 154 (03-17 @ 12:30), 158 (03-17 @ 10:24), 156 (03-17 @ 08:09), 159 (03-17 @ 05:34), 163 (03-17 @ 03:10), 164 (03-17 @ 01:01), 163 (03-16 @ 23:08), 164 (03-16 @ 22:15), 170 (03-16 @ 20:29), >170 (03-16 @ 19:45), >170 (03-16 @ 18:32), >170 (03-16 @ 17:23), 168 (03-16 @ 11:17), 160 (03-16 @ 04:02), 157 (03-15 @ 17:25), 155 (03-15 @ 09:09), 146 (03-15 @ 03:31)  K: 4.0 (03-17 @ 18:17), 3.5 (03-17 @ 16:19), 3.7 (03-17 @ 14:06), 4.0 (03-17 @ 12:30), 3.7 (03-17 @ 10:24), 3.3 (03-17 @ 08:09), 3.7 (03-17 @ 05:34), 3.7 (03-17 @ 03:10), 3.3 (03-17 @ 01:01), 3.3 (03-16 @ 23:08), 3.2 (03-16 @ 22:15), 3.9 (03-16 @ 20:29), 4.2 (03-16 @ 19:45), 4.2 (03-16 @ 18:32), 4.3 (03-16 @ 17:23), 3.8 (03-16 @ 11:17), 3.6 (03-16 @ 04:02), 3.9 (03-15 @ 17:25), 3.5 (03-15 @ 09:09), 3.1 (03-15 @ 03:31)  Cl: 119 (03-17 @ 18:17), 122 (03-17 @ 16:19), 122 (03-17 @ 14:06), 121 (03-17 @ 12:30), 125 (03-17 @ 10:24), 124 (03-17 @ 08:09), 125 (03-17 @ 05:34), 127 (03-17 @ 03:10), 129 (03-17 @ 01:01), 130 (03-16 @ 23:08), 128 (03-16 @ 22:15), 134 (03-16 @ 20:29), >135 (03-16 @ 19:45), >135 (03-16 @ 18:32), >135 (03-16 @ 17:23), 131 (03-16 @ 11:17), 127 (03-16 @ 04:02), 125 (03-15 @ 17:25), 120 (03-15 @ 09:09), 112 (03-15 @ 03:31)  CO2: 23 (03-17 @ 18:17), 24 (03-17 @ 16:19), 24 (03-17 @ 14:06), 25 (03-17 @ 12:30), 24 (03-17 @ 10:24), 24 (03-17 @ 08:09), 26 (03-17 @ 05:34), 26 (03-17 @ 03:10), 24 (03-17 @ 01:01), 25 (03-16 @ 23:08), 23 (03-16 @ 22:15), 25 (03-16 @ 20:29), 25 (03-16 @ 19:45), 25 (03-16 @ 18:32), 23 (03-16 @ 17:23), 21 (03-16 @ 11:17), 23 (03-16 @ 04:02), 22 (03-15 @ 17:25), 20 (03-15 @ 09:09), 17 (03-15 @ 03:31)  BUN: 11 (03-17 @ 18:17), 11 (03-17 @ 16:19), 11 (03-17 @ 14:06), 10 (03-17 @ 12:30), 10 (03-17 @ 10:24), 9 (03-17 @ 08:09), 10 (03-17 @ 05:34), 10 (03-17 @ 03:10), 10 (03-17 @ 01:01), 10 (03-16 @ 23:08), 10 (03-16 @ 22:15), 10 (03-16 @ 20:29), 11 (03-16 @ 19:45), 10 (03-16 @ 18:32), 10 (03-16 @ 17:23), 9 (03-16 @ 11:17), 8 (03-16 @ 04:02), 9 (03-15 @ 17:25), 12 (03-15 @ 09:09), 12 (03-15 @ 03:31)  Cr: 0.52 (03-17 @ 18:17), 0.52 (03-17 @ 16:19), 0.51 (03-17 @ 14:06), 0.51 (03-17 @ 12:30), 0.53 (03-17 @ 10:24), 0.56 (03-17 @ 08:09), 0.58 (03-17 @ 05:34), 0.64 (03-17 @ 03:10), 0.62 (03-17 @ 01:01), 0.65 (03-16 @ 23:08), 0.66 (03-16 @ 22:15), 0.70 (03-16 @ 20:29), 0.67 (03-16 @ 19:45), 0.70 (03-16 @ 18:32), 0.69 (03-16 @ 17:23), 0.74 (03-16 @ 11:17), 0.70 (03-16 @ 04:02), 0.68 (03-15 @ 17:25), 0.69 (03-15 @ 09:09), 0.64 (03-15 @ 03:31)  Glu: 281(03-17 @ 18:17), 209(03-17 @ 16:19), 206(03-17 @ 14:06), 280(03-17 @ 12:30), 110(03-17 @ 10:24), 219(03-17 @ 08:09), 258(03-17 @ 05:34), 329(03-17 @ 03:10), 404(03-17 @ 01:01), 379(03-16 @ 23:08), 447(03-16 @ 22:15), 396(03-16 @ 20:29), 336(03-16 @ 19:45), 322(03-16 @ 18:32), 290(03-16 @ 17:23), 169(03-16 @ 11:17), 130(03-16 @ 04:02), 170(03-15 @ 17:25), 373(03-15 @ 09:09), 261(03-15 @ 03:31)    Hgb: 11.3 (03-16 @ 22:15), 11.8 (03-16 @ 04:02), 12.2 (03-15 @ 03:31)  Hct: 37.1 (03-16 @ 22:15), 37.0 (03-16 @ 04:02), 38.4 (03-15 @ 03:31)  WBC: 14.10 (03-16 @ 22:15), 14.98 (03-16 @ 04:02), 16.92 (03-15 @ 03:31)  Plt: 262 (03-16 @ 22:15), 230 (03-16 @ 04:02), 349 (03-15 @ 03:31)    MEDICATIONS:  acetaminophen     Tablet .. 650 milliGRAM(s) Oral every 6 hours PRN  amLODIPine   Tablet 10 milliGRAM(s) Oral daily  atorvastatin 80 milliGRAM(s) Oral at bedtime  cefTRIAXone   IVPB 1000 milliGRAM(s) IV Intermittent every 24 hours  chlorhexidine 4% Liquid 1 Application(s) Topical daily  desmopressin Injectable 0.5 MICROGram(s) IV Push <User Schedule>  dextrose 5%. 1000 milliLiter(s) IV Continuous <Continuous>  dextrose 5%. 1000 milliLiter(s) IV Continuous <Continuous>  dextrose 5%. 1000 milliLiter(s) IV Continuous <Continuous>  dextrose 50% Injectable 50 milliLiter(s) IV Push every 15 minutes  dextrose Oral Gel 15 Gram(s) Oral once PRN  glucagon  Injectable 1 milliGRAM(s) IntraMuscular once  hydrocortisone sodium succinate Injectable 50 milliGRAM(s) IV Push every 8 hours  insulin lispro (ADMELOG) corrective regimen sliding scale   SubCutaneous every 6 hours  insulin NPH human recombinant 8 Unit(s) SubCutaneous every 6 hours  insulin regular Infusion 5 Unit(s)/Hr IV Continuous <Continuous>  levothyroxine Injectable 50 MICROGram(s) IV Push <User Schedule>  metroNIDAZOLE  IVPB 500 milliGRAM(s) IV Intermittent every 8 hours  ondansetron Injectable 4 milliGRAM(s) IV Push every 6 hours PRN  oxyCODONE    IR 5 milliGRAM(s) Oral every 4 hours PRN  pantoprazole    Tablet 40 milliGRAM(s) Oral daily  polyethylene glycol 3350 17 Gram(s) Oral daily  senna 2 Tablet(s) Oral at bedtime  sodium chloride 0.65% Nasal 1 Spray(s) Both Nostrils three times a day PRN  vancomycin  IVPB 1250 milliGRAM(s) IV Intermittent every 12 hours    EXAMINATION:  General:  in NAD  HEENT:  MMM  Neuro:  slightly lethargic but easily arouses to voice, oriented to self, hospital, month and year, follows commands, PERRL, EOMI, face symmetric, no PD, 5/5 in b/l triceps, no drift in LEs  Cards:  bradycardic, regular   Respiratory:  no respiratory distress  Abdomen:  soft  Extremities:  no LE edema    Assessment/Plan:   53 yo woman with h/o HTN, DM and HLD, 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     c/w LD clamped   pituitary precautions   c/w VEEG for episode of L sided weakness  q4h BMP, d/c D5W at 75cc/hr   start  mg q4h (same amount of water as per IV)   on ddAVP 0.5 mcg IV BID   c/w hydrocort 50 mg q8h and synthroid 50 mcg IV  c/w vanc, ceftriaxone and flagyl as per nrsg  NPH 8 U q6h and moderate ISS  With NG placed by ENT, start tube feeds     c/w kristel Nayak  Neurocritical Care Attending

## 2023-03-18 LAB
ALBUMIN SERPL ELPH-MCNC: 3.1 G/DL — LOW (ref 3.3–5)
ALBUMIN SERPL ELPH-MCNC: 3.1 G/DL — LOW (ref 3.3–5)
ALBUMIN SERPL ELPH-MCNC: 3.2 G/DL — LOW (ref 3.3–5)
ALP SERPL-CCNC: 50 U/L — SIGNIFICANT CHANGE UP (ref 40–120)
ALP SERPL-CCNC: 51 U/L — SIGNIFICANT CHANGE UP (ref 40–120)
ALP SERPL-CCNC: 99 U/L — SIGNIFICANT CHANGE UP (ref 40–120)
ALT FLD-CCNC: 238 U/L — HIGH (ref 10–45)
ALT FLD-CCNC: 30 U/L — SIGNIFICANT CHANGE UP (ref 10–45)
ALT FLD-CCNC: 31 U/L — SIGNIFICANT CHANGE UP (ref 10–45)
ANION GAP SERPL CALC-SCNC: 10 MMOL/L — SIGNIFICANT CHANGE UP (ref 5–17)
ANION GAP SERPL CALC-SCNC: 9 MMOL/L — SIGNIFICANT CHANGE UP (ref 5–17)
APPEARANCE UR: ABNORMAL
AST SERPL-CCNC: 27 U/L — SIGNIFICANT CHANGE UP (ref 10–40)
AST SERPL-CCNC: 28 U/L — SIGNIFICANT CHANGE UP (ref 10–40)
AST SERPL-CCNC: 341 U/L — HIGH (ref 10–40)
BACTERIA # UR AUTO: NEGATIVE — SIGNIFICANT CHANGE UP
BILIRUB SERPL-MCNC: 0.2 MG/DL — SIGNIFICANT CHANGE UP (ref 0.2–1.2)
BILIRUB SERPL-MCNC: 0.2 MG/DL — SIGNIFICANT CHANGE UP (ref 0.2–1.2)
BILIRUB SERPL-MCNC: 0.4 MG/DL — SIGNIFICANT CHANGE UP (ref 0.2–1.2)
BILIRUB UR-MCNC: NEGATIVE — SIGNIFICANT CHANGE UP
BUN SERPL-MCNC: 10 MG/DL — SIGNIFICANT CHANGE UP (ref 7–23)
BUN SERPL-MCNC: 12 MG/DL — SIGNIFICANT CHANGE UP (ref 7–23)
BUN SERPL-MCNC: 9 MG/DL — SIGNIFICANT CHANGE UP (ref 7–23)
CALCIUM SERPL-MCNC: 8.2 MG/DL — LOW (ref 8.4–10.5)
CALCIUM SERPL-MCNC: 8.3 MG/DL — LOW (ref 8.4–10.5)
CALCIUM SERPL-MCNC: 8.4 MG/DL — SIGNIFICANT CHANGE UP (ref 8.4–10.5)
CHLORIDE SERPL-SCNC: 111 MMOL/L — HIGH (ref 96–108)
CHLORIDE SERPL-SCNC: 112 MMOL/L — HIGH (ref 96–108)
CHLORIDE SERPL-SCNC: 113 MMOL/L — HIGH (ref 96–108)
CHLORIDE SERPL-SCNC: 116 MMOL/L — HIGH (ref 96–108)
CHLORIDE SERPL-SCNC: 117 MMOL/L — HIGH (ref 96–108)
CO2 SERPL-SCNC: 20 MMOL/L — LOW (ref 22–31)
CO2 SERPL-SCNC: 21 MMOL/L — LOW (ref 22–31)
CO2 SERPL-SCNC: 22 MMOL/L — SIGNIFICANT CHANGE UP (ref 22–31)
CO2 SERPL-SCNC: 23 MMOL/L — SIGNIFICANT CHANGE UP (ref 22–31)
CO2 SERPL-SCNC: 23 MMOL/L — SIGNIFICANT CHANGE UP (ref 22–31)
COLOR SPEC: YELLOW — SIGNIFICANT CHANGE UP
CREAT SERPL-MCNC: 0.37 MG/DL — LOW (ref 0.5–1.3)
CREAT SERPL-MCNC: 0.39 MG/DL — LOW (ref 0.5–1.3)
CREAT SERPL-MCNC: 0.43 MG/DL — LOW (ref 0.5–1.3)
CREAT SERPL-MCNC: 0.46 MG/DL — LOW (ref 0.5–1.3)
CREAT SERPL-MCNC: 0.5 MG/DL — SIGNIFICANT CHANGE UP (ref 0.5–1.3)
DIFF PNL FLD: NEGATIVE — SIGNIFICANT CHANGE UP
EGFR: 112 ML/MIN/1.73M2 — SIGNIFICANT CHANGE UP
EGFR: 114 ML/MIN/1.73M2 — SIGNIFICANT CHANGE UP
EGFR: 116 ML/MIN/1.73M2 — SIGNIFICANT CHANGE UP
EGFR: 119 ML/MIN/1.73M2 — SIGNIFICANT CHANGE UP
EGFR: 121 ML/MIN/1.73M2 — SIGNIFICANT CHANGE UP
EPI CELLS # UR: 7 /HPF — HIGH
GLUCOSE BLDC GLUCOMTR-MCNC: 113 MG/DL — HIGH (ref 70–99)
GLUCOSE BLDC GLUCOMTR-MCNC: 123 MG/DL — HIGH (ref 70–99)
GLUCOSE BLDC GLUCOMTR-MCNC: 132 MG/DL — HIGH (ref 70–99)
GLUCOSE BLDC GLUCOMTR-MCNC: 159 MG/DL — HIGH (ref 70–99)
GLUCOSE BLDC GLUCOMTR-MCNC: 163 MG/DL — HIGH (ref 70–99)
GLUCOSE BLDC GLUCOMTR-MCNC: 163 MG/DL — HIGH (ref 70–99)
GLUCOSE BLDC GLUCOMTR-MCNC: 168 MG/DL — HIGH (ref 70–99)
GLUCOSE BLDC GLUCOMTR-MCNC: 170 MG/DL — HIGH (ref 70–99)
GLUCOSE BLDC GLUCOMTR-MCNC: 173 MG/DL — HIGH (ref 70–99)
GLUCOSE BLDC GLUCOMTR-MCNC: 179 MG/DL — HIGH (ref 70–99)
GLUCOSE BLDC GLUCOMTR-MCNC: 179 MG/DL — HIGH (ref 70–99)
GLUCOSE BLDC GLUCOMTR-MCNC: 191 MG/DL — HIGH (ref 70–99)
GLUCOSE BLDC GLUCOMTR-MCNC: 207 MG/DL — HIGH (ref 70–99)
GLUCOSE BLDC GLUCOMTR-MCNC: 212 MG/DL — HIGH (ref 70–99)
GLUCOSE BLDC GLUCOMTR-MCNC: 215 MG/DL — HIGH (ref 70–99)
GLUCOSE SERPL-MCNC: 173 MG/DL — HIGH (ref 70–99)
GLUCOSE SERPL-MCNC: 175 MG/DL — HIGH (ref 70–99)
GLUCOSE SERPL-MCNC: 202 MG/DL — HIGH (ref 70–99)
GLUCOSE SERPL-MCNC: 203 MG/DL — HIGH (ref 70–99)
GLUCOSE SERPL-MCNC: 212 MG/DL — HIGH (ref 70–99)
GLUCOSE UR QL: NEGATIVE — SIGNIFICANT CHANGE UP
HCT VFR BLD CALC: 28.2 % — LOW (ref 34.5–45)
HGB BLD-MCNC: 9 G/DL — LOW (ref 11.5–15.5)
HYALINE CASTS # UR AUTO: 34 /LPF — HIGH (ref 0–2)
KETONES UR-MCNC: NEGATIVE — SIGNIFICANT CHANGE UP
LEUKOCYTE ESTERASE UR-ACNC: NEGATIVE — SIGNIFICANT CHANGE UP
MAGNESIUM SERPL-MCNC: 1.7 MG/DL — SIGNIFICANT CHANGE UP (ref 1.6–2.6)
MAGNESIUM SERPL-MCNC: 1.8 MG/DL — SIGNIFICANT CHANGE UP (ref 1.6–2.6)
MCHC RBC-ENTMCNC: 28.5 PG — SIGNIFICANT CHANGE UP (ref 27–34)
MCHC RBC-ENTMCNC: 31.9 GM/DL — LOW (ref 32–36)
MCV RBC AUTO: 89.2 FL — SIGNIFICANT CHANGE UP (ref 80–100)
NITRITE UR-MCNC: NEGATIVE — SIGNIFICANT CHANGE UP
NRBC # BLD: 0 /100 WBCS — SIGNIFICANT CHANGE UP (ref 0–0)
OSMOLALITY SERPL: 296 MOSMOL/KG — SIGNIFICANT CHANGE UP (ref 275–300)
OSMOLALITY SERPL: 298 MOSMOL/KG — SIGNIFICANT CHANGE UP (ref 275–300)
OSMOLALITY SERPL: 303 MOSMOL/KG — HIGH (ref 275–300)
OSMOLALITY SERPL: 308 MOSMOL/KG — HIGH (ref 275–300)
OSMOLALITY SERPL: 316 MOSMOL/KG — HIGH (ref 275–300)
OSMOLALITY UR: 783 MOS/KG — SIGNIFICANT CHANGE UP (ref 300–900)
OSMOLALITY UR: 785 MOS/KG — SIGNIFICANT CHANGE UP (ref 300–900)
OSMOLALITY UR: 818 MOS/KG — SIGNIFICANT CHANGE UP (ref 300–900)
OSMOLALITY UR: 835 MOS/KG — SIGNIFICANT CHANGE UP (ref 300–900)
OSMOLALITY UR: 845 MOS/KG — SIGNIFICANT CHANGE UP (ref 300–900)
PH UR: 6.5 — SIGNIFICANT CHANGE UP (ref 5–8)
PHOSPHATE SERPL-MCNC: 2.3 MG/DL — LOW (ref 2.5–4.5)
PHOSPHATE SERPL-MCNC: 3.4 MG/DL — SIGNIFICANT CHANGE UP (ref 2.5–4.5)
PLATELET # BLD AUTO: 197 K/UL — SIGNIFICANT CHANGE UP (ref 150–400)
POTASSIUM SERPL-MCNC: 3.4 MMOL/L — LOW (ref 3.5–5.3)
POTASSIUM SERPL-MCNC: 3.6 MMOL/L — SIGNIFICANT CHANGE UP (ref 3.5–5.3)
POTASSIUM SERPL-MCNC: 3.7 MMOL/L — SIGNIFICANT CHANGE UP (ref 3.5–5.3)
POTASSIUM SERPL-SCNC: 3.4 MMOL/L — LOW (ref 3.5–5.3)
POTASSIUM SERPL-SCNC: 3.6 MMOL/L — SIGNIFICANT CHANGE UP (ref 3.5–5.3)
POTASSIUM SERPL-SCNC: 3.7 MMOL/L — SIGNIFICANT CHANGE UP (ref 3.5–5.3)
PROT SERPL-MCNC: 5.6 G/DL — LOW (ref 6–8.3)
PROT SERPL-MCNC: 5.7 G/DL — LOW (ref 6–8.3)
PROT SERPL-MCNC: 5.8 G/DL — LOW (ref 6–8.3)
PROT UR-MCNC: ABNORMAL
RBC # BLD: 3.16 M/UL — LOW (ref 3.8–5.2)
RBC # FLD: 13.6 % — SIGNIFICANT CHANGE UP (ref 10.3–14.5)
RBC CASTS # UR COMP ASSIST: 13 /HPF — HIGH (ref 0–4)
SODIUM SERPL-SCNC: 141 MMOL/L — SIGNIFICANT CHANGE UP (ref 135–145)
SODIUM SERPL-SCNC: 143 MMOL/L — SIGNIFICANT CHANGE UP (ref 135–145)
SODIUM SERPL-SCNC: 145 MMOL/L — SIGNIFICANT CHANGE UP (ref 135–145)
SODIUM SERPL-SCNC: 148 MMOL/L — HIGH (ref 135–145)
SODIUM SERPL-SCNC: 150 MMOL/L — HIGH (ref 135–145)
SP GR SPEC: 1.03 — HIGH (ref 1.01–1.02)
SP GR UR STRIP: 1.03 — HIGH (ref 1.01–1.02)
UROBILINOGEN FLD QL: NEGATIVE — SIGNIFICANT CHANGE UP
VANCOMYCIN TROUGH SERPL-MCNC: 11.6 UG/ML — SIGNIFICANT CHANGE UP (ref 10–20)
WBC # BLD: 10.46 K/UL — SIGNIFICANT CHANGE UP (ref 3.8–10.5)
WBC # FLD AUTO: 10.46 K/UL — SIGNIFICANT CHANGE UP (ref 3.8–10.5)
WBC UR QL: 9 /HPF — HIGH (ref 0–5)

## 2023-03-18 PROCEDURE — 99291 CRITICAL CARE FIRST HOUR: CPT

## 2023-03-18 PROCEDURE — 70450 CT HEAD/BRAIN W/O DYE: CPT | Mod: 26

## 2023-03-18 PROCEDURE — 71045 X-RAY EXAM CHEST 1 VIEW: CPT | Mod: 26

## 2023-03-18 RX ORDER — POTASSIUM CHLORIDE 20 MEQ
10 PACKET (EA) ORAL
Refills: 0 | Status: COMPLETED | OUTPATIENT
Start: 2023-03-18 | End: 2023-03-18

## 2023-03-18 RX ORDER — ENOXAPARIN SODIUM 100 MG/ML
40 INJECTION SUBCUTANEOUS
Refills: 0 | Status: DISCONTINUED | OUTPATIENT
Start: 2023-03-18 | End: 2023-03-24

## 2023-03-18 RX ORDER — POTASSIUM CHLORIDE 20 MEQ
20 PACKET (EA) ORAL
Refills: 0 | Status: COMPLETED | OUTPATIENT
Start: 2023-03-18 | End: 2023-03-18

## 2023-03-18 RX ORDER — SODIUM CHLORIDE 9 MG/ML
1000 INJECTION INTRAMUSCULAR; INTRAVENOUS; SUBCUTANEOUS
Refills: 0 | Status: DISCONTINUED | OUTPATIENT
Start: 2023-03-18 | End: 2023-03-18

## 2023-03-18 RX ORDER — POTASSIUM PHOSPHATE, MONOBASIC POTASSIUM PHOSPHATE, DIBASIC 236; 224 MG/ML; MG/ML
30 INJECTION, SOLUTION INTRAVENOUS ONCE
Refills: 0 | Status: COMPLETED | OUTPATIENT
Start: 2023-03-18 | End: 2023-03-18

## 2023-03-18 RX ORDER — INSULIN LISPRO 100/ML
VIAL (ML) SUBCUTANEOUS EVERY 6 HOURS
Refills: 0 | Status: DISCONTINUED | OUTPATIENT
Start: 2023-03-18 | End: 2023-03-19

## 2023-03-18 RX ORDER — POTASSIUM CHLORIDE 20 MEQ
20 PACKET (EA) ORAL ONCE
Refills: 0 | Status: COMPLETED | OUTPATIENT
Start: 2023-03-18 | End: 2023-03-19

## 2023-03-18 RX ORDER — POTASSIUM PHOSPHATE, MONOBASIC POTASSIUM PHOSPHATE, DIBASIC 236; 224 MG/ML; MG/ML
15 INJECTION, SOLUTION INTRAVENOUS ONCE
Refills: 0 | Status: COMPLETED | OUTPATIENT
Start: 2023-03-18 | End: 2023-03-19

## 2023-03-18 RX ORDER — SODIUM CHLORIDE 9 MG/ML
1000 INJECTION INTRAMUSCULAR; INTRAVENOUS; SUBCUTANEOUS
Refills: 0 | Status: DISCONTINUED | OUTPATIENT
Start: 2023-03-18 | End: 2023-03-19

## 2023-03-18 RX ORDER — INSULIN LISPRO 100/ML
VIAL (ML) SUBCUTANEOUS
Refills: 0 | Status: DISCONTINUED | OUTPATIENT
Start: 2023-03-18 | End: 2023-03-18

## 2023-03-18 RX ORDER — MAGNESIUM SULFATE 500 MG/ML
2 VIAL (ML) INJECTION ONCE
Refills: 0 | Status: COMPLETED | OUTPATIENT
Start: 2023-03-18 | End: 2023-03-19

## 2023-03-18 RX ORDER — HUMAN INSULIN 100 [IU]/ML
8 INJECTION, SUSPENSION SUBCUTANEOUS EVERY 6 HOURS
Refills: 0 | Status: DISCONTINUED | OUTPATIENT
Start: 2023-03-18 | End: 2023-03-19

## 2023-03-18 RX ADMIN — TRAMADOL HYDROCHLORIDE 50 MILLIGRAM(S): 50 TABLET ORAL at 19:57

## 2023-03-18 RX ADMIN — SODIUM CHLORIDE 75 MILLILITER(S): 9 INJECTION, SOLUTION INTRAVENOUS at 07:38

## 2023-03-18 RX ADMIN — Medication 166.67 MILLIGRAM(S): at 06:31

## 2023-03-18 RX ADMIN — Medication 2: at 17:12

## 2023-03-18 RX ADMIN — Medication 20 MILLIEQUIVALENT(S): at 11:29

## 2023-03-18 RX ADMIN — SODIUM CHLORIDE 90 MILLILITER(S): 9 INJECTION INTRAMUSCULAR; INTRAVENOUS; SUBCUTANEOUS at 11:29

## 2023-03-18 RX ADMIN — Medication 20 MILLIEQUIVALENT(S): at 13:04

## 2023-03-18 RX ADMIN — SODIUM CHLORIDE 100 MILLILITER(S): 9 INJECTION, SOLUTION INTRAVENOUS at 00:04

## 2023-03-18 RX ADMIN — HUMAN INSULIN 8 UNIT(S): 100 INJECTION, SUSPENSION SUBCUTANEOUS at 17:11

## 2023-03-18 RX ADMIN — TRAMADOL HYDROCHLORIDE 50 MILLIGRAM(S): 50 TABLET ORAL at 21:55

## 2023-03-18 RX ADMIN — Medication 100 MILLIEQUIVALENT(S): at 07:37

## 2023-03-18 RX ADMIN — ENOXAPARIN SODIUM 40 MILLIGRAM(S): 100 INJECTION SUBCUTANEOUS at 17:12

## 2023-03-18 RX ADMIN — SODIUM CHLORIDE 50 MILLILITER(S): 9 INJECTION INTRAMUSCULAR; INTRAVENOUS; SUBCUTANEOUS at 19:57

## 2023-03-18 RX ADMIN — DESMOPRESSIN ACETATE 0.5 MICROGRAM(S): 0.1 TABLET ORAL at 05:37

## 2023-03-18 RX ADMIN — CHLORHEXIDINE GLUCONATE 1 APPLICATION(S): 213 SOLUTION TOPICAL at 11:04

## 2023-03-18 RX ADMIN — DESMOPRESSIN ACETATE 0.5 MICROGRAM(S): 0.1 TABLET ORAL at 17:13

## 2023-03-18 RX ADMIN — Medication 50 MICROGRAM(S): at 17:13

## 2023-03-18 RX ADMIN — Medication 50 MILLIGRAM(S): at 22:43

## 2023-03-18 RX ADMIN — Medication 100 MILLIEQUIVALENT(S): at 06:32

## 2023-03-18 RX ADMIN — INSULIN HUMAN 2 UNIT(S)/HR: 100 INJECTION, SOLUTION SUBCUTANEOUS at 07:38

## 2023-03-18 RX ADMIN — Medication 50 MILLIGRAM(S): at 13:04

## 2023-03-18 RX ADMIN — POTASSIUM PHOSPHATE, MONOBASIC POTASSIUM PHOSPHATE, DIBASIC 83.33 MILLIMOLE(S): 236; 224 INJECTION, SOLUTION INTRAVENOUS at 00:06

## 2023-03-18 RX ADMIN — Medication 100 MILLIGRAM(S): at 05:37

## 2023-03-18 RX ADMIN — Medication 100 MILLIEQUIVALENT(S): at 08:39

## 2023-03-18 RX ADMIN — PANTOPRAZOLE SODIUM 40 MILLIGRAM(S): 20 TABLET, DELAYED RELEASE ORAL at 11:05

## 2023-03-18 RX ADMIN — ATORVASTATIN CALCIUM 80 MILLIGRAM(S): 80 TABLET, FILM COATED ORAL at 22:44

## 2023-03-18 RX ADMIN — Medication 50 MILLIGRAM(S): at 05:36

## 2023-03-18 RX ADMIN — HUMAN INSULIN 8 UNIT(S): 100 INJECTION, SUSPENSION SUBCUTANEOUS at 11:04

## 2023-03-18 NOTE — PROGRESS NOTE ADULT - ASSESSMENT
Sinus bradycardia   suspect this is neurogenic post op   ? brain Edema   if no improvement , then would get EP eval

## 2023-03-18 NOTE — PROGRESS NOTE ADULT - SUBJECTIVE AND OBJECTIVE BOX
ENDOCRINE FOLLOW UP     Chief Complaint: craniopharyngioma    History:   Patient placed back on insulin gtt.    MEDICATIONS  (STANDING):  amLODIPine   Tablet 10 milliGRAM(s) Oral daily  atorvastatin 80 milliGRAM(s) Oral at bedtime  cefTRIAXone   IVPB 1000 milliGRAM(s) IV Intermittent every 24 hours  chlorhexidine 4% Liquid 1 Application(s) Topical daily  desmopressin Injectable 0.5 MICROGram(s) IV Push <User Schedule>  dextrose 5%. 1000 milliLiter(s) (75 mL/Hr) IV Continuous <Continuous>  dextrose 5%. 1000 milliLiter(s) (50 mL/Hr) IV Continuous <Continuous>  dextrose 5%. 1000 milliLiter(s) (100 mL/Hr) IV Continuous <Continuous>  dextrose 50% Injectable 50 milliLiter(s) IV Push every 15 minutes  glucagon  Injectable 1 milliGRAM(s) IntraMuscular once  hydrocortisone sodium succinate Injectable 50 milliGRAM(s) IV Push every 8 hours  insulin regular Infusion 2 Unit(s)/Hr (2 mL/Hr) IV Continuous <Continuous>  levothyroxine Injectable 50 MICROGram(s) IV Push <User Schedule>  metroNIDAZOLE  IVPB 500 milliGRAM(s) IV Intermittent every 8 hours  pantoprazole    Tablet 40 milliGRAM(s) Oral daily  polyethylene glycol 3350 17 Gram(s) Oral daily  senna 2 Tablet(s) Oral at bedtime  vancomycin  IVPB 1250 milliGRAM(s) IV Intermittent every 12 hours    MEDICATIONS  (PRN):  acetaminophen     Tablet .. 650 milliGRAM(s) Oral every 6 hours PRN Temp greater or equal to 38C (100.4F), Mild Pain (1 - 3)  dextrose Oral Gel 15 Gram(s) Oral once PRN Blood Glucose LESS THAN 70 milliGRAM(s)/deciliter  ondansetron Injectable 4 milliGRAM(s) IV Push every 6 hours PRN Nausea and/or Vomiting  sodium chloride 0.65% Nasal 1 Spray(s) Both Nostrils three times a day PRN Nasal Congestion  traMADol 25 milliGRAM(s) Oral every 4 hours PRN Moderate Pain (4 - 6)  traMADol 50 milliGRAM(s) Oral every 4 hours PRN Severe Pain (7 - 10)      Allergies    No Known Drug Allergies  S/P TRANSSPHENOIDAL SURGERY. SINONASAL PRECAUTIONS! NO NASAL SWABS OR NG TUBES. (Unknown)    Intolerances        ROS: All other systems reviewed and negative    PHYSICAL EXAM:  VITALS: T(C): 36.7 (03-18-23 @ 07:00)  T(F): 98.1 (03-18-23 @ 07:00), Max: 99.3 (03-17-23 @ 15:00)  HR: 47 (03-18-23 @ 09:00) (34 - 57)  BP: --  RR:  (12 - 17)  SpO2:  (95% - 100%)  Wt(kg): --  GENERAL: NAD, resting comfortably   EYES: No proptosis,  anicteric  HEENT:  Atraumatic, Normocephalic, moist mucous membranes  RESPIRATORY: Nonlabored respirations on room air, normal rate/effort   CARDIOVASCULAR: Did not appear cyanotic, no lower extremity swelling visualized  GI: Soft, nontender, non distended  NEURO: Answering questions appropriately, moves all extremities spontaneously  PSYCH:  reactive affect, euthymic mood    POCT Blood Glucose.: 168 mg/dL (03-18-23 @ 09:02)  POCT Blood Glucose.: 179 mg/dL (03-18-23 @ 08:04)  POCT Blood Glucose.: 159 mg/dL (03-18-23 @ 07:09)  POCT Blood Glucose.: 163 mg/dL (03-18-23 @ 06:05)  POCT Blood Glucose.: 191 mg/dL (03-18-23 @ 05:28)  POCT Blood Glucose.: 207 mg/dL (03-18-23 @ 04:13)  POCT Blood Glucose.: 170 mg/dL (03-18-23 @ 03:09)  POCT Blood Glucose.: 179 mg/dL (03-18-23 @ 02:14)  POCT Blood Glucose.: 212 mg/dL (03-18-23 @ 01:16)  POCT Blood Glucose.: 202 mg/dL (03-17-23 @ 23:06)  POCT Blood Glucose.: 241 mg/dL (03-17-23 @ 18:16)  POCT Blood Glucose.: 190 mg/dL (03-17-23 @ 17:26)  POCT Blood Glucose.: 165 mg/dL (03-17-23 @ 15:58)  POCT Blood Glucose.: 173 mg/dL (03-17-23 @ 14:01)  POCT Blood Glucose.: 162 mg/dL (03-17-23 @ 13:02)  POCT Blood Glucose.: 165 mg/dL (03-17-23 @ 12:26)  POCT Blood Glucose.: 77 mg/dL (03-17-23 @ 12:01)  POCT Blood Glucose.: 99 mg/dL (03-17-23 @ 11:03)  POCT Blood Glucose.: 106 mg/dL (03-17-23 @ 10:05)  POCT Blood Glucose.: 175 mg/dL (03-17-23 @ 09:03)  POCT Blood Glucose.: 219 mg/dL (03-17-23 @ 08:09)  POCT Blood Glucose.: 206 mg/dL (03-17-23 @ 07:10)  POCT Blood Glucose.: 216 mg/dL (03-17-23 @ 06:25)  POCT Blood Glucose.: 230 mg/dL (03-17-23 @ 05:21)  POCT Blood Glucose.: 240 mg/dL (03-17-23 @ 04:19)  POCT Blood Glucose.: 298 mg/dL (03-17-23 @ 03:08)  POCT Blood Glucose.: 329 mg/dL (03-17-23 @ 02:05)  POCT Blood Glucose.: 319 mg/dL (03-17-23 @ 01:11)  POCT Blood Glucose.: 210 mg/dL (03-17-23 @ 00:26)  POCT Blood Glucose.: 358 mg/dL (03-16-23 @ 23:00)  POCT Blood Glucose.: 373 mg/dL (03-16-23 @ 22:01)  POCT Blood Glucose.: 464 mg/dL (03-16-23 @ 21:13)  POCT Blood Glucose.: 338 mg/dL (03-16-23 @ 20:32)  POCT Blood Glucose.: 190 mg/dL (03-16-23 @ 18:21)  POCT Blood Glucose.: 107 mg/dL (03-16-23 @ 05:04)  POCT Blood Glucose.: 111 mg/dL (03-16-23 @ 03:10)  POCT Blood Glucose.: 127 mg/dL (03-16-23 @ 02:02)  POCT Blood Glucose.: 112 mg/dL (03-16-23 @ 01:13)  POCT Blood Glucose.: 107 mg/dL (03-15-23 @ 23:59)  POCT Blood Glucose.: 124 mg/dL (03-15-23 @ 23:08)  POCT Blood Glucose.: 108 mg/dL (03-15-23 @ 22:05)  POCT Blood Glucose.: 123 mg/dL (03-15-23 @ 21:08)  POCT Blood Glucose.: 111 mg/dL (03-15-23 @ 20:03)  POCT Blood Glucose.: 111 mg/dL (03-15-23 @ 18:53)  POCT Blood Glucose.: 129 mg/dL (03-15-23 @ 18:00)  POCT Blood Glucose.: 169 mg/dL (03-15-23 @ 16:59)  POCT Blood Glucose.: 169 mg/dL (03-15-23 @ 16:05)  POCT Blood Glucose.: 215 mg/dL (03-15-23 @ 14:58)  POCT Blood Glucose.: 287 mg/dL (03-15-23 @ 13:54)  POCT Blood Glucose.: 265 mg/dL (03-15-23 @ 13:02)  POCT Blood Glucose.: 298 mg/dL (03-15-23 @ 12:14)      03-18    148<H>  |  116<H>  |  9   ----------------------------<  203<H>  3.6   |  23  |  0.43<L>    eGFR: 116    Ca    8.4      03-18  Mg     1.8     03-18  Phos  3.4     03-18    TPro  5.6<L>  /  Alb  3.2<L>  /  TBili  0.2  /  DBili  x   /  AST  28  /  ALT  30  /  AlkPhos  50  03-18      A1C with Estimated Average Glucose Result: 8.0 % (03-13-23 @ 06:23)      Thyroid Stimulating Hormone, Serum: 4.43 uIU/mL (03-16-23 @ 04:02)  Thyroid Stimulating Hormone, Serum: 3.46 uIU/mL (03-15-23 @ 09:09)  Thyroid Stimulating Hormone, Serum: 8.85 uIU/mL (03-11-23 @ 05:39)   ENDOCRINE FOLLOW UP     Chief Complaint: craniopharyngioma    History:   Patient placed back on insulin gtt. [employer]  ID [id number] used. Patient reports feeling better, endorses hiccups and hand pain.    MEDICATIONS  (STANDING):  amLODIPine   Tablet 10 milliGRAM(s) Oral daily  atorvastatin 80 milliGRAM(s) Oral at bedtime  cefTRIAXone   IVPB 1000 milliGRAM(s) IV Intermittent every 24 hours  chlorhexidine 4% Liquid 1 Application(s) Topical daily  desmopressin Injectable 0.5 MICROGram(s) IV Push <User Schedule>  dextrose 5%. 1000 milliLiter(s) (75 mL/Hr) IV Continuous <Continuous>  dextrose 5%. 1000 milliLiter(s) (50 mL/Hr) IV Continuous <Continuous>  dextrose 5%. 1000 milliLiter(s) (100 mL/Hr) IV Continuous <Continuous>  dextrose 50% Injectable 50 milliLiter(s) IV Push every 15 minutes  glucagon  Injectable 1 milliGRAM(s) IntraMuscular once  hydrocortisone sodium succinate Injectable 50 milliGRAM(s) IV Push every 8 hours  insulin regular Infusion 2 Unit(s)/Hr (2 mL/Hr) IV Continuous <Continuous>  levothyroxine Injectable 50 MICROGram(s) IV Push <User Schedule>  metroNIDAZOLE  IVPB 500 milliGRAM(s) IV Intermittent every 8 hours  pantoprazole    Tablet 40 milliGRAM(s) Oral daily  polyethylene glycol 3350 17 Gram(s) Oral daily  senna 2 Tablet(s) Oral at bedtime  vancomycin  IVPB 1250 milliGRAM(s) IV Intermittent every 12 hours    MEDICATIONS  (PRN):  acetaminophen     Tablet .. 650 milliGRAM(s) Oral every 6 hours PRN Temp greater or equal to 38C (100.4F), Mild Pain (1 - 3)  dextrose Oral Gel 15 Gram(s) Oral once PRN Blood Glucose LESS THAN 70 milliGRAM(s)/deciliter  ondansetron Injectable 4 milliGRAM(s) IV Push every 6 hours PRN Nausea and/or Vomiting  sodium chloride 0.65% Nasal 1 Spray(s) Both Nostrils three times a day PRN Nasal Congestion  traMADol 25 milliGRAM(s) Oral every 4 hours PRN Moderate Pain (4 - 6)  traMADol 50 milliGRAM(s) Oral every 4 hours PRN Severe Pain (7 - 10)      Allergies    No Known Drug Allergies  S/P TRANSSPHENOIDAL SURGERY. SINONASAL PRECAUTIONS! NO NASAL SWABS OR NG TUBES. (Unknown)    Intolerances        ROS: All other systems reviewed and negative    PHYSICAL EXAM:  VITALS: T(C): 36.7 (03-18-23 @ 07:00)  T(F): 98.1 (03-18-23 @ 07:00), Max: 99.3 (03-17-23 @ 15:00)  HR: 47 (03-18-23 @ 09:00) (34 - 57)  BP: --  RR:  (12 - 17)  SpO2:  (95% - 100%)  Wt(kg): --  GENERAL: tired appearing, lying in bed  EYES: No proptosis,  anicteric  HEENT:  wrapped in bandage, dry mucous membranes  RESPIRATORY: Nonlabored respirations on room air, normal rate/effort   CARDIOVASCULAR: Did not appear cyanotic, no lower extremity swelling visualized  GI: Soft, nontender, non distended  NEURO: Answering questions appropriately, moves all extremities spontaneously  PSYCH:  flat affect, euthymic mood    POCT Blood Glucose.: 168 mg/dL (03-18-23 @ 09:02)  POCT Blood Glucose.: 179 mg/dL (03-18-23 @ 08:04)  POCT Blood Glucose.: 159 mg/dL (03-18-23 @ 07:09)  POCT Blood Glucose.: 163 mg/dL (03-18-23 @ 06:05)  POCT Blood Glucose.: 191 mg/dL (03-18-23 @ 05:28)  POCT Blood Glucose.: 207 mg/dL (03-18-23 @ 04:13)  POCT Blood Glucose.: 170 mg/dL (03-18-23 @ 03:09)  POCT Blood Glucose.: 179 mg/dL (03-18-23 @ 02:14)  POCT Blood Glucose.: 212 mg/dL (03-18-23 @ 01:16)  POCT Blood Glucose.: 202 mg/dL (03-17-23 @ 23:06)  POCT Blood Glucose.: 241 mg/dL (03-17-23 @ 18:16)  POCT Blood Glucose.: 190 mg/dL (03-17-23 @ 17:26)  POCT Blood Glucose.: 165 mg/dL (03-17-23 @ 15:58)  POCT Blood Glucose.: 173 mg/dL (03-17-23 @ 14:01)  POCT Blood Glucose.: 162 mg/dL (03-17-23 @ 13:02)  POCT Blood Glucose.: 165 mg/dL (03-17-23 @ 12:26)  POCT Blood Glucose.: 77 mg/dL (03-17-23 @ 12:01)  POCT Blood Glucose.: 99 mg/dL (03-17-23 @ 11:03)  POCT Blood Glucose.: 106 mg/dL (03-17-23 @ 10:05)  POCT Blood Glucose.: 175 mg/dL (03-17-23 @ 09:03)  POCT Blood Glucose.: 219 mg/dL (03-17-23 @ 08:09)  POCT Blood Glucose.: 206 mg/dL (03-17-23 @ 07:10)  POCT Blood Glucose.: 216 mg/dL (03-17-23 @ 06:25)  POCT Blood Glucose.: 230 mg/dL (03-17-23 @ 05:21)  POCT Blood Glucose.: 240 mg/dL (03-17-23 @ 04:19)  POCT Blood Glucose.: 298 mg/dL (03-17-23 @ 03:08)  POCT Blood Glucose.: 329 mg/dL (03-17-23 @ 02:05)  POCT Blood Glucose.: 319 mg/dL (03-17-23 @ 01:11)  POCT Blood Glucose.: 210 mg/dL (03-17-23 @ 00:26)  POCT Blood Glucose.: 358 mg/dL (03-16-23 @ 23:00)  POCT Blood Glucose.: 373 mg/dL (03-16-23 @ 22:01)  POCT Blood Glucose.: 464 mg/dL (03-16-23 @ 21:13)  POCT Blood Glucose.: 338 mg/dL (03-16-23 @ 20:32)  POCT Blood Glucose.: 190 mg/dL (03-16-23 @ 18:21)  POCT Blood Glucose.: 107 mg/dL (03-16-23 @ 05:04)  POCT Blood Glucose.: 111 mg/dL (03-16-23 @ 03:10)  POCT Blood Glucose.: 127 mg/dL (03-16-23 @ 02:02)  POCT Blood Glucose.: 112 mg/dL (03-16-23 @ 01:13)  POCT Blood Glucose.: 107 mg/dL (03-15-23 @ 23:59)  POCT Blood Glucose.: 124 mg/dL (03-15-23 @ 23:08)  POCT Blood Glucose.: 108 mg/dL (03-15-23 @ 22:05)  POCT Blood Glucose.: 123 mg/dL (03-15-23 @ 21:08)  POCT Blood Glucose.: 111 mg/dL (03-15-23 @ 20:03)  POCT Blood Glucose.: 111 mg/dL (03-15-23 @ 18:53)  POCT Blood Glucose.: 129 mg/dL (03-15-23 @ 18:00)  POCT Blood Glucose.: 169 mg/dL (03-15-23 @ 16:59)  POCT Blood Glucose.: 169 mg/dL (03-15-23 @ 16:05)  POCT Blood Glucose.: 215 mg/dL (03-15-23 @ 14:58)  POCT Blood Glucose.: 287 mg/dL (03-15-23 @ 13:54)  POCT Blood Glucose.: 265 mg/dL (03-15-23 @ 13:02)  POCT Blood Glucose.: 298 mg/dL (03-15-23 @ 12:14)      03-18    148<H>  |  116<H>  |  9   ----------------------------<  203<H>  3.6   |  23  |  0.43<L>    eGFR: 116    Ca    8.4      03-18  Mg     1.8     03-18  Phos  3.4     03-18    TPro  5.6<L>  /  Alb  3.2<L>  /  TBili  0.2  /  DBili  x   /  AST  28  /  ALT  30  /  AlkPhos  50  03-18      A1C with Estimated Average Glucose Result: 8.0 % (03-13-23 @ 06:23)      Thyroid Stimulating Hormone, Serum: 4.43 uIU/mL (03-16-23 @ 04:02)  Thyroid Stimulating Hormone, Serum: 3.46 uIU/mL (03-15-23 @ 09:09)  Thyroid Stimulating Hormone, Serum: 8.85 uIU/mL (03-11-23 @ 05:39)

## 2023-03-18 NOTE — PROGRESS NOTE ADULT - SUBJECTIVE AND OBJECTIVE BOX
SUMMARY: 51yo woman transferred from [facility] on 3/10 for suprasellar mass found on MRI for opthalmology work up--etta HA and 3wk of blurry vision. PMH HTN, DM2, HLD.     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     Overnight EVENTS: 3/16- Patient was given 0.5 mg of DDAVP . Still waking up from the surgery.     3/17- Na more stable, mental status improved. ON ddavp drip. D5 running.     3/18- Na trending down. Exam improved significantly. LD clamped. NGT placed by ENT. No more on the DDAVP gtt. On D5 and insulin gtt.       REVIEW OF SYSTEMS: [] Unable to Assess due to neurologic exam   [ x] All ROS addressed below are non-contributory, except:  Neuro: [ x] Headache [ ] Back pain [ ] Numbness [ ] Weakness [ ] Ataxia [ ] Dizziness [ ] Aphasia [ ] Dysarthria [ x] Visual disturbance  Resp: [ ] Shortness of breath/dyspnea [ ] Orthopnea [ ] Cough  CV: [ ] Chest pain [ ] Palpitation [ ] Lightheadedness [ ] Syncope  Renal: [ ] Thirst [ ] Edema  GI: [ ] Nausea [ ] Emesis [ ] Abdominal pain [ ] Constipation [ ] Diarrhea  Hem: [ ] Hematemesis [ ] bBright red blood per rectum  ID: [ ] Fever [ ] Chills [ ] Dysuria  ENT: [ ] Rhinorrhea    VITALS: [x] Reviewed    IMAGING/DATA: [x] Reviewed    IVF FLUIDS/MEDICATIONS: [x] Reviewed    ALLERGIES: Allergies    No Known Allergies    Intolerances      DEVICES:   [] Restraints [x] PIVs [] ET tube [] central line [] PICC [x] arterial line [x] humphries [] NGT/OGT [] EVD [] LD [] TIM/HMV [] LiCOX [] ICP monitor [] Trach [] PEG [] Chest Tube [] other:    EXAMINATION:  General: No acute distress  HEENT: Anicteric sclerae  Cardiac: [id number]  Lungs: Clear  Abdomen: Soft, non-tender, +BS  Extremities: No c/c/e  Skin/Incision Site: Clean, dry and intact  Neurologic: Drowsy, grimacing to pain, responding to her name, following commands. Moving all extremities spontaneously. Pupils 3 mm Bilaterally reactive.  SUMMARY: 51yo woman transferred from [facility] on 3/10 for suprasellar mass found on MRI for opthalmology work up--etta HA and 3wk of blurry vision. PMH HTN, DM2, HLD.     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     Overnight EVENTS: 3/16- Patient was given 0.5 mg of DDAVP . Still waking up from the surgery.     3/17- Na more stable, mental status improved. ON ddavp drip. D5 running.     3/18- Na trending down appropriately. Exam improved significantly. LD clamped. NGT placed by ENT. No more on the DDAVP gtt. On D5 and insulin gtt.       REVIEW OF SYSTEMS: [] Unable to Assess due to neurologic exam   [ x] All ROS addressed below are non-contributory, except:  Neuro: [ x] Headache [ ] Back pain [ ] Numbness [ ] Weakness [ ] Ataxia [ ] Dizziness [ ] Aphasia [ ] Dysarthria [ x] Visual disturbance  Resp: [ ] Shortness of breath/dyspnea [ ] Orthopnea [ ] Cough  CV: [ ] Chest pain [ ] Palpitation [ ] Lightheadedness [ ] Syncope  Renal: [ ] Thirst [ ] Edema  GI: [ ] Nausea [ ] Emesis [ ] Abdominal pain [ ] Constipation [ ] Diarrhea  Hem: [ ] Hematemesis [ ] bBright red blood per rectum  ID: [ ] Fever [ ] Chills [ ] Dysuria  ENT: [ ] Rhinorrhea    ICU Vital Signs Last 24 Hrs  T(C): 36.5 (18 Mar 2023 03:00), Max: 37.4 (17 Mar 2023 15:00)  T(F): 97.7 (18 Mar 2023 03:00), Max: 99.3 (17 Mar 2023 15:00)  HR: 36 (18 Mar 2023 06:00) (34 - 57)  BP: --  BP(mean): --  ABP: 99/42 (18 Mar 2023 06:00) (91/43 - 124/54)  ABP(mean): 63 (18 Mar 2023 06:00) (60 - 82)  RR: 12 (18 Mar 2023 06:00) (12 - 17)  SpO2: 99% (18 Mar 2023 06:00) (95% - 100%)    O2 Parameters below as of 17 Mar 2023 19:00  Patient On (Oxygen Delivery Method): room air    MEDICATIONS  (STANDING):  amLODIPine   Tablet 10 milliGRAM(s) Oral daily  atorvastatin 80 milliGRAM(s) Oral at bedtime  cefTRIAXone   IVPB 1000 milliGRAM(s) IV Intermittent every 24 hours  chlorhexidine 4% Liquid 1 Application(s) Topical daily  desmopressin Injectable 0.5 MICROGram(s) IV Push <User Schedule>  dextrose 5%. 1000 milliLiter(s) (75 mL/Hr) IV Continuous <Continuous>  dextrose 5%. 1000 milliLiter(s) (50 mL/Hr) IV Continuous <Continuous>  dextrose 5%. 1000 milliLiter(s) (100 mL/Hr) IV Continuous <Continuous>  dextrose 50% Injectable 50 milliLiter(s) IV Push every 15 minutes  glucagon  Injectable 1 milliGRAM(s) IntraMuscular once  hydrocortisone sodium succinate Injectable 50 milliGRAM(s) IV Push every 8 hours  insulin regular Infusion 2 Unit(s)/Hr (2 mL/Hr) IV Continuous <Continuous>  levothyroxine Injectable 50 MICROGram(s) IV Push <User Schedule>  metroNIDAZOLE  IVPB 500 milliGRAM(s) IV Intermittent every 8 hours  pantoprazole    Tablet 40 milliGRAM(s) Oral daily  polyethylene glycol 3350 17 Gram(s) Oral daily  potassium chloride  10 mEq/100 mL IVPB 10 milliEquivalent(s) IV Intermittent every 1 hour  senna 2 Tablet(s) Oral at bedtime  vancomycin  IVPB 1250 milliGRAM(s) IV Intermittent every 12 hours    MEDICATIONS  (PRN):  acetaminophen     Tablet .. 650 milliGRAM(s) Oral every 6 hours PRN Temp greater or equal to 38C (100.4F), Mild Pain (1 - 3)  dextrose Oral Gel 15 Gram(s) Oral once PRN Blood Glucose LESS THAN 70 milliGRAM(s)/deciliter  ondansetron Injectable 4 milliGRAM(s) IV Push every 6 hours PRN Nausea and/or Vomiting  sodium chloride 0.65% Nasal 1 Spray(s) Both Nostrils three times a day PRN Nasal Congestion  traMADol 25 milliGRAM(s) Oral every 4 hours PRN Moderate Pain (4 - 6)  traMADol 50 milliGRAM(s) Oral every 4 hours PRN Severe Pain (7 - 10)          ALLERGIES: Allergies    No Known Allergies    Intolerances      DEVICES:   [] Restraints [x] PIVs [] ET tube [] central line [] PICC [x] arterial line [x] humphries [] NGT/OGT [] EVD [] LD [] TIM/HMV [] LiCOX [] ICP monitor [] Trach [] PEG [] Chest Tube [] other:    EXAMINATION:  General: No acute distress  HEENT: Anicteric sclerae  Cardiac: [id number]  Lungs: Clear  Abdomen: Soft, non-tender, +BS  Extremities: No c/c/e  Skin/Incision Site: Clean, dry and intact  Neurologic: Drowsy, grimacing to pain, responding to her name, following commands. Moving all extremities spontaneously. Pupils 3 mm Bilaterally reactive.  SUMMARY: 51yo woman transferred from [facility] on 3/10 for suprasellar mass found on MRI for opthalmology work up--etta HA and 3wk of blurry vision. PMH HTN, DM2, HLD.     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     Overnight EVENTS: 3/16- Patient was given 0.5 mg of DDAVP . Still waking up from the surgery.     3/17- Na more stable, mental status improved. ON ddavp drip. D5 running.     3/18- Na trending down appropriately. Exam improved significantly. LD clamped. NGT placed by ENT. No more on the DDAVP gtt. On D5 and insulin gtt.     Overnight events: sinus bradycardia        REVIEW OF SYSTEMS: [] Unable to Assess due to neurologic exam   [ x] All ROS addressed below are non-contributory, except:  Neuro: [ x] Headache [ ] Back pain [ ] Numbness [ ] Weakness [ ] Ataxia [ ] Dizziness [ ] Aphasia [ ] Dysarthria [ x] Visual disturbance  Resp: [ ] Shortness of breath/dyspnea [ ] Orthopnea [ ] Cough  CV: [ ] Chest pain [ ] Palpitation [ ] Lightheadedness [ ] Syncope  Renal: [ ] Thirst [ ] Edema  GI: [ ] Nausea [ ] Emesis [ ] Abdominal pain [ ] Constipation [ ] Diarrhea  Hem: [ ] Hematemesis [ ] bBright red blood per rectum  ID: [ ] Fever [ ] Chills [ ] Dysuria  ENT: [ ] Rhinorrhea      T(C): 36.7 (03-18-23 @ 07:00), Max: 37.4 (03-17-23 @ 15:00)  HR: 47 (03-18-23 @ 09:00) (34 - 57)  BP: --  RR: 13 (03-18-23 @ 09:00) (12 - 17)  SpO2: 99% (03-18-23 @ 09:00) (95% - 100%)  03-17-23 @ 07:01  -  03-18-23 @ 07:00  --------------------------------------------------------  IN: 3980.1 mL / OUT: 740 mL / NET: 3240.1 mL    03-18-23 @ 07:01  -  03-18-23 @ 09:52  --------------------------------------------------------  IN: 451 mL / OUT: 0 mL / NET: 451 mL    acetaminophen     Tablet .. 650 milliGRAM(s) Oral every 6 hours PRN  amLODIPine   Tablet 10 milliGRAM(s) Oral daily  atorvastatin 80 milliGRAM(s) Oral at bedtime  cefTRIAXone   IVPB 1000 milliGRAM(s) IV Intermittent every 24 hours  chlorhexidine 4% Liquid 1 Application(s) Topical daily  desmopressin Injectable 0.5 MICROGram(s) IV Push <User Schedule>  dextrose 5%. 1000 milliLiter(s) IV Continuous <Continuous>  dextrose 5%. 1000 milliLiter(s) IV Continuous <Continuous>  dextrose 5%. 1000 milliLiter(s) IV Continuous <Continuous>  dextrose 50% Injectable 50 milliLiter(s) IV Push every 15 minutes  dextrose Oral Gel 15 Gram(s) Oral once PRN  glucagon  Injectable 1 milliGRAM(s) IntraMuscular once  hydrocortisone sodium succinate Injectable 50 milliGRAM(s) IV Push every 8 hours  insulin regular Infusion 2 Unit(s)/Hr IV Continuous <Continuous>  levothyroxine Injectable 50 MICROGram(s) IV Push <User Schedule>  metroNIDAZOLE  IVPB 500 milliGRAM(s) IV Intermittent every 8 hours  ondansetron Injectable 4 milliGRAM(s) IV Push every 6 hours PRN  pantoprazole    Tablet 40 milliGRAM(s) Oral daily  polyethylene glycol 3350 17 Gram(s) Oral daily  senna 2 Tablet(s) Oral at bedtime  sodium chloride 0.65% Nasal 1 Spray(s) Both Nostrils three times a day PRN  traMADol 25 milliGRAM(s) Oral every 4 hours PRN  traMADol 50 milliGRAM(s) Oral every 4 hours PRN  vancomycin  IVPB 1250 milliGRAM(s) IV Intermittent every 12 hours        ALLERGIES: Allergies    No Known Allergies    Intolerances      DEVICES:   [] Restraints [x] PIVs [] ET tube [] central line [] PICC [x] arterial line [x] humphries [] NGT/OGT [] EVD [] LD [] TIM/HMV [] LiCOX [] ICP monitor [] Trach [] PEG [] Chest Tube [] other:    EXAMINATION:  General: No acute distress  HEENT: Anicteric sclerae  Cardiac: [id number]  Lungs: Clear  Abdomen: Soft, non-tender, +BS  Extremities: No c/c/e  Skin/Incision Site: Clean, dry and intact  Neurologic: Drowsy, opens eys to verbal stimuli , responding to her name, following commands. Moving all extremities spontaneously. Pupils 3 mm Bilaterally reactive.       LABS:  Na: 148 (03-18 @ 05:26), 150 (03-18 @ 03:54), 152 (03-17 @ 23:08), 150 (03-17 @ 18:17), 153 (03-17 @ 16:19), 154 (03-17 @ 14:06), 154 (03-17 @ 12:30), 158 (03-17 @ 10:24), 156 (03-17 @ 08:09), 159 (03-17 @ 05:34), 163 (03-17 @ 03:10), 164 (03-17 @ 01:01), 163 (03-16 @ 23:08), 164 (03-16 @ 22:15), 170 (03-16 @ 20:29), >170 (03-16 @ 19:45), >170 (03-16 @ 18:32), >170 (03-16 @ 17:23), 168 (03-16 @ 11:17), 160 (03-16 @ 04:02), 157 (03-15 @ 17:25)  K: 3.6 (03-18 @ 05:26), 3.6 (03-18 @ 03:54), 3.7 (03-17 @ 23:08), 4.0 (03-17 @ 18:17), 3.5 (03-17 @ 16:19), 3.7 (03-17 @ 14:06), 4.0 (03-17 @ 12:30), 3.7 (03-17 @ 10:24), 3.3 (03-17 @ 08:09), 3.7 (03-17 @ 05:34), 3.7 (03-17 @ 03:10), 3.3 (03-17 @ 01:01), 3.3 (03-16 @ 23:08), 3.2 (03-16 @ 22:15), 3.9 (03-16 @ 20:29), 4.2 (03-16 @ 19:45), 4.2 (03-16 @ 18:32), 4.3 (03-16 @ 17:23), 3.8 (03-16 @ 11:17), 3.6 (03-16 @ 04:02), 3.9 (03-15 @ 17:25)  Cl: 116 (03-18 @ 05:26), 117 (03-18 @ 03:54), 120 (03-17 @ 23:08), 119 (03-17 @ 18:17), 122 (03-17 @ 16:19), 122 (03-17 @ 14:06), 121 (03-17 @ 12:30), 125 (03-17 @ 10:24), 124 (03-17 @ 08:09), 125 (03-17 @ 05:34), 127 (03-17 @ 03:10), 129 (03-17 @ 01:01), 130 (03-16 @ 23:08), 128 (03-16 @ 22:15), 134 (03-16 @ 20:29), >135 (03-16 @ 19:45), >135 (03-16 @ 18:32), >135 (03-16 @ 17:23), 131 (03-16 @ 11:17), 127 (03-16 @ 04:02), 125 (03-15 @ 17:25)  CO2: 23 (03-18 @ 05:26), 23 (03-18 @ 03:54), 23 (03-17 @ 23:08), 23 (03-17 @ 18:17), 24 (03-17 @ 16:19), 24 (03-17 @ 14:06), 25 (03-17 @ 12:30), 24 (03-17 @ 10:24), 24 (03-17 @ 08:09), 26 (03-17 @ 05:34), 26 (03-17 @ 03:10), 24 (03-17 @ 01:01), 25 (03-16 @ 23:08), 23 (03-16 @ 22:15), 25 (03-16 @ 20:29), 25 (03-16 @ 19:45), 25 (03-16 @ 18:32), 23 (03-16 @ 17:23), 21 (03-16 @ 11:17), 23 (03-16 @ 04:02), 22 (03-15 @ 17:25)  BUN: 9 (03-18 @ 05:26), 9 (03-18 @ 03:54), 10 (03-17 @ 23:08), 11 (03-17 @ 18:17), 11 (03-17 @ 16:19), 11 (03-17 @ 14:06), 10 (03-17 @ 12:30), 10 (03-17 @ 10:24), 9 (03-17 @ 08:09), 10 (03-17 @ 05:34), 10 (03-17 @ 03:10), 10 (03-17 @ 01:01), 10 (03-16 @ 23:08), 10 (03-16 @ 22:15), 10 (03-16 @ 20:29), 11 (03-16 @ 19:45), 10 (03-16 @ 18:32), 10 (03-16 @ 17:23), 9 (03-16 @ 11:17), 8 (03-16 @ 04:02), 9 (03-15 @ 17:25)  Cr: 0.43 (03-18 @ 05:26), 0.46 (03-18 @ 03:54), 0.49 (03-17 @ 23:08), 0.52 (03-17 @ 18:17), 0.52 (03-17 @ 16:19), 0.51 (03-17 @ 14:06), 0.51 (03-17 @ 12:30), 0.53 (03-17 @ 10:24), 0.56 (03-17 @ 08:09), 0.58 (03-17 @ 05:34), 0.64 (03-17 @ 03:10), 0.62 (03-17 @ 01:01), 0.65 (03-16 @ 23:08), 0.66 (03-16 @ 22:15), 0.70 (03-16 @ 20:29), 0.67 (03-16 @ 19:45), 0.70 (03-16 @ 18:32), 0.69 (03-16 @ 17:23), 0.74 (03-16 @ 11:17), 0.70 (03-16 @ 04:02), 0.68 (03-15 @ 17:25)  Glu: 203(03-18 @ 05:26), 212(03-18 @ 03:54), 218(03-17 @ 23:08), 281(03-17 @ 18:17), 209(03-17 @ 16:19), 206(03-17 @ 14:06), 280(03-17 @ 12:30), 110(03-17 @ 10:24), 219(03-17 @ 08:09), 258(03-17 @ 05:34), 329(03-17 @ 03:10), 404(03-17 @ 01:01), 379(03-16 @ 23:08), 447(03-16 @ 22:15), 396(03-16 @ 20:29), 336(03-16 @ 19:45), 322(03-16 @ 18:32), 290(03-16 @ 17:23), 169(03-16 @ 11:17), 130(03-16 @ 04:02), 170(03-15 @ 17:25)    Hgb: 9.0 (03-17 @ 23:08), 11.3 (03-16 @ 22:15), 11.8 (03-16 @ 04:02)  Hct: 28.9 (03-17 @ 23:08), 37.1 (03-16 @ 22:15), 37.0 (03-16 @ 04:02)  WBC: 11.81 (03-17 @ 23:08), 14.10 (03-16 @ 22:15), 14.98 (03-16 @ 04:02)  Plt: 200 (03-17 @ 23:08), 262 (03-16 @ 22:15), 230 (03-16 @ 04:02)    INR:   PTT:

## 2023-03-18 NOTE — PROGRESS NOTE ADULT - ASSESSMENT
52 yr old female with a history of hypertension, hyperlipidemia, dm type 2, no surgeries ever presents with a month of headaches and 3 weeks of blurred vision. Endocrinology consulted for evaluation of sellar mass.    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 Too low to be prolactinoma given size of the sellar mass. Suspect stalk effect. (prolactin diluted 103, undiluted 107)  -Secondary hypogonadism can be addressed as outpatient. (Lh 2.6, estradiol <5)  -3/12 AM ayush 5.9, ACTH 21.5, 3/13 am cortisol (6am) 6.9  - cosyntropin stim test: 3/14 AM cortisol at 8:15am 5.1, cosyntropin at 8:15, 8:50am cortisol 16.9, 9:25am 20.7  - s/p tsp 3/15  - IGF1 3/15 46  PLAN  - f/u neurosurgery and ophthalmology recommendations  - continue hydrocortisone 50mg q8h    DI/SIADH Watch POST OP  - Please eval for signs of DI vs SIADH postop  - now off vasopressin gtt, monitor on DDAVP 0.5 mcg IV bid, D5 at 75 cc/hr  - q6h BMP  - Strict I/Os, daily weights. if UO >250cc/hour, please check BMP and urine osm, serum osm.  If consistent with DI, patient may require DDAVP.   - May require DDAVP IVP x1 if meets any of these criteria: Na > 145, UO > 250cc/hr, Urine osm < 100 or urine specific gravity < 1.005.  - Signs and symptoms of DI and SIADH post op:  may occur in the 2-3 weeks following surgery.  Patient should check daily weights and if they experience weight gain of 2-3 pounds to call her endocrinologist.  Signs of DI include increased thirst with high urine output.    Primary Hypothyroidism   - Patient with elevated TSH (8)and Low Free T4 (0.5)  PLAN  - increase to levothyroxine 50 mcg IV daily (closer to 75 mcg PO)    Steroid hyperglycemia  T2DM with hyperglycemia  - HbA1c: 8  - Home Regimen: metformin 1000mg bid  - Endocrinologist: does not have  - patient with hyperglycemia on stress dose steroids  - on D5  PLAN  - monitor on NPH 8q6h  - moderate admelog correction scale q6h  - Goal -180  Discharge plan:  - Discharge medications: metformin 1000mg bid + GLP-1 agonist  - Patient to call doctor with persistent high or low BG at home.   - Ensure patient has glucometer, test strips and lancets on discharge.  - Recommend routine outpatient ophthalmology, podiatry and endocrinology f/u    HTN  - Home regimen: lisinopril 2.5mg daily  PLAN  - Can check urine microalbumin outpatient  - Outpatient goal BP <130/80. Management per primary team.    HLD  - Home regimen: atorvastatin 80mg daily  -   PLAN  - resume statin when able    Will schedule an appointment for the patient to follow up.  [address] [place] [address]  Phone Number: [phone]    Discussed with primary team.     Thank you for the interesting consult.    Denny Bishop MD, Endocrinology Fellow  Pager [phone] from 9am to 5pm. After hours and on weekends, please call [phone]. 52 yr old female with a history of hypertension, hyperlipidemia, dm type 2, no surgeries ever presents with a month of headaches and 3 weeks of blurred vision. Endocrinology consulted for evaluation of sellar mass.    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 Too low to be prolactinoma given size of the sellar mass. Suspect stalk effect. (prolactin diluted 103, undiluted 107)  -Secondary hypogonadism can be addressed as outpatient. (Lh 2.6, estradiol <5)  -3/12 AM ayush 5.9, ACTH 21.5, 3/13 am cortisol (6am) 6.9  - cosyntropin stim test: 3/14 AM cortisol at 8:15am 5.1, cosyntropin at 8:15, 8:50am cortisol 16.9, 9:25am 20.7  - s/p tsp 3/15  - IGF1 3/15 46  PLAN  - f/u neurosurgery and ophthalmology recommendations  - can wean to hydrocortisone 50mg IV q12h tonight    DI/SIADH Watch POST OP  - Please eval for signs of DI vs SIADH postop  - now off vasopressin gtt, monitor on DDAVP 0.5 mcg IV bid, off D5 as Na improved to 145  - q6h BMP  - Strict I/Os, daily weights. if UO >250cc/hour, please check BMP and urine osm, serum osm.  If consistent with DI, patient may require DDAVP.   - May require DDAVP IVP x1 if meets any of these criteria: Na > 145, UO > 250cc/hr, Urine osm < 100 or urine specific gravity < 1.005.  - Signs and symptoms of DI and SIADH post op:  may occur in the 2-3 weeks following surgery.  Patient should check daily weights and if they experience weight gain of 2-3 pounds to call her endocrinologist.  Signs of DI include increased thirst with high urine output.    Primary Hypothyroidism   - Patient with elevated TSH (8)and Low Free T4 (0.5)  PLAN  - continue levothyroxine 50 mcg IV daily (closer to 75 mcg PO)    Steroid hyperglycemia  T2DM with hyperglycemia  - HbA1c: 8  - Home Regimen: metformin 1000mg bid  - Endocrinologist: does not have  - patient with hyperglycemia on stress dose steroids  - now on tube feeds  PLAN  - monitor on NPH 8q6h, just taken off insulin gtt  - moderate admelog correction scale q6h  - Goal -180  Discharge plan:  - Discharge medications: metformin 1000mg bid + GLP-1 agonist  - Patient to call doctor with persistent high or low BG at home.   - Ensure patient has glucometer, test strips and lancets on discharge.  - Recommend routine outpatient ophthalmology, podiatry and endocrinology f/u    HTN  - Home regimen: lisinopril 2.5mg daily  PLAN  - Can check urine microalbumin outpatient  - Outpatient goal BP <130/80. Management per primary team.    HLD  - Home regimen: atorvastatin 80mg daily  -   PLAN  - resume statin when able    Will schedule an appointment for the patient to follow up.  [address] [place] [address]  Phone Number: [phone]    Discussed with primary team.     Thank you for the interesting consult.    Denny Bishop MD, Endocrinology Fellow  Pager [phone] from 9am to 5pm. After hours and on weekends, please call [phone]. 52 yr old female with a history of hypertension, hyperlipidemia, dm type 2, no surgeries ever presents with a month of headaches and 3 weeks of blurred vision. Endocrinology consulted for evaluation of sellar mass.    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 Too low to be prolactinoma given size of the sellar mass. Suspect stalk effect. (prolactin diluted 103, undiluted 107)  -Secondary hypogonadism can be addressed as outpatient. (Lh 2.6, estradiol <5)  -3/12 AM ayush 5.9, ACTH 21.5, 3/13 am cortisol (6am) 6.9  - cosyntropin stim test: 3/14 AM cortisol at 8:15am 5.1, cosyntropin at 8:15, 8:50am cortisol 16.9, 9:25am 20.7  - s/p tsp 3/15  - IGF1 3/15 46  PLAN  - f/u neurosurgery and ophthalmology recommendations  - can wean to hydrocortisone 50mg IV q12h tonight    DI/SIADH Watch POST OP  - Please eval for signs of DI vs SIADH postop  - now off vasopressin gtt, monitor on DDAVP 0.5 mcg IV bid, off D5 as Na improved to 145  - q6h BMP  - Strict I/Os, daily weights. if UO >250cc/hour, please check BMP and urine osm, serum osm.  If consistent with DI, patient may require DDAVP.   - May require DDAVP IVP x1 if meets any of these criteria: Na > 145, UO > 250cc/hr, Urine osm < 100 or urine specific gravity < 1.005.  - Signs and symptoms of DI and SIADH post op:  may occur in the 2-3 weeks following surgery.  Patient should check daily weights and if they experience weight gain of 2-3 pounds to call her endocrinologist.  Signs of DI include increased thirst with high urine output.    Primary Hypothyroidism   - Patient with elevated TSH (8)and Low Free T4 (0.5)  PLAN  - continue levothyroxine 50 mcg IV daily (closer to 75 mcg PO)  - can check free thyroxine and TSH as having bradycardia    Steroid hyperglycemia  T2DM with hyperglycemia  - HbA1c: 8  - Home Regimen: metformin 1000mg bid  - Endocrinologist: does not have  - patient with hyperglycemia on stress dose steroids  - now on tube feeds  PLAN  - monitor on NPH 8q6h, just taken off insulin gtt  - moderate admelog correction scale q6h  - Goal -180  Discharge plan:  - Discharge medications: metformin 1000mg bid + GLP-1 agonist  - Patient to call doctor with persistent high or low BG at home.   - Ensure patient has glucometer, test strips and lancets on discharge.  - Recommend routine outpatient ophthalmology, podiatry and endocrinology f/u    HTN  - Home regimen: lisinopril 2.5mg daily  PLAN  - Can check urine microalbumin outpatient  - Outpatient goal BP <130/80. Management per primary team.    HLD  - Home regimen: atorvastatin 80mg daily  -   PLAN  - resume statin when able    Will schedule an appointment for the patient to follow up.  [address] [place] [address]  Phone Number: [phone]    Discussed with primary team.     Thank you for the interesting consult.    Denny Bishop MD, Endocrinology Fellow  Pager [phone] from 9am to 5pm. After hours and on weekends, please call [phone].

## 2023-03-18 NOTE — EEG REPORT - NS EEG TEXT BOX
LORRIE REZA [id number]     Study Date: 		03-17-23 15:24 to 03-18-23 08:00  Duration x Hours: 16:30  --------------------------------------------------------------------------------------------------  History:  CC/ HPI Patient is a 52y old  Female who presents with a chief complaint of Craniopharyngioma (16 Mar 2023 10:52)    MEDICATIONS  (STANDING):  amLODIPine   Tablet 10 milliGRAM(s) Oral daily  atorvastatin 80 milliGRAM(s) Oral at bedtime  cefTRIAXone   IVPB 1000 milliGRAM(s) IV Intermittent every 24 hours  chlorhexidine 4% Liquid 1 Application(s) Topical daily  desmopressin Injectable 0.5 MICROGram(s) IV Push <User Schedule>  dextrose 5%. 1000 milliLiter(s) (75 mL/Hr) IV Continuous <Continuous>  dextrose 5%. 1000 milliLiter(s) (50 mL/Hr) IV Continuous <Continuous>  dextrose 5%. 1000 milliLiter(s) (100 mL/Hr) IV Continuous <Continuous>  dextrose 50% Injectable 50 milliLiter(s) IV Push every 15 minutes  glucagon  Injectable 1 milliGRAM(s) IntraMuscular once  hydrocortisone sodium succinate Injectable 50 milliGRAM(s) IV Push every 8 hours  insulin regular Infusion 2 Unit(s)/Hr (2 mL/Hr) IV Continuous <Continuous>  levothyroxine Injectable 50 MICROGram(s) IV Push <User Schedule>  metroNIDAZOLE  IVPB 500 milliGRAM(s) IV Intermittent every 8 hours  pantoprazole    Tablet 40 milliGRAM(s) Oral daily  polyethylene glycol 3350 17 Gram(s) Oral daily  senna 2 Tablet(s) Oral at bedtime  vancomycin  IVPB 1250 milliGRAM(s) IV Intermittent every 12 hours    --------------------------------------------------------------------------------------------------  Study Interpretation:    [[[Abbreviation Key:  PDR=alpha rhythm/posterior dominant rhythm. A-P=anterior posterior.  Amplitude: ‘very low’:<20; ‘low’:20-49; ‘medium’:; ‘high’:>150uV.  Persistence for periodic/rhythmic patterns (% of epoch) ‘rare’:<1%; ‘occasional’:1-10%; ‘frequent’:10-50%; ‘abundant’:50-90%; ‘continuous’:>90%.  Persistence for sporadic discharges: ‘rare’:<1/hr; ‘occasional’:1/min-1/hr; ‘frequent’:>1/min; ‘abundant’:>1/10 sec.  RPP=rhythmic and periodic patterns; GRDA=generalized rhythmic delta activity; FIRDA=frontal intermittent GRDA; LRDA=lateralized rhythmic delta activity; TIRDA=temporal intermittent rhythmic delta activity;  LPD=PLED=lateralized periodic discharges; GPD=generalized periodic discharges; BIPDs =bilateral independent periodic discharges; Mf=multifocal; SIRPDs=stimulus induced rhythmic, periodic, or ictal appearing discharges; BIRDs=brief potentially ictal rhythmic discharges >4 Hz, lasting .5-10s; PFA (paroxysmal bursts >13 Hz or =8 Hz <10s).  Modifiers: +F=with fast component; +S=with spike component; +R=with rhythmic component.  S-B=burst suppression pattern.  Max=maximal. N1-drowsy; N2-stage II sleep; N3-slow wave sleep. SSS/BETS=small sharp spikes/benign epileptiform transients of sleep. HV=hyperventilation; PS=photic stimulation]]]    Daily EEG Visual Analysis    FINDINGS:      Background:  Continuous: continuous  Symmetry: symmetric  PDR: 6 Hz activity, with amplitude to 40 uV, that attenuated to eye opening.  Low amplitude frontal beta noted in wakefulness.  Reactivity: present  Voltage: normal, mostly 20-150uV  Anterior Posterior Gradient: present  Other background findings: none  Breach: absent    Background Slowing:  Generalized slowing: Continuous diffuse delta and theta. Intermittent GRDA 1hz.   Focal slowing: none was present.    State Changes:   -Drowsiness noted with increased slowing, attenuation of fast activity, vertex transients.  -Present with N2 sleep transients with symmetric spindles and K-complexes.    Sporadic Epileptiform Discharges:    None    Rhythmic and Periodic Patterns (RPPs):  None     Electrographic and Electroclinical seizures:  None    Other Clinical Events:  None    Activation Procedures:   -Hyperventilation was not performed.    -Photic stimulation was performed and did not elicit any abnormalities.      Artifacts:  Intermittent myogenic and movement artifacts were noted.    ECG:  The heart rate on single channel ECG was predominantly between 60-70 BPM.    EEG Classification / Summary:  Abnormal  EEG in the awake / drowsy / asleep state(s).    Background slowing, generalized, moderate    Clinical Impression:    Moderate diffuse/multifocal cerebral dysfunction, not specific as to etiology  There were no epileptiform abnormalities recorded.      This is a prelim report only, pending review with attending prior to finalization.        -------------------------------------------------------------------------------------------------------  [facility] EEG Reading Room Ph#: [phone]  Epilepsy Answering Service after 5PM and before 8:30AM: Ph#: [phone]    Evelio Carbajal M.D.   Epilepsy fellow LORRIE REZA [id number]     Study Date: 		03-17-23 15:24 to 03-18-23 12:00  Duration x Hours: 20:30  --------------------------------------------------------------------------------------------------  History:  CC/ HPI Patient is a 52y old  Female who presents with a chief complaint of Craniopharyngioma (16 Mar 2023 10:52)    MEDICATIONS  (STANDING):  amLODIPine   Tablet 10 milliGRAM(s) Oral daily  atorvastatin 80 milliGRAM(s) Oral at bedtime  cefTRIAXone   IVPB 1000 milliGRAM(s) IV Intermittent every 24 hours  hydrocortisone sodium succinate Injectable 50 milliGRAM(s) IV Push every 8 hours  insulin regular Infusion 2 Unit(s)/Hr (2 mL/Hr) IV Continuous <Continuous>  levothyroxine Injectable 50 MICROGram(s) IV Push <User Schedule>  metroNIDAZOLE  IVPB 500 milliGRAM(s) IV Intermittent every 8 hours  pantoprazole    Tablet 40 milliGRAM(s) Oral daily  polyethylene glycol 3350 17 Gram(s) Oral daily  senna 2 Tablet(s) Oral at bedtime  vancomycin  IVPB 1250 milliGRAM(s) IV Intermittent every 12 hours    --------------------------------------------------------------------------------------------------  Study Interpretation:    [[[Abbreviation Key:  PDR=alpha rhythm/posterior dominant rhythm. A-P=anterior posterior.  Amplitude: ‘very low’:<20; ‘low’:20-49; ‘medium’:; ‘high’:>150uV.  Persistence for periodic/rhythmic patterns (% of epoch) ‘rare’:<1%; ‘occasional’:1-10%; ‘frequent’:10-50%; ‘abundant’:50-90%; ‘continuous’:>90%.  Persistence for sporadic discharges: ‘rare’:<1/hr; ‘occasional’:1/min-1/hr; ‘frequent’:>1/min; ‘abundant’:>1/10 sec.  RPP=rhythmic and periodic patterns; GRDA=generalized rhythmic delta activity; FIRDA=frontal intermittent GRDA; LRDA=lateralized rhythmic delta activity; TIRDA=temporal intermittent rhythmic delta activity;  LPD=PLED=lateralized periodic discharges; GPD=generalized periodic discharges; BIPDs =bilateral independent periodic discharges; Mf=multifocal; SIRPDs=stimulus induced rhythmic, periodic, or ictal appearing discharges; BIRDs=brief potentially ictal rhythmic discharges >4 Hz, lasting .5-10s; PFA (paroxysmal bursts >13 Hz or =8 Hz <10s).  Modifiers: +F=with fast component; +S=with spike component; +R=with rhythmic component.  S-B=burst suppression pattern.  Max=maximal. N1-drowsy; N2-stage II sleep; N3-slow wave sleep. SSS/BETS=small sharp spikes/benign epileptiform transients of sleep. HV=hyperventilation; PS=photic stimulation]]]    Daily EEG Visual Analysis    FINDINGS:      Background:  Continuous: continuous  Symmetry: symmetric  PDR: 6 Hz activity, with amplitude to 40 uV, that attenuated to eye opening.  Low amplitude frontal beta noted in wakefulness.  Reactivity: present  Voltage: normal, mostly 20-150uV  Anterior Posterior Gradient: present  Other background findings: none  Breach: absent    Background Slowing:  Generalized slowing: Continuous diffuse delta and theta. Intermittent GRDA 1hz.   Focal slowing: none was present.    State Changes:   -Drowsiness noted with increased slowing, attenuation of fast activity, vertex transients.  -Present with N2 sleep transients with symmetric spindles and K-complexes.    Sporadic Epileptiform Discharges:    None    Rhythmic and Periodic Patterns (RPPs):  None     Electrographic and Electroclinical seizures:  None    Other Clinical Events:  None    Activation Procedures:   -Hyperventilation was not performed.    -Photic stimulation was performed and did not elicit any abnormalities.      Artifacts:  Intermittent myogenic and movement artifacts were noted.    ECG:  The heart rate on single channel ECG was predominantly between 60-70 BPM.    EEG Classification / Summary:  Abnormal  EEG in the awake / drowsy / asleep state(s).  Background slowing, generalized, moderate    Clinical Impression:    Moderate diffuse/multifocal cerebral dysfunction, not specific as to etiology  There were no epileptiform abnormalities recorded.      -------------------------------------------------------------------------------------------------------  [facility] EEG Reading Room Ph#: [phone]  Epilepsy Answering Service after 5PM and before 8:30AM: Ph#: [phone]    Evelio Carbajal M.D.   Epilepsy fellow

## 2023-03-18 NOTE — PROGRESS NOTE ADULT - ASSESSMENT
CTH AM, LD clamped for now   F/u DDAVP doses with Q4h labs   HOB to 30 - no bending forward or provoke  Vanco Trough 5AM

## 2023-03-18 NOTE — PROGRESS NOTE ADULT - SUBJECTIVE AND OBJECTIVE BOX
DATE OF SERVICE: 03-18-23 @ 15:00    Subjective: Patient seen and examined. No new events except as noted.     SUBJECTIVE/ROS:  ROS limited      MEDICATIONS:  MEDICATIONS  (STANDING):  amLODIPine   Tablet 10 milliGRAM(s) Oral daily  atorvastatin 80 milliGRAM(s) Oral at bedtime  chlorhexidine 4% Liquid 1 Application(s) Topical daily  desmopressin Injectable 0.5 MICROGram(s) IV Push <User Schedule>  dextrose 5%. 1000 milliLiter(s) (50 mL/Hr) IV Continuous <Continuous>  dextrose 5%. 1000 milliLiter(s) (100 mL/Hr) IV Continuous <Continuous>  dextrose 50% Injectable 50 milliLiter(s) IV Push every 15 minutes  enoxaparin Injectable 40 milliGRAM(s) SubCutaneous <User Schedule>  glucagon  Injectable 1 milliGRAM(s) IntraMuscular once  hydrocortisone sodium succinate Injectable 50 milliGRAM(s) IV Push every 8 hours  insulin lispro (ADMELOG) corrective regimen sliding scale   SubCutaneous every 6 hours  insulin NPH human recombinant 8 Unit(s) SubCutaneous every 6 hours  levothyroxine Injectable 50 MICROGram(s) IV Push <User Schedule>  pantoprazole    Tablet 40 milliGRAM(s) Oral daily  polyethylene glycol 3350 17 Gram(s) Oral daily  senna 2 Tablet(s) Oral at bedtime  sodium chloride 0.225%. 1000 milliLiter(s) (90 mL/Hr) IV Continuous <Continuous>      PHYSICAL EXAM:  T(C): 36.1 (03-18-23 @ 15:00), Max: 37.3 (03-17-23 @ 23:00)  HR: 38 (03-18-23 @ 15:00) (34 - 57)  BP: --  RR: 14 (03-18-23 @ 15:00) (12 - 19)  SpO2: 99% (03-18-23 @ 15:00) (96% - 100%)  Wt(kg): --  I&O's Summary    17 Mar 2023 07:01  -  18 Mar 2023 07:00  --------------------------------------------------------  IN: 3980.1 mL / OUT: 740 mL / NET: 3240.1 mL    18 Mar 2023 07:01  -  18 Mar 2023 15:00  --------------------------------------------------------  IN: 1708 mL / OUT: 200 mL / NET: 1508 mL            JVP: Normal  Neck: supple  Lung: clear   CV: S1 S2 , Murmur:  Abd: soft  Ext: No edema  neuro: Awake   Psych: flat affect  Skin: normal``    LABS/DATA:    CARDIAC MARKERS:                                9.0    11.81 )-----------( 200      ( 17 Mar 2023 23:08 )             28.9     03-18    145  |  113<H>  |  9   ----------------------------<  202<H>  3.4<L>   |  22  |  0.37<L>    Ca    8.3<L>      18 Mar 2023 10:36  Phos  3.4     03-18  Mg     1.8     03-18    TPro  5.6<L>  /  Alb  3.2<L>  /  TBili  0.2  /  DBili  x   /  AST  28  /  ALT  30  /  AlkPhos  50  03-18    proBNP:   Lipid Profile:   HgA1c:   TSH:     TELE:  EKG:

## 2023-03-18 NOTE — PROGRESS NOTE ADULT - ASSESSMENT
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 Pt doing well. Still having a slight h/a and nasal pain that is relieved with pain meds.

## 2023-03-18 NOTE — PROGRESS NOTE ADULT - SUBJECTIVE AND OBJECTIVE BOX
Interval events:    VITALS:  T(C): , Max: 37.3 (03-17-23 @ 23:00)  HR:  (34 - 57)  BP: --  ABP:  (99/42 - 134/61)  RR:  (12 - 19)  SpO2:  (96% - 100%)  Wt(kg): --      03-17-23 @ 07:01  -  03-18-23 @ 07:00  --------------------------------------------------------  IN: 3980.1 mL / OUT: 740 mL / NET: 3240.1 mL    03-18-23 @ 07:01  -  03-18-23 @ 18:18  --------------------------------------------------------  IN: 2368 mL / OUT: 200 mL / NET: 2168 mL      LABS:  Na: 141 (03-18 @ 17:05), 145 (03-18 @ 10:36), 148 (03-18 @ 05:26), 150 (03-18 @ 03:54), 152 (03-17 @ 23:08), 150 (03-17 @ 18:17), 153 (03-17 @ 16:19), 154 (03-17 @ 14:06), 154 (03-17 @ 12:30), 158 (03-17 @ 10:24), 156 (03-17 @ 08:09), 159 (03-17 @ 05:34), 163 (03-17 @ 03:10), 164 (03-17 @ 01:01), 163 (03-16 @ 23:08), 164 (03-16 @ 22:15), 170 (03-16 @ 20:29), >170 (03-16 @ 19:45), >170 (03-16 @ 18:32), >170 (03-16 @ 17:23), 168 (03-16 @ 11:17), 160 (03-16 @ 04:02)  K: 3.6 (03-18 @ 17:05), 3.4 (03-18 @ 10:36), 3.6 (03-18 @ 05:26), 3.6 (03-18 @ 03:54), 3.7 (03-17 @ 23:08), 4.0 (03-17 @ 18:17), 3.5 (03-17 @ 16:19), 3.7 (03-17 @ 14:06), 4.0 (03-17 @ 12:30), 3.7 (03-17 @ 10:24), 3.3 (03-17 @ 08:09), 3.7 (03-17 @ 05:34), 3.7 (03-17 @ 03:10), 3.3 (03-17 @ 01:01), 3.3 (03-16 @ 23:08), 3.2 (03-16 @ 22:15), 3.9 (03-16 @ 20:29), 4.2 (03-16 @ 19:45), 4.2 (03-16 @ 18:32), 4.3 (03-16 @ 17:23), 3.8 (03-16 @ 11:17), 3.6 (03-16 @ 04:02)  Cl: 111 (03-18 @ 17:05), 113 (03-18 @ 10:36), 116 (03-18 @ 05:26), 117 (03-18 @ 03:54), 120 (03-17 @ 23:08), 119 (03-17 @ 18:17), 122 (03-17 @ 16:19), 122 (03-17 @ 14:06), 121 (03-17 @ 12:30), 125 (03-17 @ 10:24), 124 (03-17 @ 08:09), 125 (03-17 @ 05:34), 127 (03-17 @ 03:10), 129 (03-17 @ 01:01), 130 (03-16 @ 23:08), 128 (03-16 @ 22:15), 134 (03-16 @ 20:29), >135 (03-16 @ 19:45), >135 (03-16 @ 18:32), >135 (03-16 @ 17:23), 131 (03-16 @ 11:17), 127 (03-16 @ 04:02)  CO2: 20 (03-18 @ 17:05), 22 (03-18 @ 10:36), 23 (03-18 @ 05:26), 23 (03-18 @ 03:54), 23 (03-17 @ 23:08), 23 (03-17 @ 18:17), 24 (03-17 @ 16:19), 24 (03-17 @ 14:06), 25 (03-17 @ 12:30), 24 (03-17 @ 10:24), 24 (03-17 @ 08:09), 26 (03-17 @ 05:34), 26 (03-17 @ 03:10), 24 (03-17 @ 01:01), 25 (03-16 @ 23:08), 23 (03-16 @ 22:15), 25 (03-16 @ 20:29), 25 (03-16 @ 19:45), 25 (03-16 @ 18:32), 23 (03-16 @ 17:23), 21 (03-16 @ 11:17), 23 (03-16 @ 04:02)  BUN: 10 (03-18 @ 17:05), 9 (03-18 @ 10:36), 9 (03-18 @ 05:26), 9 (03-18 @ 03:54), 10 (03-17 @ 23:08), 11 (03-17 @ 18:17), 11 (03-17 @ 16:19), 11 (03-17 @ 14:06), 10 (03-17 @ 12:30), 10 (03-17 @ 10:24), 9 (03-17 @ 08:09), 10 (03-17 @ 05:34), 10 (03-17 @ 03:10), 10 (03-17 @ 01:01), 10 (03-16 @ 23:08), 10 (03-16 @ 22:15), 10 (03-16 @ 20:29), 11 (03-16 @ 19:45), 10 (03-16 @ 18:32), 10 (03-16 @ 17:23), 9 (03-16 @ 11:17), 8 (03-16 @ 04:02)  Cr: 0.39 (03-18 @ 17:05), 0.37 (03-18 @ 10:36), 0.43 (03-18 @ 05:26), 0.46 (03-18 @ 03:54), 0.49 (03-17 @ 23:08), 0.52 (03-17 @ 18:17), 0.52 (03-17 @ 16:19), 0.51 (03-17 @ 14:06), 0.51 (03-17 @ 12:30), 0.53 (03-17 @ 10:24), 0.56 (03-17 @ 08:09), 0.58 (03-17 @ 05:34), 0.64 (03-17 @ 03:10), 0.62 (03-17 @ 01:01), 0.65 (03-16 @ 23:08), 0.66 (03-16 @ 22:15), 0.70 (03-16 @ 20:29), 0.67 (03-16 @ 19:45), 0.70 (03-16 @ 18:32), 0.69 (03-16 @ 17:23), 0.74 (03-16 @ 11:17), 0.70 (03-16 @ 04:02)  Glu: 175(03-18 @ 17:05), 202(03-18 @ 10:36), 203(03-18 @ 05:26), 212(03-18 @ 03:54), 218(03-17 @ 23:08), 281(03-17 @ 18:17), 209(03-17 @ 16:19), 206(03-17 @ 14:06), 280(03-17 @ 12:30), 110(03-17 @ 10:24), 219(03-17 @ 08:09), 258(03-17 @ 05:34), 329(03-17 @ 03:10), 404(03-17 @ 01:01), 379(03-16 @ 23:08), 447(03-16 @ 22:15), 396(03-16 @ 20:29), 336(03-16 @ 19:45), 322(03-16 @ 18:32), 290(03-16 @ 17:23), 169(03-16 @ 11:17), 130(03-16 @ 04:02)    Hgb: 9.0 (03-17 @ 23:08), 11.3 (03-16 @ 22:15), 11.8 (03-16 @ 04:02)  Hct: 28.9 (03-17 @ 23:08), 37.1 (03-16 @ 22:15), 37.0 (03-16 @ 04:02)  WBC: 11.81 (03-17 @ 23:08), 14.10 (03-16 @ 22:15), 14.98 (03-16 @ 04:02)  Plt: 200 (03-17 @ 23:08), 262 (03-16 @ 22:15), 230 (03-16 @ 04:02)    INR:   PTT:     MEDICATIONS:  acetaminophen     Tablet .. 650 milliGRAM(s) Oral every 6 hours PRN  amLODIPine   Tablet 10 milliGRAM(s) Oral daily  atorvastatin 80 milliGRAM(s) Oral at bedtime  chlorhexidine 4% Liquid 1 Application(s) Topical daily  desmopressin Injectable 0.5 MICROGram(s) IV Push <User Schedule>  dextrose 5%. 1000 milliLiter(s) IV Continuous <Continuous>  dextrose 5%. 1000 milliLiter(s) IV Continuous <Continuous>  dextrose 50% Injectable 50 milliLiter(s) IV Push every 15 minutes  dextrose Oral Gel 15 Gram(s) Oral once PRN  enoxaparin Injectable 40 milliGRAM(s) SubCutaneous <User Schedule>  glucagon  Injectable 1 milliGRAM(s) IntraMuscular once  hydrocortisone sodium succinate Injectable 50 milliGRAM(s) IV Push every 8 hours  insulin lispro (ADMELOG) corrective regimen sliding scale   SubCutaneous every 6 hours  insulin NPH human recombinant 8 Unit(s) SubCutaneous every 6 hours  levothyroxine Injectable 50 MICROGram(s) IV Push <User Schedule>  ondansetron Injectable 4 milliGRAM(s) IV Push every 6 hours PRN  pantoprazole    Tablet 40 milliGRAM(s) Oral daily  polyethylene glycol 3350 17 Gram(s) Oral daily  senna 2 Tablet(s) Oral at bedtime  sodium chloride 0.225%. 1000 milliLiter(s) IV Continuous <Continuous>  sodium chloride 0.65% Nasal 1 Spray(s) Both Nostrils three times a day PRN  traMADol 25 milliGRAM(s) Oral every 4 hours PRN  traMADol 50 milliGRAM(s) Oral every 4 hours PRN    EXAMINATION:  General:  in NAD  HEENT:  MMM  Neuro:  slightly lethargic but easily arouses to voice, oriented to self, hospital, month and year, follows commands, PERRL, EOMI, face symmetric, no PD, 5/5 in b/l triceps, no drift in LEs  Cards:  bradycardic, regular   Respiratory:  no respiratory distress  Abdomen:  soft  Extremities:  no LE edema    Assessment/Plan:   51 yo woman with h/o HTN, DM and HLD, 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     c/w LD clamped   pituitary precautions   c/w VEEG for episode of L sided weakness  q4h BMP, d/c D5W at 75cc/hr   start  mg q4h (same amount of water as per IV)   on ddAVP 0.5 mcg IV BID   c/w hydrocort 50 mg q8h and synthroid 50 mcg IV  c/w vanc, ceftriaxone and flagyl as per nrsg  NPH 8 U q6h and moderate ISS  With NG placed by ENT, start tube feeds     c/w kristel Nayak  Neurocritical Care Attending  Interval events:    VITALS:  T(C): , Max: 37.3 (03-17-23 @ 23:00)  HR:  (34 - 57)  BP: --  ABP:  (99/42 - 134/61)  RR:  (12 - 19)  SpO2:  (96% - 100%)  Wt(kg): --      03-17-23 @ 07:01  -  03-18-23 @ 07:00  --------------------------------------------------------  IN: 3980.1 mL / OUT: 740 mL / NET: 3240.1 mL    03-18-23 @ 07:01  -  03-18-23 @ 18:18  --------------------------------------------------------  IN: 2368 mL / OUT: 200 mL / NET: 2168 mL      LABS:  Na: 141 (03-18 @ 17:05), 145 (03-18 @ 10:36), 148 (03-18 @ 05:26), 150 (03-18 @ 03:54), 152 (03-17 @ 23:08), 150 (03-17 @ 18:17), 153 (03-17 @ 16:19), 154 (03-17 @ 14:06), 154 (03-17 @ 12:30), 158 (03-17 @ 10:24), 156 (03-17 @ 08:09), 159 (03-17 @ 05:34), 163 (03-17 @ 03:10), 164 (03-17 @ 01:01), 163 (03-16 @ 23:08), 164 (03-16 @ 22:15), 170 (03-16 @ 20:29), >170 (03-16 @ 19:45), >170 (03-16 @ 18:32), >170 (03-16 @ 17:23), 168 (03-16 @ 11:17), 160 (03-16 @ 04:02)  K: 3.6 (03-18 @ 17:05), 3.4 (03-18 @ 10:36), 3.6 (03-18 @ 05:26), 3.6 (03-18 @ 03:54), 3.7 (03-17 @ 23:08), 4.0 (03-17 @ 18:17), 3.5 (03-17 @ 16:19), 3.7 (03-17 @ 14:06), 4.0 (03-17 @ 12:30), 3.7 (03-17 @ 10:24), 3.3 (03-17 @ 08:09), 3.7 (03-17 @ 05:34), 3.7 (03-17 @ 03:10), 3.3 (03-17 @ 01:01), 3.3 (03-16 @ 23:08), 3.2 (03-16 @ 22:15), 3.9 (03-16 @ 20:29), 4.2 (03-16 @ 19:45), 4.2 (03-16 @ 18:32), 4.3 (03-16 @ 17:23), 3.8 (03-16 @ 11:17), 3.6 (03-16 @ 04:02)  Cl: 111 (03-18 @ 17:05), 113 (03-18 @ 10:36), 116 (03-18 @ 05:26), 117 (03-18 @ 03:54), 120 (03-17 @ 23:08), 119 (03-17 @ 18:17), 122 (03-17 @ 16:19), 122 (03-17 @ 14:06), 121 (03-17 @ 12:30), 125 (03-17 @ 10:24), 124 (03-17 @ 08:09), 125 (03-17 @ 05:34), 127 (03-17 @ 03:10), 129 (03-17 @ 01:01), 130 (03-16 @ 23:08), 128 (03-16 @ 22:15), 134 (03-16 @ 20:29), >135 (03-16 @ 19:45), >135 (03-16 @ 18:32), >135 (03-16 @ 17:23), 131 (03-16 @ 11:17), 127 (03-16 @ 04:02)  CO2: 20 (03-18 @ 17:05), 22 (03-18 @ 10:36), 23 (03-18 @ 05:26), 23 (03-18 @ 03:54), 23 (03-17 @ 23:08), 23 (03-17 @ 18:17), 24 (03-17 @ 16:19), 24 (03-17 @ 14:06), 25 (03-17 @ 12:30), 24 (03-17 @ 10:24), 24 (03-17 @ 08:09), 26 (03-17 @ 05:34), 26 (03-17 @ 03:10), 24 (03-17 @ 01:01), 25 (03-16 @ 23:08), 23 (03-16 @ 22:15), 25 (03-16 @ 20:29), 25 (03-16 @ 19:45), 25 (03-16 @ 18:32), 23 (03-16 @ 17:23), 21 (03-16 @ 11:17), 23 (03-16 @ 04:02)  BUN: 10 (03-18 @ 17:05), 9 (03-18 @ 10:36), 9 (03-18 @ 05:26), 9 (03-18 @ 03:54), 10 (03-17 @ 23:08), 11 (03-17 @ 18:17), 11 (03-17 @ 16:19), 11 (03-17 @ 14:06), 10 (03-17 @ 12:30), 10 (03-17 @ 10:24), 9 (03-17 @ 08:09), 10 (03-17 @ 05:34), 10 (03-17 @ 03:10), 10 (03-17 @ 01:01), 10 (03-16 @ 23:08), 10 (03-16 @ 22:15), 10 (03-16 @ 20:29), 11 (03-16 @ 19:45), 10 (03-16 @ 18:32), 10 (03-16 @ 17:23), 9 (03-16 @ 11:17), 8 (03-16 @ 04:02)  Cr: 0.39 (03-18 @ 17:05), 0.37 (03-18 @ 10:36), 0.43 (03-18 @ 05:26), 0.46 (03-18 @ 03:54), 0.49 (03-17 @ 23:08), 0.52 (03-17 @ 18:17), 0.52 (03-17 @ 16:19), 0.51 (03-17 @ 14:06), 0.51 (03-17 @ 12:30), 0.53 (03-17 @ 10:24), 0.56 (03-17 @ 08:09), 0.58 (03-17 @ 05:34), 0.64 (03-17 @ 03:10), 0.62 (03-17 @ 01:01), 0.65 (03-16 @ 23:08), 0.66 (03-16 @ 22:15), 0.70 (03-16 @ 20:29), 0.67 (03-16 @ 19:45), 0.70 (03-16 @ 18:32), 0.69 (03-16 @ 17:23), 0.74 (03-16 @ 11:17), 0.70 (03-16 @ 04:02)  Glu: 175(03-18 @ 17:05), 202(03-18 @ 10:36), 203(03-18 @ 05:26), 212(03-18 @ 03:54), 218(03-17 @ 23:08), 281(03-17 @ 18:17), 209(03-17 @ 16:19), 206(03-17 @ 14:06), 280(03-17 @ 12:30), 110(03-17 @ 10:24), 219(03-17 @ 08:09), 258(03-17 @ 05:34), 329(03-17 @ 03:10), 404(03-17 @ 01:01), 379(03-16 @ 23:08), 447(03-16 @ 22:15), 396(03-16 @ 20:29), 336(03-16 @ 19:45), 322(03-16 @ 18:32), 290(03-16 @ 17:23), 169(03-16 @ 11:17), 130(03-16 @ 04:02)    Hgb: 9.0 (03-17 @ 23:08), 11.3 (03-16 @ 22:15), 11.8 (03-16 @ 04:02)  Hct: 28.9 (03-17 @ 23:08), 37.1 (03-16 @ 22:15), 37.0 (03-16 @ 04:02)  WBC: 11.81 (03-17 @ 23:08), 14.10 (03-16 @ 22:15), 14.98 (03-16 @ 04:02)  Plt: 200 (03-17 @ 23:08), 262 (03-16 @ 22:15), 230 (03-16 @ 04:02)    MEDICATIONS:  acetaminophen     Tablet .. 650 milliGRAM(s) Oral every 6 hours PRN  amLODIPine   Tablet 10 milliGRAM(s) Oral daily  atorvastatin 80 milliGRAM(s) Oral at bedtime  chlorhexidine 4% Liquid 1 Application(s) Topical daily  desmopressin Injectable 0.5 MICROGram(s) IV Push <User Schedule>  dextrose 5%. 1000 milliLiter(s) IV Continuous <Continuous>  dextrose 5%. 1000 milliLiter(s) IV Continuous <Continuous>  dextrose 50% Injectable 50 milliLiter(s) IV Push every 15 minutes  dextrose Oral Gel 15 Gram(s) Oral once PRN  enoxaparin Injectable 40 milliGRAM(s) SubCutaneous <User Schedule>  glucagon  Injectable 1 milliGRAM(s) IntraMuscular once  hydrocortisone sodium succinate Injectable 50 milliGRAM(s) IV Push every 8 hours  insulin lispro (ADMELOG) corrective regimen sliding scale   SubCutaneous every 6 hours  insulin NPH human recombinant 8 Unit(s) SubCutaneous every 6 hours  levothyroxine Injectable 50 MICROGram(s) IV Push <User Schedule>  ondansetron Injectable 4 milliGRAM(s) IV Push every 6 hours PRN  pantoprazole    Tablet 40 milliGRAM(s) Oral daily  polyethylene glycol 3350 17 Gram(s) Oral daily  senna 2 Tablet(s) Oral at bedtime  sodium chloride 0.225%. 1000 milliLiter(s) IV Continuous <Continuous>  sodium chloride 0.65% Nasal 1 Spray(s) Both Nostrils three times a day PRN  traMADol 25 milliGRAM(s) Oral every 4 hours PRN  traMADol 50 milliGRAM(s) Oral every 4 hours PRN    EXAMINATION:  General:  in NAD  HEENT:  MMM  Neuro:  slightly lethargic but easily arouses to voice, oriented to self, hospital, month and year, follows commands, PERRL, EOMI, face symmetric, no PD, 5/5 in b/l triceps, no drift in LEs  Cards:  bradycardic, regular   Respiratory:  no respiratory distress  Abdomen:  soft  Extremities:  no LE edema    Assessment/Plan:   51 yo woman with h/o HTN, DM and HLD, 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     c/w LD clamped   pituitary precautions   c/w VEEG for episode of L sided weakness  q4h BMP, d/c D5W at 75cc/hr   start  mg q4h (same amount of water as per IV)   on ddAVP 0.5 mcg IV BID   c/w hydrocort 50 mg q12h and synthroid 50 mcg IV (check TSH and free T4)  c/w vanc, ceftriaxone and flagyl as per nrsg  NPH 8 U q6h and moderate ISS  With NG placed by ENT, start tube feeds     c/w kristel Nayak  Neurocritical Care Attending  Interval events:  With hypothermia during the day.   UO wnl.   Abx d/c'ed today.     No acute events on evening rounds.     VITALS:  T(C): , Max: 37.3 (03-17-23 @ 23:00)  HR:  (34 - 57)  BP: --  ABP:  (99/42 - 134/61)  RR:  (12 - 19)  SpO2:  (96% - 100%)  Wt(kg): --      03-17-23 @ 07:01  -  03-18-23 @ 07:00  --------------------------------------------------------  IN: 3980.1 mL / OUT: 740 mL / NET: 3240.1 mL    03-18-23 @ 07:01  -  03-18-23 @ 18:18  --------------------------------------------------------  IN: 2368 mL / OUT: 200 mL / NET: 2168 mL      LABS:  Na: 141 (03-18 @ 17:05), 145 (03-18 @ 10:36), 148 (03-18 @ 05:26), 150 (03-18 @ 03:54), 152 (03-17 @ 23:08), 150 (03-17 @ 18:17), 153 (03-17 @ 16:19), 154 (03-17 @ 14:06), 154 (03-17 @ 12:30), 158 (03-17 @ 10:24), 156 (03-17 @ 08:09), 159 (03-17 @ 05:34), 163 (03-17 @ 03:10), 164 (03-17 @ 01:01), 163 (03-16 @ 23:08), 164 (03-16 @ 22:15), 170 (03-16 @ 20:29), >170 (03-16 @ 19:45), >170 (03-16 @ 18:32), >170 (03-16 @ 17:23), 168 (03-16 @ 11:17), 160 (03-16 @ 04:02)  K: 3.6 (03-18 @ 17:05), 3.4 (03-18 @ 10:36), 3.6 (03-18 @ 05:26), 3.6 (03-18 @ 03:54), 3.7 (03-17 @ 23:08), 4.0 (03-17 @ 18:17), 3.5 (03-17 @ 16:19), 3.7 (03-17 @ 14:06), 4.0 (03-17 @ 12:30), 3.7 (03-17 @ 10:24), 3.3 (03-17 @ 08:09), 3.7 (03-17 @ 05:34), 3.7 (03-17 @ 03:10), 3.3 (03-17 @ 01:01), 3.3 (03-16 @ 23:08), 3.2 (03-16 @ 22:15), 3.9 (03-16 @ 20:29), 4.2 (03-16 @ 19:45), 4.2 (03-16 @ 18:32), 4.3 (03-16 @ 17:23), 3.8 (03-16 @ 11:17), 3.6 (03-16 @ 04:02)  Cl: 111 (03-18 @ 17:05), 113 (03-18 @ 10:36), 116 (03-18 @ 05:26), 117 (03-18 @ 03:54), 120 (03-17 @ 23:08), 119 (03-17 @ 18:17), 122 (03-17 @ 16:19), 122 (03-17 @ 14:06), 121 (03-17 @ 12:30), 125 (03-17 @ 10:24), 124 (03-17 @ 08:09), 125 (03-17 @ 05:34), 127 (03-17 @ 03:10), 129 (03-17 @ 01:01), 130 (03-16 @ 23:08), 128 (03-16 @ 22:15), 134 (03-16 @ 20:29), >135 (03-16 @ 19:45), >135 (03-16 @ 18:32), >135 (03-16 @ 17:23), 131 (03-16 @ 11:17), 127 (03-16 @ 04:02)  CO2: 20 (03-18 @ 17:05), 22 (03-18 @ 10:36), 23 (03-18 @ 05:26), 23 (03-18 @ 03:54), 23 (03-17 @ 23:08), 23 (03-17 @ 18:17), 24 (03-17 @ 16:19), 24 (03-17 @ 14:06), 25 (03-17 @ 12:30), 24 (03-17 @ 10:24), 24 (03-17 @ 08:09), 26 (03-17 @ 05:34), 26 (03-17 @ 03:10), 24 (03-17 @ 01:01), 25 (03-16 @ 23:08), 23 (03-16 @ 22:15), 25 (03-16 @ 20:29), 25 (03-16 @ 19:45), 25 (03-16 @ 18:32), 23 (03-16 @ 17:23), 21 (03-16 @ 11:17), 23 (03-16 @ 04:02)  BUN: 10 (03-18 @ 17:05), 9 (03-18 @ 10:36), 9 (03-18 @ 05:26), 9 (03-18 @ 03:54), 10 (03-17 @ 23:08), 11 (03-17 @ 18:17), 11 (03-17 @ 16:19), 11 (03-17 @ 14:06), 10 (03-17 @ 12:30), 10 (03-17 @ 10:24), 9 (03-17 @ 08:09), 10 (03-17 @ 05:34), 10 (03-17 @ 03:10), 10 (03-17 @ 01:01), 10 (03-16 @ 23:08), 10 (03-16 @ 22:15), 10 (03-16 @ 20:29), 11 (03-16 @ 19:45), 10 (03-16 @ 18:32), 10 (03-16 @ 17:23), 9 (03-16 @ 11:17), 8 (03-16 @ 04:02)  Cr: 0.39 (03-18 @ 17:05), 0.37 (03-18 @ 10:36), 0.43 (03-18 @ 05:26), 0.46 (03-18 @ 03:54), 0.49 (03-17 @ 23:08), 0.52 (03-17 @ 18:17), 0.52 (03-17 @ 16:19), 0.51 (03-17 @ 14:06), 0.51 (03-17 @ 12:30), 0.53 (03-17 @ 10:24), 0.56 (03-17 @ 08:09), 0.58 (03-17 @ 05:34), 0.64 (03-17 @ 03:10), 0.62 (03-17 @ 01:01), 0.65 (03-16 @ 23:08), 0.66 (03-16 @ 22:15), 0.70 (03-16 @ 20:29), 0.67 (03-16 @ 19:45), 0.70 (03-16 @ 18:32), 0.69 (03-16 @ 17:23), 0.74 (03-16 @ 11:17), 0.70 (03-16 @ 04:02)  Glu: 175(03-18 @ 17:05), 202(03-18 @ 10:36), 203(03-18 @ 05:26), 212(03-18 @ 03:54), 218(03-17 @ 23:08), 281(03-17 @ 18:17), 209(03-17 @ 16:19), 206(03-17 @ 14:06), 280(03-17 @ 12:30), 110(03-17 @ 10:24), 219(03-17 @ 08:09), 258(03-17 @ 05:34), 329(03-17 @ 03:10), 404(03-17 @ 01:01), 379(03-16 @ 23:08), 447(03-16 @ 22:15), 396(03-16 @ 20:29), 336(03-16 @ 19:45), 322(03-16 @ 18:32), 290(03-16 @ 17:23), 169(03-16 @ 11:17), 130(03-16 @ 04:02)    Hgb: 9.0 (03-17 @ 23:08), 11.3 (03-16 @ 22:15), 11.8 (03-16 @ 04:02)  Hct: 28.9 (03-17 @ 23:08), 37.1 (03-16 @ 22:15), 37.0 (03-16 @ 04:02)  WBC: 11.81 (03-17 @ 23:08), 14.10 (03-16 @ 22:15), 14.98 (03-16 @ 04:02)  Plt: 200 (03-17 @ 23:08), 262 (03-16 @ 22:15), 230 (03-16 @ 04:02)    MEDICATIONS:  acetaminophen     Tablet .. 650 milliGRAM(s) Oral every 6 hours PRN  amLODIPine   Tablet 10 milliGRAM(s) Oral daily  atorvastatin 80 milliGRAM(s) Oral at bedtime  chlorhexidine 4% Liquid 1 Application(s) Topical daily  desmopressin Injectable 0.5 MICROGram(s) IV Push <User Schedule>  dextrose 5%. 1000 milliLiter(s) IV Continuous <Continuous>  dextrose 5%. 1000 milliLiter(s) IV Continuous <Continuous>  dextrose 50% Injectable 50 milliLiter(s) IV Push every 15 minutes  dextrose Oral Gel 15 Gram(s) Oral once PRN  enoxaparin Injectable 40 milliGRAM(s) SubCutaneous <User Schedule>  glucagon  Injectable 1 milliGRAM(s) IntraMuscular once  hydrocortisone sodium succinate Injectable 50 milliGRAM(s) IV Push every 8 hours  insulin lispro (ADMELOG) corrective regimen sliding scale   SubCutaneous every 6 hours  insulin NPH human recombinant 8 Unit(s) SubCutaneous every 6 hours  levothyroxine Injectable 50 MICROGram(s) IV Push <User Schedule>  ondansetron Injectable 4 milliGRAM(s) IV Push every 6 hours PRN  pantoprazole    Tablet 40 milliGRAM(s) Oral daily  polyethylene glycol 3350 17 Gram(s) Oral daily  senna 2 Tablet(s) Oral at bedtime  sodium chloride 0.225%. 1000 milliLiter(s) IV Continuous <Continuous>  sodium chloride 0.65% Nasal 1 Spray(s) Both Nostrils three times a day PRN  traMADol 25 milliGRAM(s) Oral every 4 hours PRN  traMADol 50 milliGRAM(s) Oral every 4 hours PRN    EXAMINATION:  Please see exam from daytime.     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     c/w LD clamped   pituitary precautions   q6h BMP, c/w NS at 50cc/hr   off FWB   hold ddAVP   c/w hydrocort 50 mg q12h and synthroid 50 mcg IV (check TSH and free T4)  off abx   NPH 8 U q6h and moderate ISS  With NG placed by ENT, on TF at goal   d/c Atorvastatin 80 mg daily for elevated LFTs  Lov ppx    straight caths, Axillary A line     Kelsi Nayak  Neurocritical Care Attending

## 2023-03-18 NOTE — PROGRESS NOTE ADULT - SUBJECTIVE AND OBJECTIVE BOX
ENT ISSUE/POD: s/p TSRP, leak, R fat graft pod 3    SUBJECTIVE: Pt seen and examined at bedside. There were no acute overnight events. She is laying in bed comfortable. The patient states she has a headache and pain in her nose that is relieved with pain meds. Pt denies any bleeding, no leaking, no salty or metallic taste in mouth, no PND.         PAST MEDICAL & SURGICAL HISTORY:  Hypertension      Hyperlipidemia      Diabetes mellitus        Allergies    No Known Drug Allergies  S/P TRANSSPHENOIDAL SURGERY. SINONASAL PRECAUTIONS! NO NASAL SWABS OR NG TUBES. (Unknown)    Intolerances      MEDICATIONS  (STANDING):  amLODIPine   Tablet 10 milliGRAM(s) Oral daily  atorvastatin 80 milliGRAM(s) Oral at bedtime  cefTRIAXone   IVPB 1000 milliGRAM(s) IV Intermittent every 24 hours  chlorhexidine 4% Liquid 1 Application(s) Topical daily  desmopressin Injectable 0.5 MICROGram(s) IV Push <User Schedule>  dextrose 5%. 1000 milliLiter(s) (75 mL/Hr) IV Continuous <Continuous>  dextrose 5%. 1000 milliLiter(s) (50 mL/Hr) IV Continuous <Continuous>  dextrose 5%. 1000 milliLiter(s) (100 mL/Hr) IV Continuous <Continuous>  dextrose 50% Injectable 50 milliLiter(s) IV Push every 15 minutes  glucagon  Injectable 1 milliGRAM(s) IntraMuscular once  hydrocortisone sodium succinate Injectable 50 milliGRAM(s) IV Push every 8 hours  insulin regular Infusion 2 Unit(s)/Hr (2 mL/Hr) IV Continuous <Continuous>  levothyroxine Injectable 50 MICROGram(s) IV Push <User Schedule>  metroNIDAZOLE  IVPB 500 milliGRAM(s) IV Intermittent every 8 hours  pantoprazole    Tablet 40 milliGRAM(s) Oral daily  polyethylene glycol 3350 17 Gram(s) Oral daily  senna 2 Tablet(s) Oral at bedtime  vancomycin  IVPB 1250 milliGRAM(s) IV Intermittent every 12 hours    MEDICATIONS  (PRN):  acetaminophen     Tablet .. 650 milliGRAM(s) Oral every 6 hours PRN Temp greater or equal to 38C (100.4F), Mild Pain (1 - 3)  dextrose Oral Gel 15 Gram(s) Oral once PRN Blood Glucose LESS THAN 70 milliGRAM(s)/deciliter  ondansetron Injectable 4 milliGRAM(s) IV Push every 6 hours PRN Nausea and/or Vomiting  sodium chloride 0.65% Nasal 1 Spray(s) Both Nostrils three times a day PRN Nasal Congestion  traMADol 25 milliGRAM(s) Oral every 4 hours PRN Moderate Pain (4 - 6)  traMADol 50 milliGRAM(s) Oral every 4 hours PRN Severe Pain (7 - 10)      social history: see consult     family history: see consult     ROS:   ENT: all negative except as noted in HPI   Pulm: denies SOB, cough, hemoptysis  Neuro: denies numbness/tingling, loss of sensation  Endo: denies heat/cold intolerance, excessive sweating      Vital Signs Last 24 Hrs  T(C): 36.7 (18 Mar 2023 07:00), Max: 37.4 (17 Mar 2023 15:00)  T(F): 98.1 (18 Mar 2023 07:00), Max: 99.3 (17 Mar 2023 15:00)  HR: 47 (18 Mar 2023 09:00) (34 - 57)  BP: --  BP(mean): --  RR: 13 (18 Mar 2023 09:00) (12 - 17)  SpO2: 99% (18 Mar 2023 09:00) (95% - 100%)    Parameters below as of 18 Mar 2023 07:00  Patient On (Oxygen Delivery Method): room air                              9.0    11.81 )-----------( 200      ( 17 Mar 2023 23:08 )             28.9    03-18    148<H>  |  116<H>  |  9   ----------------------------<  203<H>  3.6   |  23  |  0.43<L>    Ca    8.4      18 Mar 2023 05:26  Phos  3.4     03-18  Mg     1.8     03-18    TPro  5.6<L>  /  Alb  3.2<L>  /  TBili  0.2  /  DBili  x   /  AST  28  /  ALT  30  /  AlkPhos  50  03-18       PHYSICAL EXAM:  Gen: NAD  Skin: No rashes, bruises, or lesions  Head: Normocephalic, Atraumatic  Face: no edema, erythema, or fluctuance. Parotid glands soft without mass  Eyes: no scleral injection  Nose: Nares bilaterally patent, no discharge, + dry blood in b/l nares, no active bleed, no CSF leak  Mouth: No Stridor / Drooling / Trismus.  Mucosa moist, tongue/uvula midline, oropharynx clear  Neck: Flat, supple, no lymphadenopathy, trachea midline, no masses  Resp: breathing easily, no stridor  Neuro: facial nerve intact, no facial droop

## 2023-03-18 NOTE — PROGRESS NOTE ADULT - ASSESSMENT
ASSESSMENT: TSP resection of pituitary adenoma, POD3    NEURO:  Neurochecks Q1  CTh with pnemo, keep flat, face tent at [id number], LD clamped  HOB 30  MRI post-op patient did not tolerate it, was agitated, aborted  Pain control  DI watch   Activity: [x] OOB as tolerated [] Bedrest [x] PT [x] OT [] PMNR    PULM:  RA,  Pituitary precautions (no incentive spirometry, no straws, no BIPAP)    CV:  -160mmHg    RENAL: On D5 at 75 cc/hour  Pena for strict I+Os  IVF until good PO intake  Drink to thirst    GI:  Diet: bedside dysphagia   GI prophylaxis [] not indicated [x] PPI [] other:  Bowel regimen [] colace [x] senna [x] other: miralax   No BM yet    ENDO:   Hypernatremia, hyperglycemia   Goal euglycemia (-180)  - cosyntropin stim test: 3/14 AM cortisol at 8:15am 5.1, cosyntropin at 8:15, 8:50am cortisol 16.9, 9:25am 20.7  hydrocortisone 50mg IV Q8, will follow up with endo to wean off  cont synthroid  insulin gtt, will continue for now  Pituitary labs as needed  Patient was started on vasopressin gtt last night, rate increased overnight, Na improved.   Will continue the DDAVP gtt until the Na is in the 140's range; Will check Na Q2, will continue D5  Will need standing dose of DDAVP after gtt     HEME/ONC:  H/H stable   VTE prophylaxis: [x] SCDs [x] chemoprophylaxis [] hold chemoprophylaxis due to:  [x] high risk of DVT/PE on admission due to: tumor     ID:  afebrile  vanc/ceftriaxone/flagyl per ENT for potential CSF leak/hx sinusitis   Will follow up about the antibiotics   ENT following    MISC:    SOCIAL/FAMILY:  [x] awaiting [] updated at bedside [] family meeting    CODE STATUS:  [x] Full Code [] DNR [] DNI [] Palliative/Comfort Care    DISPOSITION:  [x] ICU [] Stroke Unit [] Floor [] EMU [] RCU [] PCU    [x] Patient is at high risk of neurologic deterioration/death due to: post op hemorrhage, DI, pan hypopituitarism    ASSESSMENT: TSP resection of pituitary adenoma, POD4    NEURO:  Neurochecks Q1  CTh with pnemo, LD clamped with plans to unclamp it on 3/21  HOB 30  MRI reviewed- unremarkable  Pain control  DI watch - see endo for Di plan  Activity: [x] OOB as tolerated [] Bedrest [x] PT [x] OT [] PMNR    PULM:  RA  Pituitary precautions (no incentive spirometry, no straws, no BIPAP)    CV:  -160mmHg    RENAL: On D5 at 75 cc/hour  Pena for strict I+Os  IVF until good PO intake  Drink to thirst  I&O 3772.6 mL / OUT: 740 mL / NET: 3032.6 mL    GI:  Diet: NGT placed by ENT with scope  GI prophylaxis [] not indicated [x] PPI [] other:  Bowel regimen [] colace [x] senna [x] other: miralax   No BM yet    ENDO:   Hypernatremia, hyperglycemia   Goal euglycemia (-180)  On hydrocortisone 50mg IV Q8, will follow up with endo to wean off  cont synthroid 50 mcg IV QD  A1c: 8.0  Intermittently on insulin gtt due to D5 saline, currently off  Patient was started on vasopressin gtt, now off and Na appropriately trending down  On standing dose of DDAVP 0.5 mcg     HEME/ONC:  H/H stable   VTE prophylaxis: [x] SCDs [x] chemoprophylaxis [] hold chemoprophylaxis due to:  [x] high risk of DVT/PE on admission due to: tumor     ID:  afebrile  vanc/ceftriaxone/flagyl per ENT for potential CSF leak/hx sinusitis   Will follow up about the antibiotics   ENT following    MISC:    SOCIAL/FAMILY:  [x] awaiting [] updated at bedside [] family meeting    CODE STATUS:  [x] Full Code [] DNR [] DNI [] Palliative/Comfort Care    DISPOSITION:  [x] ICU [] Stroke Unit [] Floor [] EMU [] RCU [] PCU    [x] Patient is at high risk of neurologic deterioration/death due to: post op hemorrhage, DI, pan hypopituitarism    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 has LD     NEURO:  Neuro checks Q 2 hr   CTh stable   LD clamped per NS   video EEG no seizure, d/c EEG    HOB 30  PT/OT/OBC      PULM:  RA  Pituitary precautions (no incentive spirometry, no straws, no BIPAP)    CV:  -160 mmHg  sinus bradycardia, asymptomatic     RENAL: On D5 at 75 cc/hour, change to 1/4 NS at 90 ml/hr   free water flushes 300 ml q 4 hr   Humphries for strict I+Os   I/O 400 ml   no humphries   bladder scan q 6 hr for urinary retention   change BMP q 6 hr , urine sg   DI on ddavp     GI:  Diet: NGT placed by ENT with scope, on glucerna 60 ml/hr   swallow eval   GI prophylaxis [] not indicated [x] PPI [] other:  Bowel regimen [] colace [x] senna [x] other: miralax   last BM today     ENDO:   Hypernatremia, hyperglycemia , panhypopit   Goal euglycemia (-180)  On hydrocortisone 50mg IV Q8, will discuss with endo changing it to physiologic dose as she is not shock   hy[pothyroidism cont synthroid 50 mcg IV QD  on insulin drip, change to NPH 8 units q 6 hr , ISS, finger stick   On standing dose of IV , DDAVP 0.5 mcg BID      HEME/ONC:  H/H stable   lovenox 40 mg sc qhs     ID:  afebrile  d/c ABX per NS    ENT following    MISC:    SOCIAL/FAMILY:  [x] awaiting [] updated at bedside [] family meeting    CODE STATUS:  [x] Full Code [] DNR [] DNI [] Palliative/Comfort Care    DISPOSITION:  [x] ICU [] Stroke Unit [] Floor [] EMU [] RCU [] PCU    [x] Patient is at high risk of neurologic deterioration/death due to: post op hemorrhage, DI, pan hypopituitarism

## 2023-03-18 NOTE — PROGRESS NOTE ADULT - SUBJECTIVE AND OBJECTIVE BOX
Patient seen and examined at bedside.  On D5W drip, free water, and BID DDAVP dosing for DI s/p TSP surgery.    --Anticoagulation--    T(C): 37.3 (03-17-23 @ 23:00), Max: 37.4 (03-17-23 @ 15:00)  HR: 36 (03-18-23 @ 01:00) (34 - 61)  BP: 98/45 (03-17-23 @ 04:15) (94/55 - 98/45)  RR: 13 (03-18-23 @ 01:00) (12 - 20)  SpO2: 100% (03-18-23 @ 01:00) (95% - 100%)  Wt(kg): --    Exam: Arousable, AOx3, PERRL, FC,  OCHOA 5/5    .  LABS:                         9.0    11.81 )-----------( 200      ( 17 Mar 2023 23:08 )             28.9     03-17    152<H>  |  120<H>  |  10  ----------------------------<  218<H>  3.7   |  23  |  0.49<L>    Ca    8.3<L>      17 Mar 2023 23:08  Phos  2.1     03-17  Mg     1.9     03-17                RADIOLOGY, EKG & ADDITIONAL TESTS: Reviewed.

## 2023-03-19 LAB
ALBUMIN SERPL ELPH-MCNC: 3.1 G/DL — LOW (ref 3.3–5)
ALP SERPL-CCNC: 94 U/L — SIGNIFICANT CHANGE UP (ref 40–120)
ALT FLD-CCNC: 190 U/L — HIGH (ref 10–45)
ANION GAP SERPL CALC-SCNC: 7 MMOL/L — SIGNIFICANT CHANGE UP (ref 5–17)
ANION GAP SERPL CALC-SCNC: 8 MMOL/L — SIGNIFICANT CHANGE UP (ref 5–17)
ANION GAP SERPL CALC-SCNC: 9 MMOL/L — SIGNIFICANT CHANGE UP (ref 5–17)
AST SERPL-CCNC: 193 U/L — HIGH (ref 10–40)
BILIRUB SERPL-MCNC: 0.3 MG/DL — SIGNIFICANT CHANGE UP (ref 0.2–1.2)
BUN SERPL-MCNC: 14 MG/DL — SIGNIFICANT CHANGE UP (ref 7–23)
BUN SERPL-MCNC: 14 MG/DL — SIGNIFICANT CHANGE UP (ref 7–23)
BUN SERPL-MCNC: 15 MG/DL — SIGNIFICANT CHANGE UP (ref 7–23)
CALCIUM SERPL-MCNC: 7.9 MG/DL — LOW (ref 8.4–10.5)
CALCIUM SERPL-MCNC: 8.3 MG/DL — LOW (ref 8.4–10.5)
CALCIUM SERPL-MCNC: 8.4 MG/DL — SIGNIFICANT CHANGE UP (ref 8.4–10.5)
CHLORIDE SERPL-SCNC: 114 MMOL/L — HIGH (ref 96–108)
CHLORIDE SERPL-SCNC: 115 MMOL/L — HIGH (ref 96–108)
CHLORIDE SERPL-SCNC: 117 MMOL/L — HIGH (ref 96–108)
CO2 SERPL-SCNC: 22 MMOL/L — SIGNIFICANT CHANGE UP (ref 22–31)
CO2 SERPL-SCNC: 22 MMOL/L — SIGNIFICANT CHANGE UP (ref 22–31)
CO2 SERPL-SCNC: 24 MMOL/L — SIGNIFICANT CHANGE UP (ref 22–31)
CREAT SERPL-MCNC: 0.54 MG/DL — SIGNIFICANT CHANGE UP (ref 0.5–1.3)
CREAT SERPL-MCNC: 0.58 MG/DL — SIGNIFICANT CHANGE UP (ref 0.5–1.3)
CREAT SERPL-MCNC: 0.62 MG/DL — SIGNIFICANT CHANGE UP (ref 0.5–1.3)
EGFR: 106 ML/MIN/1.73M2 — SIGNIFICANT CHANGE UP
EGFR: 108 ML/MIN/1.73M2 — SIGNIFICANT CHANGE UP
EGFR: 110 ML/MIN/1.73M2 — SIGNIFICANT CHANGE UP
GLUCOSE BLDC GLUCOMTR-MCNC: 109 MG/DL — HIGH (ref 70–99)
GLUCOSE BLDC GLUCOMTR-MCNC: 111 MG/DL — HIGH (ref 70–99)
GLUCOSE BLDC GLUCOMTR-MCNC: 134 MG/DL — HIGH (ref 70–99)
GLUCOSE BLDC GLUCOMTR-MCNC: 145 MG/DL — HIGH (ref 70–99)
GLUCOSE BLDC GLUCOMTR-MCNC: 222 MG/DL — HIGH (ref 70–99)
GLUCOSE SERPL-MCNC: 142 MG/DL — HIGH (ref 70–99)
GLUCOSE SERPL-MCNC: 149 MG/DL — HIGH (ref 70–99)
GLUCOSE SERPL-MCNC: 172 MG/DL — HIGH (ref 70–99)
MAGNESIUM SERPL-MCNC: 2.4 MG/DL — SIGNIFICANT CHANGE UP (ref 1.6–2.6)
OSMOLALITY SERPL: 300 MOSMOL/KG — SIGNIFICANT CHANGE UP (ref 275–300)
OSMOLALITY SERPL: 307 MOSMOL/KG — HIGH (ref 275–300)
OSMOLALITY SERPL: 310 MOSMOL/KG — HIGH (ref 275–300)
OSMOLALITY UR: 238 MOS/KG — LOW (ref 300–900)
OSMOLALITY UR: 248 MOS/KG — LOW (ref 300–900)
OSMOLALITY UR: 702 MOS/KG — SIGNIFICANT CHANGE UP (ref 300–900)
PHOSPHATE SERPL-MCNC: 3.2 MG/DL — SIGNIFICANT CHANGE UP (ref 2.5–4.5)
POTASSIUM SERPL-MCNC: 3.6 MMOL/L — SIGNIFICANT CHANGE UP (ref 3.5–5.3)
POTASSIUM SERPL-MCNC: 3.9 MMOL/L — SIGNIFICANT CHANGE UP (ref 3.5–5.3)
POTASSIUM SERPL-MCNC: 4 MMOL/L — SIGNIFICANT CHANGE UP (ref 3.5–5.3)
POTASSIUM SERPL-SCNC: 3.6 MMOL/L — SIGNIFICANT CHANGE UP (ref 3.5–5.3)
POTASSIUM SERPL-SCNC: 3.9 MMOL/L — SIGNIFICANT CHANGE UP (ref 3.5–5.3)
POTASSIUM SERPL-SCNC: 4 MMOL/L — SIGNIFICANT CHANGE UP (ref 3.5–5.3)
PROT SERPL-MCNC: 5.4 G/DL — LOW (ref 6–8.3)
SODIUM SERPL-SCNC: 145 MMOL/L — SIGNIFICANT CHANGE UP (ref 135–145)
SODIUM SERPL-SCNC: 146 MMOL/L — HIGH (ref 135–145)
SODIUM SERPL-SCNC: 147 MMOL/L — HIGH (ref 135–145)
SP GR UR STRIP: 1.01 — LOW (ref 1.01–1.02)
SP GR UR STRIP: 1.01 — LOW (ref 1.01–1.02)
SP GR UR STRIP: 1.02 — SIGNIFICANT CHANGE UP (ref 1.01–1.02)
T3 SERPL-MCNC: 49 NG/DL — LOW (ref 80–200)
T4 AB SER-ACNC: 5.4 UG/DL — SIGNIFICANT CHANGE UP (ref 4.6–12)
T4 FREE SERPL-MCNC: 1 NG/DL — SIGNIFICANT CHANGE UP (ref 0.9–1.8)
TSH SERPL-MCNC: 1.05 UIU/ML — SIGNIFICANT CHANGE UP (ref 0.27–4.2)
TSH SERPL-MCNC: 1.05 UIU/ML — SIGNIFICANT CHANGE UP (ref 0.27–4.2)

## 2023-03-19 PROCEDURE — 99231 SBSQ HOSP IP/OBS SF/LOW 25: CPT | Mod: 25

## 2023-03-19 PROCEDURE — 99232 SBSQ HOSP IP/OBS MODERATE 35: CPT

## 2023-03-19 RX ORDER — INSULIN LISPRO 100/ML
VIAL (ML) SUBCUTANEOUS
Refills: 0 | Status: DISCONTINUED | OUTPATIENT
Start: 2023-03-19 | End: 2023-03-20

## 2023-03-19 RX ORDER — SODIUM CHLORIDE 9 MG/ML
1000 INJECTION, SOLUTION INTRAVENOUS
Refills: 0 | Status: DISCONTINUED | OUTPATIENT
Start: 2023-03-19 | End: 2023-03-19

## 2023-03-19 RX ORDER — DESMOPRESSIN ACETATE 0.1 MG/1
0.5 TABLET ORAL ONCE
Refills: 0 | Status: COMPLETED | OUTPATIENT
Start: 2023-03-19 | End: 2023-03-19

## 2023-03-19 RX ORDER — DESMOPRESSIN ACETATE 0.1 MG/1
0.5 TABLET ORAL ONCE
Refills: 0 | Status: DISCONTINUED | OUTPATIENT
Start: 2023-03-19 | End: 2023-03-19

## 2023-03-19 RX ORDER — LEVOTHYROXINE SODIUM 125 MCG
75 TABLET ORAL DAILY
Refills: 0 | Status: DISCONTINUED | OUTPATIENT
Start: 2023-03-19 | End: 2023-03-24

## 2023-03-19 RX ORDER — INSULIN LISPRO 100/ML
VIAL (ML) SUBCUTANEOUS AT BEDTIME
Refills: 0 | Status: DISCONTINUED | OUTPATIENT
Start: 2023-03-19 | End: 2023-03-19

## 2023-03-19 RX ORDER — INSULIN LISPRO 100/ML
3 VIAL (ML) SUBCUTANEOUS
Refills: 0 | Status: DISCONTINUED | OUTPATIENT
Start: 2023-03-19 | End: 2023-03-20

## 2023-03-19 RX ORDER — HYDROCORTISONE 20 MG
50 TABLET ORAL EVERY 12 HOURS
Refills: 0 | Status: DISCONTINUED | OUTPATIENT
Start: 2023-03-19 | End: 2023-03-20

## 2023-03-19 RX ORDER — HUMAN INSULIN 100 [IU]/ML
6 INJECTION, SUSPENSION SUBCUTANEOUS EVERY 6 HOURS
Refills: 0 | Status: DISCONTINUED | OUTPATIENT
Start: 2023-03-19 | End: 2023-03-19

## 2023-03-19 RX ORDER — INSULIN LISPRO 100/ML
VIAL (ML) SUBCUTANEOUS
Refills: 0 | Status: DISCONTINUED | OUTPATIENT
Start: 2023-03-19 | End: 2023-03-19

## 2023-03-19 RX ORDER — POTASSIUM CHLORIDE 20 MEQ
40 PACKET (EA) ORAL ONCE
Refills: 0 | Status: COMPLETED | OUTPATIENT
Start: 2023-03-19 | End: 2023-03-19

## 2023-03-19 RX ORDER — INSULIN GLARGINE 100 [IU]/ML
10 INJECTION, SOLUTION SUBCUTANEOUS AT BEDTIME
Refills: 0 | Status: DISCONTINUED | OUTPATIENT
Start: 2023-03-19 | End: 2023-03-20

## 2023-03-19 RX ADMIN — HUMAN INSULIN 8 UNIT(S): 100 INJECTION, SUSPENSION SUBCUTANEOUS at 01:13

## 2023-03-19 RX ADMIN — Medication 40 MILLIEQUIVALENT(S): at 12:59

## 2023-03-19 RX ADMIN — DESMOPRESSIN ACETATE 0.5 MICROGRAM(S): 0.1 TABLET ORAL at 17:55

## 2023-03-19 RX ADMIN — Medication 20 MILLIEQUIVALENT(S): at 01:12

## 2023-03-19 RX ADMIN — Medication 3 UNIT(S): at 17:28

## 2023-03-19 RX ADMIN — INSULIN GLARGINE 10 UNIT(S): 100 INJECTION, SOLUTION SUBCUTANEOUS at 21:40

## 2023-03-19 RX ADMIN — SODIUM CHLORIDE 50 MILLILITER(S): 9 INJECTION INTRAMUSCULAR; INTRAVENOUS; SUBCUTANEOUS at 11:01

## 2023-03-19 RX ADMIN — HUMAN INSULIN 6 UNIT(S): 100 INJECTION, SUSPENSION SUBCUTANEOUS at 11:07

## 2023-03-19 RX ADMIN — Medication 50 MILLIGRAM(S): at 17:29

## 2023-03-19 RX ADMIN — Medication 25 GRAM(S): at 01:12

## 2023-03-19 RX ADMIN — DESMOPRESSIN ACETATE 0.5 MICROGRAM(S): 0.1 TABLET ORAL at 12:59

## 2023-03-19 RX ADMIN — TRAMADOL HYDROCHLORIDE 50 MILLIGRAM(S): 50 TABLET ORAL at 02:16

## 2023-03-19 RX ADMIN — TRAMADOL HYDROCHLORIDE 50 MILLIGRAM(S): 50 TABLET ORAL at 01:12

## 2023-03-19 RX ADMIN — PANTOPRAZOLE SODIUM 40 MILLIGRAM(S): 20 TABLET, DELAYED RELEASE ORAL at 11:01

## 2023-03-19 RX ADMIN — Medication 2: at 21:40

## 2023-03-19 RX ADMIN — TRAMADOL HYDROCHLORIDE 50 MILLIGRAM(S): 50 TABLET ORAL at 12:50

## 2023-03-19 RX ADMIN — TRAMADOL HYDROCHLORIDE 50 MILLIGRAM(S): 50 TABLET ORAL at 12:20

## 2023-03-19 RX ADMIN — SODIUM CHLORIDE 250 MILLILITER(S): 9 INJECTION, SOLUTION INTRAVENOUS at 12:59

## 2023-03-19 RX ADMIN — POTASSIUM PHOSPHATE, MONOBASIC POTASSIUM PHOSPHATE, DIBASIC 62.5 MILLIMOLE(S): 236; 224 INJECTION, SOLUTION INTRAVENOUS at 01:12

## 2023-03-19 RX ADMIN — ENOXAPARIN SODIUM 40 MILLIGRAM(S): 100 INJECTION SUBCUTANEOUS at 17:29

## 2023-03-19 RX ADMIN — CHLORHEXIDINE GLUCONATE 1 APPLICATION(S): 213 SOLUTION TOPICAL at 11:01

## 2023-03-19 NOTE — PROGRESS NOTE ADULT - SUBJECTIVE AND OBJECTIVE BOX
DATE OF SERVICE: 03-19-23 @ 10:59    Subjective: Patient seen and examined. No new events except as noted.     SUBJECTIVE/ROS:  nad      MEDICATIONS:  MEDICATIONS  (STANDING):  amLODIPine   Tablet 10 milliGRAM(s) Oral daily  chlorhexidine 4% Liquid 1 Application(s) Topical daily  desmopressin Injectable 0.5 MICROGram(s) IV Push <User Schedule>  dextrose 50% Injectable 50 milliLiter(s) IV Push every 15 minutes  enoxaparin Injectable 40 milliGRAM(s) SubCutaneous <User Schedule>  hydrocortisone sodium succinate Injectable 50 milliGRAM(s) IV Push every 12 hours  insulin lispro (ADMELOG) corrective regimen sliding scale   SubCutaneous Before meals and at bedtime  insulin NPH human recombinant 6 Unit(s) SubCutaneous every 6 hours  levothyroxine Injectable 50 MICROGram(s) IV Push <User Schedule>  pantoprazole    Tablet 40 milliGRAM(s) Oral daily  polyethylene glycol 3350 17 Gram(s) Oral daily  senna 2 Tablet(s) Oral at bedtime  sodium chloride 0.9%. 1000 milliLiter(s) (50 mL/Hr) IV Continuous <Continuous>      PHYSICAL EXAM:  T(C): 36.5 (03-19-23 @ 11:00), Max: 36.9 (03-18-23 @ 11:00)  HR: 57 (03-19-23 @ 11:00) (37 - 74)  BP: --  RR: 16 (03-19-23 @ 11:00) (12 - 20)  SpO2: 99% (03-19-23 @ 11:00) (95% - 100%)  Wt(kg): --  I&O's Summary    18 Mar 2023 07:01  -  19 Mar 2023 07:00  --------------------------------------------------------  IN: 3788 mL / OUT: 1150 mL / NET: 2638 mL    19 Mar 2023 07:01  -  19 Mar 2023 10:59  --------------------------------------------------------  IN: 200 mL / OUT: 500 mL / NET: -300 mL            JVP: Normal  Neck: supple  Lung: clear   CV: S1 S2 , Murmur:  Abd: soft  Ext: No edema  neuro: Awake / alert  Psych: flat affect  Skin: normal``    LABS/DATA:    CARDIAC MARKERS:                                9.0    10.46 )-----------( 197      ( 18 Mar 2023 21:50 )             28.2     03-19    145  |  114<H>  |  14  ----------------------------<  142<H>  4.0   |  22  |  0.54    Ca    8.3<L>      19 Mar 2023 04:02  Phos  3.2     03-19  Mg     2.4     03-19    TPro  5.4<L>  /  Alb  3.1<L>  /  TBili  0.3  /  DBili  x   /  AST  193<H>  /  ALT  190<H>  /  AlkPhos  94  03-19    proBNP:   Lipid Profile:   HgA1c:   TSH: Thyroid Stimulating Hormone, Serum: 1.05 uIU/mL (03-18 @ 17:05)  Thyroid Stimulating Hormone, Serum: 1.05 uIU/mL (03-18 @ 17:05)      TELE:  EKG:

## 2023-03-19 NOTE — PROGRESS NOTE ADULT - SUBJECTIVE AND OBJECTIVE BOX
O:    T(C): 36.2 (03-19-23 @ 03:00), Max: 36.9 (03-18-23 @ 11:00)  HR: 74 (03-19-23 @ 07:00) (37 - 74)  BP: --  RR: 25 (03-19-23 @ 07:00) (12 - 25)  SpO2: 98% (03-19-23 @ 07:00) (95% - 100%)  03-18-23 @ 07:01  -  03-19-23 @ 07:00  --------------------------------------------------------  IN: 3788 mL / OUT: 1150 mL / NET: 2638 mL    amLODIPine   Tablet 10 milliGRAM(s) Oral daily  chlorhexidine 4% Liquid 1 Application(s) Topical daily  desmopressin Injectable 0.5 MICROGram(s) IV Push <User Schedule>  dextrose 5%. 1000 milliLiter(s) IV Continuous <Continuous>  dextrose 5%. 1000 milliLiter(s) IV Continuous <Continuous>  dextrose 50% Injectable 50 milliLiter(s) IV Push every 15 minutes  dextrose Oral Gel 15 Gram(s) Oral once PRN  enoxaparin Injectable 40 milliGRAM(s) SubCutaneous <User Schedule>  glucagon  Injectable 1 milliGRAM(s) IntraMuscular once  hydrocortisone sodium succinate Injectable 50 milliGRAM(s) IV Push every 12 hours  insulin lispro (ADMELOG) corrective regimen sliding scale   SubCutaneous every 6 hours  insulin NPH human recombinant 8 Unit(s) SubCutaneous every 6 hours  levothyroxine Injectable 50 MICROGram(s) IV Push <User Schedule>  ondansetron Injectable 4 milliGRAM(s) IV Push every 6 hours PRN  pantoprazole    Tablet 40 milliGRAM(s) Oral daily  polyethylene glycol 3350 17 Gram(s) Oral daily  senna 2 Tablet(s) Oral at bedtime  sodium chloride 0.65% Nasal 1 Spray(s) Both Nostrils three times a day PRN  sodium chloride 0.9%. 1000 milliLiter(s) IV Continuous <Continuous>  traMADol 25 milliGRAM(s) Oral every 4 hours PRN  traMADol 50 milliGRAM(s) Oral every 4 hours PRN    EXAMINATION:  General: No acute distress  HEENT: Anicteric sclerae  Cardiac: [id number]  Lungs: Clear  Abdomen: Soft, non-tender, +BS  Extremities: No c/c/e  Skin/Incision Site: Clean, dry and intact  Neurologic: Drowsy, opens eys to verbal stimuli , responding to her name, following commands. Moving all extremities spontaneously. Pupils 3 mm Bilaterally reactive.         LABS:  Na: 145 (03-19 @ 04:02), 143 (03-18 @ 21:50), 141 (03-18 @ 17:05), 145 (03-18 @ 10:36), 148 (03-18 @ 05:26), 150 (03-18 @ 03:54), 152 (03-17 @ 23:08), 150 (03-17 @ 18:17), 153 (03-17 @ 16:19), 154 (03-17 @ 14:06), 154 (03-17 @ 12:30), 158 (03-17 @ 10:24), 156 (03-17 @ 08:09), 159 (03-17 @ 05:34), 163 (03-17 @ 03:10), 164 (03-17 @ 01:01), 163 (03-16 @ 23:08), 164 (03-16 @ 22:15), 170 (03-16 @ 20:29), >170 (03-16 @ 19:45), >170 (03-16 @ 18:32), >170 (03-16 @ 17:23), 168 (03-16 @ 11:17)  K: 4.0 (03-19 @ 04:02), 3.7 (03-18 @ 21:50), 3.6 (03-18 @ 17:05), 3.4 (03-18 @ 10:36), 3.6 (03-18 @ 05:26), 3.6 (03-18 @ 03:54), 3.7 (03-17 @ 23:08), 4.0 (03-17 @ 18:17), 3.5 (03-17 @ 16:19), 3.7 (03-17 @ 14:06), 4.0 (03-17 @ 12:30), 3.7 (03-17 @ 10:24), 3.3 (03-17 @ 08:09), 3.7 (03-17 @ 05:34), 3.7 (03-17 @ 03:10), 3.3 (03-17 @ 01:01), 3.3 (03-16 @ 23:08), 3.2 (03-16 @ 22:15), 3.9 (03-16 @ 20:29), 4.2 (03-16 @ 19:45), 4.2 (03-16 @ 18:32), 4.3 (03-16 @ 17:23), 3.8 (03-16 @ 11:17)  Cl: 114 (03-19 @ 04:02), 112 (03-18 @ 21:50), 111 (03-18 @ 17:05), 113 (03-18 @ 10:36), 116 (03-18 @ 05:26), 117 (03-18 @ 03:54), 120 (03-17 @ 23:08), 119 (03-17 @ 18:17), 122 (03-17 @ 16:19), 122 (03-17 @ 14:06), 121 (03-17 @ 12:30), 125 (03-17 @ 10:24), 124 (03-17 @ 08:09), 125 (03-17 @ 05:34), 127 (03-17 @ 03:10), 129 (03-17 @ 01:01), 130 (03-16 @ 23:08), 128 (03-16 @ 22:15), 134 (03-16 @ 20:29), >135 (03-16 @ 19:45), >135 (03-16 @ 18:32), >135 (03-16 @ 17:23), 131 (03-16 @ 11:17)  CO2: 22 (03-19 @ 04:02), 21 (03-18 @ 21:50), 20 (03-18 @ 17:05), 22 (03-18 @ 10:36), 23 (03-18 @ 05:26), 23 (03-18 @ 03:54), 23 (03-17 @ 23:08), 23 (03-17 @ 18:17), 24 (03-17 @ 16:19), 24 (03-17 @ 14:06), 25 (03-17 @ 12:30), 24 (03-17 @ 10:24), 24 (03-17 @ 08:09), 26 (03-17 @ 05:34), 26 (03-17 @ 03:10), 24 (03-17 @ 01:01), 25 (03-16 @ 23:08), 23 (03-16 @ 22:15), 25 (03-16 @ 20:29), 25 (03-16 @ 19:45), 25 (03-16 @ 18:32), 23 (03-16 @ 17:23), 21 (03-16 @ 11:17)  BUN: 14 (03-19 @ 04:02), 12 (03-18 @ 21:50), 10 (03-18 @ 17:05), 9 (03-18 @ 10:36), 9 (03-18 @ 05:26), 9 (03-18 @ 03:54), 10 (03-17 @ 23:08), 11 (03-17 @ 18:17), 11 (03-17 @ 16:19), 11 (03-17 @ 14:06), 10 (03-17 @ 12:30), 10 (03-17 @ 10:24), 9 (03-17 @ 08:09), 10 (03-17 @ 05:34), 10 (03-17 @ 03:10), 10 (03-17 @ 01:01), 10 (03-16 @ 23:08), 10 (03-16 @ 22:15), 10 (03-16 @ 20:29), 11 (03-16 @ 19:45), 10 (03-16 @ 18:32), 10 (03-16 @ 17:23), 9 (03-16 @ 11:17)  Cr: 0.54 (03-19 @ 04:02), 0.50 (03-18 @ 21:50), 0.39 (03-18 @ 17:05), 0.37 (03-18 @ 10:36), 0.43 (03-18 @ 05:26), 0.46 (03-18 @ 03:54), 0.49 (03-17 @ 23:08), 0.52 (03-17 @ 18:17), 0.52 (03-17 @ 16:19), 0.51 (03-17 @ 14:06), 0.51 (03-17 @ 12:30), 0.53 (03-17 @ 10:24), 0.56 (03-17 @ 08:09), 0.58 (03-17 @ 05:34), 0.64 (03-17 @ 03:10), 0.62 (03-17 @ 01:01), 0.65 (03-16 @ 23:08), 0.66 (03-16 @ 22:15), 0.70 (03-16 @ 20:29), 0.67 (03-16 @ 19:45), 0.70 (03-16 @ 18:32), 0.69 (03-16 @ 17:23), 0.74 (03-16 @ 11:17)  Glu: 142(03-19 @ 04:02), 173(03-18 @ 21:50), 175(03-18 @ 17:05), 202(03-18 @ 10:36), 203(03-18 @ 05:26), 212(03-18 @ 03:54), 218(03-17 @ 23:08), 281(03-17 @ 18:17), 209(03-17 @ 16:19), 206(03-17 @ 14:06), 280(03-17 @ 12:30), 110(03-17 @ 10:24), 219(03-17 @ 08:09), 258(03-17 @ 05:34), 329(03-17 @ 03:10), 404(03-17 @ 01:01), 379(03-16 @ 23:08), 447(03-16 @ 22:15), 396(03-16 @ 20:29), 336(03-16 @ 19:45), 322(03-16 @ 18:32), 290(03-16 @ 17:23), 169(03-16 @ 11:17)    Hgb: 9.0 (03-18 @ 21:50), 9.0 (03-17 @ 23:08), 11.3 (03-16 @ 22:15)  Hct: 28.2 (03-18 @ 21:50), 28.9 (03-17 @ 23:08), 37.1 (03-16 @ 22:15)  WBC: 10.46 (03-18 @ 21:50), 11.81 (03-17 @ 23:08), 14.10 (03-16 @ 22:15)  Plt: 197 (03-18 @ 21:50), 200 (03-17 @ 23:08), 262 (03-16 @ 22:15)    INR:   PTT:         	  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 has LD     NEURO:  Neuro checks Q 2 hr   CTh stable   LD clamped per NS   video EEG no seizure, d/c EEG    HOB 30  PT/OT/OBC      PULM:  RA  Pituitary precautions (no incentive spirometry, no straws, no BIPAP)    CV:  -160 mmHg  sinus bradycardia, asymptomatic     RENAL:   DI resolved   free water flushes 300 ml q 4 hr   Humphries for strict I+Os   I/O 400 ml   no humphries   bladder scan q 6 hr for urinary retention   change BMP q 6 hr , urine sg   DI on ddavp   IN: 3788 mL / OUT: 1150 mL / NET: 2638 mL    GI:  Diet: NGT placed by ENT with scope, on glucerna 60 ml/hr   swallow eval   GI prophylaxis [] not indicated [x] PPI [] other:  Bowel regimen [] colace [x] senna [x] other: miralax   last BM today     ENDO:   Hypernatremia, hyperglycemia , panhypopit   Goal euglycemia (-180)  On hydrocortisone 50mg IV Q8, will discuss with endo changing it to physiologic dose as she is not shock   hy[pothyroidism cont synthroid 50 mcg IV QD  on insulin drip, change to NPH 8 units q 6 hr , ISS, finger stick   On standing dose of IV , DDAVP 0.5 mcg BID      HEME/ONC:  H/H stable   lovenox 40 mg sc qhs     ID:  afebrile  d/c ABX per NS    ENT following    MISC:    SOCIAL/FAMILY:  [x] awaiting [] updated at bedside [] family meeting    CODE STATUS:  [x] Full Code [] DNR [] DNI [] Palliative/Comfort Care    DISPOSITION:  [x] ICU [] Stroke Unit [] Floor [] EMU [] RCU [] PCU    [x] Patient is at high risk of neurologic deterioration/death due to: post op hemorrhage, DI, pan hypopituitarism        SUMMARY: 53yo woman transferred from [facility] on 3/10 for suprasellar mass found on MRI for opthalmology work up--etta HA and 3wk of blurry vision. PMH HTN, DM2, HLD.     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     Overnight EVENTS: 3/16- Patient was given 0.5 mg of DDAVP . Still waking up from the surgery.     3/17- Na more stable, mental status improved. ON ddavp drip. D5 running.     3/18- Na trending down appropriately. Exam improved significantly. LD clamped. NGT placed by ENT. No more on the DDAVP gtt. On D5 and insulin gtt.         REVIEW OF SYSTEMS: [] Unable to Assess due to neurologic exam   [ x] All ROS addressed below are non-contributory, except:  Neuro: [ x] Headache [ ] Back pain [ ] Numbness [ ] Weakness [ ] Ataxia [ ] Dizziness [ ] Aphasia [ ] Dysarthria [ x] Visual disturbance  Resp: [ ] Shortness of breath/dyspnea [ ] Orthopnea [ ] Cough  CV: [ ] Chest pain [ ] Palpitation [ ] Lightheadedness [ ] Syncope  Renal: [ ] Thirst [ ] Edema  GI: [ ] Nausea [ ] Emesis [ ] Abdominal pain [ ] Constipation [ ] Diarrhea  Hem: [ ] Hematemesis [ ] bBright red blood per rectum  ID: [ ] Fever [ ] Chills [ ] Dysuria  ENT: [ ] Rhinorrhea    PAST MEDICAL & SURGICAL HISTORY:  Hypertension      Hyperlipidemia      Diabetes mellitus      Allergies    No Known Drug Allergies  S/P TRANSSPHENOIDAL SURGERY. SINONASAL PRECAUTIONS! NO NASAL SWABS OR NG TUBES. (Unknown)    Intolerances          O: no DI     T(C): 36.2 (03-19-23 @ 03:00), Max: 36.9 (03-18-23 @ 11:00)  HR: 74 (03-19-23 @ 07:00) (37 - 74)  BP: --  RR: 25 (03-19-23 @ 07:00) (12 - 25)  SpO2: 98% (03-19-23 @ 07:00) (95% - 100%)  03-18-23 @ 07:01  -  03-19-23 @ 07:00  --------------------------------------------------------  IN: 3788 mL / OUT: 1150 mL / NET: 2638 mL    amLODIPine   Tablet 10 milliGRAM(s) Oral daily  chlorhexidine 4% Liquid 1 Application(s) Topical daily  desmopressin Injectable 0.5 MICROGram(s) IV Push <User Schedule>  dextrose 5%. 1000 milliLiter(s) IV Continuous <Continuous>  dextrose 5%. 1000 milliLiter(s) IV Continuous <Continuous>  dextrose 50% Injectable 50 milliLiter(s) IV Push every 15 minutes  dextrose Oral Gel 15 Gram(s) Oral once PRN  enoxaparin Injectable 40 milliGRAM(s) SubCutaneous <User Schedule>  glucagon  Injectable 1 milliGRAM(s) IntraMuscular once  hydrocortisone sodium succinate Injectable 50 milliGRAM(s) IV Push every 12 hours  insulin lispro (ADMELOG) corrective regimen sliding scale   SubCutaneous every 6 hours  insulin NPH human recombinant 8 Unit(s) SubCutaneous every 6 hours  levothyroxine Injectable 50 MICROGram(s) IV Push <User Schedule>  ondansetron Injectable 4 milliGRAM(s) IV Push every 6 hours PRN  pantoprazole    Tablet 40 milliGRAM(s) Oral daily  polyethylene glycol 3350 17 Gram(s) Oral daily  senna 2 Tablet(s) Oral at bedtime  sodium chloride 0.65% Nasal 1 Spray(s) Both Nostrils three times a day PRN  sodium chloride 0.9%. 1000 milliLiter(s) IV Continuous <Continuous>  traMADol 25 milliGRAM(s) Oral every 4 hours PRN  traMADol 50 milliGRAM(s) Oral every 4 hours PRN    EXAMINATION:  General: No acute distress  HEENT: Anicteric sclerae  Cardiac: [id number]  Lungs: Clear  Abdomen: Soft, non-tender, +BS  Extremities: No c/c/e  Skin/Incision Site: Clean, dry and intact  Neurologic: [place], alert, oriented x 3 ,  following commands. Moving all extremities spontaneously. Pupils 3 mm Bilaterally reactive.           LABS:  Na: 145 (03-19 @ 04:02), 143 (03-18 @ 21:50), 141 (03-18 @ 17:05), 145 (03-18 @ 10:36), 148 (03-18 @ 05:26), 150 (03-18 @ 03:54), 152 (03-17 @ 23:08), 150 (03-17 @ 18:17), 153 (03-17 @ 16:19), 154 (03-17 @ 14:06), 154 (03-17 @ 12:30), 158 (03-17 @ 10:24), 156 (03-17 @ 08:09), 159 (03-17 @ 05:34), 163 (03-17 @ 03:10), 164 (03-17 @ 01:01), 163 (03-16 @ 23:08), 164 (03-16 @ 22:15), 170 (03-16 @ 20:29), >170 (03-16 @ 19:45), >170 (03-16 @ 18:32), >170 (03-16 @ 17:23), 168 (03-16 @ 11:17)  K: 4.0 (03-19 @ 04:02), 3.7 (03-18 @ 21:50), 3.6 (03-18 @ 17:05), 3.4 (03-18 @ 10:36), 3.6 (03-18 @ 05:26), 3.6 (03-18 @ 03:54), 3.7 (03-17 @ 23:08), 4.0 (03-17 @ 18:17), 3.5 (03-17 @ 16:19), 3.7 (03-17 @ 14:06), 4.0 (03-17 @ 12:30), 3.7 (03-17 @ 10:24), 3.3 (03-17 @ 08:09), 3.7 (03-17 @ 05:34), 3.7 (03-17 @ 03:10), 3.3 (03-17 @ 01:01), 3.3 (03-16 @ 23:08), 3.2 (03-16 @ 22:15), 3.9 (03-16 @ 20:29), 4.2 (03-16 @ 19:45), 4.2 (03-16 @ 18:32), 4.3 (03-16 @ 17:23), 3.8 (03-16 @ 11:17)  Cl: 114 (03-19 @ 04:02), 112 (03-18 @ 21:50), 111 (03-18 @ 17:05), 113 (03-18 @ 10:36), 116 (03-18 @ 05:26), 117 (03-18 @ 03:54), 120 (03-17 @ 23:08), 119 (03-17 @ 18:17), 122 (03-17 @ 16:19), 122 (03-17 @ 14:06), 121 (03-17 @ 12:30), 125 (03-17 @ 10:24), 124 (03-17 @ 08:09), 125 (03-17 @ 05:34), 127 (03-17 @ 03:10), 129 (03-17 @ 01:01), 130 (03-16 @ 23:08), 128 (03-16 @ 22:15), 134 (03-16 @ 20:29), >135 (03-16 @ 19:45), >135 (03-16 @ 18:32), >135 (03-16 @ 17:23), 131 (03-16 @ 11:17)  CO2: 22 (03-19 @ 04:02), 21 (03-18 @ 21:50), 20 (03-18 @ 17:05), 22 (03-18 @ 10:36), 23 (03-18 @ 05:26), 23 (03-18 @ 03:54), 23 (03-17 @ 23:08), 23 (03-17 @ 18:17), 24 (03-17 @ 16:19), 24 (03-17 @ 14:06), 25 (03-17 @ 12:30), 24 (03-17 @ 10:24), 24 (03-17 @ 08:09), 26 (03-17 @ 05:34), 26 (03-17 @ 03:10), 24 (03-17 @ 01:01), 25 (03-16 @ 23:08), 23 (03-16 @ 22:15), 25 (03-16 @ 20:29), 25 (03-16 @ 19:45), 25 (03-16 @ 18:32), 23 (03-16 @ 17:23), 21 (03-16 @ 11:17)  BUN: 14 (03-19 @ 04:02), 12 (03-18 @ 21:50), 10 (03-18 @ 17:05), 9 (03-18 @ 10:36), 9 (03-18 @ 05:26), 9 (03-18 @ 03:54), 10 (03-17 @ 23:08), 11 (03-17 @ 18:17), 11 (03-17 @ 16:19), 11 (03-17 @ 14:06), 10 (03-17 @ 12:30), 10 (03-17 @ 10:24), 9 (03-17 @ 08:09), 10 (03-17 @ 05:34), 10 (03-17 @ 03:10), 10 (03-17 @ 01:01), 10 (03-16 @ 23:08), 10 (03-16 @ 22:15), 10 (03-16 @ 20:29), 11 (03-16 @ 19:45), 10 (03-16 @ 18:32), 10 (03-16 @ 17:23), 9 (03-16 @ 11:17)  Cr: 0.54 (03-19 @ 04:02), 0.50 (03-18 @ 21:50), 0.39 (03-18 @ 17:05), 0.37 (03-18 @ 10:36), 0.43 (03-18 @ 05:26), 0.46 (03-18 @ 03:54), 0.49 (03-17 @ 23:08), 0.52 (03-17 @ 18:17), 0.52 (03-17 @ 16:19), 0.51 (03-17 @ 14:06), 0.51 (03-17 @ 12:30), 0.53 (03-17 @ 10:24), 0.56 (03-17 @ 08:09), 0.58 (03-17 @ 05:34), 0.64 (03-17 @ 03:10), 0.62 (03-17 @ 01:01), 0.65 (03-16 @ 23:08), 0.66 (03-16 @ 22:15), 0.70 (03-16 @ 20:29), 0.67 (03-16 @ 19:45), 0.70 (03-16 @ 18:32), 0.69 (03-16 @ 17:23), 0.74 (03-16 @ 11:17)  Glu: 142(03-19 @ 04:02), 173(03-18 @ 21:50), 175(03-18 @ 17:05), 202(03-18 @ 10:36), 203(03-18 @ 05:26), 212(03-18 @ 03:54), 218(03-17 @ 23:08), 281(03-17 @ 18:17), 209(03-17 @ 16:19), 206(03-17 @ 14:06), 280(03-17 @ 12:30), 110(03-17 @ 10:24), 219(03-17 @ 08:09), 258(03-17 @ 05:34), 329(03-17 @ 03:10), 404(03-17 @ 01:01), 379(03-16 @ 23:08), 447(03-16 @ 22:15), 396(03-16 @ 20:29), 336(03-16 @ 19:45), 322(03-16 @ 18:32), 290(03-16 @ 17:23), 169(03-16 @ 11:17)    Hgb: 9.0 (03-18 @ 21:50), 9.0 (03-17 @ 23:08), 11.3 (03-16 @ 22:15)  Hct: 28.2 (03-18 @ 21:50), 28.9 (03-17 @ 23:08), 37.1 (03-16 @ 22:15)  WBC: 10.46 (03-18 @ 21:50), 11.81 (03-17 @ 23:08), 14.10 (03-16 @ 22:15)  Plt: 197 (03-18 @ 21:50), 200 (03-17 @ 23:08), 262 (03-16 @ 22:15)    INR:   PTT:                 	  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 has LD     NEURO:  Neuro checks Q 4  hr   LD clamped per NS will discuss removal   HOB 30  PT/OT/OBC      PULM:  RA  Pituitary precautions (no incentive spirometry, no straws, no BIPAP)    CV:  -160 mmHg  asymptomatic sinus bradycardia, cardiology consulted     RENAL:   DI resolved    IN: 3788 mL / OUT: 1150 mL / NET: 2638 mL  blader scan and straight cath as needed   if UO> 300 m in 2 hrs will get STAT BMP and  urine sg   am ddavp was held, will monitor  today and give ddavp as needed      GI:  Diet: D/C NG    swallow eval passed   will start regular diet    encourage oral fluid intake   Bowel regimen [] colace [x] senna [x] other: miralax   last BM today     ENDO:   pantohypopit   Goal euglycemia (-180)  On hydrocortisone 50mg IV Q12, will discuss with endo changing it to physiologic dose as she is not shock   hypothyroidism cont synthroid 50 mcg IV QD  decrease NPH to  6 units q 6 hr , ISS, finger stick   DI, DDAVP am dose held    HEME/ONC:  H/H stable   lovenox 40 mg sc qhs     ID:  afebrile    MISC:    SOCIAL/FAMILY:  [x] awaiting [] updated at bedside [] family meeting    CODE STATUS:  [x] Full Code [] DNR [] DNI [] Palliative/Comfort Care    DISPOSITION:  [x] ICU [] Stroke Unit [] Floor [] EMU [] RCU [] PCU    not critical  moderate complex medical decision

## 2023-03-19 NOTE — PROGRESS NOTE ADULT - SUBJECTIVE AND OBJECTIVE BOX
ENT ISSUE/POD: s/p TSRP, leak, R fat graft pod 4    HPI: Pt seen and examined at bedside. There were no acute overnight events. She is laying in bed comfortable. The patient states she has a mild headache and pain in her nose. Pt denies any bleeding, clear discharge from B/L nares, no salty or metallic taste in mouth, no PND.             PAST MEDICAL & SURGICAL HISTORY:  Hypertension      Hyperlipidemia      Diabetes mellitus        Allergies    No Known Drug Allergies  S/P TRANSSPHENOIDAL SURGERY. SINONASAL PRECAUTIONS! NO NASAL SWABS OR NG TUBES. (Unknown)    Intolerances      MEDICATIONS  (STANDING):  amLODIPine   Tablet 10 milliGRAM(s) Oral daily  chlorhexidine 4% Liquid 1 Application(s) Topical daily  desmopressin Injectable 0.5 MICROGram(s) IV Push once  desmopressin Injectable 0.5 MICROGram(s) IV Push <User Schedule>  dextrose 50% Injectable 50 milliLiter(s) IV Push every 15 minutes  enoxaparin Injectable 40 milliGRAM(s) SubCutaneous <User Schedule>  hydrocortisone sodium succinate Injectable 50 milliGRAM(s) IV Push every 12 hours  insulin lispro (ADMELOG) corrective regimen sliding scale   SubCutaneous Before meals and at bedtime  insulin NPH human recombinant 6 Unit(s) SubCutaneous every 6 hours  levothyroxine Injectable 50 MICROGram(s) IV Push <User Schedule>  pantoprazole    Tablet 40 milliGRAM(s) Oral daily  polyethylene glycol 3350 17 Gram(s) Oral daily  potassium chloride    Tablet ER 40 milliEquivalent(s) Oral once  senna 2 Tablet(s) Oral at bedtime  sodium chloride 0.45%. 1000 milliLiter(s) (250 mL/Hr) IV Continuous <Continuous>  sodium chloride 0.9%. 1000 milliLiter(s) (50 mL/Hr) IV Continuous <Continuous>    MEDICATIONS  (PRN):  ondansetron Injectable 4 milliGRAM(s) IV Push every 6 hours PRN Nausea and/or Vomiting  sodium chloride 0.65% Nasal 1 Spray(s) Both Nostrils three times a day PRN Nasal Congestion  traMADol 25 milliGRAM(s) Oral every 4 hours PRN Moderate Pain (4 - 6)  traMADol 50 milliGRAM(s) Oral every 4 hours PRN Severe Pain (7 - 10)      Social History: see consult    Family history: see consult    ROS:   ENT: all negative except as noted in HPI   Pulm: denies SOB, cough, hemoptysis  Neuro: denies numbness/tingling, loss of sensation  Endo: denies heat/cold intolerance, excessive sweating      Vital Signs Last 24 Hrs  T(C): 36.5 (19 Mar 2023 11:00), Max: 36.6 (19 Mar 2023 07:00)  T(F): 97.7 (19 Mar 2023 11:00), Max: 97.8 (19 Mar 2023 07:00)  HR: 60 (19 Mar 2023 12:00) (37 - 74)  BP: --  BP(mean): --  RR: 19 (19 Mar 2023 12:00) (12 - 20)  SpO2: 98% (19 Mar 2023 12:00) (95% - 100%)    Parameters below as of 19 Mar 2023 11:55  Patient On (Oxygen Delivery Method): room air                              9.0    10.46 )-----------( 197      ( 18 Mar 2023 21:50 )             28.2    03-19    147<H>  |  117<H>  |  14  ----------------------------<  149<H>  3.6   |  22  |  0.58    Ca    8.4      19 Mar 2023 10:55  Phos  3.2     03-19  Mg     2.4     03-19    TPro  5.4<L>  /  Alb  3.1<L>  /  TBili  0.3  /  DBili  x   /  AST  193<H>  /  ALT  190<H>  /  AlkPhos  94  03-19       PHYSICAL EXAM:  Gen: NAD  Skin: No rashes, bruises, or lesions  Head: Normocephalic, Atraumatic  Face: no edema, erythema, or fluctuance. Parotid glands soft without mass  Eyes: no scleral injection  Nose: Nares bilaterally patent, no discharge, + dry blood in b/l nares, no active bleed, no CSF leak  Mouth: No Stridor / Drooling / Trismus.  Mucosa moist, tongue/uvula midline, oropharynx clear  Neck: Flat, supple, no lymphadenopathy, trachea midline, no masses  Resp: breathing easily, no stridor  Neuro: facial nerve intact, no facial droop

## 2023-03-19 NOTE — PROGRESS NOTE ADULT - PROBLEM SELECTOR PLAN 1
·  Plan: - continue humidified face tent  - nasal saline, 2 sprays to b/l nares 4 times a day.  - monitor for signs of Epistaxis and CSF leak  - avoid nasal trauma, heavy lifting and bending at waist.  - Care a/p neurosurgery.

## 2023-03-19 NOTE — SWALLOW BEDSIDE ASSESSMENT ADULT - COMMENTS
Continued history:   - Adm NSCU s/p expanded endonasal resection of craniopharyngioma, LD placement.   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         Swallow history: No reports in SCM.   As per discussion with RN this date/team rounding, pt passed RN swallow screen and placed on a diet earlier this date. 3/19.

## 2023-03-19 NOTE — PROGRESS NOTE ADULT - ASSESSMENT
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 No CSF leak on exam. Still having a slight h/a and nasal pain that is relieved with pain meds.

## 2023-03-19 NOTE — SWALLOW BEDSIDE ASSESSMENT ADULT - SWALLOW EVAL: DIAGNOSIS
Order appreciated. Pt is s/p transfer from [facility] on 3/10 for suprasellar mass found on MRI for opthalmology work up. She is now s/p TSP resection of pituitary adenoma, POD4 complicated by hypernatremia from DI. Discussion held w/ [facility] rounding team and RN regarding role of slp in patient care at this time, as recently passed RN swallow screen. Patient passed and placed on a diet earlier by team. Per verbal, SLP to sign off as consultation no longer warranted. Will complete order and sign off, please re-consult if intolerance noted. Yaritza Schulte MS CCC-SLP Prefer teams   extension [phone]

## 2023-03-19 NOTE — PROGRESS NOTE ADULT - ASSESSMENT
52 yr old female with a history of hypertension, hyperlipidemia, dm type 2, no surgeries ever presents with a month of headaches and 3 weeks of blurred vision. Endocrinology consulted for evaluation of sellar mass.    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 Too low to be prolactinoma given size of the sellar mass. Suspect stalk effect. (prolactin diluted 103, undiluted 107)  -Secondary hypogonadism can be addressed as outpatient. (Lh 2.6, estradiol <5)  -3/12 AM ayush 5.9, ACTH 21.5, 3/13 am cortisol (6am) 6.9  - cosyntropin stim test: 3/14 AM cortisol at 8:15am 5.1, cosyntropin at 8:15, 8:50am cortisol 16.9, 9:25am 20.7  - s/p tsp 3/15  - IGF1 3/15 46  PLAN  - f/u neurosurgery and ophthalmology recommendations  - can wean to hydrocortisone 50mg IV q12h tonight    DI/SIADH Watch POST OP  - Please eval for signs of DI vs SIADH postop  - now off vasopressin gtt, monitor on DDAVP 0.5 mcg IV bid, off D5 as Na improved to 145  - q6h BMP  - Strict I/Os, daily weights. if UO >250cc/hour, please check BMP and urine osm, serum osm.  If consistent with DI, patient may require DDAVP.   - May require DDAVP IVP x1 if meets any of these criteria: Na > 145, UO > 250cc/hr, Urine osm < 100 or urine specific gravity < 1.005.  - Signs and symptoms of DI and SIADH post op:  may occur in the 2-3 weeks following surgery.  Patient should check daily weights and if they experience weight gain of 2-3 pounds to call her endocrinologist.  Signs of DI include increased thirst with high urine output.    Primary Hypothyroidism   - Patient with elevated TSH (8)and Low Free T4 (0.5)  - 3/18 TSH 1.05, FT4 1, TT3 49, serum T4 5.4, do not suspect contributing to bradycardia, steroids can cause low T3  PLAN  - continue levothyroxine 50 mcg IV daily (closer to 75 mcg PO)    Steroid hyperglycemia  T2DM with hyperglycemia  - HbA1c: 8  - Home Regimen: metformin 1000mg bid  - Endocrinologist: does not have  - patient with hyperglycemia on stress dose steroids  - now on tube feeds  PLAN  - monitor on NPH 6q6h, just taken off insulin gtt  - moderate admelog correction scale q6h  - Goal -180  Discharge plan:  - Discharge medications: metformin 1000mg bid + GLP-1 agonist  - Patient to call doctor with persistent high or low BG at home.   - Ensure patient has glucometer, test strips and lancets on discharge.  - Recommend routine outpatient ophthalmology, podiatry and endocrinology f/u    HTN  - Home regimen: lisinopril 2.5mg daily  PLAN  - Can check urine microalbumin outpatient  - Outpatient goal BP <130/80. Management per primary team.    HLD  - Home regimen: atorvastatin 80mg daily  -   PLAN  - resume statin when able    Will schedule an appointment for the patient to follow up.  [address] [place] [address]  Phone Number: [phone]    Discussed with primary team.     Thank you for the interesting consult.    Denny Bishop MD, Endocrinology Fellow  Pager [phone] from 9am to 5pm. After hours and on weekends, please call [phone]. 52 yr old female with a history of hypertension, hyperlipidemia, dm type 2, no surgeries ever presents with a month of headaches and 3 weeks of blurred vision. Endocrinology consulted for evaluation of sellar mass.    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 Too low to be prolactinoma given size of the sellar mass. Suspect stalk effect. (prolactin diluted 103, undiluted 107)  -Secondary hypogonadism can be addressed as outpatient. (Lh 2.6, estradiol <5)  -3/12 AM ayush 5.9, ACTH 21.5, 3/13 am cortisol (6am) 6.9  - cosyntropin stim test: 3/14 AM cortisol at 8:15am 5.1, cosyntropin at 8:15, 8:50am cortisol 16.9, 9:25am 20.7  - s/p tsp 3/15  - IGF1 3/15 46  PLAN  - f/u neurosurgery and ophthalmology recommendations  - continue hydrocortisone 50mg IV q12h, can wean to hydrocortisone 25mg q12h on 3/20    DI/SIADH Watch POST OP  - Please eval for signs of DI vs SIADH postop  - now off vasopressin gtt, monitor on DDAVP 0.5 mcg IV bid, off D5 as Na improved to 145  - q6h BMP  - Strict I/Os, daily weights. if UO >250cc/hour, please check BMP and urine osm, serum osm.  If consistent with DI, patient may require DDAVP.   - May require DDAVP IVP x1 if meets any of these criteria: Na > 145, UO > 250cc/hr, Urine osm < 100 or urine specific gravity < 1.005.  - Signs and symptoms of DI and SIADH post op:  may occur in the 2-3 weeks following surgery.  Patient should check daily weights and if they experience weight gain of 2-3 pounds to call her endocrinologist.  Signs of DI include increased thirst with high urine output.    Primary Hypothyroidism   - Patient with elevated TSH (8)and Low Free T4 (0.5)  - 3/18 TSH 1.05, FT4 1, TT3 49, serum T4 5.4, do not suspect contributing to bradycardia, steroids can cause low T3  PLAN  - continue levothyroxine 50 mcg IV daily (closer to 75 mcg PO)    Steroid hyperglycemia  T2DM with hyperglycemia  - HbA1c: 8  - Home Regimen: metformin 1000mg bid  - Endocrinologist: does not have  - patient with hyperglycemia on stress dose steroids  - now on tube feeds  PLAN  - would transition to lantus 10 units, admelog 3units premeal, low admelog correction scale tid with meals and separate low admelog correction scale at bedtime now that patient is tolerating diet  - fingersticks qid with meals and bedtime  - hypoglycemia protocol prn  - Goal -180  Discharge plan:  - Discharge medications: metformin 1000mg bid + GLP-1 agonist  - Patient to call doctor with persistent high or low BG at home.   - Ensure patient has glucometer, test strips and lancets on discharge.  - Recommend routine outpatient ophthalmology, podiatry and endocrinology f/u    HTN  - Home regimen: lisinopril 2.5mg daily  PLAN  - Can check urine microalbumin outpatient  - Outpatient goal BP <130/80. Management per primary team.    HLD  - Home regimen: atorvastatin 80mg daily  -   PLAN  - resume statin when able    Will schedule an appointment for the patient to follow up.  [address] [place] [address]  Phone Number: [phone]    Discussed with primary team.     Thank you for the interesting consult.    Denny Bishop MD, Endocrinology Fellow  Pager [phone] from 9am to 5pm. After hours and on weekends, please call [phone]. 52 yr old female with a history of hypertension, hyperlipidemia, dm type 2, no surgeries ever presents with a month of headaches and 3 weeks of blurred vision. Endocrinology consulted for evaluation of sellar mass.    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 Too low to be prolactinoma given size of the sellar mass. Suspect stalk effect. (prolactin diluted 103, undiluted 107)  -Secondary hypogonadism can be addressed as outpatient. (Lh 2.6, estradiol <5)  -3/12 AM ayush 5.9, ACTH 21.5, 3/13 am cortisol (6am) 6.9  - cosyntropin stim test: 3/14 AM cortisol at 8:15am 5.1, cosyntropin at 8:15, 8:50am cortisol 16.9, 9:25am 20.7  - s/p tsp 3/15  - IGF1 3/15 46  PLAN  - f/u neurosurgery and ophthalmology recommendations  - continue hydrocortisone 50mg IV q12h, can wean to hydrocortisone 25mg q12h on 3/20    DI/SIADH Watch POST OP  - Please eval for signs of DI vs SIADH postop  - now off vasopressin gtt, monitor on DDAVP 0.5 mcg IV bid, off D5 as Na improved to 145  - q6h BMP  - Strict I/Os, daily weights. if UO >250cc/hour, please check BMP and urine osm, serum osm.  If consistent with DI, patient may require DDAVP.   - May require DDAVP IVP x1 if meets any of these criteria: Na > 145, UO > 250cc/hr, Urine osm < 100 or urine specific gravity < 1.005.  - Signs and symptoms of DI and SIADH post op:  may occur in the 2-3 weeks following surgery.  Patient should check daily weights and if they experience weight gain of 2-3 pounds to call her endocrinologist.  Signs of DI include increased thirst with high urine output.    Primary Hypothyroidism   - Patient with elevated TSH (8)and Low Free T4 (0.5)  - 3/18 TSH 1.05, FT4 1, TT3 49, serum T4 5.4, do not suspect contributing to bradycardia, steroids can cause low T3  PLAN  - can change back to levothyroxine 75 mcg daily PO as tolerating po (maintain on empty stomach (at least 1 hour before meals), separate by 4 hours from PPI or calcium supplementation which can inhibit its absorption)    Steroid hyperglycemia  T2DM with hyperglycemia  - HbA1c: 8  - Home Regimen: metformin 1000mg bid  - Endocrinologist: does not have  - patient with hyperglycemia on stress dose steroids  - now on tube feeds  PLAN  - would transition to lantus 10 units tonight, admelog 3units premeal, low admelog correction scale tid with meals and separate low admelog correction scale at bedtime now that patient is tolerating diet  - d/c NPH  - fingersticks qid with meals and bedtime  - hypoglycemia protocol prn  - Goal -180  Discharge plan:  - Discharge medications: metformin 1000mg bid + GLP-1 agonist  - Patient to call doctor with persistent high or low BG at home.   - Ensure patient has glucometer, test strips and lancets on discharge.  - Recommend routine outpatient ophthalmology, podiatry and endocrinology f/u    HTN  - Home regimen: lisinopril 2.5mg daily  PLAN  - Can check urine microalbumin outpatient  - Outpatient goal BP <130/80. Management per primary team.    HLD  - Home regimen: atorvastatin 80mg daily  -   PLAN  - resume statin when able    Will schedule an appointment for the patient to follow up.  [address] [place] [address]  Phone Number: [phone]    Discussed with primary team.     Thank you for the interesting consult.    Denny Bishop MD, Endocrinology Fellow  Pager [phone] from 9am to 5pm. After hours and on weekends, please call [phone]. 52 yr old female with a history of hypertension, hyperlipidemia, dm type 2, no surgeries ever presents with a month of headaches and 3 weeks of blurred vision. Endocrinology consulted for evaluation of sellar mass.    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 Too low to be prolactinoma given size of the sellar mass. Suspect stalk effect. (prolactin diluted 103, undiluted 107)  -Secondary hypogonadism can be addressed as outpatient. (Lh 2.6, estradiol <5)  -3/12 AM ayush 5.9, ACTH 21.5, 3/13 am cortisol (6am) 6.9  - cosyntropin stim test: 3/14 AM cortisol at 8:15am 5.1, cosyntropin at 8:15, 8:50am cortisol 16.9, 9:25am 20.7  - s/p tsp 3/15  - IGF1 3/15 46  PLAN  - f/u neurosurgery and ophthalmology recommendations  - continue hydrocortisone 50mg IV q12h, can wean to hydrocortisone 25mg q12h on 3/20    DI/SIADH Watch POST OP  - Please eval for signs of DI vs SIADH postop  - now off vasopressin gtt, monitor on DDAVP 0.5 mcg IV bid, off D5 as Na improved to 145  - q6h BMP  - Strict I/Os, daily weights. if UO >250cc/hour, please check BMP and urine osm, serum osm.  If consistent with DI, patient may require DDAVP.   - May require DDAVP IVP x1 if meets any of these criteria: Na > 145, UO > 250cc/hr, Urine osm < 100 or urine specific gravity < 1.005.  - Signs and symptoms of DI and SIADH post op:  may occur in the 2-3 weeks following surgery.  Patient should check daily weights and if they experience weight gain of 2-3 pounds to call her endocrinologist.  Signs of DI include increased thirst with high urine output.    Primary Hypothyroidism   - Patient with elevated TSH (8)and Low Free T4 (0.5)  - 3/18 TSH 1.05, FT4 1, TT3 49, serum T4 5.4, do not suspect contributing to bradycardia, steroids can cause low T3  PLAN  - can change back to levothyroxine 75 mcg daily PO as tolerating po (maintain on empty stomach (at least 1 hour before meals), separate by 4 hours from PPI or calcium supplementation which can inhibit its absorption)    Steroid hyperglycemia  T2DM with hyperglycemia  - HbA1c: 8  - Home Regimen: metformin 1000mg bid  - Endocrinologist: does not have  - patient with hyperglycemia on stress dose steroids  - now on tube feeds  PLAN  - would transition to lantus 10 units tonight (do not hold if npo), stop NPH  - start admelog 3 units tid premeal (hold if npo)  - start low admelog correction scale tid with meals and separate low admelog correction scale at bedtime now that patient is tolerating diet  - carb consistent diet  - fingersticks qid with meals and bedtime  - hypoglycemia protocol prn  - Goal -180  Discharge plan:  - Discharge medications: metformin 1000mg bid + GLP-1 agonist  - Patient to call doctor with persistent high or low BG at home.   - Ensure patient has glucometer, test strips and lancets on discharge.  - Recommend routine outpatient ophthalmology, podiatry and endocrinology f/u    HTN  - Home regimen: lisinopril 2.5mg daily  PLAN  - Can check urine microalbumin outpatient  - Outpatient goal BP <130/80. Management per primary team.    HLD  - Home regimen: atorvastatin 80mg daily  -   PLAN  - resume statin when able    Will schedule an appointment for the patient to follow up.  [address] [place] [address]  Phone Number: [phone]    Discussed with primary team.     Thank you for the interesting consult.    Denny Bishop MD, Endocrinology Fellow  Pager [phone] from 9am to 5pm. After hours and on weekends, please call [phone].

## 2023-03-19 NOTE — PROGRESS NOTE ADULT - SUBJECTIVE AND OBJECTIVE BOX
Statement Selected ENDOCRINE FOLLOW UP     Chief Complaint: craniopharyngioma    History:     MEDICATIONS  (STANDING):  amLODIPine   Tablet 10 milliGRAM(s) Oral daily  chlorhexidine 4% Liquid 1 Application(s) Topical daily  desmopressin Injectable 0.5 MICROGram(s) IV Push <User Schedule>  dextrose 50% Injectable 50 milliLiter(s) IV Push every 15 minutes  enoxaparin Injectable 40 milliGRAM(s) SubCutaneous <User Schedule>  hydrocortisone sodium succinate Injectable 50 milliGRAM(s) IV Push every 12 hours  insulin lispro (ADMELOG) corrective regimen sliding scale   SubCutaneous Before meals and at bedtime  insulin NPH human recombinant 6 Unit(s) SubCutaneous every 6 hours  levothyroxine Injectable 50 MICROGram(s) IV Push <User Schedule>  pantoprazole    Tablet 40 milliGRAM(s) Oral daily  polyethylene glycol 3350 17 Gram(s) Oral daily  senna 2 Tablet(s) Oral at bedtime  sodium chloride 0.9%. 1000 milliLiter(s) (50 mL/Hr) IV Continuous <Continuous>    MEDICATIONS  (PRN):  ondansetron Injectable 4 milliGRAM(s) IV Push every 6 hours PRN Nausea and/or Vomiting  sodium chloride 0.65% Nasal 1 Spray(s) Both Nostrils three times a day PRN Nasal Congestion  traMADol 25 milliGRAM(s) Oral every 4 hours PRN Moderate Pain (4 - 6)  traMADol 50 milliGRAM(s) Oral every 4 hours PRN Severe Pain (7 - 10)      Allergies    No Known Drug Allergies  S/P TRANSSPHENOIDAL SURGERY. SINONASAL PRECAUTIONS! NO NASAL SWABS OR NG TUBES. (Unknown)    Intolerances        ROS: All other systems reviewed and negative    PHYSICAL EXAM:  VITALS: T(C): 36.6 (03-19-23 @ 07:00)  T(F): 97.8 (03-19-23 @ 07:00), Max: 98.5 (03-18-23 @ 11:00)  HR: 62 (03-19-23 @ 09:00) (37 - 74)  BP: --  RR:  (12 - 20)  SpO2:  (95% - 100%)  Wt(kg): --  GENERAL: NAD, resting comfortably   EYES: No proptosis,  anicteric  HEENT:  Atraumatic, Normocephalic, moist mucous membranes  RESPIRATORY: Nonlabored respirations on room air, normal rate/effort   CARDIOVASCULAR: Did not appear cyanotic, no lower extremity swelling visualized  GI: Soft, nontender, non distended  NEURO: Answering questions appropriately, moves all extremities spontaneously  PSYCH:  reactive affect, euthymic mood    POCT Blood Glucose.: 109 mg/dL (03-19-23 @ 06:38)  POCT Blood Glucose.: 134 mg/dL (03-19-23 @ 00:24)  POCT Blood Glucose.: 163 mg/dL (03-18-23 @ 17:10)  POCT Blood Glucose.: 123 mg/dL (03-18-23 @ 14:01)  POCT Blood Glucose.: 113 mg/dL (03-18-23 @ 13:03)  POCT Blood Glucose.: 132 mg/dL (03-18-23 @ 12:01)  POCT Blood Glucose.: 215 mg/dL (03-18-23 @ 11:00)  POCT Blood Glucose.: 173 mg/dL (03-18-23 @ 10:00)  POCT Blood Glucose.: 168 mg/dL (03-18-23 @ 09:02)  POCT Blood Glucose.: 179 mg/dL (03-18-23 @ 08:04)  POCT Blood Glucose.: 159 mg/dL (03-18-23 @ 07:09)  POCT Blood Glucose.: 163 mg/dL (03-18-23 @ 06:05)  POCT Blood Glucose.: 191 mg/dL (03-18-23 @ 05:28)  POCT Blood Glucose.: 207 mg/dL (03-18-23 @ 04:13)  POCT Blood Glucose.: 170 mg/dL (03-18-23 @ 03:09)  POCT Blood Glucose.: 179 mg/dL (03-18-23 @ 02:14)  POCT Blood Glucose.: 212 mg/dL (03-18-23 @ 01:16)  POCT Blood Glucose.: 202 mg/dL (03-17-23 @ 23:06)  POCT Blood Glucose.: 241 mg/dL (03-17-23 @ 18:16)  POCT Blood Glucose.: 190 mg/dL (03-17-23 @ 17:26)  POCT Blood Glucose.: 165 mg/dL (03-17-23 @ 15:58)  POCT Blood Glucose.: 173 mg/dL (03-17-23 @ 14:01)  POCT Blood Glucose.: 162 mg/dL (03-17-23 @ 13:02)  POCT Blood Glucose.: 165 mg/dL (03-17-23 @ 12:26)  POCT Blood Glucose.: 77 mg/dL (03-17-23 @ 12:01)  POCT Blood Glucose.: 99 mg/dL (03-17-23 @ 11:03)  POCT Blood Glucose.: 106 mg/dL (03-17-23 @ 10:05)  POCT Blood Glucose.: 175 mg/dL (03-17-23 @ 09:03)  POCT Blood Glucose.: 219 mg/dL (03-17-23 @ 08:09)  POCT Blood Glucose.: 206 mg/dL (03-17-23 @ 07:10)  POCT Blood Glucose.: 216 mg/dL (03-17-23 @ 06:25)  POCT Blood Glucose.: 230 mg/dL (03-17-23 @ 05:21)  POCT Blood Glucose.: 240 mg/dL (03-17-23 @ 04:19)  POCT Blood Glucose.: 298 mg/dL (03-17-23 @ 03:08)  POCT Blood Glucose.: 329 mg/dL (03-17-23 @ 02:05)  POCT Blood Glucose.: 319 mg/dL (03-17-23 @ 01:11)  POCT Blood Glucose.: 210 mg/dL (03-17-23 @ 00:26)  POCT Blood Glucose.: 358 mg/dL (03-16-23 @ 23:00)  POCT Blood Glucose.: 373 mg/dL (03-16-23 @ 22:01)  POCT Blood Glucose.: 464 mg/dL (03-16-23 @ 21:13)  POCT Blood Glucose.: 338 mg/dL (03-16-23 @ 20:32)  POCT Blood Glucose.: 190 mg/dL (03-16-23 @ 18:21)      03-19    145  |  114<H>  |  14  ----------------------------<  142<H>  4.0   |  22  |  0.54    eGFR: 110    Ca    8.3<L>      03-19  Mg     2.4     03-19  Phos  3.2     03-19    TPro  5.4<L>  /  Alb  3.1<L>  /  TBili  0.3  /  DBili  x   /  AST  193<H>  /  ALT  190<H>  /  AlkPhos  94  03-19      A1C with Estimated Average Glucose Result: 8.0 % (03-13-23 @ 06:23)      Thyroid Stimulating Hormone, Serum: 1.05 uIU/mL (03-18-23 @ 17:05)  Thyroid Stimulating Hormone, Serum: 1.05 uIU/mL (03-18-23 @ 17:05)  Thyroid Stimulating Hormone, Serum: 4.43 uIU/mL (03-16-23 @ 04:02)  Thyroid Stimulating Hormone, Serum: 3.46 uIU/mL (03-15-23 @ 09:09)  Thyroid Stimulating Hormone, Serum: 8.85 uIU/mL (03-11-23 @ 05:39)   ENDOCRINE FOLLOW UP     Chief Complaint: craniopharyngioma    History:   [facility]  ID [id number] used. Patient reports improving headache, denies other complaints. Reports eating and drinking okay. Missed dose DDAVP this am, getting dose around 1pm for DI.    MEDICATIONS  (STANDING):  amLODIPine   Tablet 10 milliGRAM(s) Oral daily  chlorhexidine 4% Liquid 1 Application(s) Topical daily  desmopressin Injectable 0.5 MICROGram(s) IV Push <User Schedule>  dextrose 50% Injectable 50 milliLiter(s) IV Push every 15 minutes  enoxaparin Injectable 40 milliGRAM(s) SubCutaneous <User Schedule>  hydrocortisone sodium succinate Injectable 50 milliGRAM(s) IV Push every 12 hours  insulin lispro (ADMELOG) corrective regimen sliding scale   SubCutaneous Before meals and at bedtime  insulin NPH human recombinant 6 Unit(s) SubCutaneous every 6 hours  levothyroxine Injectable 50 MICROGram(s) IV Push <User Schedule>  pantoprazole    Tablet 40 milliGRAM(s) Oral daily  polyethylene glycol 3350 17 Gram(s) Oral daily  senna 2 Tablet(s) Oral at bedtime  sodium chloride 0.9%. 1000 milliLiter(s) (50 mL/Hr) IV Continuous <Continuous>    MEDICATIONS  (PRN):  ondansetron Injectable 4 milliGRAM(s) IV Push every 6 hours PRN Nausea and/or Vomiting  sodium chloride 0.65% Nasal 1 Spray(s) Both Nostrils three times a day PRN Nasal Congestion  traMADol 25 milliGRAM(s) Oral every 4 hours PRN Moderate Pain (4 - 6)  traMADol 50 milliGRAM(s) Oral every 4 hours PRN Severe Pain (7 - 10)      Allergies    No Known Drug Allergies  S/P TRANSSPHENOIDAL SURGERY. SINONASAL PRECAUTIONS! NO NASAL SWABS OR NG TUBES. (Unknown)    Intolerances        ROS: All other systems reviewed and negative    PHYSICAL EXAM:  VITALS: T(C): 36.6 (03-19-23 @ 07:00)  T(F): 97.8 (03-19-23 @ 07:00), Max: 98.5 (03-18-23 @ 11:00)  HR: 62 (03-19-23 @ 09:00) (37 - 74)  BP: --  RR:  (12 - 20)  SpO2:  (95% - 100%)  Wt(kg): --  GENERAL: NAD, resting comfortably   EYES: No proptosis,  anicteric  HEENT:  Atraumatic, Normocephalic, moist mucous membranes  RESPIRATORY: Nonlabored respirations on room air, normal rate/effort   CARDIOVASCULAR: Did not appear cyanotic, no lower extremity swelling visualized  GI: Soft, nontender, non distended  NEURO: Answering questions appropriately, moves all extremities spontaneously  PSYCH:  reactive affect, euthymic mood, mildly tired    POCT Blood Glucose.: 109 mg/dL (03-19-23 @ 06:38)  POCT Blood Glucose.: 134 mg/dL (03-19-23 @ 00:24)  POCT Blood Glucose.: 163 mg/dL (03-18-23 @ 17:10)  POCT Blood Glucose.: 123 mg/dL (03-18-23 @ 14:01)  POCT Blood Glucose.: 113 mg/dL (03-18-23 @ 13:03)  POCT Blood Glucose.: 132 mg/dL (03-18-23 @ 12:01)  POCT Blood Glucose.: 215 mg/dL (03-18-23 @ 11:00)  POCT Blood Glucose.: 173 mg/dL (03-18-23 @ 10:00)  POCT Blood Glucose.: 168 mg/dL (03-18-23 @ 09:02)  POCT Blood Glucose.: 179 mg/dL (03-18-23 @ 08:04)  POCT Blood Glucose.: 159 mg/dL (03-18-23 @ 07:09)  POCT Blood Glucose.: 163 mg/dL (03-18-23 @ 06:05)  POCT Blood Glucose.: 191 mg/dL (03-18-23 @ 05:28)  POCT Blood Glucose.: 207 mg/dL (03-18-23 @ 04:13)  POCT Blood Glucose.: 170 mg/dL (03-18-23 @ 03:09)  POCT Blood Glucose.: 179 mg/dL (03-18-23 @ 02:14)  POCT Blood Glucose.: 212 mg/dL (03-18-23 @ 01:16)  POCT Blood Glucose.: 202 mg/dL (03-17-23 @ 23:06)  POCT Blood Glucose.: 241 mg/dL (03-17-23 @ 18:16)  POCT Blood Glucose.: 190 mg/dL (03-17-23 @ 17:26)  POCT Blood Glucose.: 165 mg/dL (03-17-23 @ 15:58)  POCT Blood Glucose.: 173 mg/dL (03-17-23 @ 14:01)  POCT Blood Glucose.: 162 mg/dL (03-17-23 @ 13:02)  POCT Blood Glucose.: 165 mg/dL (03-17-23 @ 12:26)  POCT Blood Glucose.: 77 mg/dL (03-17-23 @ 12:01)  POCT Blood Glucose.: 99 mg/dL (03-17-23 @ 11:03)  POCT Blood Glucose.: 106 mg/dL (03-17-23 @ 10:05)  POCT Blood Glucose.: 175 mg/dL (03-17-23 @ 09:03)  POCT Blood Glucose.: 219 mg/dL (03-17-23 @ 08:09)  POCT Blood Glucose.: 206 mg/dL (03-17-23 @ 07:10)  POCT Blood Glucose.: 216 mg/dL (03-17-23 @ 06:25)  POCT Blood Glucose.: 230 mg/dL (03-17-23 @ 05:21)  POCT Blood Glucose.: 240 mg/dL (03-17-23 @ 04:19)  POCT Blood Glucose.: 298 mg/dL (03-17-23 @ 03:08)  POCT Blood Glucose.: 329 mg/dL (03-17-23 @ 02:05)  POCT Blood Glucose.: 319 mg/dL (03-17-23 @ 01:11)  POCT Blood Glucose.: 210 mg/dL (03-17-23 @ 00:26)  POCT Blood Glucose.: 358 mg/dL (03-16-23 @ 23:00)  POCT Blood Glucose.: 373 mg/dL (03-16-23 @ 22:01)  POCT Blood Glucose.: 464 mg/dL (03-16-23 @ 21:13)  POCT Blood Glucose.: 338 mg/dL (03-16-23 @ 20:32)  POCT Blood Glucose.: 190 mg/dL (03-16-23 @ 18:21)      03-19    145  |  114<H>  |  14  ----------------------------<  142<H>  4.0   |  22  |  0.54    eGFR: 110    Ca    8.3<L>      03-19  Mg     2.4     03-19  Phos  3.2     03-19    TPro  5.4<L>  /  Alb  3.1<L>  /  TBili  0.3  /  DBili  x   /  AST  193<H>  /  ALT  190<H>  /  AlkPhos  94  03-19      A1C with Estimated Average Glucose Result: 8.0 % (03-13-23 @ 06:23)      Thyroid Stimulating Hormone, Serum: 1.05 uIU/mL (03-18-23 @ 17:05)  Thyroid Stimulating Hormone, Serum: 1.05 uIU/mL (03-18-23 @ 17:05)  Thyroid Stimulating Hormone, Serum: 4.43 uIU/mL (03-16-23 @ 04:02)  Thyroid Stimulating Hormone, Serum: 3.46 uIU/mL (03-15-23 @ 09:09)  Thyroid Stimulating Hormone, Serum: 8.85 uIU/mL (03-11-23 @ 05:39)

## 2023-03-19 NOTE — PROGRESS NOTE ADULT - SUBJECTIVE AND OBJECTIVE BOX
NSCU ATTENDING -- ADDITIONAL PROGRESS NOTE    Nighttime rounds were performed -- please refer to earlier Progress Note for HPI details.    T(C): 36.2 (03-19-23 @ 19:00), Max: 36.6 (03-19-23 @ 07:00)  HR: 67 (03-19-23 @ 19:00) (50 - 74)  BP: --  RR: 17 (03-19-23 @ 19:00) (13 - 20)  SpO2: 98% (03-19-23 @ 19:00) (95% - 99%)  Wt(kg): --    Relevant labwork and imaging reviewed.    Patient remains critically ill.    [A/P]    Additional 30 minutes of critical care time. NSCU ATTENDING -- ADDITIONAL PROGRESS NOTE    Nighttime rounds were performed -- please refer to earlier Progress Note for HPI details.    T(C): 36.2 (03-19-23 @ 19:00), Max: 36.6 (03-19-23 @ 07:00)  HR: 67 (03-19-23 @ 19:00) (50 - 74)  BP: --  RR: 17 (03-19-23 @ 19:00) (13 - 20)  SpO2: 98% (03-19-23 @ 19:00) (95% - 99%)  Wt(kg): --    Relevant labwork and imaging reviewed.    s/p TSP     DI; standing ddAVP, Q6bmp  LD clamped, f/u plans with neurosurgery   CT in am

## 2023-03-19 NOTE — PROGRESS NOTE ADULT - ASSESSMENT
52y female with a past medical history/ocular history of glaucoma and suprasellar mass consulted for gross baseline ophtho exam, found to have bitemporal inferior field defects and decreased color vison OS.   Patient seen post surgery with gross improvement in confrontation fields, improvement in color vision (left eye) and improvement with pinhole corrected vision OS.     #Bitemporal inferior field defect on gross bedside exam  - Grossly improved on exam post sugery   - Color vision OS now 12/12, CVF grossly improved OU.    - Patient will still need outpatient visual field testing    - Appreciate management per endocrine and neurosurgery    #Hx of glaucoma   - IOP under control on exam today   - Cw xalatan a night in both eyes   - Patient needs to be followed outpatient with OCT ON, HVF, pachymetry    Outpatient Follow-up: Patient should follow-up with his/her ophthalmologist or with [facility] Department of Ophthalmology within 1 week of after discharge at:    [address]  [place] [address]  [phone]    Julius Velásquez MD, PGY-2  Also available on [employer] Teams

## 2023-03-19 NOTE — SWALLOW BEDSIDE ASSESSMENT ADULT - H & P REVIEW
MELLY SANDOVAL is a 52 yr old female with a history of hypertension, hyperlipidemia, dm type 2, no surgeries ever presents with 6 months of headaches and 3 weeks of blurred vision.  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  Patient was at [facility] and was transferred to [facility] 2/2 finding a suprasellar mass. She is s/p/yes

## 2023-03-19 NOTE — PROGRESS NOTE ADULT - SUBJECTIVE AND OBJECTIVE BOX
[facility] DEPARTMENT OF OPHTHALMOLOGY  ------------------------------------------------------------------------------  Julius Velásquez MD PGY-2  ------------------------------------------------------------------------------    Interval History: Patient examined yesterday at [facility]. [facility] no [facility]    : [id number]    Patient states that she feels that her vision has improved after the surgery.     MEDICATIONS  (STANDING):  amLODIPine   Tablet 10 milliGRAM(s) Oral daily  chlorhexidine 4% Liquid 1 Application(s) Topical daily  desmopressin Injectable 0.5 MICROGram(s) IV Push <User Schedule>  enoxaparin Injectable 40 milliGRAM(s) SubCutaneous <User Schedule>  hydrocortisone sodium succinate Injectable 50 milliGRAM(s) IV Push every 12 hours  insulin glargine Injectable (LANTUS) 10 Unit(s) SubCutaneous at bedtime  insulin lispro (ADMELOG) corrective regimen sliding scale   SubCutaneous three times a day before meals  insulin lispro (ADMELOG) corrective regimen sliding scale   SubCutaneous at bedtime  insulin lispro Injectable (ADMELOG) 3 Unit(s) SubCutaneous three times a day before meals  levothyroxine 75 MICROGram(s) Oral daily  pantoprazole    Tablet 40 milliGRAM(s) Oral daily  polyethylene glycol 3350 17 Gram(s) Oral daily  senna 2 Tablet(s) Oral at bedtime    MEDICATIONS  (PRN):  ondansetron Injectable 4 milliGRAM(s) IV Push every 6 hours PRN Nausea and/or Vomiting  sodium chloride 0.65% Nasal 1 Spray(s) Both Nostrils three times a day PRN Nasal Congestion  traMADol 25 milliGRAM(s) Oral every 4 hours PRN Moderate Pain (4 - 6)  traMADol 50 milliGRAM(s) Oral every 4 hours PRN Severe Pain (7 - 10)      VITALS: T(C): 36.3 (03-19-23 @ 15:00)  T(F): 97.4 (03-19-23 @ 15:00), Max: 97.8 (03-19-23 @ 07:00)  HR: 60 (03-19-23 @ 15:00) (37 - 74)  BP: --  RR:  (12 - 20)  SpO2:  (95% - 100%)  Wt(kg): --    Ophthalmology Exam:  eneral: AAO x 3, appropriate mood and affect    Ophthalmology Exam:  Visual acuity (cc): 20/70 OD PH 20/20; OS 20/70 PH 20/30  Pupils: PERRL OU, no APD  Ttono: 16 OU  Extraocular movements (EOMs): Full OU, no pain, no diplopia  Confrontational Visual Field (CVF): No gross inferotemporal deficits OU (Improved)   Color Plates: 12/12 OD 12/12 OS (imporved from 0/12)    Pen Light Exam (PLE)  External: Flat OU  Lids/Lashes/Lacrimal Ducts: Flat OU    Sclera/Conjunctiva: W+Q OU  Cornea: Cl OU  Anterior Chamber: D+F OU    Iris: Flat OU  Lens: NS OU

## 2023-03-20 LAB
ALBUMIN SERPL ELPH-MCNC: 3.4 G/DL — SIGNIFICANT CHANGE UP (ref 3.3–5)
ALP SERPL-CCNC: 126 U/L — HIGH (ref 40–120)
ALT FLD-CCNC: 239 U/L — HIGH (ref 10–45)
ANION GAP SERPL CALC-SCNC: 12 MMOL/L — SIGNIFICANT CHANGE UP (ref 5–17)
ANION GAP SERPL CALC-SCNC: 12 MMOL/L — SIGNIFICANT CHANGE UP (ref 5–17)
ANION GAP SERPL CALC-SCNC: 8 MMOL/L — SIGNIFICANT CHANGE UP (ref 5–17)
ANION GAP SERPL CALC-SCNC: 8 MMOL/L — SIGNIFICANT CHANGE UP (ref 5–17)
ANION GAP SERPL CALC-SCNC: 9 MMOL/L — SIGNIFICANT CHANGE UP (ref 5–17)
AST SERPL-CCNC: 226 U/L — HIGH (ref 10–40)
BILIRUB SERPL-MCNC: 0.2 MG/DL — SIGNIFICANT CHANGE UP (ref 0.2–1.2)
BUN SERPL-MCNC: 13 MG/DL — SIGNIFICANT CHANGE UP (ref 7–23)
BUN SERPL-MCNC: 14 MG/DL — SIGNIFICANT CHANGE UP (ref 7–23)
BUN SERPL-MCNC: 15 MG/DL — SIGNIFICANT CHANGE UP (ref 7–23)
CALCIUM SERPL-MCNC: 8.3 MG/DL — LOW (ref 8.4–10.5)
CALCIUM SERPL-MCNC: 8.5 MG/DL — SIGNIFICANT CHANGE UP (ref 8.4–10.5)
CALCIUM SERPL-MCNC: 9 MG/DL — SIGNIFICANT CHANGE UP (ref 8.4–10.5)
CALCIUM SERPL-MCNC: 9.2 MG/DL — SIGNIFICANT CHANGE UP (ref 8.4–10.5)
CALCIUM SERPL-MCNC: 9.3 MG/DL — SIGNIFICANT CHANGE UP (ref 8.4–10.5)
CHLORIDE SERPL-SCNC: 107 MMOL/L — SIGNIFICANT CHANGE UP (ref 96–108)
CHLORIDE SERPL-SCNC: 111 MMOL/L — HIGH (ref 96–108)
CHLORIDE SERPL-SCNC: 114 MMOL/L — HIGH (ref 96–108)
CHLORIDE SERPL-SCNC: 114 MMOL/L — HIGH (ref 96–108)
CHLORIDE SERPL-SCNC: 115 MMOL/L — HIGH (ref 96–108)
CO2 SERPL-SCNC: 24 MMOL/L — SIGNIFICANT CHANGE UP (ref 22–31)
CO2 SERPL-SCNC: 25 MMOL/L — SIGNIFICANT CHANGE UP (ref 22–31)
CO2 SERPL-SCNC: 25 MMOL/L — SIGNIFICANT CHANGE UP (ref 22–31)
CO2 SERPL-SCNC: 26 MMOL/L — SIGNIFICANT CHANGE UP (ref 22–31)
CO2 SERPL-SCNC: 26 MMOL/L — SIGNIFICANT CHANGE UP (ref 22–31)
CREAT SERPL-MCNC: 0.57 MG/DL — SIGNIFICANT CHANGE UP (ref 0.5–1.3)
CREAT SERPL-MCNC: 0.67 MG/DL — SIGNIFICANT CHANGE UP (ref 0.5–1.3)
CREAT SERPL-MCNC: 0.72 MG/DL — SIGNIFICANT CHANGE UP (ref 0.5–1.3)
CREAT SERPL-MCNC: 0.75 MG/DL — SIGNIFICANT CHANGE UP (ref 0.5–1.3)
CREAT SERPL-MCNC: 0.78 MG/DL — SIGNIFICANT CHANGE UP (ref 0.5–1.3)
EGFR: 100 ML/MIN/1.73M2 — SIGNIFICANT CHANGE UP
EGFR: 104 ML/MIN/1.73M2 — SIGNIFICANT CHANGE UP
EGFR: 109 ML/MIN/1.73M2 — SIGNIFICANT CHANGE UP
EGFR: 91 ML/MIN/1.73M2 — SIGNIFICANT CHANGE UP
EGFR: 95 ML/MIN/1.73M2 — SIGNIFICANT CHANGE UP
ESTROGEN SERPL-MCNC: 78 PG/ML — SIGNIFICANT CHANGE UP
GLUCOSE BLDC GLUCOMTR-MCNC: 135 MG/DL — HIGH (ref 70–99)
GLUCOSE BLDC GLUCOMTR-MCNC: 152 MG/DL — HIGH (ref 70–99)
GLUCOSE BLDC GLUCOMTR-MCNC: 251 MG/DL — HIGH (ref 70–99)
GLUCOSE BLDC GLUCOMTR-MCNC: 252 MG/DL — HIGH (ref 70–99)
GLUCOSE SERPL-MCNC: 100 MG/DL — HIGH (ref 70–99)
GLUCOSE SERPL-MCNC: 158 MG/DL — HIGH (ref 70–99)
GLUCOSE SERPL-MCNC: 176 MG/DL — HIGH (ref 70–99)
GLUCOSE SERPL-MCNC: 218 MG/DL — HIGH (ref 70–99)
GLUCOSE SERPL-MCNC: 297 MG/DL — HIGH (ref 70–99)
HCT VFR BLD CALC: 26.8 % — LOW (ref 34.5–45)
HCT VFR BLD CALC: 29.5 % — LOW (ref 34.5–45)
HGB BLD-MCNC: 8.6 G/DL — LOW (ref 11.5–15.5)
HGB BLD-MCNC: 9.6 G/DL — LOW (ref 11.5–15.5)
MAGNESIUM SERPL-MCNC: 2 MG/DL — SIGNIFICANT CHANGE UP (ref 1.6–2.6)
MAGNESIUM SERPL-MCNC: 2.1 MG/DL — SIGNIFICANT CHANGE UP (ref 1.6–2.6)
MAGNESIUM SERPL-MCNC: 2.1 MG/DL — SIGNIFICANT CHANGE UP (ref 1.6–2.6)
MAGNESIUM SERPL-MCNC: 2.3 MG/DL — SIGNIFICANT CHANGE UP (ref 1.6–2.6)
MAGNESIUM SERPL-MCNC: 2.3 MG/DL — SIGNIFICANT CHANGE UP (ref 1.6–2.6)
MCHC RBC-ENTMCNC: 28.4 PG — SIGNIFICANT CHANGE UP (ref 27–34)
MCHC RBC-ENTMCNC: 28.4 PG — SIGNIFICANT CHANGE UP (ref 27–34)
MCHC RBC-ENTMCNC: 32.1 GM/DL — SIGNIFICANT CHANGE UP (ref 32–36)
MCHC RBC-ENTMCNC: 32.5 GM/DL — SIGNIFICANT CHANGE UP (ref 32–36)
MCV RBC AUTO: 87.3 FL — SIGNIFICANT CHANGE UP (ref 80–100)
MCV RBC AUTO: 88.4 FL — SIGNIFICANT CHANGE UP (ref 80–100)
NRBC # BLD: 0 /100 WBCS — SIGNIFICANT CHANGE UP (ref 0–0)
NRBC # BLD: 0 /100 WBCS — SIGNIFICANT CHANGE UP (ref 0–0)
OSMOLALITY SERPL: 298 MOSMOL/KG — SIGNIFICANT CHANGE UP (ref 275–300)
OSMOLALITY SERPL: 304 MOSMOL/KG — HIGH (ref 275–300)
OSMOLALITY SERPL: 308 MOSMOL/KG — HIGH (ref 275–300)
OSMOLALITY SERPL: 321 MOSMOL/KG — HIGH (ref 275–300)
OSMOLALITY UR: 133 MOS/KG — LOW (ref 300–900)
OSMOLALITY UR: 226 MOS/KG — LOW (ref 300–900)
OSMOLALITY UR: 562 MOS/KG — SIGNIFICANT CHANGE UP (ref 300–900)
OSMOLALITY UR: 592 MOS/KG — SIGNIFICANT CHANGE UP (ref 300–900)
OSMOLALITY UR: 611 MOS/KG — SIGNIFICANT CHANGE UP (ref 300–900)
PHOSPHATE SERPL-MCNC: 3 MG/DL — SIGNIFICANT CHANGE UP (ref 2.5–4.5)
PHOSPHATE SERPL-MCNC: 3 MG/DL — SIGNIFICANT CHANGE UP (ref 2.5–4.5)
PHOSPHATE SERPL-MCNC: 3.1 MG/DL — SIGNIFICANT CHANGE UP (ref 2.5–4.5)
PHOSPHATE SERPL-MCNC: 3.2 MG/DL — SIGNIFICANT CHANGE UP (ref 2.5–4.5)
PHOSPHATE SERPL-MCNC: 3.7 MG/DL — SIGNIFICANT CHANGE UP (ref 2.5–4.5)
PLATELET # BLD AUTO: 199 K/UL — SIGNIFICANT CHANGE UP (ref 150–400)
PLATELET # BLD AUTO: 224 K/UL — SIGNIFICANT CHANGE UP (ref 150–400)
POTASSIUM SERPL-MCNC: 3.4 MMOL/L — LOW (ref 3.5–5.3)
POTASSIUM SERPL-MCNC: 3.7 MMOL/L — SIGNIFICANT CHANGE UP (ref 3.5–5.3)
POTASSIUM SERPL-MCNC: 3.7 MMOL/L — SIGNIFICANT CHANGE UP (ref 3.5–5.3)
POTASSIUM SERPL-MCNC: 3.8 MMOL/L — SIGNIFICANT CHANGE UP (ref 3.5–5.3)
POTASSIUM SERPL-MCNC: 3.8 MMOL/L — SIGNIFICANT CHANGE UP (ref 3.5–5.3)
POTASSIUM SERPL-SCNC: 3.4 MMOL/L — LOW (ref 3.5–5.3)
POTASSIUM SERPL-SCNC: 3.7 MMOL/L — SIGNIFICANT CHANGE UP (ref 3.5–5.3)
POTASSIUM SERPL-SCNC: 3.7 MMOL/L — SIGNIFICANT CHANGE UP (ref 3.5–5.3)
POTASSIUM SERPL-SCNC: 3.8 MMOL/L — SIGNIFICANT CHANGE UP (ref 3.5–5.3)
POTASSIUM SERPL-SCNC: 3.8 MMOL/L — SIGNIFICANT CHANGE UP (ref 3.5–5.3)
PROT SERPL-MCNC: 6 G/DL — SIGNIFICANT CHANGE UP (ref 6–8.3)
RBC # BLD: 3.03 M/UL — LOW (ref 3.8–5.2)
RBC # BLD: 3.38 M/UL — LOW (ref 3.8–5.2)
RBC # FLD: 13.4 % — SIGNIFICANT CHANGE UP (ref 10.3–14.5)
RBC # FLD: 13.5 % — SIGNIFICANT CHANGE UP (ref 10.3–14.5)
SODIUM SERPL-SCNC: 144 MMOL/L — SIGNIFICANT CHANGE UP (ref 135–145)
SODIUM SERPL-SCNC: 144 MMOL/L — SIGNIFICANT CHANGE UP (ref 135–145)
SODIUM SERPL-SCNC: 146 MMOL/L — HIGH (ref 135–145)
SODIUM SERPL-SCNC: 150 MMOL/L — HIGH (ref 135–145)
SODIUM SERPL-SCNC: 152 MMOL/L — HIGH (ref 135–145)
SP GR UR STRIP: 1 — LOW (ref 1.01–1.02)
SP GR UR STRIP: 1.01 — LOW (ref 1.01–1.02)
SP GR UR STRIP: 1.01 — SIGNIFICANT CHANGE UP (ref 1.01–1.02)
SP GR UR STRIP: 1.01 — SIGNIFICANT CHANGE UP (ref 1.01–1.02)
SP GR UR STRIP: 1.02 — SIGNIFICANT CHANGE UP (ref 1.01–1.02)
WBC # BLD: 6.11 K/UL — SIGNIFICANT CHANGE UP (ref 3.8–10.5)
WBC # BLD: 8.08 K/UL — SIGNIFICANT CHANGE UP (ref 3.8–10.5)
WBC # FLD AUTO: 6.11 K/UL — SIGNIFICANT CHANGE UP (ref 3.8–10.5)
WBC # FLD AUTO: 8.08 K/UL — SIGNIFICANT CHANGE UP (ref 3.8–10.5)

## 2023-03-20 PROCEDURE — 99233 SBSQ HOSP IP/OBS HIGH 50: CPT

## 2023-03-20 PROCEDURE — 99232 SBSQ HOSP IP/OBS MODERATE 35: CPT

## 2023-03-20 PROCEDURE — 70450 CT HEAD/BRAIN W/O DYE: CPT | Mod: 26

## 2023-03-20 RX ORDER — POTASSIUM CHLORIDE 20 MEQ
40 PACKET (EA) ORAL EVERY 4 HOURS
Refills: 0 | Status: DISCONTINUED | OUTPATIENT
Start: 2023-03-20 | End: 2023-03-20

## 2023-03-20 RX ORDER — POTASSIUM CHLORIDE 20 MEQ
20 PACKET (EA) ORAL ONCE
Refills: 0 | Status: COMPLETED | OUTPATIENT
Start: 2023-03-20 | End: 2023-03-21

## 2023-03-20 RX ORDER — HYDROCORTISONE 20 MG
25 TABLET ORAL
Refills: 0 | Status: COMPLETED | OUTPATIENT
Start: 2023-03-20 | End: 2023-03-20

## 2023-03-20 RX ORDER — POTASSIUM CHLORIDE 20 MEQ
20 PACKET (EA) ORAL ONCE
Refills: 0 | Status: COMPLETED | OUTPATIENT
Start: 2023-03-20 | End: 2023-03-20

## 2023-03-20 RX ORDER — SODIUM CHLORIDE 9 MG/ML
1000 INJECTION, SOLUTION INTRAVENOUS
Refills: 0 | Status: DISCONTINUED | OUTPATIENT
Start: 2023-03-20 | End: 2023-03-20

## 2023-03-20 RX ORDER — HYDROCORTISONE 20 MG
25 TABLET ORAL EVERY 12 HOURS
Refills: 0 | Status: DISCONTINUED | OUTPATIENT
Start: 2023-03-20 | End: 2023-03-20

## 2023-03-20 RX ORDER — INSULIN LISPRO 100/ML
VIAL (ML) SUBCUTANEOUS
Refills: 0 | Status: DISCONTINUED | OUTPATIENT
Start: 2023-03-20 | End: 2023-03-21

## 2023-03-20 RX ORDER — INSULIN GLARGINE 100 [IU]/ML
12 INJECTION, SOLUTION SUBCUTANEOUS AT BEDTIME
Refills: 0 | Status: DISCONTINUED | OUTPATIENT
Start: 2023-03-20 | End: 2023-03-22

## 2023-03-20 RX ORDER — HYDROCORTISONE 20 MG
20 TABLET ORAL
Refills: 0 | Status: DISCONTINUED | OUTPATIENT
Start: 2023-03-21 | End: 2023-03-21

## 2023-03-20 RX ORDER — POTASSIUM CHLORIDE 20 MEQ
40 PACKET (EA) ORAL ONCE
Refills: 0 | Status: COMPLETED | OUTPATIENT
Start: 2023-03-20 | End: 2023-03-20

## 2023-03-20 RX ORDER — DESMOPRESSIN ACETATE 0.1 MG/1
0.05 TABLET ORAL
Refills: 0 | Status: DISCONTINUED | OUTPATIENT
Start: 2023-03-20 | End: 2023-03-24

## 2023-03-20 RX ORDER — DESMOPRESSIN ACETATE 0.1 MG/1
0.5 TABLET ORAL
Refills: 0 | Status: DISCONTINUED | OUTPATIENT
Start: 2023-03-20 | End: 2023-03-20

## 2023-03-20 RX ORDER — INSULIN LISPRO 100/ML
4 VIAL (ML) SUBCUTANEOUS
Refills: 0 | Status: DISCONTINUED | OUTPATIENT
Start: 2023-03-20 | End: 2023-03-21

## 2023-03-20 RX ADMIN — PANTOPRAZOLE SODIUM 40 MILLIGRAM(S): 20 TABLET, DELAYED RELEASE ORAL at 11:59

## 2023-03-20 RX ADMIN — Medication 50 MILLIGRAM(S): at 06:23

## 2023-03-20 RX ADMIN — SODIUM CHLORIDE 300 MILLILITER(S): 9 INJECTION, SOLUTION INTRAVENOUS at 13:22

## 2023-03-20 RX ADMIN — CHLORHEXIDINE GLUCONATE 1 APPLICATION(S): 213 SOLUTION TOPICAL at 11:59

## 2023-03-20 RX ADMIN — Medication 75 MICROGRAM(S): at 06:25

## 2023-03-20 RX ADMIN — Medication 3 UNIT(S): at 06:25

## 2023-03-20 RX ADMIN — Medication 1: at 23:20

## 2023-03-20 RX ADMIN — AMLODIPINE BESYLATE 10 MILLIGRAM(S): 2.5 TABLET ORAL at 06:25

## 2023-03-20 RX ADMIN — Medication 20 MILLIEQUIVALENT(S): at 06:25

## 2023-03-20 RX ADMIN — TRAMADOL HYDROCHLORIDE 50 MILLIGRAM(S): 50 TABLET ORAL at 16:34

## 2023-03-20 RX ADMIN — TRAMADOL HYDROCHLORIDE 50 MILLIGRAM(S): 50 TABLET ORAL at 17:04

## 2023-03-20 RX ADMIN — DESMOPRESSIN ACETATE 0.5 MICROGRAM(S): 0.1 TABLET ORAL at 06:23

## 2023-03-20 RX ADMIN — Medication 3 UNIT(S): at 12:20

## 2023-03-20 RX ADMIN — DESMOPRESSIN ACETATE 0.5 MICROGRAM(S): 0.1 TABLET ORAL at 18:01

## 2023-03-20 RX ADMIN — Medication 25 MILLIGRAM(S): at 18:02

## 2023-03-20 RX ADMIN — ENOXAPARIN SODIUM 40 MILLIGRAM(S): 100 INJECTION SUBCUTANEOUS at 17:52

## 2023-03-20 RX ADMIN — Medication 3: at 17:53

## 2023-03-20 RX ADMIN — Medication 3: at 12:20

## 2023-03-20 RX ADMIN — Medication 3 UNIT(S): at 17:52

## 2023-03-20 RX ADMIN — POLYETHYLENE GLYCOL 3350 17 GRAM(S): 17 POWDER, FOR SOLUTION ORAL at 12:00

## 2023-03-20 RX ADMIN — INSULIN GLARGINE 12 UNIT(S): 100 INJECTION, SOLUTION SUBCUTANEOUS at 23:19

## 2023-03-20 RX ADMIN — Medication 4 UNIT(S): at 23:19

## 2023-03-20 RX ADMIN — Medication 40 MILLIEQUIVALENT(S): at 07:50

## 2023-03-20 NOTE — DIETITIAN INITIAL EVALUATION ADULT - ORAL INTAKE PTA/DIET HISTORY
visited pt at bedside this morning. reports decreased PO intake x ~1 month due to headache. checks blood glucose levels daily, but not following any therapeutic diets. metformin BID PTA. confirms NKFA.

## 2023-03-20 NOTE — DIETITIAN INITIAL EVALUATION ADULT - PERTINENT MEDS FT
MEDICATIONS  (STANDING):  amLODIPine   Tablet 10 milliGRAM(s) Oral daily  chlorhexidine 4% Liquid 1 Application(s) Topical daily  desmopressin Injectable 0.5 MICROGram(s) IV Push <User Schedule>  enoxaparin Injectable 40 milliGRAM(s) SubCutaneous <User Schedule>  hydrocortisone sodium succinate Injectable 25 milliGRAM(s) IV Push every 12 hours  insulin glargine Injectable (LANTUS) 10 Unit(s) SubCutaneous at bedtime  insulin lispro (ADMELOG) corrective regimen sliding scale   SubCutaneous three times a day before meals  insulin lispro Injectable (ADMELOG) 3 Unit(s) SubCutaneous three times a day before meals  levothyroxine 75 MICROGram(s) Oral daily  pantoprazole    Tablet 40 milliGRAM(s) Oral daily  polyethylene glycol 3350 17 Gram(s) Oral daily  senna 2 Tablet(s) Oral at bedtime    MEDICATIONS  (PRN):  ondansetron Injectable 4 milliGRAM(s) IV Push every 6 hours PRN Nausea and/or Vomiting  sodium chloride 0.65% Nasal 1 Spray(s) Both Nostrils three times a day PRN Nasal Congestion  traMADol 25 milliGRAM(s) Oral every 4 hours PRN Moderate Pain (4 - 6)  traMADol 50 milliGRAM(s) Oral every 4 hours PRN Severe Pain (7 - 10)

## 2023-03-20 NOTE — PROGRESS NOTE ADULT - SUBJECTIVE AND OBJECTIVE BOX
DATE OF SERVICE: 03-20-23 @ 10:32    Subjective: Patient seen and examined. No new events except as noted.     SUBJECTIVE/ROS:        MEDICATIONS:  MEDICATIONS  (STANDING):  amLODIPine   Tablet 10 milliGRAM(s) Oral daily  chlorhexidine 4% Liquid 1 Application(s) Topical daily  desmopressin Injectable 0.5 MICROGram(s) IV Push <User Schedule>  enoxaparin Injectable 40 milliGRAM(s) SubCutaneous <User Schedule>  hydrocortisone sodium succinate Injectable 25 milliGRAM(s) IV Push every 12 hours  insulin glargine Injectable (LANTUS) 10 Unit(s) SubCutaneous at bedtime  insulin lispro (ADMELOG) corrective regimen sliding scale   SubCutaneous three times a day before meals  insulin lispro Injectable (ADMELOG) 3 Unit(s) SubCutaneous three times a day before meals  levothyroxine 75 MICROGram(s) Oral daily  pantoprazole    Tablet 40 milliGRAM(s) Oral daily  polyethylene glycol 3350 17 Gram(s) Oral daily  senna 2 Tablet(s) Oral at bedtime      PHYSICAL EXAM:  T(C): 36.2 (03-20-23 @ 07:00), Max: 36.5 (03-19-23 @ 11:00)  HR: 68 (03-20-23 @ 10:00) (50 - 69)  BP: --  RR: 20 (03-20-23 @ 10:00) (12 - 20)  SpO2: 98% (03-20-23 @ 10:00) (95% - 99%)  Wt(kg): --  I&O's Summary    19 Mar 2023 07:01  -  20 Mar 2023 07:00  --------------------------------------------------------  IN: 750 mL / OUT: 2400 mL / NET: -1650 mL    20 Mar 2023 07:01  -  20 Mar 2023 10:32  --------------------------------------------------------  IN: 400 mL / OUT: 600 mL / NET: -200 mL            JVP: Normal  Neck: supple  Lung: clear   CV: S1 S2 , Murmur:  Abd: soft  Ext: No edema  neuro: Awake / alert  Psych: flat affect  Skin: normal``    LABS/DATA:    CARDIAC MARKERS:                                8.6    6.11  )-----------( 199      ( 20 Mar 2023 00:26 )             26.8     03-20    144  |  111<H>  |  14  ----------------------------<  100<H>  3.4<L>   |  25  |  0.67    Ca    8.5      20 Mar 2023 06:46  Phos  3.0     03-20  Mg     2.0     03-20    TPro  5.4<L>  /  Alb  3.1<L>  /  TBili  0.3  /  DBili  x   /  AST  193<H>  /  ALT  190<H>  /  AlkPhos  94  03-19    proBNP:   Lipid Profile:   HgA1c:   TSH:     TELE:  EKG:

## 2023-03-20 NOTE — DIETITIAN INITIAL EVALUATION ADULT - ADD RECOMMEND
1) Will continue to monitor PO intake, weight, labs, skin, GI status and diet 2) RD to add No Sugar Added Mighty Shake supplement daily to aid in meeting nutrient needs 3) Nutrition risk placed in chart

## 2023-03-20 NOTE — PROGRESS NOTE ADULT - TIME BILLING
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

## 2023-03-20 NOTE — PROGRESS NOTE ADULT - SUBJECTIVE AND OBJECTIVE BOX
ENDOCRINE FOLLOW UP     Chief Complaint: craniopharyngioma    History:     MEDICATIONS  (STANDING):  amLODIPine   Tablet 10 milliGRAM(s) Oral daily  chlorhexidine 4% Liquid 1 Application(s) Topical daily  desmopressin Injectable 0.5 MICROGram(s) IV Push <User Schedule>  enoxaparin Injectable 40 milliGRAM(s) SubCutaneous <User Schedule>  hydrocortisone sodium succinate Injectable 25 milliGRAM(s) IV Push every 12 hours  insulin glargine Injectable (LANTUS) 10 Unit(s) SubCutaneous at bedtime  insulin lispro (ADMELOG) corrective regimen sliding scale   SubCutaneous three times a day before meals  insulin lispro Injectable (ADMELOG) 3 Unit(s) SubCutaneous three times a day before meals  levothyroxine 75 MICROGram(s) Oral daily  pantoprazole    Tablet 40 milliGRAM(s) Oral daily  polyethylene glycol 3350 17 Gram(s) Oral daily  senna 2 Tablet(s) Oral at bedtime    MEDICATIONS  (PRN):  ondansetron Injectable 4 milliGRAM(s) IV Push every 6 hours PRN Nausea and/or Vomiting  sodium chloride 0.65% Nasal 1 Spray(s) Both Nostrils three times a day PRN Nasal Congestion  traMADol 25 milliGRAM(s) Oral every 4 hours PRN Moderate Pain (4 - 6)  traMADol 50 milliGRAM(s) Oral every 4 hours PRN Severe Pain (7 - 10)      Allergies    No Known Drug Allergies  S/P TRANSSPHENOIDAL SURGERY. SINONASAL PRECAUTIONS! NO NASAL SWABS OR NG TUBES. (Unknown)    Intolerances        ROS: All other systems reviewed and negative    PHYSICAL EXAM:  VITALS: T(C): 36.4 (03-20-23 @ 11:00)  T(F): 97.6 (03-20-23 @ 11:00), Max: 97.6 (03-20-23 @ 11:00)  HR: 63 (03-20-23 @ 12:00) (50 - 69)  BP: --  RR:  (12 - 20)  SpO2:  (95% - 99%)  Wt(kg): --  GENERAL: NAD, resting comfortably   EYES: No proptosis,  anicteric  HEENT:  Atraumatic, Normocephalic, moist mucous membranes  RESPIRATORY: Nonlabored respirations on room air, normal rate/effort   CARDIOVASCULAR: Did not appear cyanotic, no lower extremity swelling visualized  GI: Soft, nontender, non distended  NEURO: Answering questions appropriately, moves all extremities spontaneously  PSYCH:  reactive affect, euthymic mood    POCT Blood Glucose.: 252 mg/dL (03-20-23 @ 12:19)  POCT Blood Glucose.: 135 mg/dL (03-20-23 @ 05:42)  POCT Blood Glucose.: 222 mg/dL (03-19-23 @ 21:21)  POCT Blood Glucose.: 145 mg/dL (03-19-23 @ 17:26)  POCT Blood Glucose.: 111 mg/dL (03-19-23 @ 11:07)  POCT Blood Glucose.: 109 mg/dL (03-19-23 @ 06:38)  POCT Blood Glucose.: 134 mg/dL (03-19-23 @ 00:24)  POCT Blood Glucose.: 163 mg/dL (03-18-23 @ 17:10)  POCT Blood Glucose.: 123 mg/dL (03-18-23 @ 14:01)  POCT Blood Glucose.: 113 mg/dL (03-18-23 @ 13:03)  POCT Blood Glucose.: 132 mg/dL (03-18-23 @ 12:01)  POCT Blood Glucose.: 215 mg/dL (03-18-23 @ 11:00)  POCT Blood Glucose.: 173 mg/dL (03-18-23 @ 10:00)  POCT Blood Glucose.: 168 mg/dL (03-18-23 @ 09:02)  POCT Blood Glucose.: 179 mg/dL (03-18-23 @ 08:04)  POCT Blood Glucose.: 159 mg/dL (03-18-23 @ 07:09)  POCT Blood Glucose.: 163 mg/dL (03-18-23 @ 06:05)  POCT Blood Glucose.: 191 mg/dL (03-18-23 @ 05:28)  POCT Blood Glucose.: 207 mg/dL (03-18-23 @ 04:13)  POCT Blood Glucose.: 170 mg/dL (03-18-23 @ 03:09)  POCT Blood Glucose.: 179 mg/dL (03-18-23 @ 02:14)  POCT Blood Glucose.: 212 mg/dL (03-18-23 @ 01:16)  POCT Blood Glucose.: 202 mg/dL (03-17-23 @ 23:06)  POCT Blood Glucose.: 241 mg/dL (03-17-23 @ 18:16)  POCT Blood Glucose.: 190 mg/dL (03-17-23 @ 17:26)  POCT Blood Glucose.: 165 mg/dL (03-17-23 @ 15:58)  POCT Blood Glucose.: 173 mg/dL (03-17-23 @ 14:01)  POCT Blood Glucose.: 162 mg/dL (03-17-23 @ 13:02)      03-20    144  |  111<H>  |  14  ----------------------------<  100<H>  3.4<L>   |  25  |  0.67    eGFR: 104    Ca    8.5      03-20  Mg     2.0     03-20  Phos  3.0     03-20    TPro  5.4<L>  /  Alb  3.1<L>  /  TBili  0.3  /  DBili  x   /  AST  193<H>  /  ALT  190<H>  /  AlkPhos  94  03-19      A1C with Estimated Average Glucose Result: 8.0 % (03-13-23 @ 06:23)      Thyroid Stimulating Hormone, Serum: 1.05 uIU/mL (03-18-23 @ 17:05)  Thyroid Stimulating Hormone, Serum: 1.05 uIU/mL (03-18-23 @ 17:05)  Thyroid Stimulating Hormone, Serum: 4.43 uIU/mL (03-16-23 @ 04:02)  Thyroid Stimulating Hormone, Serum: 3.46 uIU/mL (03-15-23 @ 09:09)  Thyroid Stimulating Hormone, Serum: 8.85 uIU/mL (03-11-23 @ 05:39)   ENDOCRINE FOLLOW UP     Chief Complaint: craniopharyngioma    History:   [facility]  ID [id number] Олег used. Patient reports having headache and tooth pain. She reports eating and drinking okay and endorses nausea. Otherwise denies complaints.    MEDICATIONS  (STANDING):  amLODIPine   Tablet 10 milliGRAM(s) Oral daily  chlorhexidine 4% Liquid 1 Application(s) Topical daily  desmopressin Injectable 0.5 MICROGram(s) IV Push <User Schedule>  enoxaparin Injectable 40 milliGRAM(s) SubCutaneous <User Schedule>  hydrocortisone sodium succinate Injectable 25 milliGRAM(s) IV Push every 12 hours  insulin glargine Injectable (LANTUS) 10 Unit(s) SubCutaneous at bedtime  insulin lispro (ADMELOG) corrective regimen sliding scale   SubCutaneous three times a day before meals  insulin lispro Injectable (ADMELOG) 3 Unit(s) SubCutaneous three times a day before meals  levothyroxine 75 MICROGram(s) Oral daily  pantoprazole    Tablet 40 milliGRAM(s) Oral daily  polyethylene glycol 3350 17 Gram(s) Oral daily  senna 2 Tablet(s) Oral at bedtime    MEDICATIONS  (PRN):  ondansetron Injectable 4 milliGRAM(s) IV Push every 6 hours PRN Nausea and/or Vomiting  sodium chloride 0.65% Nasal 1 Spray(s) Both Nostrils three times a day PRN Nasal Congestion  traMADol 25 milliGRAM(s) Oral every 4 hours PRN Moderate Pain (4 - 6)  traMADol 50 milliGRAM(s) Oral every 4 hours PRN Severe Pain (7 - 10)      Allergies    No Known Drug Allergies  S/P TRANSSPHENOIDAL SURGERY. SINONASAL PRECAUTIONS! NO NASAL SWABS OR NG TUBES. (Unknown)    Intolerances        ROS: All other systems reviewed and negative    PHYSICAL EXAM:  VITALS: T(C): 36.4 (03-20-23 @ 11:00)  T(F): 97.6 (03-20-23 @ 11:00), Max: 97.6 (03-20-23 @ 11:00)  HR: 63 (03-20-23 @ 12:00) (50 - 69)  BP: --  RR:  (12 - 20)  SpO2:  (95% - 99%)  Wt(kg): --  GENERAL: NAD, resting comfortably, tired appearing  EYES: No proptosis,  anicteric  HEENT:  Atraumatic, Normocephalic, dry mucous membranes  RESPIRATORY: Nonlabored respirations on room air, CTA anteriorly, normal rate/effort   CARDIOVASCULAR: Did not appear cyanotic, no murmurs appreciated, no lower extremity swelling appreciated  GI: Soft, nontender, non distended  NEURO: Answering questions appropriately, moves all extremities spontaneously  PSYCH:  reactive affect, euthymic mood    POCT Blood Glucose.: 252 mg/dL (03-20-23 @ 12:19)  POCT Blood Glucose.: 135 mg/dL (03-20-23 @ 05:42)  POCT Blood Glucose.: 222 mg/dL (03-19-23 @ 21:21)  POCT Blood Glucose.: 145 mg/dL (03-19-23 @ 17:26)  POCT Blood Glucose.: 111 mg/dL (03-19-23 @ 11:07)  POCT Blood Glucose.: 109 mg/dL (03-19-23 @ 06:38)  POCT Blood Glucose.: 134 mg/dL (03-19-23 @ 00:24)  POCT Blood Glucose.: 163 mg/dL (03-18-23 @ 17:10)  POCT Blood Glucose.: 123 mg/dL (03-18-23 @ 14:01)  POCT Blood Glucose.: 113 mg/dL (03-18-23 @ 13:03)  POCT Blood Glucose.: 132 mg/dL (03-18-23 @ 12:01)  POCT Blood Glucose.: 215 mg/dL (03-18-23 @ 11:00)  POCT Blood Glucose.: 173 mg/dL (03-18-23 @ 10:00)  POCT Blood Glucose.: 168 mg/dL (03-18-23 @ 09:02)  POCT Blood Glucose.: 179 mg/dL (03-18-23 @ 08:04)  POCT Blood Glucose.: 159 mg/dL (03-18-23 @ 07:09)  POCT Blood Glucose.: 163 mg/dL (03-18-23 @ 06:05)  POCT Blood Glucose.: 191 mg/dL (03-18-23 @ 05:28)  POCT Blood Glucose.: 207 mg/dL (03-18-23 @ 04:13)  POCT Blood Glucose.: 170 mg/dL (03-18-23 @ 03:09)  POCT Blood Glucose.: 179 mg/dL (03-18-23 @ 02:14)  POCT Blood Glucose.: 212 mg/dL (03-18-23 @ 01:16)  POCT Blood Glucose.: 202 mg/dL (03-17-23 @ 23:06)  POCT Blood Glucose.: 241 mg/dL (03-17-23 @ 18:16)  POCT Blood Glucose.: 190 mg/dL (03-17-23 @ 17:26)  POCT Blood Glucose.: 165 mg/dL (03-17-23 @ 15:58)  POCT Blood Glucose.: 173 mg/dL (03-17-23 @ 14:01)  POCT Blood Glucose.: 162 mg/dL (03-17-23 @ 13:02)      03-20    144  |  111<H>  |  14  ----------------------------<  100<H>  3.4<L>   |  25  |  0.67    eGFR: 104    Ca    8.5      03-20  Mg     2.0     03-20  Phos  3.0     03-20    TPro  5.4<L>  /  Alb  3.1<L>  /  TBili  0.3  /  DBili  x   /  AST  193<H>  /  ALT  190<H>  /  AlkPhos  94  03-19      A1C with Estimated Average Glucose Result: 8.0 % (03-13-23 @ 06:23)      Thyroid Stimulating Hormone, Serum: 1.05 uIU/mL (03-18-23 @ 17:05)  Thyroid Stimulating Hormone, Serum: 1.05 uIU/mL (03-18-23 @ 17:05)  Thyroid Stimulating Hormone, Serum: 4.43 uIU/mL (03-16-23 @ 04:02)  Thyroid Stimulating Hormone, Serum: 3.46 uIU/mL (03-15-23 @ 09:09)  Thyroid Stimulating Hormone, Serum: 8.85 uIU/mL (03-11-23 @ 05:39)

## 2023-03-20 NOTE — DIETITIAN INITIAL EVALUATION ADULT - NSFNSGIIOFT_GEN_A_CORE
no reported nausea/vomiting/constipation/diarrhea. Last bowel movement per flow sheets 3/19 x 4.   Lumbar drain clamped 3/16

## 2023-03-20 NOTE — PROGRESS NOTE ADULT - SUBJECTIVE AND OBJECTIVE BOX
ENT ISSUE/POD: s/p TSRP pod 5    SUBJECTIVE: Pt seen and examined at bedside. There were no acute overnight events. The patient has no complaints this morning. She is doing well. Her HA subsided. Her pain is controlled. She denies active nasal bleeding, nasal discharge, dripping in the back of the throat, HA, facial pain/pressure, ear pain, salty taste, metallic taste, CP, SOB, fevers, chills, N/V/D.        PAST MEDICAL & SURGICAL HISTORY:  Hypertension      Hyperlipidemia      Diabetes mellitus        Allergies    No Known Drug Allergies  S/P TRANSSPHENOIDAL SURGERY. SINONASAL PRECAUTIONS! NO NASAL SWABS OR NG TUBES. (Unknown)    Intolerances      MEDICATIONS  (STANDING):  amLODIPine   Tablet 10 milliGRAM(s) Oral daily  chlorhexidine 4% Liquid 1 Application(s) Topical daily  desmopressin Injectable 0.5 MICROGram(s) IV Push <User Schedule>  enoxaparin Injectable 40 milliGRAM(s) SubCutaneous <User Schedule>  hydrocortisone sodium succinate Injectable 25 milliGRAM(s) IV Push every 12 hours  insulin glargine Injectable (LANTUS) 10 Unit(s) SubCutaneous at bedtime  insulin lispro (ADMELOG) corrective regimen sliding scale   SubCutaneous three times a day before meals  insulin lispro Injectable (ADMELOG) 3 Unit(s) SubCutaneous three times a day before meals  levothyroxine 75 MICROGram(s) Oral daily  pantoprazole    Tablet 40 milliGRAM(s) Oral daily  polyethylene glycol 3350 17 Gram(s) Oral daily  senna 2 Tablet(s) Oral at bedtime    MEDICATIONS  (PRN):  ondansetron Injectable 4 milliGRAM(s) IV Push every 6 hours PRN Nausea and/or Vomiting  sodium chloride 0.65% Nasal 1 Spray(s) Both Nostrils three times a day PRN Nasal Congestion  traMADol 25 milliGRAM(s) Oral every 4 hours PRN Moderate Pain (4 - 6)  traMADol 50 milliGRAM(s) Oral every 4 hours PRN Severe Pain (7 - 10)      Social History: see consult    Family history: see consult    ROS:   ENT: all negative except as noted in HPI   Pulm: denies SOB, cough, hemoptysis  Neuro: denies numbness/tingling, loss of sensation  Endo: denies heat/cold intolerance, excessive sweating      Vital Signs Last 24 Hrs  T(C): 36.2 (20 Mar 2023 07:00), Max: 36.5 (19 Mar 2023 11:00)  T(F): 97.2 (20 Mar 2023 07:00), Max: 97.7 (19 Mar 2023 11:00)  HR: 63 (20 Mar 2023 07:00) (50 - 69)  BP: --  BP(mean): --  RR: 14 (20 Mar 2023 07:00) (12 - 19)  SpO2: 96% (20 Mar 2023 07:00) (95% - 99%)    Parameters below as of 20 Mar 2023 07:00  Patient On (Oxygen Delivery Method): room air                              8.6    6.11  )-----------( 199      ( 20 Mar 2023 00:26 )             26.8    03-20    144  |  111<H>  |  14  ----------------------------<  100<H>  3.4<L>   |  25  |  0.67    Ca    8.5      20 Mar 2023 06:46  Phos  3.0     03-20  Mg     2.0     03-20    TPro  5.4<L>  /  Alb  3.1<L>  /  TBili  0.3  /  DBili  x   /  AST  193<H>  /  ALT  190<H>  /  AlkPhos  94  03-19       PHYSICAL EXAM:  Gen: NAD  Skin: No rashes, bruises, or lesions  Head: Normocephalic, Atraumatic  Face: no edema, erythema, or fluctuance. Parotid glands soft without mass  Eyes: no scleral injection  Nose: Nares bilaterally patent, no discharge, + dry blood in b/l nares, no active bleed, no CSF leak  Mouth: No Stridor / Drooling / Trismus.  Mucosa moist, tongue/uvula midline, oropharynx clear  Neck: Flat, supple, no lymphadenopathy, trachea midline, no masses  Resp: breathing easily, no stridor  Neuro: facial nerve intact, no facial droop

## 2023-03-20 NOTE — PROGRESS NOTE ADULT - SUBJECTIVE AND OBJECTIVE BOX
NSCU ATTENDING -- ADDITIONAL PROGRESS NOTE    Nighttime rounds were performed -- please refer to earlier Progress Note for HPI details.    ICU Vital Signs Last 24 Hrs  T(C): 37 (20 Mar 2023 15:00), Max: 37 (20 Mar 2023 15:00)  T(F): 98.6 (20 Mar 2023 15:00), Max: 98.6 (20 Mar 2023 15:00)  HR: 54 (20 Mar 2023 18:00) (50 - 69)  BP: --  BP(mean): --  ABP: 120/55 (20 Mar 2023 18:00) (105/46 - 153/72)  ABP(mean): 80 (20 Mar 2023 18:00) (69 - 105)  RR: 15 (20 Mar 2023 18:00) (12 - 20)  SpO2: 99% (20 Mar 2023 18:00) (95% - 100%)    O2 Parameters below as of 20 Mar 2023 07:00  Patient On (Oxygen Delivery Method): room air    Relevant labwork and imaging reviewed.    s/p TSP     DI; standing ddAVP, Q6bmp  LD d/c'ed today  CT in am NSCU ATTENDING -- ADDITIONAL PROGRESS NOTE    Nighttime rounds were performed -- please refer to earlier Progress Note for HPI details.    ICU Vital Signs Last 24 Hrs  T(C): 37 (20 Mar 2023 15:00), Max: 37 (20 Mar 2023 15:00)  T(F): 98.6 (20 Mar 2023 15:00), Max: 98.6 (20 Mar 2023 15:00)  HR: 54 (20 Mar 2023 18:00) (50 - 69)  BP: --  BP(mean): --  ABP: 120/55 (20 Mar 2023 18:00) (105/46 - 153/72)  ABP(mean): 80 (20 Mar 2023 18:00) (69 - 105)  RR: 15 (20 Mar 2023 18:00) (12 - 20)  SpO2: 99% (20 Mar 2023 18:00) (95% - 100%)    O2 Parameters below as of 20 Mar 2023 07:00  Patient On (Oxygen Delivery Method): room air    Relevant labwork and imaging reviewed.    s/p TSP     Q4 neuro/vitals; Q1hr UOP monitor   drink to thirst, encourage   DI; standing ddAVP, Q6 bmp, given ddavp at 6pm, no UOP since then, monitor closely   LD d/c'ed today

## 2023-03-20 NOTE — PROGRESS NOTE ADULT - SUBJECTIVE AND OBJECTIVE BOX
SUMMARY: 53yo woman transferred from [facility] on 3/10 for suprasellar mass found on MRI for opthalmology work up--etta HA and 3wk of blurry vision. PMH HTN, DM2, HLD.     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     Overnight EVENTS: 3/16- Patient was given 0.5 mg of DDAVP . Still waking up from the surgery.     3/17- Na more stable, mental status improved. ON ddavp drip. D5 running.     3/18- Na trending down appropriately. Exam improved significantly. LD clamped. NGT placed by ENT. No more on the DDAVP gtt. On D5 and insulin gtt.         REVIEW OF SYSTEMS: [] Unable to Assess due to neurologic exam   [ x] All ROS addressed below are non-contributory, except:  Neuro: [ x] Headache [ ] Back pain [ ] Numbness [ ] Weakness [ ] Ataxia [ ] Dizziness [ ] Aphasia [ ] Dysarthria [ x] Visual disturbance  Resp: [ ] Shortness of breath/dyspnea [ ] Orthopnea [ ] Cough  CV: [ ] Chest pain [ ] Palpitation [ ] Lightheadedness [ ] Syncope  Renal: [ ] Thirst [ ] Edema  GI: [ ] Nausea [ ] Emesis [ ] Abdominal pain [ ] Constipation [ ] Diarrhea  Hem: [ ] Hematemesis [ ] bBright red blood per rectum  ID: [ ] Fever [ ] Chills [ ] Dysuria  ENT: [ ] Rhinorrhea    PAST MEDICAL & SURGICAL HISTORY:  Hypertension      Hyperlipidemia      Diabetes mellitus      Allergies    No Known Drug Allergies  S/P TRANSSPHENOIDAL SURGERY. SINONASAL PRECAUTIONS! NO NASAL SWABS OR NG TUBES. (Unknown)    Intolerances    ICU Vital Signs Last 24 Hrs  T(C): 36.3 (20 Mar 2023 03:00), Max: 36.6 (19 Mar 2023 07:00)  T(F): 97.4 (20 Mar 2023 03:00), Max: 97.8 (19 Mar 2023 07:00)  HR: 52 (20 Mar 2023 05:00) (50 - 74)  BP: --  BP(mean): --  ABP: 131/61 (20 Mar 2023 05:00) (108/51 - 138/69)  ABP(mean): 86 (20 Mar 2023 05:00) (74 - 96)  RR: 12 (20 Mar 2023 05:00) (12 - 20)  SpO2: 97% (20 Mar 2023 05:00) (95% - 99%)    O2 Parameters below as of 19 Mar 2023 19:00  Patient On (Oxygen Delivery Method): room air        I&O's Summary    18 Mar 2023 07:01  -  19 Mar 2023 07:00  --------------------------------------------------------  IN: 3788 mL / OUT: 1150 mL / NET: 2638 mL    19 Mar 2023 07:01  -  20 Mar 2023 06:14  --------------------------------------------------------  IN: 750 mL / OUT: 2400 mL / NET: -1650 mL          EXAMINATION:  General: No acute distress  HEENT: Anicteric sclerae  Cardiac: [id number]  Lungs: Clear  Abdomen: Soft, non-tender, +BS  Extremities: No c/c/e  Skin/Incision Site: Clean, dry and intact  Neurologic: [place], alert, oriented x 3 ,  following commands. Moving all extremities spontaneously. Pupils 3 mm Bilaterally reactive.   MEDICATIONS  (STANDING):  amLODIPine   Tablet 10 milliGRAM(s) Oral daily  chlorhexidine 4% Liquid 1 Application(s) Topical daily  desmopressin Injectable 0.5 MICROGram(s) IV Push <User Schedule>  enoxaparin Injectable 40 milliGRAM(s) SubCutaneous <User Schedule>  hydrocortisone sodium succinate Injectable 50 milliGRAM(s) IV Push every 12 hours  insulin glargine Injectable (LANTUS) 10 Unit(s) SubCutaneous at bedtime  insulin lispro (ADMELOG) corrective regimen sliding scale   SubCutaneous three times a day before meals  insulin lispro Injectable (ADMELOG) 3 Unit(s) SubCutaneous three times a day before meals  levothyroxine 75 MICROGram(s) Oral daily  pantoprazole    Tablet 40 milliGRAM(s) Oral daily  polyethylene glycol 3350 17 Gram(s) Oral daily  potassium chloride    Tablet ER 20 milliEquivalent(s) Oral once  senna 2 Tablet(s) Oral at bedtime    MEDICATIONS  (PRN):  ondansetron Injectable 4 milliGRAM(s) IV Push every 6 hours PRN Nausea and/or Vomiting  sodium chloride 0.65% Nasal 1 Spray(s) Both Nostrils three times a day PRN Nasal Congestion  traMADol 25 milliGRAM(s) Oral every 4 hours PRN Moderate Pain (4 - 6)  traMADol 50 milliGRAM(s) Oral every 4 hours PRN Severe Pain (7 - 10)      Hct: 28.2 (03-18 @ 21:50), 28.9 (03-17 @ 23:08), 37.1 (03-16 @ 22:15)  WBC: 10.46 (03-18 @ 21:50), 11.81 (03-17 @ 23:08), 14.10 (03-16 @ 22:15)  Plt: 197 (03-18 @ 21:50), 200 (03-17 @ 23:08), 262 (03-16 @ 22:15)    INR:   PTT:                 	     SUMMARY: 51yo woman transferred from [facility] on 3/10 for suprasellar mass found on MRI for opthalmology work up--etta HA and 3wk of blurry vision. PMH HTN, DM2, HLD.     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     Overnight EVENTS: 3/16- Patient was given 0.5 mg of DDAVP . Still waking up from the surgery.     3/17- Na more stable, mental status improved. ON ddavp drip. D5 running.     3/18- Na trending down appropriately. Exam improved significantly. LD clamped. NGT placed by ENT. No more on the DDAVP gtt. On D5 and insulin gtt.         REVIEW OF SYSTEMS: [] Unable to Assess due to neurologic exam   [ x] All ROS addressed below are non-contributory, except:  Neuro: [ x] Headache [ ] Back pain [ ] Numbness [ ] Weakness [ ] Ataxia [ ] Dizziness [ ] Aphasia [ ] Dysarthria [ x] Visual disturbance  Resp: [ ] Shortness of breath/dyspnea [ ] Orthopnea [ ] Cough  CV: [ ] Chest pain [ ] Palpitation [ ] Lightheadedness [ ] Syncope  Renal: [ ] Thirst [ ] Edema  GI: [ ] Nausea [ ] Emesis [ ] Abdominal pain [ ] Constipation [ ] Diarrhea  Hem: [ ] Hematemesis [ ] bBright red blood per rectum  ID: [ ] Fever [ ] Chills [ ] Dysuria  ENT: [ ] Rhinorrhea    PAST MEDICAL & SURGICAL HISTORY:  Hypertension      Hyperlipidemia      Diabetes mellitus      Allergies    No Known Drug Allergies  S/P TRANSSPHENOIDAL SURGERY. SINONASAL PRECAUTIONS! NO NASAL SWABS OR NG TUBES. (Unknown)    Intolerances    ICU Vital Signs Last 24 Hrs  T(C): 36.3 (20 Mar 2023 03:00), Max: 36.6 (19 Mar 2023 07:00)  T(F): 97.4 (20 Mar 2023 03:00), Max: 97.8 (19 Mar 2023 07:00)  HR: 52 (20 Mar 2023 05:00) (50 - 74)  BP: --  BP(mean): --  ABP: 131/61 (20 Mar 2023 05:00) (108/51 - 138/69)  ABP(mean): 86 (20 Mar 2023 05:00) (74 - 96)  RR: 12 (20 Mar 2023 05:00) (12 - 20)  SpO2: 97% (20 Mar 2023 05:00) (95% - 99%)    O2 Parameters below as of 19 Mar 2023 19:00  Patient On (Oxygen Delivery Method): room air        I&O's Summary    18 Mar 2023 07:01  -  19 Mar 2023 07:00  --------------------------------------------------------  IN: 3788 mL / OUT: 1150 mL / NET: 2638 mL    19 Mar 2023 07:01  -  20 Mar 2023 06:14  --------------------------------------------------------  IN: 750 mL / OUT: 2400 mL / NET: -1650 mL          EXAMINATION:  General: No acute distress  HEENT: Anicteric sclerae  Cardiac: [id number]  Lungs: Clear  Abdomen: Soft, non-tender, +BS  Extremities: No c/c/e  Skin/Incision Site: Clean, dry and intact  Neurologic: awake, alert, oriented x 3,  following commands. Moving all extremities spontaneously. Pupils 3 mm Bilaterally reactive.       MEDICATIONS  (STANDING):  amLODIPine   Tablet 10 milliGRAM(s) Oral daily  chlorhexidine 4% Liquid 1 Application(s) Topical daily  desmopressin Injectable 0.5 MICROGram(s) IV Push <User Schedule>  enoxaparin Injectable 40 milliGRAM(s) SubCutaneous <User Schedule>  hydrocortisone sodium succinate Injectable 50 milliGRAM(s) IV Push every 12 hours  insulin glargine Injectable (LANTUS) 10 Unit(s) SubCutaneous at bedtime  insulin lispro (ADMELOG) corrective regimen sliding scale   SubCutaneous three times a day before meals  insulin lispro Injectable (ADMELOG) 3 Unit(s) SubCutaneous three times a day before meals  levothyroxine 75 MICROGram(s) Oral daily  pantoprazole    Tablet 40 milliGRAM(s) Oral daily  polyethylene glycol 3350 17 Gram(s) Oral daily  potassium chloride    Tablet ER 20 milliEquivalent(s) Oral once  senna 2 Tablet(s) Oral at bedtime    MEDICATIONS  (PRN):  ondansetron Injectable 4 milliGRAM(s) IV Push every 6 hours PRN Nausea and/or Vomiting  sodium chloride 0.65% Nasal 1 Spray(s) Both Nostrils three times a day PRN Nasal Congestion  traMADol 25 milliGRAM(s) Oral every 4 hours PRN Moderate Pain (4 - 6)  traMADol 50 milliGRAM(s) Oral every 4 hours PRN Severe Pain (7 - 10)      Hct: 28.2 (03-18 @ 21:50), 28.9 (03-17 @ 23:08), 37.1 (03-16 @ 22:15)  WBC: 10.46 (03-18 @ 21:50), 11.81 (03-17 @ 23:08), 14.10 (03-16 @ 22:15)  Plt: 197 (03-18 @ 21:50), 200 (03-17 @ 23:08), 262 (03-16 @ 22:15)

## 2023-03-20 NOTE — PROGRESS NOTE ADULT - ASSESSMENT
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 No CSF leak on exam. She is doing great.     Plan:  - continue humidified face tent  - nasal saline, 2 sprays to b/l nares 4 times a day.  - monitor for signs of Epistaxis and CSF leak  - avoid nasal trauma, heavy lifting and bending at waist.  - Care a/p neurosurgery

## 2023-03-20 NOTE — DIETITIAN INITIAL EVALUATION ADULT - PERTINENT LABORATORY DATA
03-20    144  |  111<H>  |  14  ----------------------------<  100<H>  3.4<L>   |  25  |  0.67    Ca    8.5      20 Mar 2023 06:46  Phos  3.0     03-20  Mg     2.0     03-20    TPro  5.4<L>  /  Alb  3.1<L>  /  TBili  0.3  /  DBili  x   /  AST  193<H>  /  ALT  190<H>  /  AlkPhos  94  03-19  POCT Blood Glucose.: 135 mg/dL (03-20-23 @ 05:42)  A1C with Estimated Average Glucose Result: 8.0 % (03-13-23 @ 06:23)

## 2023-03-20 NOTE — PHARMACOTHERAPY INTERVENTION NOTE - REASON FOR NOTE
Reviewed medications for appropriate route of administration
Reviewed medications for appropriate route of administration
Reviewed Appropriate Routes of Medication Administration

## 2023-03-20 NOTE — DIETITIAN INITIAL EVALUATION ADULT - PERSON TAUGHT/METHOD
Provided education on Carbohydrate Consistent diet including sources of carbohydrates, portion sizes, pairing protein with carbohydrates, and the importance of consistent eating pattern to help optimize glycemic control. PO intake encouraged with emphasis on protein for healing. Pt made aware that RD remains available./verbal instruction/patient instructed

## 2023-03-20 NOTE — PROGRESS NOTE ADULT - ASSESSMENT
52 yr old female with a history of hypertension, hyperlipidemia, dm type 2, no surgeries ever presents with a month of headaches and 3 weeks of blurred vision. Endocrinology consulted for evaluation of sellar mass.    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 Too low to be prolactinoma given size of the sellar mass. Suspect stalk effect. (prolactin diluted 103, undiluted 107)  -Secondary hypogonadism can be addressed as outpatient. (Lh 2.6, estradiol <5)  -3/12 AM ayush 5.9, ACTH 21.5, 3/13 am cortisol (6am) 6.9  - cosyntropin stim test: 3/14 AM cortisol at 8:15am 5.1, cosyntropin at 8:15, 8:50am cortisol 16.9, 9:25am 20.7  - s/p tsp 3/15  - IGF1 3/15 46  PLAN  - f/u neurosurgery and ophthalmology recommendations  - continue hydrocortisone 25mg q12h on 3/20    DI/SIADH Watch POST OP  - Please eval for signs of DI vs SIADH postop  - continue DDAVP 0.5 mcg IV bid  - q6h BMP  - Strict I/Os, daily weights. if UO >250cc/hour, please check BMP and urine osm, serum osm.  If consistent with DI, patient may require DDAVP.   - May require DDAVP IVP x1 if meets any of these criteria: Na > 145, UO > 250cc/hr, Urine osm < 100 or urine specific gravity < 1.005.  - Signs and symptoms of DI and SIADH post op:  may occur in the 2-3 weeks following surgery.  Patient should check daily weights and if they experience weight gain of 2-3 pounds to call her endocrinologist.  Signs of DI include increased thirst with high urine output.    Primary Hypothyroidism   - Patient with elevated TSH (8)and Low Free T4 (0.5)  - 3/18 TSH 1.05, FT4 1, TT3 49, serum T4 5.4, do not suspect contributing to bradycardia, steroids can cause low T3  PLAN  - continue levothyroxine 75 mcg daily (maintain on empty stomach (at least 1 hour before meals), separate by 4 hours from PPI or calcium supplementation which can inhibit its absorption)    Steroid hyperglycemia  T2DM with hyperglycemia  - HbA1c: 8  - Home Regimen: metformin 1000mg bid  - Endocrinologist: does not have  - patient with hyperglycemia on stress dose steroids  - now on tube feeds  PLAN  - continue lantus 10 units tonight (do not hold if npo)  - start admelog 3 units tid premeal (hold if npo)  - start low admelog correction scale tid with meals and separate low admelog correction scale at bedtime now that patient is tolerating diet  - carb consistent diet  - fingersticks qid with meals and bedtime  - hypoglycemia protocol prn  - Goal -180  Discharge plan:  - Discharge medications: metformin 1000mg bid + GLP-1 agonist  - Patient to call doctor with persistent high or low BG at home.   - Ensure patient has glucometer, test strips and lancets on discharge.  - Recommend routine outpatient ophthalmology, podiatry and endocrinology f/u    HTN  - Home regimen: lisinopril 2.5mg daily  PLAN  - Can check urine microalbumin outpatient  - Outpatient goal BP <130/80. Management per primary team.    HLD  - Home regimen: atorvastatin 80mg daily  -   PLAN  - resume statin when able    Will schedule an appointment for the patient to follow up.  [address] [place] [address]  Phone Number: [phone]    Discussed with primary team.     Thank you for the interesting consult.    Denny Bishop MD, Endocrinology Fellow  Pager [phone] from 9am to 5pm. After hours and on weekends, please call [phone]. 52 yr old female with a history of hypertension, hyperlipidemia, dm type 2, no surgeries ever presents with a month of headaches and 3 weeks of blurred vision. Endocrinology consulted for evaluation of sellar mass.    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 Too low to be prolactinoma given size of the sellar mass. Suspect stalk effect. (prolactin diluted 103, undiluted 107)  -Secondary hypogonadism can be addressed as outpatient. (Lh 2.6, estradiol <5)  -3/12 AM ayush 5.9, ACTH 21.5, 3/13 am cortisol (6am) 6.9  - cosyntropin stim test: 3/14 AM cortisol at 8:15am 5.1, cosyntropin at 8:15, 8:50am cortisol 16.9, 9:25am 20.7  - s/p tsp 3/15  - IGF1 3/15 46  PLAN  - f/u neurosurgery and ophthalmology recommendations  - continue hydrocortisone 25mg q12h on 3/20, would give one dose hydrocortisone 20mg PO at 8am on 3/21, hold afternoon dose on 3/21, repeat 8am ACTH and SERUM NOT FREE cortisol on 3/21 to reassess adrenal axis     DI/SIADH Watch POST OP  - Please eval for signs of DI vs SIADH postop  - can change to DDAVP 0.05mg PO bid  - continue q8-12h BMP monitoring  - Strict I/Os, daily weights. if UO >250cc/hour, please check BMP and urine osm, serum osm.  If consistent with DI, patient may require DDAVP.   - May require DDAVP IVP x1 if meets any of these criteria: Na > 145, UO > 250cc/hr, Urine osm < 100 or urine specific gravity < 1.005.  - Signs and symptoms of DI and SIADH post op:  may occur in the 2-3 weeks following surgery.  Patient should check daily weights and if they experience weight gain of 2-3 pounds to call her endocrinologist.  Signs of DI include increased thirst with high urine output.    Primary Hypothyroidism   - Patient with elevated TSH (8)and Low Free T4 (0.5)  - 3/18 TSH 1.05, FT4 1, TT3 49, serum T4 5.4, do not suspect contributing to bradycardia, steroids can cause low T3  PLAN  - continue levothyroxine 75 mcg daily (maintain on empty stomach (at least 1 hour before meals), separate by 4 hours from PPI or calcium supplementation which can inhibit its absorption)    Steroid hyperglycemia  T2DM with hyperglycemia  - HbA1c: 8  - Home Regimen: metformin 1000mg bid  - Endocrinologist: does not have  - patient with hyperglycemia on stress dose steroids  - now on tube feeds  PLAN  - continue lantus 10 units tonight (do not hold if npo)  - start admelog 5 units tid premeal (hold if npo)  - start low admelog correction scale tid with meals and separate low admelog correction scale at bedtime now that patient is tolerating diet  - carb consistent diet  - fingersticks qid with meals and bedtime  - hypoglycemia protocol prn  - Goal -180  Discharge plan:  - Discharge medications: metformin 1000mg bid + GLP-1 agonist  - Patient to call doctor with persistent high or low BG at home.   - Ensure patient has glucometer, test strips and lancets on discharge.  - Recommend routine outpatient ophthalmology, podiatry and endocrinology f/u    HTN  - Home regimen: lisinopril 2.5mg daily  PLAN  - Can check urine microalbumin outpatient  - Outpatient goal BP <130/80. Management per primary team.    HLD  - Home regimen: atorvastatin 80mg daily  -   PLAN  - resume statin when able    Will schedule an appointment for the patient to follow up.  [address] [place] [address]  Phone Number: [phone]    Discussed with primary team.     Thank you for the interesting consult.    Denny Bishop MD, Endocrinology Fellow  Pager [phone] from 9am to 5pm. After hours and on weekends, please call [phone]. 52 yr old female with a history of hypertension, hyperlipidemia, dm type 2, no surgeries ever presents with a month of headaches and 3 weeks of blurred vision. Endocrinology consulted for evaluation of sellar mass.    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 Too low to be prolactinoma given size of the sellar mass. Suspect stalk effect. (prolactin diluted 103, undiluted 107)  -Secondary hypogonadism can be addressed as outpatient. (Lh 2.6, estradiol <5)  -3/12 AM ayush 5.9, ACTH 21.5, 3/13 am cortisol (6am) 6.9  - cosyntropin stim test: 3/14 AM cortisol at 8:15am 5.1, cosyntropin at 8:15, 8:50am cortisol 16.9, 9:25am 20.7  - s/p tsp 3/15  - IGF1 3/15 46  PLAN  - f/u neurosurgery and ophthalmology recommendations  - continue hydrocortisone 25mg q12h on 3/20, would give one dose hydrocortisone 20mg PO at 8am on 3/21, hold afternoon dose on 3/21, repeat 8am ACTH and SERUM NOT FREE cortisol on 3/21 to reassess adrenal axis     DI/SIADH Watch POST OP  - Please eval for signs of DI vs SIADH postop  - can transition to DDAVP 0.05mg PO bid  - continue q8-12h BMP monitoring  - Strict I/Os, daily weights. if UO >250cc/hour, please check BMP and urine osm, serum osm.  If consistent with DI, patient may require DDAVP.   - May require DDAVP IVP x1 if meets any of these criteria: Na > 145, UO > 250cc/hr, Urine osm < 100 or urine specific gravity < 1.005.  - Signs and symptoms of DI and SIADH post op:  may occur in the 2-3 weeks following surgery.  Patient should check daily weights and if they experience weight gain of 2-3 pounds to call her endocrinologist.  Signs of DI include increased thirst with high urine output.    Primary Hypothyroidism   - Patient with elevated TSH (8)and Low Free T4 (0.5)  - 3/18 TSH 1.05, FT4 1, TT3 49, serum T4 5.4, do not suspect contributing to bradycardia, steroids can cause low T3  PLAN  - continue levothyroxine 75 mcg daily (maintain on empty stomach (at least 1 hour before meals), separate by 4 hours from PPI or calcium supplementation which can inhibit its absorption)    Steroid hyperglycemia  T2DM with hyperglycemia  - HbA1c: 8  - Home Regimen: metformin 1000mg bid  - Endocrinologist: does not have  - patient with hyperglycemia on stress dose steroids  - now on tube feeds  PLAN  - continue lantus 10 units tonight (do not hold if npo)  - start admelog 5 units tid premeal (hold if npo)  - start low admelog correction scale tid with meals and separate low admelog correction scale at bedtime now that patient is tolerating diet  - carb consistent diet  - fingersticks qid with meals and bedtime  - hypoglycemia protocol prn  - Goal -180  Discharge plan:  - Discharge medications: metformin 1000mg bid + GLP-1 agonist  - Patient to call doctor with persistent high or low BG at home.   - Ensure patient has glucometer, test strips and lancets on discharge.  - Recommend routine outpatient ophthalmology, podiatry and endocrinology f/u    HTN  - Home regimen: lisinopril 2.5mg daily  PLAN  - Can check urine microalbumin outpatient  - Outpatient goal BP <130/80. Management per primary team.    HLD  - Home regimen: atorvastatin 80mg daily  -   PLAN  - resume statin when able    Will schedule an appointment for the patient to follow up.  [address] [place] [address]  Phone Number: [phone]    Discussed with primary team.     Thank you for the interesting consult.    Denny Bishop MD, Endocrinology Fellow  Pager [phone] from 9am to 5pm. After hours and on weekends, please call [phone]. 52 yr old female with a history of hypertension, hyperlipidemia, dm type 2, no surgeries ever presents with a month of headaches and 3 weeks of blurred vision. Endocrinology consulted for evaluation of sellar mass.    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 Too low to be prolactinoma given size of the sellar mass. Suspect stalk effect. (prolactin diluted 103, undiluted 107)  -Secondary hypogonadism can be addressed as outpatient. (Lh 2.6, estradiol <5)  -3/12 AM ayush 5.9, ACTH 21.5, 3/13 am cortisol (6am) 6.9  - cosyntropin stim test: 3/14 AM cortisol at 8:15am 5.1, cosyntropin at 8:15, 8:50am cortisol 16.9, 9:25am 20.7  - s/p tsp 3/15  - IGF1 3/15 46  PLAN  - f/u neurosurgery and ophthalmology recommendations  - continue hydrocortisone 25mg q12h on 3/20, would give one dose hydrocortisone 20mg PO at 8am on 3/21, hold afternoon dose on 3/21, repeat 8am ACTH and SERUM cortisol NOT FREE cortisol on 3/22 to reassess adrenal axis     DI/SIADH Watch POST OP  - Please eval for signs of DI vs SIADH postop  - can transition to DDAVP 0.05mg PO bid  - continue q8-12h BMP monitoring  - Strict I/Os, daily weights. if UO >250cc/hour, please check BMP and urine osm, serum osm.  If consistent with DI, patient may require DDAVP.   - May require DDAVP IVP x1 if meets any of these criteria: Na > 145, UO > 250cc/hr, Urine osm < 100 or urine specific gravity < 1.005.  - Signs and symptoms of DI and SIADH post op:  may occur in the 2-3 weeks following surgery.  Patient should check daily weights and if they experience weight gain of 2-3 pounds to call her endocrinologist.  Signs of DI include increased thirst with high urine output.    Primary Hypothyroidism   - Patient with elevated TSH (8)and Low Free T4 (0.5)  - 3/18 TSH 1.05, FT4 1, TT3 49, serum T4 5.4, do not suspect contributing to bradycardia, steroids can cause low T3  PLAN  - continue levothyroxine 75 mcg daily (maintain on empty stomach (at least 1 hour before meals), separate by 4 hours from PPI or calcium supplementation which can inhibit its absorption)    Steroid hyperglycemia  T2DM with hyperglycemia  - HbA1c: 8  - Home Regimen: metformin 1000mg bid  - Endocrinologist: does not have  - patient with hyperglycemia on stress dose steroids  - now on tube feeds  PLAN  - continue lantus 10 units tonight (do not hold if npo)  - start admelog 5 units tid premeal (hold if npo)  - start low admelog correction scale tid with meals and separate low admelog correction scale at bedtime now that patient is tolerating diet  - carb consistent diet  - fingersticks qid with meals and bedtime  - hypoglycemia protocol prn  - Goal -180  Discharge plan:  - Discharge medications: metformin 1000mg bid + GLP-1 agonist  - Patient to call doctor with persistent high or low BG at home.   - Ensure patient has glucometer, test strips and lancets on discharge.  - Recommend routine outpatient ophthalmology, podiatry and endocrinology f/u    HTN  - Home regimen: lisinopril 2.5mg daily  PLAN  - Can check urine microalbumin outpatient  - Outpatient goal BP <130/80. Management per primary team.    HLD  - Home regimen: atorvastatin 80mg daily  -   PLAN  - resume statin when able    Will schedule an appointment for the patient to follow up.  [address] [place] [address]  Phone Number: [phone]    Discussed with primary team.     Thank you for the interesting consult.    Denny Bishop MD, Endocrinology Fellow  Pager [phone] from 9am to 5pm. After hours and on weekends, please call [phone].

## 2023-03-20 NOTE — PHARMACOTHERAPY INTERVENTION NOTE - COMMENTS
Reviewed medications for appropriate route of administration    MEDICATIONS  (STANDING):  amLODIPine Tablet 10 milliGRAM(s) Oral daily  atorvastatin 80 milliGRAM(s) Oral at bedtime  cefTRIAXone IVPB 1000 milliGRAM(s) IV Intermittent every 24 hours  chlorhexidine 4% Liquid 1 Application(s) Topical daily  desmopressin Injectable 0.5 MICROGram(s) IV Push twice daily  dextrose 50% Injectable 50 milliLiter(s) IV Push every 15 minutes  glucagon  Injectable 1 milliGRAM(s) IntraMuscular once  hydrocortisone sodium succinate Injectable 50 milliGRAM(s) IV Push every 8 hours  insulin lispro (ADMELOG) corrective regimen sliding scale   SubCutaneous every 6 hours  insulin NPH human recombinant 8 Unit(s) SubCutaneous every 6 hours  insulin regular Infusion 5 Unit(s)/Hr (5 mL/Hr) IV Continuous  levothyroxine Injectable 50 MICROGram(s) IV Push <User Schedule>  metroNIDAZOLE  IVPB 500 milliGRAM(s) IV Intermittent every 8 hours  pantoprazole Tablet 40 milliGRAM(s) Oral daily  polyethylene glycol 3350 17 Gram(s) Oral daily  potassium chloride  10 mEq/100 mL IVPB 10 milliEquivalent(s) IV Intermittent every 1 hour  senna 2 Tablet(s) Oral at bedtime  vancomycin  IVPB 1250 milliGRAM(s) IV Intermittent every 12 hours    MEDICATIONS  (PRN):  acetaminophen Tablet 650 milliGRAM(s) Oral every 6 hours PRN Temp greater or equal to 38C (100.4F), Mild Pain (1 - 3)  dextrose Oral Gel 15 Gram(s) Oral once PRN Blood Glucose LESS THAN 70 milliGRAM(s)/deciliter  ondansetron Injectable 4 milliGRAM(s) IV Push every 6 hours PRN Nausea and/or Vomiting  oxyCODONE IR 5 milliGRAM(s) Oral every 4 hours PRN Moderate Pain (4 - 6)  sodium chloride 0.65% Nasal 1 Spray(s) Both Nostrils three times a day PRN Nasal Congestion    Denis Coates PharmD  PGY1 Pharmacy Resident  Available on [employer] [id number]  
Reviewed medications for appropriate route of administration    MEDICATIONS  (STANDING):  amLODIPine Tablet 10 milliGRAM(s) Oral daily  atorvastatin 80 milliGRAM(s) Oral at bedtime  cefTRIAXone IVPB 2000 milliGRAM(s) IV Intermittent every 12 hours  chlorhexidine 4% Liquid 1 Application(s) Topical daily  glucagon  Injectable 1 milliGRAM(s) IntraMuscular once  hydrocortisone 50 milliGRAM(s) Oral every 8 hours  insulin lispro (ADMELOG) corrective regimen sliding scale   SubCutaneous Before meals and at bedtime  insulin NPH human recombinant 6 Unit(s) SubCutaneous every 6 hours  levothyroxine 75 MICROGram(s) Oral daily  metroNIDAZOLE  IVPB 500 milliGRAM(s) IV Intermittent every 8 hours  pantoprazole  Injectable 40 milliGRAM(s) IV Push daily  polyethylene glycol 3350 17 Gram(s) Oral daily  senna 2 Tablet(s) Oral at bedtime  vancomycin  IVPB 1000 milliGRAM(s) IV Intermittent every 12 hours    MEDICATIONS  (PRN):  acetaminophen Tablet 650 milliGRAM(s) Oral every 6 hours PRN Temp greater or equal to 38C (100.4F), Mild Pain (1 - 3)  ondansetron Injectable 4 milliGRAM(s) IV Push every 6 hours PRN Nausea and/or Vomiting  oxyCODONE IR 5 milliGRAM(s) Oral every 4 hours PRN Moderate Pain (4 - 6)  sodium chloride 0.65% Nasal 1 Spray(s) Both Nostrils three times a day PRN Nasal Congestion    Denis Coates, PharmD  PGY1 Pharmacy Resident  Available on [employer] [phone]  
Reviewed Appropriate Routes of Medication Administration   MEDICATIONS  (STANDING):  amLODIPine   Tablet 10 milliGRAM(s) Oral daily  chlorhexidine 4% Liquid 1 Application(s) Topical daily  desmopressin Injectable 0.5 MICROGram(s) IV Push <User Schedule>  enoxaparin Injectable 40 milliGRAM(s) SubCutaneous <User Schedule>  hydrocortisone sodium succinate Injectable 25 milliGRAM(s) IV Push every 12 hours  insulin glargine Injectable (LANTUS) 10 Unit(s) SubCutaneous at bedtime  insulin lispro (ADMELOG) corrective regimen sliding scale   SubCutaneous three times a day before meals  insulin lispro Injectable (ADMELOG) 3 Unit(s) SubCutaneous three times a day before meals  levothyroxine 75 MICROGram(s) Oral daily  pantoprazole    Tablet 40 milliGRAM(s) Oral daily  polyethylene glycol 3350 17 Gram(s) Oral daily  senna 2 Tablet(s) Oral at bedtime  sodium chloride 0.45%. 1000 milliLiter(s) (300 mL/Hr) IV Continuous <Continuous>    MEDICATIONS  (PRN):  ondansetron Injectable 4 milliGRAM(s) IV Push every 6 hours PRN Nausea and/or Vomiting  sodium chloride 0.65% Nasal 1 Spray(s) Both Nostrils three times a day PRN Nasal Congestion  traMADol 25 milliGRAM(s) Oral every 4 hours PRN Moderate Pain (4 - 6)  traMADol 50 milliGRAM(s) Oral every 4 hours PRN Severe Pain (7 - 10)

## 2023-03-20 NOTE — DIETITIAN INITIAL EVALUATION ADULT - NS FNS WEIGHT CHANGE REASON
pt reports ~13 pound weight loss PTA x 1 month due to decreased PO intake. Current dosing weight 155 pounds. This would indicate a ~7.7% loss. RD will continue to monitor trends./unintentional

## 2023-03-20 NOTE — DIETITIAN INITIAL EVALUATION ADULT - SIGNS/SYMPTOMS
lumbar spine surgical incision  reported ~13 pound, 7.7% pound weight loss, decreased PO intake secondary to headache 1 month PTA reported ~13 pound/ 7.7% pound weight loss, decreased PO intake secondary to headache 1 month PTA

## 2023-03-20 NOTE — PROGRESS NOTE ADULT - ASSESSMENT
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 has LD     NEURO:  Neuro checks Q 4  hr   LD clamped per NS will discuss removal   HOB 30  PT/OT/OBC      PULM:  RA  Pituitary precautions (no incentive spirometry, no straws, no BIPAP)    CV:  -160 mmHg  asymptomatic sinus bradycardia, cardiology consulted     RENAL:   DI resolved   3/20- I/O- IN: 750 mL / OUT: 2400 mL / NET: -1650 mL  blader scan and straight cath as needed   if UO> 300 m in 2 hrs will get STAT BMP and  urine sg   am ddavp was held, will monitor  today and give ddavp as needed      GI:  Diet: D/C NG    swallow eval passed   will start regular diet    encourage oral fluid intake   Bowel regimen [] colace [x] senna [x] other: miralax   last BM today     ENDO:   pantohypopit   Goal euglycemia (-180)  On hydrocortisone 50mg IV Q12, will discuss with endo changing it to physiologic dose as she is not shock   hypothyroidism cont synthroid 50 mcg IV QD  decrease NPH to  6 units q 6 hr , ISS, finger stick   DI, DDAVP am dose held    HEME/ONC:  H/H stable   lovenox 40 mg sc qhs     ID:  afebrile    MISC:    SOCIAL/FAMILY:  [x] awaiting [] updated at bedside [] family meeting    CODE STATUS:  [x] Full Code [] DNR [] DNI [] Palliative/Comfort Care    DISPOSITION:  [x] ICU [] Stroke Unit [] Floor [] EMU [] RCU [] PCU    not critical  moderate complex medical decision      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     NEURO:  Neuro checks Q 4  hr   LD clamped per NS  CTH done- stable, following up with neurosurgery regarding plans to remove it today   HOB 30  PT/OT/OBC      PULM:  RA  Ocean nasal spray continue   Pituitary precautions (no incentive spirometry, no straws, no BIPAP)    CV:  -160 mmHg  Cardiology consulted for sinus mikael-resolved, likely neurogenic     RENAL:   DI resolved, Na appropriately corrected   3/20- I/O- IN: 750 mL / OUT: 2400 mL / NET: -1650 mL    GI:  Diet: CCD   encourage oral fluid intake   Bowel regimen [] colace [x] senna [x] other: miralax   last BM 3/19     ENDO:   pantohypopit   Goal euglycemia (-180)  Hydrocort wean to 25 mg Q12  hypothyroidism cont synthroid 75 mcg IV QD  DM: lantus 10 units q 6 hr, admelog 3 units premeal  DDAVP 0.5 mcg BID  Will get another BMP in 6 hours and will send labs if suspicious for DI     HEME/ONC:  H/H stable   lovenox 40 mg sc qhs     ID:  afebrile    MISC:    SOCIAL/FAMILY:  [x] awaiting [] updated at bedside [] family meeting    CODE STATUS:  [x] Full Code [] DNR [] DNI [] Palliative/Comfort Care    DISPOSITION:  [x] ICU [] Stroke Unit [] Floor [] EMU [] RCU [] PCU    not critical  moderate complex medical decision

## 2023-03-20 NOTE — DIETITIAN INITIAL EVALUATION ADULT - REASON FOR ADMISSION
Per chart: "51yo woman transferred from [facility] on 3/10 for suprasellar mass found on MRI for opthalmology work up--etta HA and 3wk of blurry vision. PMH HTN, DM2, HLD.   Adm NSCU s/p expanded endonasal resection of craniopharyngioma, LD placement."

## 2023-03-21 LAB
ANION GAP SERPL CALC-SCNC: 10 MMOL/L — SIGNIFICANT CHANGE UP (ref 5–17)
ANION GAP SERPL CALC-SCNC: 12 MMOL/L — SIGNIFICANT CHANGE UP (ref 5–17)
ANION GAP SERPL CALC-SCNC: 9 MMOL/L — SIGNIFICANT CHANGE UP (ref 5–17)
BUN SERPL-MCNC: 17 MG/DL — SIGNIFICANT CHANGE UP (ref 7–23)
BUN SERPL-MCNC: 18 MG/DL — SIGNIFICANT CHANGE UP (ref 7–23)
BUN SERPL-MCNC: 18 MG/DL — SIGNIFICANT CHANGE UP (ref 7–23)
CALCIUM SERPL-MCNC: 9 MG/DL — SIGNIFICANT CHANGE UP (ref 8.4–10.5)
CALCIUM SERPL-MCNC: 9.1 MG/DL — SIGNIFICANT CHANGE UP (ref 8.4–10.5)
CALCIUM SERPL-MCNC: 9.2 MG/DL — SIGNIFICANT CHANGE UP (ref 8.4–10.5)
CHLORIDE SERPL-SCNC: 108 MMOL/L — SIGNIFICANT CHANGE UP (ref 96–108)
CHLORIDE SERPL-SCNC: 109 MMOL/L — HIGH (ref 96–108)
CHLORIDE SERPL-SCNC: 113 MMOL/L — HIGH (ref 96–108)
CO2 SERPL-SCNC: 24 MMOL/L — SIGNIFICANT CHANGE UP (ref 22–31)
CO2 SERPL-SCNC: 27 MMOL/L — SIGNIFICANT CHANGE UP (ref 22–31)
CO2 SERPL-SCNC: 27 MMOL/L — SIGNIFICANT CHANGE UP (ref 22–31)
CORTIS AM PEAK SERPL-MCNC: 14.7 UG/DL — SIGNIFICANT CHANGE UP (ref 6–18.4)
CREAT SERPL-MCNC: 0.63 MG/DL — SIGNIFICANT CHANGE UP (ref 0.5–1.3)
CREAT SERPL-MCNC: 0.75 MG/DL — SIGNIFICANT CHANGE UP (ref 0.5–1.3)
CREAT SERPL-MCNC: 0.88 MG/DL — SIGNIFICANT CHANGE UP (ref 0.5–1.3)
EGFR: 106 ML/MIN/1.73M2 — SIGNIFICANT CHANGE UP
EGFR: 79 ML/MIN/1.73M2 — SIGNIFICANT CHANGE UP
EGFR: 95 ML/MIN/1.73M2 — SIGNIFICANT CHANGE UP
GLUCOSE BLDC GLUCOMTR-MCNC: 120 MG/DL — HIGH (ref 70–99)
GLUCOSE BLDC GLUCOMTR-MCNC: 135 MG/DL — HIGH (ref 70–99)
GLUCOSE BLDC GLUCOMTR-MCNC: 158 MG/DL — HIGH (ref 70–99)
GLUCOSE BLDC GLUCOMTR-MCNC: 181 MG/DL — HIGH (ref 70–99)
GLUCOSE SERPL-MCNC: 142 MG/DL — HIGH (ref 70–99)
GLUCOSE SERPL-MCNC: 166 MG/DL — HIGH (ref 70–99)
GLUCOSE SERPL-MCNC: 167 MG/DL — HIGH (ref 70–99)
MAGNESIUM SERPL-MCNC: 2.2 MG/DL — SIGNIFICANT CHANGE UP (ref 1.6–2.6)
OSMOLALITY SERPL: 302 MOSMOL/KG — HIGH (ref 275–300)
OSMOLALITY SERPL: 305 MOSMOL/KG — HIGH (ref 275–300)
OSMOLALITY SERPL: 312 MOSMOL/KG — HIGH (ref 275–300)
OSMOLALITY SERPL: 315 MOSMOL/KG — HIGH (ref 275–300)
OSMOLALITY UR: 347 MOS/KG — SIGNIFICANT CHANGE UP (ref 300–900)
OSMOLALITY UR: 682 MOS/KG — SIGNIFICANT CHANGE UP (ref 300–900)
OSMOLALITY UR: 684 MOS/KG — SIGNIFICANT CHANGE UP (ref 300–900)
PHOSPHATE SERPL-MCNC: 3.6 MG/DL — SIGNIFICANT CHANGE UP (ref 2.5–4.5)
PHOSPHATE SERPL-MCNC: 3.9 MG/DL — SIGNIFICANT CHANGE UP (ref 2.5–4.5)
PHOSPHATE SERPL-MCNC: 4.1 MG/DL — SIGNIFICANT CHANGE UP (ref 2.5–4.5)
POTASSIUM SERPL-MCNC: 3.6 MMOL/L — SIGNIFICANT CHANGE UP (ref 3.5–5.3)
POTASSIUM SERPL-MCNC: 3.7 MMOL/L — SIGNIFICANT CHANGE UP (ref 3.5–5.3)
POTASSIUM SERPL-MCNC: 4.1 MMOL/L — SIGNIFICANT CHANGE UP (ref 3.5–5.3)
POTASSIUM SERPL-SCNC: 3.6 MMOL/L — SIGNIFICANT CHANGE UP (ref 3.5–5.3)
POTASSIUM SERPL-SCNC: 3.7 MMOL/L — SIGNIFICANT CHANGE UP (ref 3.5–5.3)
POTASSIUM SERPL-SCNC: 4.1 MMOL/L — SIGNIFICANT CHANGE UP (ref 3.5–5.3)
SODIUM SERPL-SCNC: 144 MMOL/L — SIGNIFICANT CHANGE UP (ref 135–145)
SODIUM SERPL-SCNC: 146 MMOL/L — HIGH (ref 135–145)
SODIUM SERPL-SCNC: 149 MMOL/L — HIGH (ref 135–145)
SP GR UR STRIP: 1.01 — SIGNIFICANT CHANGE UP (ref 1.01–1.02)
SP GR UR STRIP: 1.02 — SIGNIFICANT CHANGE UP (ref 1.01–1.02)
SP GR UR STRIP: 1.02 — SIGNIFICANT CHANGE UP (ref 1.01–1.02)

## 2023-03-21 PROCEDURE — 99233 SBSQ HOSP IP/OBS HIGH 50: CPT

## 2023-03-21 RX ORDER — INSULIN LISPRO 100/ML
VIAL (ML) SUBCUTANEOUS
Refills: 0 | Status: DISCONTINUED | OUTPATIENT
Start: 2023-03-21 | End: 2023-03-24

## 2023-03-21 RX ORDER — SODIUM CHLORIDE 9 MG/ML
500 INJECTION, SOLUTION INTRAVENOUS
Refills: 0 | Status: DISCONTINUED | OUTPATIENT
Start: 2023-03-21 | End: 2023-03-21

## 2023-03-21 RX ORDER — TRAMADOL HYDROCHLORIDE 50 MG/1
25 TABLET ORAL EVERY 4 HOURS
Refills: 0 | Status: DISCONTINUED | OUTPATIENT
Start: 2023-03-21 | End: 2023-03-24

## 2023-03-21 RX ORDER — INSULIN LISPRO 100/ML
5 VIAL (ML) SUBCUTANEOUS
Refills: 0 | Status: DISCONTINUED | OUTPATIENT
Start: 2023-03-21 | End: 2023-03-24

## 2023-03-21 RX ORDER — INSULIN LISPRO 100/ML
VIAL (ML) SUBCUTANEOUS AT BEDTIME
Refills: 0 | Status: DISCONTINUED | OUTPATIENT
Start: 2023-03-21 | End: 2023-03-24

## 2023-03-21 RX ORDER — SODIUM CHLORIDE 9 MG/ML
500 INJECTION INTRAMUSCULAR; INTRAVENOUS; SUBCUTANEOUS
Refills: 0 | Status: DISCONTINUED | OUTPATIENT
Start: 2023-03-21 | End: 2023-03-21

## 2023-03-21 RX ORDER — POTASSIUM CHLORIDE 20 MEQ
40 PACKET (EA) ORAL ONCE
Refills: 0 | Status: COMPLETED | OUTPATIENT
Start: 2023-03-21 | End: 2023-03-21

## 2023-03-21 RX ORDER — TRAMADOL HYDROCHLORIDE 50 MG/1
50 TABLET ORAL EVERY 4 HOURS
Refills: 0 | Status: DISCONTINUED | OUTPATIENT
Start: 2023-03-21 | End: 2023-03-24

## 2023-03-21 RX ADMIN — CHLORHEXIDINE GLUCONATE 1 APPLICATION(S): 213 SOLUTION TOPICAL at 12:06

## 2023-03-21 RX ADMIN — ENOXAPARIN SODIUM 40 MILLIGRAM(S): 100 INJECTION SUBCUTANEOUS at 17:27

## 2023-03-21 RX ADMIN — POLYETHYLENE GLYCOL 3350 17 GRAM(S): 17 POWDER, FOR SOLUTION ORAL at 12:06

## 2023-03-21 RX ADMIN — Medication 20 MILLIGRAM(S): at 08:24

## 2023-03-21 RX ADMIN — SODIUM CHLORIDE 500 MILLILITER(S): 9 INJECTION, SOLUTION INTRAVENOUS at 13:18

## 2023-03-21 RX ADMIN — Medication 20 MILLIEQUIVALENT(S): at 05:56

## 2023-03-21 RX ADMIN — PANTOPRAZOLE SODIUM 40 MILLIGRAM(S): 20 TABLET, DELAYED RELEASE ORAL at 12:06

## 2023-03-21 RX ADMIN — Medication 1: at 12:44

## 2023-03-21 RX ADMIN — Medication 1: at 17:29

## 2023-03-21 RX ADMIN — Medication 5 UNIT(S): at 17:28

## 2023-03-21 RX ADMIN — Medication 40 MILLIEQUIVALENT(S): at 12:05

## 2023-03-21 RX ADMIN — Medication 75 MICROGRAM(S): at 05:57

## 2023-03-21 RX ADMIN — TRAMADOL HYDROCHLORIDE 50 MILLIGRAM(S): 50 TABLET ORAL at 13:25

## 2023-03-21 RX ADMIN — DESMOPRESSIN ACETATE 0.05 MILLIGRAM(S): 0.1 TABLET ORAL at 17:27

## 2023-03-21 RX ADMIN — DESMOPRESSIN ACETATE 0.05 MILLIGRAM(S): 0.1 TABLET ORAL at 05:57

## 2023-03-21 RX ADMIN — TRAMADOL HYDROCHLORIDE 25 MILLIGRAM(S): 50 TABLET ORAL at 17:30

## 2023-03-21 RX ADMIN — Medication 4 UNIT(S): at 06:00

## 2023-03-21 RX ADMIN — INSULIN GLARGINE 12 UNIT(S): 100 INJECTION, SOLUTION SUBCUTANEOUS at 23:15

## 2023-03-21 RX ADMIN — TRAMADOL HYDROCHLORIDE 50 MILLIGRAM(S): 50 TABLET ORAL at 13:56

## 2023-03-21 RX ADMIN — Medication 4 UNIT(S): at 12:44

## 2023-03-21 RX ADMIN — TRAMADOL HYDROCHLORIDE 25 MILLIGRAM(S): 50 TABLET ORAL at 18:00

## 2023-03-21 NOTE — PROGRESS NOTE ADULT - ASSESSMENT
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     NEURO:  Neuro checks Q 4  hr   LD clamped per NS  CTH done- stable, following up with neurosurgery regarding plans to remove it today   HOB 30  PT/OT/OBC      PULM:  RA  Ocean nasal spray continue   Pituitary precautions (no incentive spirometry, no straws, no BIPAP)    CV:  -160 mmHg  Cardiology consulted for sinus mikael-resolved, likely neurogenic     RENAL:   DI resolved, Na appropriately corrected   3/20- I/O- IN: 750 mL / OUT: 2400 mL / NET: -1650 mL    GI:  Diet: CCD   encourage oral fluid intake   Bowel regimen [] colace [x] senna [x] other: miralax   last BM 3/19     ENDO:   pantohypopit   Goal euglycemia (-180)  Hydrocort wean to 25 mg Q12  hypothyroidism cont synthroid 75 mcg IV QD  DM: lantus 10 units q 6 hr, admelog 3 units premeal  DDAVP 0.05 mcg BID  Will get another BMP in 6 hours and will send labs if suspicious for DI     HEME/ONC:  H/H stable   lovenox 40 mg sc qhs     ID:  afebrile    MISC:    SOCIAL/FAMILY:  [x] awaiting [] updated at bedside [] family meeting    CODE STATUS:  [x] Full Code [] DNR [] DNI [] Palliative/Comfort Care    DISPOSITION:  [x] ICU [] Stroke Unit [] Floor [] EMU [] RCU [] PCU    not critical  moderate complex medical decision      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    NEURO:  Neuro checks Q 4  hr   LD removed  Serial CTH stable  PT/OT/OBC      PULM:  RA  Ocean nasal spray continue   Pituitary precautions (no incentive spirometry, no straws, no BIPAP)    CV:  -160 mmHg    RENAL:   DI- Na elevated, was given a dose of IV DDAVP on 3/21   Net 450 mL positive  BMP Q6H, increased urinary output    GI:  Diet: CCD   encourage oral fluid intake   Bowel regimen [] colace [x] senna [x] other: miralax   last BM 3/19     ENDO:   pantohypopit   Goal euglycemia (-180)  Hydrocort wean to 20 mg once today, hold before the night dose obtain ACTH, will follow rest of plan   hypothyroidism cont synthroid 75 mcg IV QD  DM: lantus 10 units q 6 hr, admelog 4 units premeal  DDAVP 0.05 mcg BID  Will get another BMP in 6 hours and will send labs if suspicious for DI     HEME/ONC:  H/H stable   lovenox 40 mg sc qhs     ID:  afebrile    MISC:    SOCIAL/FAMILY:  [x] awaiting [] updated at bedside [] family meeting    CODE STATUS:  [x] Full Code [] DNR [] DNI [] Palliative/Comfort Care    DISPOSITION:  [x] ICU [] Stroke Unit [] Floor [] EMU [] RCU [] PCU    not critical  moderate complex medical decision

## 2023-03-21 NOTE — PROGRESS NOTE ADULT - SUBJECTIVE AND OBJECTIVE BOX
SUMMARY: 51yo woman transferred from [facility] on 3/10 for suprasellar mass found on MRI for opthalmology work up--etta HA and 3wk of blurry vision. PMH HTN, DM2, HLD.     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     Overnight EVENTS: 3/16- Patient was given 0.5 mg of DDAVP . Still waking up from the surgery.     3/17- Na more stable, mental status improved. ON ddavp drip. D5 running.     3/18- Na trending down appropriately. Exam improved significantly. LD clamped. NGT placed by ENT. No more on the DDAVP gtt. On D5 and insulin gtt.         REVIEW OF SYSTEMS: [] Unable to Assess due to neurologic exam   [ x] All ROS addressed below are non-contributory, except:  Neuro: [ x] Headache [ ] Back pain [ ] Numbness [ ] Weakness [ ] Ataxia [ ] Dizziness [ ] Aphasia [ ] Dysarthria [ x] Visual disturbance  Resp: [ ] Shortness of breath/dyspnea [ ] Orthopnea [ ] Cough  CV: [ ] Chest pain [ ] Palpitation [ ] Lightheadedness [ ] Syncope  Renal: [ ] Thirst [ ] Edema  GI: [ ] Nausea [ ] Emesis [ ] Abdominal pain [ ] Constipation [ ] Diarrhea  Hem: [ ] Hematemesis [ ] bBright red blood per rectum  ID: [ ] Fever [ ] Chills [ ] Dysuria  ENT: [ ] Rhinorrhea    PAST MEDICAL & SURGICAL HISTORY:  Hypertension      Hyperlipidemia      Diabetes mellitus      Allergies    No Known Drug Allergies  S/P TRANSSPHENOIDAL SURGERY. SINONASAL PRECAUTIONS! NO NASAL SWABS OR NG TUBES. (Unknown)    Intolerances    ICU Vital Signs Last 24 Hrs  T(C): 36.9 (21 Mar 2023 03:00), Max: 37 (20 Mar 2023 15:00)  T(F): 98.4 (21 Mar 2023 03:00), Max: 98.6 (20 Mar 2023 15:00)  HR: 52 (21 Mar 2023 03:00) (52 - 69)  BP: --  BP(mean): --  ABP: 117/52 (21 Mar 2023 03:00) (105/46 - 153/72)  ABP(mean): 76 (21 Mar 2023 03:00) (69 - 105)  RR: 12 (21 Mar 2023 03:00) (12 - 20)  SpO2: 95% (21 Mar 2023 03:00) (95% - 100%)    O2 Parameters below as of 20 Mar 2023 19:00  Patient On (Oxygen Delivery Method): room air      I&O's Summary    19 Mar 2023 07:01  -  20 Mar 2023 07:00  --------------------------------------------------------  IN: 750 mL / OUT: 2400 mL / NET: -1650 mL    20 Mar 2023 07:01  -  21 Mar 2023 06:20  --------------------------------------------------------  IN: 3950 mL / OUT: 4590 mL / NET: -640 mL    MEDICATIONS  (STANDING):  amLODIPine   Tablet 10 milliGRAM(s) Oral daily  chlorhexidine 4% Liquid 1 Application(s) Topical daily  desmopressin 0.05 milliGRAM(s) Oral <User Schedule>  enoxaparin Injectable 40 milliGRAM(s) SubCutaneous <User Schedule>  hydrocortisone 20 milliGRAM(s) Oral <User Schedule>  insulin glargine Injectable (LANTUS) 12 Unit(s) SubCutaneous at bedtime  insulin lispro (ADMELOG) corrective regimen sliding scale   SubCutaneous Before meals and at bedtime  insulin lispro Injectable (ADMELOG) 4 Unit(s) SubCutaneous three times a day before meals  levothyroxine 75 MICROGram(s) Oral daily  pantoprazole    Tablet 40 milliGRAM(s) Oral daily  polyethylene glycol 3350 17 Gram(s) Oral daily  senna 2 Tablet(s) Oral at bedtime    MEDICATIONS  (PRN):  ondansetron Injectable 4 milliGRAM(s) IV Push every 6 hours PRN Nausea and/or Vomiting  sodium chloride 0.65% Nasal 1 Spray(s) Both Nostrils three times a day PRN Nasal Congestion  traMADol 25 milliGRAM(s) Oral every 4 hours PRN Moderate Pain (4 - 6)  traMADol 50 milliGRAM(s) Oral every 4 hours PRN Severe Pain (7 - 10)              EXAMINATION:  General: No acute distress  HEENT: Anicteric sclerae  Cardiac: [id number]  Lungs: Clear  Abdomen: Soft, non-tender, +BS  Extremities: No c/c/e  Skin/Incision Site: Clean, dry and intact  Neurologic: awake, alert, oriented x 3,  following commands. Moving all extremities spontaneously. Pupils 3 mm Bilaterally reactive.    SUMMARY: 53yo woman transferred from [facility] on 3/10 for suprasellar mass found on MRI for opthalmology work up--etta HA and 3wk of blurry vision. PMH HTN, DM2, HLD.     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     Overnight EVENTS: 3/16- Patient was given 0.5 mg of DDAVP . Still waking up from the surgery.     3/17- Na more stable, mental status improved. ON ddavp drip. D5 running.     3/18- Na trending down appropriately. Exam improved significantly. LD clamped. NGT placed by ENT. No more on the DDAVP gtt. On D5 and insulin gtt.         REVIEW OF SYSTEMS: [] Unable to Assess due to neurologic exam   [ x] All ROS addressed below are non-contributory, except:  Neuro: [ x] Headache [ ] Back pain [ ] Numbness [ ] Weakness [ ] Ataxia [ ] Dizziness [ ] Aphasia [ ] Dysarthria [ x] Visual disturbance  Resp: [ ] Shortness of breath/dyspnea [ ] Orthopnea [ ] Cough  CV: [ ] Chest pain [ ] Palpitation [ ] Lightheadedness [ ] Syncope  Renal: [ ] Thirst [ ] Edema  GI: [ ] Nausea [ ] Emesis [ ] Abdominal pain [ ] Constipation [ ] Diarrhea  Hem: [ ] Hematemesis [ ] bBright red blood per rectum  ID: [ ] Fever [ ] Chills [ ] Dysuria  ENT: [ ] Rhinorrhea    PAST MEDICAL & SURGICAL HISTORY:  Hypertension      Hyperlipidemia      Diabetes mellitus      Allergies    No Known Drug Allergies  S/P TRANSSPHENOIDAL SURGERY. SINONASAL PRECAUTIONS! NO NASAL SWABS OR NG TUBES. (Unknown)    Intolerances    ICU Vital Signs Last 24 Hrs  T(C): 37 (21 Mar 2023 07:00), Max: 37 (20 Mar 2023 15:00)  T(F): 98.6 (21 Mar 2023 07:00), Max: 98.6 (20 Mar 2023 15:00)  HR: 55 (21 Mar 2023 09:00) (52 - 71)  BP: --  BP(mean): --  ABP: 129/58 (21 Mar 2023 09:00) (105/46 - 130/65)  ABP(mean): 85 (21 Mar 2023 09:00) (69 - 91)  RR: 14 (21 Mar 2023 09:00) (12 - 20)  SpO2: 96% (21 Mar 2023 09:00) (95% - 100%)    O2 Parameters below as of 21 Mar 2023 07:00  Patient On (Oxygen Delivery Method): room air        I&O's Summary    20 Mar 2023 07:01  -  21 Mar 2023 07:00  --------------------------------------------------------  IN: 3950 mL / OUT: 4590 mL / NET: -640 mL    21 Mar 2023 07:01  -  21 Mar 2023 10:08  --------------------------------------------------------  IN: 450 mL / OUT: 0 mL / NET: 450 mL    MEDICATIONS  (STANDING):  amLODIPine   Tablet 10 milliGRAM(s) Oral daily  chlorhexidine 4% Liquid 1 Application(s) Topical daily  desmopressin 0.05 milliGRAM(s) Oral <User Schedule>  enoxaparin Injectable 40 milliGRAM(s) SubCutaneous <User Schedule>  insulin glargine Injectable (LANTUS) 12 Unit(s) SubCutaneous at bedtime  insulin lispro (ADMELOG) corrective regimen sliding scale   SubCutaneous Before meals and at bedtime  insulin lispro Injectable (ADMELOG) 4 Unit(s) SubCutaneous three times a day before meals  levothyroxine 75 MICROGram(s) Oral daily  pantoprazole    Tablet 40 milliGRAM(s) Oral daily  polyethylene glycol 3350 17 Gram(s) Oral daily  senna 2 Tablet(s) Oral at bedtime    MEDICATIONS  (PRN):  ondansetron Injectable 4 milliGRAM(s) IV Push every 6 hours PRN Nausea and/or Vomiting  sodium chloride 0.65% Nasal 1 Spray(s) Both Nostrils three times a day PRN Nasal Congestion  traMADol 25 milliGRAM(s) Oral every 4 hours PRN Moderate Pain (4 - 6)  traMADol 50 milliGRAM(s) Oral every 4 hours PRN Severe Pain (7 - 10)              EXAMINATION:  General: No acute distress  HEENT: Anicteric sclerae  Cardiac: [id number]  Lungs: Clear  Abdomen: Soft, non-tender, +BS  Extremities: No c/c/e  Skin/Incision Site: Clean, dry and intact  Neurologic: awake, alert, oriented x 3,  following commands. Moving all extremities spontaneously. Pupils 3 mm Bilaterally reactive.

## 2023-03-21 NOTE — PROGRESS NOTE ADULT - SUBJECTIVE AND OBJECTIVE BOX
ENT ISSUE/POD: s/p TSRP pod 6    SUBJECTIVE: Pt seen and examined at bedside. There were no acute overnight events. The patient has no complaints this morning. She is doing well. Her pain is controlled. She denies active nasal bleeding, nasal discharge, dripping in the back of the throat, HA, facial pain/pressure, ear pain, salty taste, metallic taste, CP, SOB, fevers, chills, N/V/D.    PAST MEDICAL & SURGICAL HISTORY:  Hypertension      Hyperlipidemia      Diabetes mellitus        Allergies    No Known Drug Allergies  S/P TRANSSPHENOIDAL SURGERY. SINONASAL PRECAUTIONS! NO NASAL SWABS OR NG TUBES. (Unknown)    Intolerances      MEDICATIONS  (STANDING):  amLODIPine   Tablet 10 milliGRAM(s) Oral daily  chlorhexidine 4% Liquid 1 Application(s) Topical daily  desmopressin 0.05 milliGRAM(s) Oral <User Schedule>  enoxaparin Injectable 40 milliGRAM(s) SubCutaneous <User Schedule>  hydrocortisone 20 milliGRAM(s) Oral <User Schedule>  insulin glargine Injectable (LANTUS) 12 Unit(s) SubCutaneous at bedtime  insulin lispro (ADMELOG) corrective regimen sliding scale   SubCutaneous Before meals and at bedtime  insulin lispro Injectable (ADMELOG) 4 Unit(s) SubCutaneous three times a day before meals  levothyroxine 75 MICROGram(s) Oral daily  pantoprazole    Tablet 40 milliGRAM(s) Oral daily  polyethylene glycol 3350 17 Gram(s) Oral daily  senna 2 Tablet(s) Oral at bedtime    MEDICATIONS  (PRN):  ondansetron Injectable 4 milliGRAM(s) IV Push every 6 hours PRN Nausea and/or Vomiting  sodium chloride 0.65% Nasal 1 Spray(s) Both Nostrils three times a day PRN Nasal Congestion  traMADol 25 milliGRAM(s) Oral every 4 hours PRN Moderate Pain (4 - 6)  traMADol 50 milliGRAM(s) Oral every 4 hours PRN Severe Pain (7 - 10)      social history: see consult     family history: see consult     ROS:   ENT: all negative except as noted in HPI   Pulm: denies SOB, cough, hemoptysis  Neuro: denies numbness/tingling, loss of sensation  Endo: denies heat/cold intolerance, excessive sweating      Vital Signs Last 24 Hrs  T(C): 37 (21 Mar 2023 07:00), Max: 37 (20 Mar 2023 15:00)  T(F): 98.6 (21 Mar 2023 07:00), Max: 98.6 (20 Mar 2023 15:00)  HR: 58 (21 Mar 2023 07:00) (52 - 69)  BP: --  BP(mean): --  RR: 13 (21 Mar 2023 07:00) (12 - 20)  SpO2: 96% (21 Mar 2023 07:00) (95% - 100%)    Parameters below as of 21 Mar 2023 07:00  Patient On (Oxygen Delivery Method): room air                              9.6    8.08  )-----------( 224      ( 20 Mar 2023 23:14 )             29.5    03-21    149<H>  |  113<H>  |  18  ----------------------------<  142<H>  3.7   |  27  |  0.75    Ca    9.1      21 Mar 2023 04:54  Phos  3.6     03-21  Mg     2.2     03-21    TPro  6.0  /  Alb  3.4  /  TBili  0.2  /  DBili  x   /  AST  226<H>  /  ALT  239<H>  /  AlkPhos  126<H>  03-20     PHYSICAL EXAM:  Gen: NAD  Skin: No rashes, bruises, or lesions  Head: Normocephalic, Atraumatic  Face: no edema, erythema, or fluctuance. Parotid glands soft without mass  Eyes: no scleral injection  Nose: Nares bilaterally patent, no discharge, + dry blood in b/l nares, no active bleed, no CSF leak  Mouth: No Stridor / Drooling / Trismus.  Mucosa moist, tongue/uvula midline, oropharynx clear  Neck: Flat, supple, no lymphadenopathy, trachea midline, no masses  Resp: breathing easily, no stridor  Neuro: facial nerve intact, no facial droop

## 2023-03-21 NOTE — OCCUPATIONAL THERAPY INITIAL EVALUATION ADULT - PHYSICAL ASSIST/NONPHYSICAL ASSIST: SIT/STAND, REHAB EVAL
verbal cues/nonverbal cues (demo/gestures)
verbal cues/nonverbal cues (demo/gestures)/1 person assist

## 2023-03-21 NOTE — PROGRESS NOTE ADULT - SUBJECTIVE AND OBJECTIVE BOX
NSCU ATTENDING -- ADDITIONAL PROGRESS NOTE    Nighttime rounds were performed -- please refer to earlier Progress Note for HPI details.    ICU Vital Signs Last 24 Hrs  T(C): 36.4 (21 Mar 2023 15:00), Max: 37 (21 Mar 2023 07:00)  T(F): 97.6 (21 Mar 2023 15:00), Max: 98.6 (21 Mar 2023 07:00)  HR: 66 (21 Mar 2023 18:00) (52 - 77)  BP: --  BP(mean): --  ABP: 129/69 (21 Mar 2023 18:00) (104/50 - 144/76)  ABP(mean): 93 (21 Mar 2023 18:00) (71 - 107)  RR: 15 (21 Mar 2023 18:00) (12 - 20)  SpO2: 95% (21 Mar 2023 18:00) (94% - 100%)    O2 Parameters below as of 21 Mar 2023 14:20  Patient On (Oxygen Delivery Method): room air    Relevant labwork and imaging reviewed.    s/p TSP     Q4 neuro/vitals; Q1hr UOP monitor   drink to thirst, encourage   DI; standing ddAVP, Q6 bmp, given ddavp at 6pm, no UOP since then, monitor closely   LD d/c'ed today   NSCU ATTENDING -- ADDITIONAL PROGRESS NOTE    Nighttime rounds were performed -- please refer to earlier Progress Note for HPI details.    ICU Vital Signs Last 24 Hrs  T(C): 36.4 (21 Mar 2023 15:00), Max: 37 (21 Mar 2023 07:00)  T(F): 97.6 (21 Mar 2023 15:00), Max: 98.6 (21 Mar 2023 07:00)  HR: 66 (21 Mar 2023 18:00) (52 - 77)  BP: --  BP(mean): --  ABP: 129/69 (21 Mar 2023 18:00) (104/50 - 144/76)  ABP(mean): 93 (21 Mar 2023 18:00) (71 - 107)  RR: 15 (21 Mar 2023 18:00) (12 - 20)  SpO2: 95% (21 Mar 2023 18:00) (94% - 100%)    O2 Parameters below as of 21 Mar 2023 14:20  Patient On (Oxygen Delivery Method): room air    Relevant labwork and imaging reviewed.    s/p TSP     Q4 neuro/vitals; Q1hr UOP monitor   drink to thirst, encourage   DI; standing ddAVP, improved UOP/Na  LD d/c'ed 3/20  tramadol prn for pain/HA

## 2023-03-21 NOTE — PROGRESS NOTE ADULT - SUBJECTIVE AND OBJECTIVE BOX
ENDOCRINE FOLLOW UP     Chief Complaint: craniopharyngioma    History:     MEDICATIONS  (STANDING):  amLODIPine   Tablet 10 milliGRAM(s) Oral daily  chlorhexidine 4% Liquid 1 Application(s) Topical daily  desmopressin 0.05 milliGRAM(s) Oral <User Schedule>  enoxaparin Injectable 40 milliGRAM(s) SubCutaneous <User Schedule>  insulin glargine Injectable (LANTUS) 12 Unit(s) SubCutaneous at bedtime  insulin lispro (ADMELOG) corrective regimen sliding scale   SubCutaneous Before meals and at bedtime  insulin lispro Injectable (ADMELOG) 4 Unit(s) SubCutaneous three times a day before meals  levothyroxine 75 MICROGram(s) Oral daily  pantoprazole    Tablet 40 milliGRAM(s) Oral daily  polyethylene glycol 3350 17 Gram(s) Oral daily  senna 2 Tablet(s) Oral at bedtime    MEDICATIONS  (PRN):  ondansetron Injectable 4 milliGRAM(s) IV Push every 6 hours PRN Nausea and/or Vomiting  sodium chloride 0.65% Nasal 1 Spray(s) Both Nostrils three times a day PRN Nasal Congestion  traMADol 25 milliGRAM(s) Oral every 4 hours PRN Moderate Pain (4 - 6)  traMADol 50 milliGRAM(s) Oral every 4 hours PRN Severe Pain (7 - 10)      Allergies    No Known Drug Allergies  S/P TRANSSPHENOIDAL SURGERY. SINONASAL PRECAUTIONS! NO NASAL SWABS OR NG TUBES. (Unknown)    Intolerances        ROS: All other systems reviewed and negative    PHYSICAL EXAM:  VITALS: T(C): 37 (03-21-23 @ 07:00)  T(F): 98.6 (03-21-23 @ 07:00), Max: 98.6 (03-20-23 @ 15:00)  HR: 55 (03-21-23 @ 09:00) (52 - 71)  BP: --  RR:  (12 - 20)  SpO2:  (95% - 100%)  Wt(kg): --  GENERAL: NAD, resting comfortably   EYES: No proptosis,  anicteric  HEENT:  Atraumatic, Normocephalic, moist mucous membranes  RESPIRATORY: Nonlabored respirations on room air, normal rate/effort   CARDIOVASCULAR: Did not appear cyanotic, no lower extremity swelling visualized  GI: Soft, nontender, non distended  NEURO: Answering questions appropriately, moves all extremities spontaneously  PSYCH:  reactive affect, euthymic mood    POCT Blood Glucose.: 135 mg/dL (03-21-23 @ 05:26)  POCT Blood Glucose.: 152 mg/dL (03-20-23 @ 23:15)  POCT Blood Glucose.: 251 mg/dL (03-20-23 @ 17:46)  POCT Blood Glucose.: 252 mg/dL (03-20-23 @ 12:19)  POCT Blood Glucose.: 135 mg/dL (03-20-23 @ 05:42)  POCT Blood Glucose.: 222 mg/dL (03-19-23 @ 21:21)  POCT Blood Glucose.: 145 mg/dL (03-19-23 @ 17:26)  POCT Blood Glucose.: 111 mg/dL (03-19-23 @ 11:07)  POCT Blood Glucose.: 109 mg/dL (03-19-23 @ 06:38)  POCT Blood Glucose.: 134 mg/dL (03-19-23 @ 00:24)  POCT Blood Glucose.: 163 mg/dL (03-18-23 @ 17:10)  POCT Blood Glucose.: 123 mg/dL (03-18-23 @ 14:01)  POCT Blood Glucose.: 113 mg/dL (03-18-23 @ 13:03)  POCT Blood Glucose.: 132 mg/dL (03-18-23 @ 12:01)  POCT Blood Glucose.: 215 mg/dL (03-18-23 @ 11:00)      03-21    149<H>  |  113<H>  |  18  ----------------------------<  142<H>  3.7   |  27  |  0.75    eGFR: 95    Ca    9.1      03-21  Mg     2.2     03-21  Phos  3.6     03-21    TPro  6.0  /  Alb  3.4  /  TBili  0.2  /  DBili  x   /  AST  226<H>  /  ALT  239<H>  /  AlkPhos  126<H>  03-20      A1C with Estimated Average Glucose Result: 8.0 % (03-13-23 @ 06:23)      Thyroid Stimulating Hormone, Serum: 1.05 uIU/mL (03-18-23 @ 17:05)  Thyroid Stimulating Hormone, Serum: 1.05 uIU/mL (03-18-23 @ 17:05)  Thyroid Stimulating Hormone, Serum: 4.43 uIU/mL (03-16-23 @ 04:02)  Thyroid Stimulating Hormone, Serum: 3.46 uIU/mL (03-15-23 @ 09:09)  Thyroid Stimulating Hormone, Serum: 8.85 uIU/mL (03-11-23 @ 05:39)   ENDOCRINE FOLLOW UP     Chief Complaint: craniopharyngioma    History:   [employer]  ID [id number] used. Patient reports ongoing headache, denies other complaints. Reports thirsty but able to drink to thirst, having polyuria,     MEDICATIONS  (STANDING):  amLODIPine   Tablet 10 milliGRAM(s) Oral daily  chlorhexidine 4% Liquid 1 Application(s) Topical daily  desmopressin 0.05 milliGRAM(s) Oral <User Schedule>  enoxaparin Injectable 40 milliGRAM(s) SubCutaneous <User Schedule>  insulin glargine Injectable (LANTUS) 12 Unit(s) SubCutaneous at bedtime  insulin lispro (ADMELOG) corrective regimen sliding scale   SubCutaneous Before meals and at bedtime  insulin lispro Injectable (ADMELOG) 4 Unit(s) SubCutaneous three times a day before meals  levothyroxine 75 MICROGram(s) Oral daily  pantoprazole    Tablet 40 milliGRAM(s) Oral daily  polyethylene glycol 3350 17 Gram(s) Oral daily  senna 2 Tablet(s) Oral at bedtime    MEDICATIONS  (PRN):  ondansetron Injectable 4 milliGRAM(s) IV Push every 6 hours PRN Nausea and/or Vomiting  sodium chloride 0.65% Nasal 1 Spray(s) Both Nostrils three times a day PRN Nasal Congestion  traMADol 25 milliGRAM(s) Oral every 4 hours PRN Moderate Pain (4 - 6)  traMADol 50 milliGRAM(s) Oral every 4 hours PRN Severe Pain (7 - 10)      Allergies    No Known Drug Allergies  S/P TRANSSPHENOIDAL SURGERY. SINONASAL PRECAUTIONS! NO NASAL SWABS OR NG TUBES. (Unknown)    Intolerances        ROS: All other systems reviewed and negative    PHYSICAL EXAM:  VITALS: T(C): 37 (03-21-23 @ 07:00)  T(F): 98.6 (03-21-23 @ 07:00), Max: 98.6 (03-20-23 @ 15:00)  HR: 55 (03-21-23 @ 09:00) (52 - 71)  BP: --  RR:  (12 - 20)  SpO2:  (95% - 100%)  Wt(kg): --  GENERAL: NAD, resting comfortably in chair  EYES: No proptosis,  anicteric  HEENT:  Atraumatic, Normocephalic, moist mucous membranes  RESPIRATORY: Nonlabored respirations on room air, normal rate/effort   CARDIOVASCULAR: Did not appear cyanotic, no lower extremity swelling visualized  NEURO: Answering questions appropriately, moves all extremities spontaneously  PSYCH:  reactive affect, euthymic mood    POCT Blood Glucose.: 135 mg/dL (03-21-23 @ 05:26)  POCT Blood Glucose.: 152 mg/dL (03-20-23 @ 23:15)  POCT Blood Glucose.: 251 mg/dL (03-20-23 @ 17:46)  POCT Blood Glucose.: 252 mg/dL (03-20-23 @ 12:19)  POCT Blood Glucose.: 135 mg/dL (03-20-23 @ 05:42)  POCT Blood Glucose.: 222 mg/dL (03-19-23 @ 21:21)  POCT Blood Glucose.: 145 mg/dL (03-19-23 @ 17:26)  POCT Blood Glucose.: 111 mg/dL (03-19-23 @ 11:07)  POCT Blood Glucose.: 109 mg/dL (03-19-23 @ 06:38)  POCT Blood Glucose.: 134 mg/dL (03-19-23 @ 00:24)  POCT Blood Glucose.: 163 mg/dL (03-18-23 @ 17:10)  POCT Blood Glucose.: 123 mg/dL (03-18-23 @ 14:01)  POCT Blood Glucose.: 113 mg/dL (03-18-23 @ 13:03)  POCT Blood Glucose.: 132 mg/dL (03-18-23 @ 12:01)  POCT Blood Glucose.: 215 mg/dL (03-18-23 @ 11:00)      03-21    149<H>  |  113<H>  |  18  ----------------------------<  142<H>  3.7   |  27  |  0.75    eGFR: 95    Ca    9.1      03-21  Mg     2.2     03-21  Phos  3.6     03-21    TPro  6.0  /  Alb  3.4  /  TBili  0.2  /  DBili  x   /  AST  226<H>  /  ALT  239<H>  /  AlkPhos  126<H>  03-20      A1C with Estimated Average Glucose Result: 8.0 % (03-13-23 @ 06:23)      Thyroid Stimulating Hormone, Serum: 1.05 uIU/mL (03-18-23 @ 17:05)  Thyroid Stimulating Hormone, Serum: 1.05 uIU/mL (03-18-23 @ 17:05)  Thyroid Stimulating Hormone, Serum: 4.43 uIU/mL (03-16-23 @ 04:02)  Thyroid Stimulating Hormone, Serum: 3.46 uIU/mL (03-15-23 @ 09:09)  Thyroid Stimulating Hormone, Serum: 8.85 uIU/mL (03-11-23 @ 05:39)

## 2023-03-21 NOTE — OCCUPATIONAL THERAPY INITIAL EVALUATION ADULT - MD/RN NOTIFIED
DATE:  07/20/2019



Covering for Dr. Merino.



IDENTIFYING DATA:  A 67-year-old male with a history of dementia, psychosis, and

BPH.



Reconciliation reviewed.  Cogentin 1 mg p.o. every day, Haldol Decanoate q.

monthly 100 mg, Seroquel 12.5 mg a day.



Nursing staff reported he mostly has been withdrawn in bed.  Today on

face-to-face evaluation, the patient is avoiding his conversation.  He has been

intrusive in going to other people peers.



MENTAL STATUS EXAMINATION:  Irritable, agitated, disengaged in his room,

refusing to participate in interview as he presents suspicious.



ASSESSMENT AND PLAN:  History of dementia and psychosis.  We will continue with

the current adjustment of medication per the primary psychiatrist's medical team

as he continues to tolerate the medication without complications or side effects

of medications.





DD: 07/20/2019 15:22

DT: 07/21/2019 11:03

JOB# [id number]  [id number]
yes

## 2023-03-21 NOTE — PROGRESS NOTE ADULT - ASSESSMENT
52 yr old female with a history of hypertension, hyperlipidemia, dm type 2, no surgeries ever presents with a month of headaches and 3 weeks of blurred vision. Endocrinology consulted for evaluation of sellar mass.    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 Too low to be prolactinoma given size of the sellar mass. Suspect stalk effect. (prolactin diluted 103, undiluted 107)  -Secondary hypogonadism can be addressed as outpatient. (Lh 2.6, estradiol <5)  -3/12 AM ayush 5.9, ACTH 21.5, 3/13 am cortisol (6am) 6.9  - cosyntropin stim test: 3/14 AM cortisol at 8:15am 5.1, cosyntropin at 8:15, 8:50am cortisol 16.9, 9:25am 20.7  - s/p tsp 3/15  - IGF1 3/15 46  PLAN  - f/u neurosurgery and ophthalmology recommendations  - continue hydrocortisone 25mg q12h on 3/20, would give one dose hydrocortisone 20mg PO at 8am on 3/21, hold afternoon dose on 3/21, repeat 8am ACTH and SERUM cortisol NOT FREE cortisol on 3/22 to reassess adrenal axis     DI/SIADH Watch POST OP  - Please eval for signs of DI vs SIADH postop  - continueDDAVP 0.05mg PO bid  - continue q8-12h BMP monitoring  - Strict I/Os, daily weights. if UO >250cc/hour, please check BMP and urine osm, serum osm.  If consistent with DI, patient may require DDAVP.   - May require DDAVP IVP x1 if meets any of these criteria: Na > 145, UO > 250cc/hr, Urine osm < 100 or urine specific gravity < 1.005.  - Signs and symptoms of DI and SIADH post op:  may occur in the 2-3 weeks following surgery.  Patient should check daily weights and if they experience weight gain of 2-3 pounds to call her endocrinologist.  Signs of DI include increased thirst with high urine output.    Primary Hypothyroidism   - Patient with elevated TSH (8)and Low Free T4 (0.5)  - 3/18 TSH 1.05, FT4 1, TT3 49, serum T4 5.4, do not suspect contributing to bradycardia, steroids can cause low T3  PLAN  - continue levothyroxine 75 mcg daily (maintain on empty stomach (at least 1 hour before meals), separate by 4 hours from PPI or calcium supplementation which can inhibit its absorption)    Steroid hyperglycemia  T2DM with hyperglycemia  - HbA1c: 8  - Home Regimen: metformin 1000mg bid  - Endocrinologist: does not have  - patient with hyperglycemia on stress dose steroids  - now on tube feeds  PLAN  - continue lantus 10 units tonight (do not hold if npo)  - start admelog 5 units tid premeal (hold if npo)  - start low admelog correction scale tid with meals and separate low admelog correction scale at bedtime now that patient is tolerating diet  - carb consistent diet  - fingersticks qid with meals and bedtime  - hypoglycemia protocol prn  - Goal -180  Discharge plan:  - Discharge medications: metformin 1000mg bid + GLP-1 agonist  - Patient to call doctor with persistent high or low BG at home.   - Ensure patient has glucometer, test strips and lancets on discharge.  - Recommend routine outpatient ophthalmology, podiatry and endocrinology f/u    HTN  - Home regimen: lisinopril 2.5mg daily  PLAN  - Can check urine microalbumin outpatient  - Outpatient goal BP <130/80. Management per primary team.    HLD  - Home regimen: atorvastatin 80mg daily  -   PLAN  - resume statin when able    Will schedule an appointment for the patient to follow up.  [address] [place] [address]  Phone Number: [phone]    Discussed with primary team.     Thank you for the interesting consult.    Denny Bishop MD, Endocrinology Fellow  Pager [phone] from 9am to 5pm. After hours and on weekends, please call [phone]. 52 yr old female with a history of hypertension, hyperlipidemia, dm type 2, no surgeries ever presents with a month of headaches and 3 weeks of blurred vision. Endocrinology consulted for evaluation of sellar mass.    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 Too low to be prolactinoma given size of the sellar mass. Suspect stalk effect. (prolactin diluted 103, undiluted 107)  -Secondary hypogonadism can be addressed as outpatient. (Lh 2.6, estradiol <5)  -3/12 AM ayush 5.9, ACTH 21.5, 3/13 am cortisol (6am) 6.9  - cosyntropin stim test: 3/14 AM cortisol at 8:15am 5.1, cosyntropin at 8:15, 8:50am cortisol 16.9, 9:25am 20.7  - s/p tsp 3/15  - IGF1 3/15 46  PLAN  - f/u neurosurgery and ophthalmology recommendations  - continue hydrocortisone 25mg q12h on 3/20, would give one dose hydrocortisone 20mg PO at 8am on 3/21, hold afternoon dose on 3/21, repeat 8am ACTH and SERUM cortisol NOT FREE cortisol on 3/22 to reassess adrenal axis     DI/SIADH Watch POST OP  - Please eval for signs of DI vs SIADH postop  - continueDDAVP 0.05mg PO bid  - continue q8-12h BMP monitoring  - Strict I/Os, daily weights. if UO >250cc/hour, please check BMP and urine osm, serum osm.  If consistent with DI, patient may require DDAVP.   - May require DDAVP IVP x1 if meets any of these criteria: Na > 145, UO > 250cc/hr, Urine osm < 100 or urine specific gravity < 1.005.  - Signs and symptoms of DI and SIADH post op:  may occur in the 2-3 weeks following surgery.  Patient should check daily weights and if they experience weight gain of 2-3 pounds to call her endocrinologist.  Signs of DI include increased thirst with high urine output.    Primary Hypothyroidism   - Patient with elevated TSH (8)and Low Free T4 (0.5)  - 3/18 TSH 1.05, FT4 1, TT3 49, serum T4 5.4, do not suspect contributing to bradycardia, steroids can cause low T3  PLAN  - continue levothyroxine 75 mcg daily (maintain on empty stomach (at least 1 hour before meals), separate by 4 hours from PPI or calcium supplementation which can inhibit its absorption)    Steroid hyperglycemia  T2DM with hyperglycemia  - HbA1c: 8  - Home Regimen: metformin 1000mg bid  - Endocrinologist: does not have  - patient with hyperglycemia on stress dose steroids  - now on tube feeds  PLAN  - Increase lantus to 12 units tonight (do not hold if npo)   - start admelog 5 units tid premeal (hold if npo)  - continue low admelog correction scale tid with meals and separate low admelog correction scale at bedtime now that patient is tolerating diet  - carb consistent diet  - fingersticks qid with meals and bedtime  - hypoglycemia protocol prn  - Goal -180  Discharge plan:  - Discharge medications: metformin 1000mg bid + GLP-1 agonist  - Patient to call doctor with persistent high or low BG at home.   - Ensure patient has glucometer, test strips and lancets on discharge.  - Recommend routine outpatient ophthalmology, podiatry and endocrinology f/u    HTN  - Home regimen: lisinopril 2.5mg daily  PLAN  - Can check urine microalbumin outpatient  - Outpatient goal BP <130/80. Management per primary team.    HLD  - Home regimen: atorvastatin 80mg daily  -   PLAN  - resume statin when able    Will schedule an appointment for the patient to follow up.  [address] [place] [address]  Phone Number: [phone]    Discussed with primary team.     Thank you for the interesting consult.    Denny Bishop MD, Endocrinology Fellow  Pager [phone] from 9am to 5pm. After hours and on weekends, please call [phone].

## 2023-03-21 NOTE — OCCUPATIONAL THERAPY INITIAL EVALUATION ADULT - RANGE OF MOTION EXAMINATION, LOWER EXTREMITY
bilateral LE Active ROM was WNL (within normal limits)
bilateral LE Active ROM was WNL (within normal limits)

## 2023-03-21 NOTE — OCCUPATIONAL THERAPY INITIAL EVALUATION ADULT - PERTINENT HX OF CURRENT PROBLEM, REHAB EVAL
52 yr old female with a history of hypertension, hyperlipidemia, dm type 2, no surgeries ever presents with a month of headaches and 3 weeks of blurred vision.  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  Patient was at [facility].  Patient was sent to [facility].  No endocrine work up done. Pt now s/p excision, mass, skull base, transsphenoidal approach 15-Mar-2023.    CT Head (3/15/23): IMPRESSION: Status post transsphenoidal resection of craniopharyngioma   with expected postoperative changes. Continued contrast-enhanced MRI surveillance can be obtained.  CT Angio Heart and Coronaries (3/1/323): IMPRESSION: 1. Minimal nonobstructive coronary artery disease in the visualized epicardial coronary arteries as reported above. 2. Coronary artery calcium Agatston score = 0.
52 yr old female with a history of hypertension, hyperlipidemia, dm type 2, no surgeries ever presents with a month of headaches and 3 weeks of blurred vision.  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  Patient was at [facility].  Patient was sent to [facility].  No endocrine work up done.    CT Angio Heart and Coronaries (3/13/23): IMPRESSION: 1. Minimal nonobstructive coronary artery disease in the visualized epicardial coronary arteries as reported above. 2.  Coronary artery calcium Agatston score = 0.  CT Head (3/11/23):  IMPRESSION: Heterogeneous mass in the suprasellar cistern separate from the pituitary gland consistent with craniopharyngioma.  X-Ray Chest (3/10/23): (-)
52 yr old female with a history of hypertension, hyperlipidemia, dm type 2, no surgeries ever presents with a month of headaches and 3 weeks of blurred vision.  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  Patient was at [facility].  Patient was sent to [facility].  No endocrine work up done. Pt now s/p excision, mass, skull base, transsphenoidal approach 15-Mar-2023.    CT Head (3/15/23): IMPRESSION: Status post transsphenoidal resection of craniopharyngioma   with expected postoperative changes. Continued contrast-enhanced MRI surveillance can be obtained.  CT Angio Heart and Coronaries (3/1/323): IMPRESSION: 1. Minimal nonobstructive coronary artery disease in the visualized epicardial coronary arteries as reported above. 2. Coronary artery calcium Agatston score = 0.

## 2023-03-21 NOTE — OCCUPATIONAL THERAPY INITIAL EVALUATION ADULT - PHYSICAL ASSIST/NONPHYSICAL ASSIST, REHAB EVAL
verbal cues/nonverbal cues (demo/gestures)/1 person assist
verbal cues/nonverbal cues (demo/gestures)

## 2023-03-21 NOTE — PROGRESS NOTE ADULT - SUBJECTIVE AND OBJECTIVE BOX
DATE OF SERVICE: 03-21-23 @ 11:17    Subjective: Patient seen and examined. No new events except as noted.     SUBJECTIVE/ROS:  nad      MEDICATIONS:  MEDICATIONS  (STANDING):  amLODIPine   Tablet 10 milliGRAM(s) Oral daily  chlorhexidine 4% Liquid 1 Application(s) Topical daily  desmopressin 0.05 milliGRAM(s) Oral <User Schedule>  enoxaparin Injectable 40 milliGRAM(s) SubCutaneous <User Schedule>  insulin glargine Injectable (LANTUS) 12 Unit(s) SubCutaneous at bedtime  insulin lispro (ADMELOG) corrective regimen sliding scale   SubCutaneous Before meals and at bedtime  insulin lispro Injectable (ADMELOG) 4 Unit(s) SubCutaneous three times a day before meals  levothyroxine 75 MICROGram(s) Oral daily  pantoprazole    Tablet 40 milliGRAM(s) Oral daily  polyethylene glycol 3350 17 Gram(s) Oral daily  senna 2 Tablet(s) Oral at bedtime      PHYSICAL EXAM:  T(C): 37 (03-21-23 @ 07:00), Max: 37 (03-20-23 @ 15:00)  HR: 55 (03-21-23 @ 09:00) (52 - 71)  BP: --  RR: 14 (03-21-23 @ 09:00) (12 - 20)  SpO2: 96% (03-21-23 @ 09:00) (95% - 100%)  Wt(kg): --  I&O's Summary    20 Mar 2023 07:01  -  21 Mar 2023 07:00  --------------------------------------------------------  IN: 3950 mL / OUT: 4590 mL / NET: -640 mL    21 Mar 2023 07:01  -  21 Mar 2023 11:17  --------------------------------------------------------  IN: 450 mL / OUT: 0 mL / NET: 450 mL            JVP: Normal  Neck: supple  Lung: clear   CV: S1 S2 , Murmur:  Abd: soft  Ext: No edema  neuro: Awake / alert  Psych: flat affect  Skin: normal``    LABS/DATA:    CARDIAC MARKERS:                                9.6    8.08  )-----------( 224      ( 20 Mar 2023 23:14 )             29.5     03-21    149<H>  |  113<H>  |  18  ----------------------------<  142<H>  3.7   |  27  |  0.75    Ca    9.1      21 Mar 2023 04:54  Phos  3.6     03-21  Mg     2.2     03-21    TPro  6.0  /  Alb  3.4  /  TBili  0.2  /  DBili  x   /  AST  226<H>  /  ALT  239<H>  /  AlkPhos  126<H>  03-20    proBNP:   Lipid Profile:   HgA1c:   TSH:     TELE:  EKG:

## 2023-03-21 NOTE — OCCUPATIONAL THERAPY INITIAL EVALUATION ADULT - GENERAL OBSERVATIONS, REHAB EVAL
Pt received semi-supine in bed, +primafit, +a-line, +pulse ox, +IV Pt received semi-supine in bed, +primafit, +a-line, +pulse ox, +IV, +tele

## 2023-03-21 NOTE — OCCUPATIONAL THERAPY INITIAL EVALUATION ADULT - DIAGNOSIS, OT EVAL
Pt is independent with all ADLs and mobility
Pt presents with deficits in strength and balance impacting all ADLs and mobility

## 2023-03-21 NOTE — PROGRESS NOTE ADULT - TIME BILLING
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   neuro exam non focal , neuro checks q 4 hr, PT/OT/OBC, SBP goal 100-160 mmhg, sodium 149, DI on ddvap, I/O  +450 mL, last USG normal,  ddavp 0.5 q 12 hr , CCD diet , last BM yesterday, panhypopit :DM on lantus 12 units, premeals 4 units,  hydrocortisone  to 20 mg, tomorrow am cortisol and ACTH,  levothyroxine 75 mcg daily , ddavp 0.05 q 12 hr , BMP and urine SG in 6 hr, lovenox 40 mg sc qhs , afebrile on no antibiotics

## 2023-03-21 NOTE — PROGRESS NOTE ADULT - ASSESSMENT
51y/o female who is POD#6 expanded endonasal transphenoidal/transplanum 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 karthikeyan graft, postop lumbar drain out, splints in place. No CSF leak on exam. She is doing great.

## 2023-03-21 NOTE — OCCUPATIONAL THERAPY INITIAL EVALUATION ADULT - NSOTDISCHREC_GEN_A_CORE
Assist with ADLs and mobility as needed/No skilled OT needs
Assist with ADLs and mobility as needed/Home OT

## 2023-03-21 NOTE — OCCUPATIONAL THERAPY INITIAL EVALUATION ADULT - LIVES WITH, PROFILE
Pt lives in a house with 2 [address] and handrails. Pt's bedroom is on the ground floor. Pt has a tub-shower and lives with her son./children
Pt lives in a house with 2 [address] and handrails. Pt's bedroom is on the ground floor. Pt has a tub-shower and lives with his son./children
Pt lives in a house with 2 [address] and handrails. Pt's bedroom is on the ground floor. Pt has a tub-shower and lives with her son./children

## 2023-03-22 DIAGNOSIS — B02.9 ZOSTER WITHOUT COMPLICATIONS: ICD-10-CM

## 2023-03-22 DIAGNOSIS — R74.01 ELEVATION OF LEVELS OF LIVER TRANSAMINASE LEVELS: ICD-10-CM

## 2023-03-22 LAB
ACTH SER-ACNC: 13.5 PG/ML — SIGNIFICANT CHANGE UP (ref 7.2–63.3)
ALBUMIN SERPL ELPH-MCNC: 3.5 G/DL — SIGNIFICANT CHANGE UP (ref 3.3–5)
ALP SERPL-CCNC: 149 U/L — HIGH (ref 40–120)
ALT FLD-CCNC: 350 U/L — HIGH (ref 10–45)
ANION GAP SERPL CALC-SCNC: 10 MMOL/L — SIGNIFICANT CHANGE UP (ref 5–17)
ANION GAP SERPL CALC-SCNC: 11 MMOL/L — SIGNIFICANT CHANGE UP (ref 5–17)
ANION GAP SERPL CALC-SCNC: 14 MMOL/L — SIGNIFICANT CHANGE UP (ref 5–17)
AST SERPL-CCNC: 309 U/L — HIGH (ref 10–40)
BILIRUB DIRECT SERPL-MCNC: <0.1 MG/DL — SIGNIFICANT CHANGE UP (ref 0–0.3)
BILIRUB INDIRECT FLD-MCNC: >0.2 MG/DL — SIGNIFICANT CHANGE UP (ref 0.2–1)
BILIRUB SERPL-MCNC: 0.3 MG/DL — SIGNIFICANT CHANGE UP (ref 0.2–1.2)
BUN SERPL-MCNC: 17 MG/DL — SIGNIFICANT CHANGE UP (ref 7–23)
BUN SERPL-MCNC: 18 MG/DL — SIGNIFICANT CHANGE UP (ref 7–23)
BUN SERPL-MCNC: 21 MG/DL — SIGNIFICANT CHANGE UP (ref 7–23)
CALCIUM SERPL-MCNC: 9 MG/DL — SIGNIFICANT CHANGE UP (ref 8.4–10.5)
CALCIUM SERPL-MCNC: 9.1 MG/DL — SIGNIFICANT CHANGE UP (ref 8.4–10.5)
CALCIUM SERPL-MCNC: 9.4 MG/DL — SIGNIFICANT CHANGE UP (ref 8.4–10.5)
CHLORIDE SERPL-SCNC: 106 MMOL/L — SIGNIFICANT CHANGE UP (ref 96–108)
CHLORIDE SERPL-SCNC: 107 MMOL/L — SIGNIFICANT CHANGE UP (ref 96–108)
CHLORIDE SERPL-SCNC: 108 MMOL/L — SIGNIFICANT CHANGE UP (ref 96–108)
CO2 SERPL-SCNC: 23 MMOL/L — SIGNIFICANT CHANGE UP (ref 22–31)
CO2 SERPL-SCNC: 27 MMOL/L — SIGNIFICANT CHANGE UP (ref 22–31)
CO2 SERPL-SCNC: 28 MMOL/L — SIGNIFICANT CHANGE UP (ref 22–31)
CORTIS AM PEAK SERPL-MCNC: 3.4 UG/DL — LOW (ref 6–18.4)
CREAT SERPL-MCNC: 0.77 MG/DL — SIGNIFICANT CHANGE UP (ref 0.5–1.3)
CREAT SERPL-MCNC: 0.85 MG/DL — SIGNIFICANT CHANGE UP (ref 0.5–1.3)
CREAT SERPL-MCNC: 0.91 MG/DL — SIGNIFICANT CHANGE UP (ref 0.5–1.3)
EGFR: 75 ML/MIN/1.73M2 — SIGNIFICANT CHANGE UP
EGFR: 82 ML/MIN/1.73M2 — SIGNIFICANT CHANGE UP
EGFR: 92 ML/MIN/1.73M2 — SIGNIFICANT CHANGE UP
GLUCOSE BLDC GLUCOMTR-MCNC: 110 MG/DL — HIGH (ref 70–99)
GLUCOSE BLDC GLUCOMTR-MCNC: 133 MG/DL — HIGH (ref 70–99)
GLUCOSE BLDC GLUCOMTR-MCNC: 162 MG/DL — HIGH (ref 70–99)
GLUCOSE BLDC GLUCOMTR-MCNC: 165 MG/DL — HIGH (ref 70–99)
GLUCOSE BLDC GLUCOMTR-MCNC: 250 MG/DL — HIGH (ref 70–99)
GLUCOSE SERPL-MCNC: 127 MG/DL — HIGH (ref 70–99)
GLUCOSE SERPL-MCNC: 185 MG/DL — HIGH (ref 70–99)
GLUCOSE SERPL-MCNC: 244 MG/DL — HIGH (ref 70–99)
HCT VFR BLD CALC: 29.4 % — LOW (ref 34.5–45)
HGB BLD-MCNC: 9.4 G/DL — LOW (ref 11.5–15.5)
HSV+VZV DNA SPEC QL NAA+PROBE: ABNORMAL
MAGNESIUM SERPL-MCNC: 2.2 MG/DL — SIGNIFICANT CHANGE UP (ref 1.6–2.6)
MCHC RBC-ENTMCNC: 28.3 PG — SIGNIFICANT CHANGE UP (ref 27–34)
MCHC RBC-ENTMCNC: 32 GM/DL — SIGNIFICANT CHANGE UP (ref 32–36)
MCV RBC AUTO: 88.6 FL — SIGNIFICANT CHANGE UP (ref 80–100)
NRBC # BLD: 0 /100 WBCS — SIGNIFICANT CHANGE UP (ref 0–0)
OSMOLALITY SERPL: 303 MOSMOL/KG — HIGH (ref 275–300)
OSMOLALITY SERPL: 314 MOSMOL/KG — HIGH (ref 275–300)
OSMOLALITY UR: 314 MOS/KG — SIGNIFICANT CHANGE UP (ref 300–900)
OSMOLALITY UR: 459 MOS/KG — SIGNIFICANT CHANGE UP (ref 300–900)
PHOSPHATE SERPL-MCNC: 3.8 MG/DL — SIGNIFICANT CHANGE UP (ref 2.5–4.5)
PLATELET # BLD AUTO: 234 K/UL — SIGNIFICANT CHANGE UP (ref 150–400)
POTASSIUM SERPL-MCNC: 3.7 MMOL/L — SIGNIFICANT CHANGE UP (ref 3.5–5.3)
POTASSIUM SERPL-MCNC: 3.9 MMOL/L — SIGNIFICANT CHANGE UP (ref 3.5–5.3)
POTASSIUM SERPL-MCNC: 4.9 MMOL/L — SIGNIFICANT CHANGE UP (ref 3.5–5.3)
POTASSIUM SERPL-SCNC: 3.7 MMOL/L — SIGNIFICANT CHANGE UP (ref 3.5–5.3)
POTASSIUM SERPL-SCNC: 3.9 MMOL/L — SIGNIFICANT CHANGE UP (ref 3.5–5.3)
POTASSIUM SERPL-SCNC: 4.9 MMOL/L — SIGNIFICANT CHANGE UP (ref 3.5–5.3)
PROT SERPL-MCNC: 6.2 G/DL — SIGNIFICANT CHANGE UP (ref 6–8.3)
RBC # BLD: 3.32 M/UL — LOW (ref 3.8–5.2)
RBC # FLD: 13.3 % — SIGNIFICANT CHANGE UP (ref 10.3–14.5)
SODIUM SERPL-SCNC: 144 MMOL/L — SIGNIFICANT CHANGE UP (ref 135–145)
SODIUM SERPL-SCNC: 145 MMOL/L — SIGNIFICANT CHANGE UP (ref 135–145)
SODIUM SERPL-SCNC: 145 MMOL/L — SIGNIFICANT CHANGE UP (ref 135–145)
SP GR UR STRIP: 1.01 — LOW (ref 1.01–1.02)
SP GR UR STRIP: 1.01 — SIGNIFICANT CHANGE UP (ref 1.01–1.02)
SPECIMEN SOURCE: SIGNIFICANT CHANGE UP
WBC # BLD: 11.24 K/UL — HIGH (ref 3.8–10.5)
WBC # FLD AUTO: 11.24 K/UL — HIGH (ref 3.8–10.5)

## 2023-03-22 PROCEDURE — 99232 SBSQ HOSP IP/OBS MODERATE 35: CPT | Mod: GC

## 2023-03-22 PROCEDURE — 99233 SBSQ HOSP IP/OBS HIGH 50: CPT

## 2023-03-22 RX ORDER — HYDROCORTISONE 20 MG
10 TABLET ORAL
Refills: 0 | Status: COMPLETED | OUTPATIENT
Start: 2023-03-22 | End: 2023-03-22

## 2023-03-22 RX ORDER — HYDROCORTISONE 20 MG
20 TABLET ORAL ONCE
Refills: 0 | Status: COMPLETED | OUTPATIENT
Start: 2023-03-22 | End: 2023-03-22

## 2023-03-22 RX ORDER — VALACYCLOVIR 500 MG/1
1000 TABLET, FILM COATED ORAL EVERY 8 HOURS
Refills: 0 | Status: DISCONTINUED | OUTPATIENT
Start: 2023-03-22 | End: 2023-03-24

## 2023-03-22 RX ORDER — POLYETHYLENE GLYCOL 3350 17 G/17G
17 POWDER, FOR SOLUTION ORAL
Refills: 0 | Status: DISCONTINUED | OUTPATIENT
Start: 2023-03-22 | End: 2023-03-24

## 2023-03-22 RX ORDER — INSULIN GLARGINE 100 [IU]/ML
14 INJECTION, SOLUTION SUBCUTANEOUS AT BEDTIME
Refills: 0 | Status: DISCONTINUED | OUTPATIENT
Start: 2023-03-22 | End: 2023-03-24

## 2023-03-22 RX ORDER — HYDROCORTISONE 20 MG
10 TABLET ORAL
Refills: 0 | Status: DISCONTINUED | OUTPATIENT
Start: 2023-03-23 | End: 2023-03-24

## 2023-03-22 RX ORDER — HYDROCORTISONE 20 MG
20 TABLET ORAL
Refills: 0 | Status: DISCONTINUED | OUTPATIENT
Start: 2023-03-23 | End: 2023-03-24

## 2023-03-22 RX ADMIN — DESMOPRESSIN ACETATE 0.05 MILLIGRAM(S): 0.1 TABLET ORAL at 17:26

## 2023-03-22 RX ADMIN — DESMOPRESSIN ACETATE 0.05 MILLIGRAM(S): 0.1 TABLET ORAL at 05:53

## 2023-03-22 RX ADMIN — Medication 5 UNIT(S): at 12:10

## 2023-03-22 RX ADMIN — Medication 5 MILLIGRAM(S): at 22:50

## 2023-03-22 RX ADMIN — Medication 20 MILLIGRAM(S): at 12:23

## 2023-03-22 RX ADMIN — POLYETHYLENE GLYCOL 3350 17 GRAM(S): 17 POWDER, FOR SOLUTION ORAL at 17:28

## 2023-03-22 RX ADMIN — TRAMADOL HYDROCHLORIDE 50 MILLIGRAM(S): 50 TABLET ORAL at 12:02

## 2023-03-22 RX ADMIN — SENNA PLUS 2 TABLET(S): 8.6 TABLET ORAL at 22:50

## 2023-03-22 RX ADMIN — PANTOPRAZOLE SODIUM 40 MILLIGRAM(S): 20 TABLET, DELAYED RELEASE ORAL at 11:57

## 2023-03-22 RX ADMIN — Medication 2: at 11:57

## 2023-03-22 RX ADMIN — Medication 75 MICROGRAM(S): at 05:53

## 2023-03-22 RX ADMIN — Medication 5 UNIT(S): at 17:03

## 2023-03-22 RX ADMIN — ENOXAPARIN SODIUM 40 MILLIGRAM(S): 100 INJECTION SUBCUTANEOUS at 17:28

## 2023-03-22 RX ADMIN — Medication 5 UNIT(S): at 06:33

## 2023-03-22 RX ADMIN — TRAMADOL HYDROCHLORIDE 50 MILLIGRAM(S): 50 TABLET ORAL at 12:30

## 2023-03-22 RX ADMIN — VALACYCLOVIR 1000 MILLIGRAM(S): 500 TABLET, FILM COATED ORAL at 10:01

## 2023-03-22 RX ADMIN — VALACYCLOVIR 1000 MILLIGRAM(S): 500 TABLET, FILM COATED ORAL at 13:41

## 2023-03-22 RX ADMIN — VALACYCLOVIR 1000 MILLIGRAM(S): 500 TABLET, FILM COATED ORAL at 22:55

## 2023-03-22 RX ADMIN — Medication 10 MILLIGRAM(S): at 17:30

## 2023-03-22 NOTE — PROGRESS NOTE ADULT - PROBLEM SELECTOR PLAN 2
new diagnosis.  - elevated TSH to 8.85 with low free T4 0.5  - started on levothyroxine 75mcg daily as per Endo recs - will need rx on discharge  - repeat TFTs as outpatient. Last free t4 3/18 1.0

## 2023-03-22 NOTE — CONSULT NOTE ADULT - CONSULT REASON
pituitary Mass
left perianal rash
Visual field deficit, limited baseline exam inpatient
Pre-Operative Cardiac Risk Stratification and Optimization
pre-operative optimization

## 2023-03-22 NOTE — PROGRESS NOTE ADULT - ASSESSMENT
51y/o female who is POD#7 expanded endonasal transphenoidal/transplanum 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 karthikeyan graft, postop lumbar drain out, splints in place, to be removed in the office. No CSF leak on exam.

## 2023-03-22 NOTE — PROGRESS NOTE ADULT - PROBLEM SELECTOR PLAN 8
DVT ppx: lovenox  Bowel regimen with opioids. Last BM appears to be 3/19   Has some neurogenic bradycardia post op which is improving. Cardio following

## 2023-03-22 NOTE — PROGRESS NOTE ADULT - SUBJECTIVE AND OBJECTIVE BOX
Patient seen and examined at bedside.    --Anticoagulation--  enoxaparin Injectable 40 milliGRAM(s) SubCutaneous <User Schedule>    T(C): 36.7 (03-21-23 @ 19:00), Max: 37 (03-21-23 @ 07:00)  HR: 55 (03-21-23 @ 22:00) (52 - 77)  BP: --  RR: 12 (03-21-23 @ 22:00) (12 - 20)  SpO2: 95% (03-21-23 @ 22:00) (94% - 98%)  Wt(kg): --    Exam: AOx3, PERRL, FC,  OCHOA 5/5    .  LABS:                         9.6    8.08  )-----------( 224      ( 20 Mar 2023 23:14 )             29.5     03-21    144  |  108  |  17  ----------------------------<  167<H>  4.1   |  24  |  0.88    Ca    9.2      21 Mar 2023 17:26  Phos  4.1     03-21  Mg     2.2     03-21    TPro  6.0  /  Alb  3.4  /  TBili  0.2  /  DBili  x   /  AST  226<H>  /  ALT  239<H>  /  AlkPhos  126<H>  03-20              RADIOLOGY, EKG & ADDITIONAL TESTS: Reviewed.

## 2023-03-22 NOTE — PROGRESS NOTE ADULT - ASSESSMENT
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    NEURO:  Neuro checks Q 4  hr   s/p lumbar drain  Serial CTH stable  PT/OT/OBC      PULM:  RA  Pituitary precautions (no incentive spirometry, no straws, no BIPAP)    CV:  -160 mmHg    RENAL:   DI- resolved  Net 900 mL positive  BMP Q6H    GI:  Diet: CCD   encourage oral fluid intake   Bowel regimen [] colace [x] senna [x] other: miralax   last BM 3/19     ENDO:   pantohypopit   Goal euglycemia (-180)  Hydrocort wean plan per endocrinology, dose held last night, cortisol AM level pending.   hypothyroidism cont synthroid 75 mcg IV QD  DM: lantus 10 units q 6 hr, admelog 4 units premeal  DDAVP 0.05 mcg BID    HEME/ONC:  H/H stable   lovenox 40 mg sc qhs     ID:  afebrile, mild leukocytosis likely reactive 2/2 steroid  No evidence of infection    MISC:    SOCIAL/FAMILY:  [x] awaiting [] updated at bedside [] family meeting    CODE STATUS:  [x] Full Code [] DNR [] DNI [] Palliative/Comfort Care    DISPOSITION:  [x] ICU [] Stroke Unit [] Floor [] EMU [] RCU [] PCU    not critical  moderate complex medical decision      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    NEURO:  Neuro checks Q 4  hr   s/p lumbar drain  Serial CTH stable  PT/OT/OBC      PULM:  RA  Pituitary precautions (no incentive spirometry, no straws, no BIPAP)    CV:  -160 mmHg    RENAL:   DI- resolved  Net 900 mL positive  BMP Q6H    GI:  Diet: CCD   encourage oral fluid intake   Bowel regimen [] colace [x] senna [x] other: miralax   last BM 3/19     ENDO:   pantohypopit   Goal euglycemia (-180)  Hydrocort wean plan per endocrinology, dose held last night, cortisol AM level pending.   hypothyroidism cont synthroid 75 mcg IV QD  DM: lantus 12 AND admelog 5 units q6  DDAVP 0.05 mcg BID    HEME/ONC:  H/H stable   lovenox 40 mg sc qhs     ID:  afebrile, mild leukocytosis likely reactive 2/2 steroid  No evidence of infection    MISC:    SOCIAL/FAMILY:  [x] awaiting [] updated at bedside [] family meeting    CODE STATUS:  [x] Full Code [] DNR [] DNI [] Palliative/Comfort Care    DISPOSITION:  [x] ICU [] Stroke Unit [] Floor [] EMU [] RCU [] PCU    not critical  moderate complex medical decision      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    NEURO:  Neuro checks Q 4  hr   s/p lumbar drain  Serial CTH stable  PT/OT/OBC      PULM:  RA  Pituitary precautions (no incentive spirometry, no straws, no BIPAP)    CV:  -160 mmHg    RENAL:   DI- resolved  Net 900 mL positive  BMP Q6H    GI:  Diet: CCD   encourage oral fluid intake   Bowel regimen [] colace [x] senna [x] other: miralax   last BM 3/19     ENDO:   pantohypopit   Goal euglycemia (-180)  Hydrocort wean plan per endocrinology, dose held last night, cortisol and ACTH AM level pending.   hypothyroidism cont synthroid 75 mcg IV QD  DM: lantus 12 AND admelog 5 units q6  DDAVP 0.05 mcg BID    HEME/ONC:  H/H stable   lovenox 40 mg sc qhs     ID:  afebrile, mild leukocytosis likely reactive 2/2 steroid  No evidence of infection    MISC:    SOCIAL/FAMILY:  [x] awaiting [] updated at bedside [] family meeting    CODE STATUS:  [x] Full Code [] DNR [] DNI [] Palliative/Comfort Care    DISPOSITION:  [x] ICU [] Stroke Unit [] Floor [] EMU [] RCU [] PCU    not critical  moderate complex medical decision

## 2023-03-22 NOTE — PROGRESS NOTE ADULT - TIME BILLING
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   neuro exam non focal , neuro checks q 4 hr, PT/OT/OBC, SBP goal 100-160 mmhg, sodium 149, DI  ddavp 0.5 q 12 hr , CCD diet , , panhypopit :DM on lantus 12 units, premeals 4 units,  hydrocortisone  to 20 mg, tomorrow am cortisol and ACTH,  levothyroxine 75 mcg daily , ddavp 0.05 q 12 hr , BMP and urine SG in 6 hr, lovenox 40 mg sc qhs , afebrile on no antibiotics. 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   neuro exam non focal , neuro checks q 4 hr, PT/OT/OBC, SBP goal 100-160 mmhg, sodium 149, DI  ddavp 0.5 q 12 hr , CCD diet , , panhypopit :DM on lantus 12 units, premeals 4 units,  hydrocortisone  on hoild until we get the am cortisol today per endo,  levothyroxine 75 mcg daily , ddavp 0.05 q 12 hr , BMP and urine SG in 6 hr, lovenox 40 mg sc qhs , afebrile on no antibiotics, has vesicle on the butt will send it for cx and ID consulted r/o [id number]

## 2023-03-22 NOTE — PROGRESS NOTE ADULT - ASSESSMENT
53 yo [place]-speaking female with PMH of HTN, HLD, T2DM, who presents with headaches, blurry vision, admitted after being found to have suprasellar mass, admitted for further workup. Medicine following for comanagement. s/p transsphenoidal/ transplanum approach for resection of infrachiasmatic craniopharygioma, graft, post lumbar drain. NSCU course c/b hyperglycemia requiring insulin gtt, transaminitis, bradycardia.

## 2023-03-22 NOTE — PROGRESS NOTE ADULT - SUBJECTIVE AND OBJECTIVE BOX
ENDOCRINE FOLLOW UP     Chief Complaint: craniopharyngioma    History:   Now being treated for herpes zoster.    MEDICATIONS  (STANDING):  amLODIPine   Tablet 10 milliGRAM(s) Oral daily  bisacodyl 5 milliGRAM(s) Oral at bedtime  desmopressin 0.05 milliGRAM(s) Oral <User Schedule>  enoxaparin Injectable 40 milliGRAM(s) SubCutaneous <User Schedule>  insulin glargine Injectable (LANTUS) 12 Unit(s) SubCutaneous at bedtime  insulin lispro (ADMELOG) corrective regimen sliding scale   SubCutaneous three times a day before meals  insulin lispro (ADMELOG) corrective regimen sliding scale   SubCutaneous at bedtime  insulin lispro Injectable (ADMELOG) 5 Unit(s) SubCutaneous three times a day before meals  levothyroxine 75 MICROGram(s) Oral daily  pantoprazole    Tablet 40 milliGRAM(s) Oral daily  polyethylene glycol 3350 17 Gram(s) Oral two times a day  senna 2 Tablet(s) Oral at bedtime  valACYclovir 1000 milliGRAM(s) Oral every 8 hours    MEDICATIONS  (PRN):  sodium chloride 0.65% Nasal 1 Spray(s) Both Nostrils three times a day PRN Nasal Congestion  traMADol 25 milliGRAM(s) Oral every 4 hours PRN Moderate Pain (4 - 6)  traMADol 50 milliGRAM(s) Oral every 4 hours PRN Severe Pain (7 - 10)      Allergies    No Known Drug Allergies  S/P TRANSSPHENOIDAL SURGERY. SINONASAL PRECAUTIONS! NO NASAL SWABS OR NG TUBES. (Unknown)    Intolerances        ROS: All other systems reviewed and negative    PHYSICAL EXAM:  VITALS: T(C): 36.4 (03-22-23 @ 07:00)  T(F): 97.6 (03-22-23 @ 07:00), Max: 98.3 (03-22-23 @ 03:00)  HR: 66 (03-22-23 @ 10:00) (48 - 77)  BP: 113/55 (03-22-23 @ 10:00) (103/62 - 113/55)  RR:  (12 - 18)  SpO2:  (94% - 98%)  Wt(kg): --  GENERAL: NAD, resting comfortably   EYES: No proptosis,  anicteric  HEENT:  Atraumatic, Normocephalic, moist mucous membranes  RESPIRATORY: Nonlabored respirations on room air, normal rate/effort   CARDIOVASCULAR: Did not appear cyanotic, no lower extremity swelling visualized  GI: Soft, nontender, non distended  NEURO: Answering questions appropriately, moves all extremities spontaneously  PSYCH:  reactive affect, euthymic mood    POCT Blood Glucose.: 133 mg/dL (03-22-23 @ 05:42)  POCT Blood Glucose.: 120 mg/dL (03-21-23 @ 22:10)  POCT Blood Glucose.: 158 mg/dL (03-21-23 @ 17:26)  POCT Blood Glucose.: 181 mg/dL (03-21-23 @ 12:41)  POCT Blood Glucose.: 135 mg/dL (03-21-23 @ 05:26)  POCT Blood Glucose.: 152 mg/dL (03-20-23 @ 23:15)  POCT Blood Glucose.: 251 mg/dL (03-20-23 @ 17:46)  POCT Blood Glucose.: 252 mg/dL (03-20-23 @ 12:19)  POCT Blood Glucose.: 135 mg/dL (03-20-23 @ 05:42)  POCT Blood Glucose.: 222 mg/dL (03-19-23 @ 21:21)  POCT Blood Glucose.: 145 mg/dL (03-19-23 @ 17:26)  POCT Blood Glucose.: 111 mg/dL (03-19-23 @ 11:07)      03-22    144  |  107  |  17  ----------------------------<  127<H>  3.9   |  27  |  0.85    eGFR: 82    Ca    9.1      03-22  Mg     2.2     03-22  Phos  3.8     03-22    TPro  6.0  /  Alb  3.4  /  TBili  0.2  /  DBili  x   /  AST  226<H>  /  ALT  239<H>  /  AlkPhos  126<H>  03-20      A1C with Estimated Average Glucose Result: 8.0 % (03-13-23 @ 06:23)      Thyroid Stimulating Hormone, Serum: 1.05 uIU/mL (03-18-23 @ 17:05)  Thyroid Stimulating Hormone, Serum: 1.05 uIU/mL (03-18-23 @ 17:05)  Thyroid Stimulating Hormone, Serum: 4.43 uIU/mL (03-16-23 @ 04:02)  Thyroid Stimulating Hormone, Serum: 3.46 uIU/mL (03-15-23 @ 09:09)  Thyroid Stimulating Hormone, Serum: 8.85 uIU/mL (03-11-23 @ 05:39)   ENDOCRINE FOLLOW UP     Chief Complaint: craniopharyngioma    History:   Now being treated for herpes zoster. [facility]  ID [id number] See used. Patient reports feeling very tired and headache. Endorses polyuria, polydipsia, denies nausea and vomiting. She reports losing her phone under ICU bed.    MEDICATIONS  (STANDING):  amLODIPine   Tablet 10 milliGRAM(s) Oral daily  bisacodyl 5 milliGRAM(s) Oral at bedtime  desmopressin 0.05 milliGRAM(s) Oral <User Schedule>  enoxaparin Injectable 40 milliGRAM(s) SubCutaneous <User Schedule>  insulin glargine Injectable (LANTUS) 12 Unit(s) SubCutaneous at bedtime  insulin lispro (ADMELOG) corrective regimen sliding scale   SubCutaneous three times a day before meals  insulin lispro (ADMELOG) corrective regimen sliding scale   SubCutaneous at bedtime  insulin lispro Injectable (ADMELOG) 5 Unit(s) SubCutaneous three times a day before meals  levothyroxine 75 MICROGram(s) Oral daily  pantoprazole    Tablet 40 milliGRAM(s) Oral daily  polyethylene glycol 3350 17 Gram(s) Oral two times a day  senna 2 Tablet(s) Oral at bedtime  valACYclovir 1000 milliGRAM(s) Oral every 8 hours    MEDICATIONS  (PRN):  sodium chloride 0.65% Nasal 1 Spray(s) Both Nostrils three times a day PRN Nasal Congestion  traMADol 25 milliGRAM(s) Oral every 4 hours PRN Moderate Pain (4 - 6)  traMADol 50 milliGRAM(s) Oral every 4 hours PRN Severe Pain (7 - 10)      Allergies    No Known Drug Allergies  S/P TRANSSPHENOIDAL SURGERY. SINONASAL PRECAUTIONS! NO NASAL SWABS OR NG TUBES. (Unknown)    Intolerances        ROS: All other systems reviewed and negative    PHYSICAL EXAM:  VITALS: T(C): 36.4 (03-22-23 @ 07:00)  T(F): 97.6 (03-22-23 @ 07:00), Max: 98.3 (03-22-23 @ 03:00)  HR: 66 (03-22-23 @ 10:00) (48 - 77)  BP: 113/55 (03-22-23 @ 10:00) (103/62 - 113/55)  RR:  (12 - 18)  SpO2:  (94% - 98%)  Wt(kg): --  GENERAL: tired appearing, lying in bed  EYES: No proptosis,  anicteric  HEENT:  Atraumatic, Normocephalic, dry mucous membranes  RESPIRATORY: Nonlabored respirations on room air, normal rate/effort   CARDIOVASCULAR: Did not appear cyanotic, no lower extremity swelling visualized  GI: did not appear distended  NEURO: Answering questions appropriately, moves all extremities spontaneously  PSYCH:  reactive affect, mildly depressed mood    POCT Blood Glucose.: 133 mg/dL (03-22-23 @ 05:42)  POCT Blood Glucose.: 120 mg/dL (03-21-23 @ 22:10)  POCT Blood Glucose.: 158 mg/dL (03-21-23 @ 17:26)  POCT Blood Glucose.: 181 mg/dL (03-21-23 @ 12:41)  POCT Blood Glucose.: 135 mg/dL (03-21-23 @ 05:26)  POCT Blood Glucose.: 152 mg/dL (03-20-23 @ 23:15)  POCT Blood Glucose.: 251 mg/dL (03-20-23 @ 17:46)  POCT Blood Glucose.: 252 mg/dL (03-20-23 @ 12:19)  POCT Blood Glucose.: 135 mg/dL (03-20-23 @ 05:42)  POCT Blood Glucose.: 222 mg/dL (03-19-23 @ 21:21)  POCT Blood Glucose.: 145 mg/dL (03-19-23 @ 17:26)  POCT Blood Glucose.: 111 mg/dL (03-19-23 @ 11:07)      03-22    144  |  107  |  17  ----------------------------<  127<H>  3.9   |  27  |  0.85    eGFR: 82    Ca    9.1      03-22  Mg     2.2     03-22  Phos  3.8     03-22    TPro  6.0  /  Alb  3.4  /  TBili  0.2  /  DBili  x   /  AST  226<H>  /  ALT  239<H>  /  AlkPhos  126<H>  03-20      A1C with Estimated Average Glucose Result: 8.0 % (03-13-23 @ 06:23)      Thyroid Stimulating Hormone, Serum: 1.05 uIU/mL (03-18-23 @ 17:05)  Thyroid Stimulating Hormone, Serum: 1.05 uIU/mL (03-18-23 @ 17:05)  Thyroid Stimulating Hormone, Serum: 4.43 uIU/mL (03-16-23 @ 04:02)  Thyroid Stimulating Hormone, Serum: 3.46 uIU/mL (03-15-23 @ 09:09)  Thyroid Stimulating Hormone, Serum: 8.85 uIU/mL (03-11-23 @ 05:39)

## 2023-03-22 NOTE — PROGRESS NOTE ADULT - SUBJECTIVE AND OBJECTIVE BOX
[facility] Division of Hospital Medicine  Carlacharlotte Bobby DO   Available via [employer] Teams      Patient is a 52y old  Female who presents with a chief complaint of skull based tumor (22 Mar 2023 10:10)      SUBJECTIVE / OVERNIGHT EVENTS:   Pierce [id number]  Patient is not very interactive. Says she is tired and has a HA. Denies vision changes, abd pain, N/V/D    MEDICATIONS  (STANDING):  bisacodyl 5 milliGRAM(s) Oral at bedtime  desmopressin 0.05 milliGRAM(s) Oral <User Schedule>  enoxaparin Injectable 40 milliGRAM(s) SubCutaneous <User Schedule>  hydrocortisone 10 milliGRAM(s) Oral <User Schedule>  insulin glargine Injectable (LANTUS) 14 Unit(s) SubCutaneous at bedtime  insulin lispro (ADMELOG) corrective regimen sliding scale   SubCutaneous three times a day before meals  insulin lispro (ADMELOG) corrective regimen sliding scale   SubCutaneous at bedtime  insulin lispro Injectable (ADMELOG) 5 Unit(s) SubCutaneous three times a day before meals  levothyroxine 75 MICROGram(s) Oral daily  pantoprazole    Tablet 40 milliGRAM(s) Oral daily  polyethylene glycol 3350 17 Gram(s) Oral two times a day  senna 2 Tablet(s) Oral at bedtime  valACYclovir 1000 milliGRAM(s) Oral every 8 hours    MEDICATIONS  (PRN):  sodium chloride 0.65% Nasal 1 Spray(s) Both Nostrils three times a day PRN Nasal Congestion  traMADol 25 milliGRAM(s) Oral every 4 hours PRN Moderate Pain (4 - 6)  traMADol 50 milliGRAM(s) Oral every 4 hours PRN Severe Pain (7 - 10)      CAPILLARY BLOOD GLUCOSE      POCT Blood Glucose.: 250 mg/dL (22 Mar 2023 11:40)  POCT Blood Glucose.: 133 mg/dL (22 Mar 2023 05:42)  POCT Blood Glucose.: 120 mg/dL (21 Mar 2023 22:10)  POCT Blood Glucose.: 158 mg/dL (21 Mar 2023 17:26)    I&O's Summary    21 Mar 2023 07:01  -  22 Mar 2023 07:00  --------------------------------------------------------  IN: 2750 mL / OUT: 1875 mL / NET: 875 mL    22 Mar 2023 07:01  -  22 Mar 2023 15:23  --------------------------------------------------------  IN: 550 mL / OUT: 0 mL / NET: 550 mL        PHYSICAL EXAM:  Vital Signs Last 24 Hrs  T(C): 36.6 (22 Mar 2023 12:29), Max: 36.8 (22 Mar 2023 03:00)  T(F): 97.9 (22 Mar 2023 12:29), Max: 98.3 (22 Mar 2023 03:00)  HR: 58 (22 Mar 2023 12:29) (48 - 69)  BP: 114/72 (22 Mar 2023 12:29) (103/62 - 114/72)  BP(mean): 72 (22 Mar 2023 10:00) (72 - 73)  RR: 18 (22 Mar 2023 12:29) (12 - 18)  SpO2: 98% (22 Mar 2023 12:29) (94% - 98%)    Parameters below as of 22 Mar 2023 12:29  Patient On (Oxygen Delivery Method): room air      CONSTITUTIONAL: NAD, well-developed, well-groomed, laying in bed   EYES: conjunctiva and sclera clear  ENMT: Moist oral mucosa  RESPIRATORY: Normal respiratory effort; lungs are clear to auscultation bilaterally  CARDIOVASCULAR: Regular rate and rhythm, normal S1 and S2, No lower extremity edema  ABDOMEN: Nontender to palpation, normoactive bowel sounds, no rebound/guarding  MUSCULOSKELETAL: no clubbing or cyanosis of digits; no joint swelling or tenderness to palpation  PSYCH: A+O to person, place, and time; affect appropriate  NEUROLOGY: follows commands, strength intact   SKIN: No rashes; no palpable lesions    LABS:                        9.4    11.24 )-----------( 234      ( 22 Mar 2023 01:43 )             29.4     03-22    145  |  106  |  21  ----------------------------<  244<H>  3.7   |  28  |  0.77    Ca    9.0      22 Mar 2023 11:21  Phos  3.8     03-22  Mg     2.2     03-22    TPro  6.2  /  Alb  3.5  /  TBili  0.3  /  DBili  <0.1  /  AST  309<H>  /  ALT  350<H>  /  AlkPhos  149<H>  03-22                RADIOLOGY & ADDITIONAL TESTS:  Results Reviewed:   Imaging Personally Reviewed:  Electrocardiogram Personally Reviewed:    COORDINATION OF CARE:  Care Discussed with Consultants/Other Providers [Y/N]: neurosurgery   Prior or Outpatient Records Reviewed [Y/N]:

## 2023-03-22 NOTE — PROGRESS NOTE ADULT - PROBLEM SELECTOR PLAN 1
s/p OR 3/15, panhypopit   Endocrine following, on DDAVP 0.05mg po BID for diabetes insipidus. Daily BMP, serum/urine osm  AI - AM cortisol still low, f/u endo recs re: hydrocortisone   On synthroid as below  Patient does not have an outpatient endocrinologist   ENT following

## 2023-03-22 NOTE — CONSULT NOTE ADULT - ASSESSMENT
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  She has had a relatively benign  post op course. She has been managed for DI. She was noted to have a left sided buttocks and perineal rash today, suspicious for HSV.  Lesions look suggestive but not quite diagnostic for HSV reactivation.  Stress, surgery, and steroids are risk factors.  The lesions do not appear classic, but it may be too early.HSV more likely than VZV, I think contact precautions should be adequate.  Suggest:  1. Valtrrex 1 gr po TID x 1 week  2. Advise viral PCR swab of area to see if HSV can be isolated  3. Evolution over next 48 hours may allow for a more definitive visual diagnosis.

## 2023-03-22 NOTE — PROGRESS NOTE ADULT - ASSESSMENT
LD removed, no CSF leak noted  Pending transfer to floor   DI labs q 4, on standing DDAVP  3/22 8am ACTH, cortisol  HOB 30: no bending forward/provoking  Monitor LFTs, No Tylenol

## 2023-03-22 NOTE — PROGRESS NOTE ADULT - SUBJECTIVE AND OBJECTIVE BOX
DATE OF SERVICE: 03-22-23 @ 14:43    Subjective: Patient seen and examined. No new events except as noted.     SUBJECTIVE/ROS:  nad      MEDICATIONS:  MEDICATIONS  (STANDING):  amLODIPine   Tablet 10 milliGRAM(s) Oral daily  bisacodyl 5 milliGRAM(s) Oral at bedtime  desmopressin 0.05 milliGRAM(s) Oral <User Schedule>  enoxaparin Injectable 40 milliGRAM(s) SubCutaneous <User Schedule>  hydrocortisone 10 milliGRAM(s) Oral <User Schedule>  insulin glargine Injectable (LANTUS) 12 Unit(s) SubCutaneous at bedtime  insulin lispro (ADMELOG) corrective regimen sliding scale   SubCutaneous three times a day before meals  insulin lispro (ADMELOG) corrective regimen sliding scale   SubCutaneous at bedtime  insulin lispro Injectable (ADMELOG) 5 Unit(s) SubCutaneous three times a day before meals  levothyroxine 75 MICROGram(s) Oral daily  pantoprazole    Tablet 40 milliGRAM(s) Oral daily  polyethylene glycol 3350 17 Gram(s) Oral two times a day  senna 2 Tablet(s) Oral at bedtime  valACYclovir 1000 milliGRAM(s) Oral every 8 hours      PHYSICAL EXAM:  T(C): 36.6 (03-22-23 @ 12:29), Max: 36.8 (03-22-23 @ 03:00)  HR: 58 (03-22-23 @ 12:29) (48 - 69)  BP: 114/72 (03-22-23 @ 12:29) (103/62 - 114/72)  RR: 18 (03-22-23 @ 12:29) (12 - 18)  SpO2: 98% (03-22-23 @ 12:29) (94% - 98%)  Wt(kg): --  I&O's Summary    21 Mar 2023 07:01  -  22 Mar 2023 07:00  --------------------------------------------------------  IN: 2750 mL / OUT: 1875 mL / NET: 875 mL    22 Mar 2023 07:01  -  22 Mar 2023 14:43  --------------------------------------------------------  IN: 550 mL / OUT: 0 mL / NET: 550 mL            JVP: Normal  Neck: supple  Lung: clear   CV: S1 S2 , Murmur:  Abd: soft  Ext: No edema  neuro: Awake / alert  Psych: flat affect  Skin: normal``    LABS/DATA:    CARDIAC MARKERS:                                9.4    11.24 )-----------( 234      ( 22 Mar 2023 01:43 )             29.4     03-22    145  |  106  |  21  ----------------------------<  244<H>  3.7   |  28  |  0.77    Ca    9.0      22 Mar 2023 11:21  Phos  3.8     03-22  Mg     2.2     03-22    TPro  6.2  /  Alb  3.5  /  TBili  0.3  /  DBili  <0.1  /  AST  309<H>  /  ALT  350<H>  /  AlkPhos  149<H>  03-22    proBNP:   Lipid Profile:   HgA1c:   TSH:     TELE:  EKG:

## 2023-03-22 NOTE — PROGRESS NOTE ADULT - PROBLEM SELECTOR PLAN 7
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

## 2023-03-22 NOTE — PROGRESS NOTE ADULT - SUBJECTIVE AND OBJECTIVE BOX
ENT ISSUE/POD: s/p TSRP pod 7    HPI: Pt seen and examined at bedside. There were no acute overnight events. The patient has no complaints this morning. She is doing well. Her pain is controlled. She denies active nasal bleeding, nasal discharge, dripping in the back of the throat, HA, facial pain/pressure, ear pain, salty taste, metallic taste, CP, SOB, fevers, chills, N/V/D.        PAST MEDICAL & SURGICAL HISTORY:  Hypertension      Hyperlipidemia      Diabetes mellitus        Allergies    No Known Drug Allergies  S/P TRANSSPHENOIDAL SURGERY. SINONASAL PRECAUTIONS! NO NASAL SWABS OR NG TUBES. (Unknown)    Intolerances      MEDICATIONS  (STANDING):  amLODIPine   Tablet 10 milliGRAM(s) Oral daily  bisacodyl 5 milliGRAM(s) Oral at bedtime  desmopressin 0.05 milliGRAM(s) Oral <User Schedule>  enoxaparin Injectable 40 milliGRAM(s) SubCutaneous <User Schedule>  insulin glargine Injectable (LANTUS) 12 Unit(s) SubCutaneous at bedtime  insulin lispro (ADMELOG) corrective regimen sliding scale   SubCutaneous three times a day before meals  insulin lispro (ADMELOG) corrective regimen sliding scale   SubCutaneous at bedtime  insulin lispro Injectable (ADMELOG) 5 Unit(s) SubCutaneous three times a day before meals  levothyroxine 75 MICROGram(s) Oral daily  pantoprazole    Tablet 40 milliGRAM(s) Oral daily  polyethylene glycol 3350 17 Gram(s) Oral two times a day  senna 2 Tablet(s) Oral at bedtime  valACYclovir 1000 milliGRAM(s) Oral every 8 hours    MEDICATIONS  (PRN):  sodium chloride 0.65% Nasal 1 Spray(s) Both Nostrils three times a day PRN Nasal Congestion  traMADol 25 milliGRAM(s) Oral every 4 hours PRN Moderate Pain (4 - 6)  traMADol 50 milliGRAM(s) Oral every 4 hours PRN Severe Pain (7 - 10)      Vital Signs Last 24 Hrs  T(C): 36.4 (22 Mar 2023 07:00), Max: 36.8 (22 Mar 2023 03:00)  T(F): 97.6 (22 Mar 2023 07:00), Max: 98.3 (22 Mar 2023 03:00)  HR: 66 (22 Mar 2023 10:00) (48 - 77)  BP: 113/55 (22 Mar 2023 10:00) (103/62 - 113/55)  BP(mean): 72 (22 Mar 2023 10:00) (72 - 73)  RR: 13 (22 Mar 2023 10:00) (12 - 18)  SpO2: 96% (22 Mar 2023 10:00) (94% - 98%)    Parameters below as of 22 Mar 2023 07:00  Patient On (Oxygen Delivery Method): room air                              9.4    11.24 )-----------( 234      ( 22 Mar 2023 01:43 )             29.4    03-22    144  |  107  |  17  ----------------------------<  127<H>  3.9   |  27  |  0.85    Ca    9.1      22 Mar 2023 01:43  Phos  3.8     03-22  Mg     2.2     03-22    TPro  6.0  /  Alb  3.4  /  TBili  0.2  /  DBili  x   /  AST  226<H>  /  ALT  239<H>  /  AlkPhos  126<H>  03-20       PHYSICAL EXAM:  Gen: NAD  Skin: No rashes, bruises, or lesions  Head: Normocephalic, Atraumatic  Face: no edema, erythema, or fluctuance. Parotid glands soft without mass  Eyes: no scleral injection  Nose: Nares bilaterally patent, no discharge, + dry blood in b/l nares, no active bleed, no CSF leak  Mouth: No Stridor / Drooling / Trismus.  Mucosa moist, tongue/uvula midline, oropharynx clear  Neck: Flat, supple, no lymphadenopathy, trachea midline, no masses  Resp: breathing easily, no stridor  Neuro: facial nerve intact, no facial droop

## 2023-03-22 NOTE — PROGRESS NOTE ADULT - PROBLEM SELECTOR PLAN 3
take metformin 1000mg BID at home  - HbA1c 8.0%  - Insulin adjustment per endocrine - 14u lantus and 5u TID w/ meals   - carb controlled diet

## 2023-03-22 NOTE — PROGRESS NOTE ADULT - PROBLEM SELECTOR PLAN 1
- continue humidified face tent PRN  - nasal saline, 2 sprays to b/l nares 4 times a day.  - monitor for signs of Epistaxis and CSF leak  - avoid nasal trauma, heavy lifting and bending at waist.  - Care a/p neurosurgery.

## 2023-03-22 NOTE — PROGRESS NOTE ADULT - PROBLEM SELECTOR PLAN 5
- home: lisinopril 2.5mg daily. hold   - Patient was started on norvasc 10mg 3/16? only got medication once but BP is soft. I discontinued the order

## 2023-03-22 NOTE — PROGRESS NOTE ADULT - PROBLEM SELECTOR PLAN 6
Started 2/18-3/19  Medication induced? Appeared to need pressors in NSCU, also got some abx and tylenol   Continue to trend LFTs  Avoid tylenol use  RUQ ordered

## 2023-03-22 NOTE — CONSULT NOTE ADULT - SUBJECTIVE AND OBJECTIVE BOX
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  She has had a relatively benign  post op course. She has been managed for DI. She was noted to have a left sided buttocks and perineal rash today, suspicious for HSV.    REVIEW OF SYSTEMS:  All other review of systems negative (Comprehensive ROS)    PAST MEDICAL & SURGICAL HISTORY:  Hypertension      Hyperlipidemia      Diabetes mellitus          Allergies    No Known Drug Allergies  S/P TRANSSPHENOIDAL SURGERY. SINONASAL PRECAUTIONS! NO NASAL SWABS OR NG TUBES. (Unknown)    Intolerances        Antimicrobials Day #  :  valACYclovir 1000 milliGRAM(s) Oral every 8 hours    Other Medications:  amLODIPine   Tablet 10 milliGRAM(s) Oral daily  bisacodyl 5 milliGRAM(s) Oral at bedtime  desmopressin 0.05 milliGRAM(s) Oral <User Schedule>  enoxaparin Injectable 40 milliGRAM(s) SubCutaneous <User Schedule>  insulin glargine Injectable (LANTUS) 12 Unit(s) SubCutaneous at bedtime  insulin lispro (ADMELOG) corrective regimen sliding scale   SubCutaneous three times a day before meals  insulin lispro (ADMELOG) corrective regimen sliding scale   SubCutaneous at bedtime  insulin lispro Injectable (ADMELOG) 5 Unit(s) SubCutaneous three times a day before meals  levothyroxine 75 MICROGram(s) Oral daily  pantoprazole    Tablet 40 milliGRAM(s) Oral daily  polyethylene glycol 3350 17 Gram(s) Oral two times a day  senna 2 Tablet(s) Oral at bedtime  sodium chloride 0.65% Nasal 1 Spray(s) Both Nostrils three times a day PRN  traMADol 25 milliGRAM(s) Oral every 4 hours PRN  traMADol 50 milliGRAM(s) Oral every 4 hours PRN      FAMILY HISTORY: N/A      SOCIAL HISTORY:  Smoking:   x  ETOH: x    Drug Use: x  Single     T(F): 97.6 (03-22-23 @ 07:00), Max: 98.3 (03-22-23 @ 03:00)  HR: 66 (03-22-23 @ 10:00)  BP: 113/55 (03-22-23 @ 10:00)  RR: 13 (03-22-23 @ 10:00)  SpO2: 96% (03-22-23 @ 10:00)  Wt(kg): --    PHYSICAL EXAM:  General: alert, no acute distress  Eyes:  anicteric, no conjunctival injection, no discharge  Oropharynx: no lesions or injection 	  Neck: supple, without adenopathy  Lungs: clear to auscultation  Heart: regular rate and rhythm; no murmur, rubs or gallops  Abdomen: soft, nondistended, nontender, without mass or organomegaly  Skin: left sided perineal area with a few small areas of " early appearing grouped  vesicles.  Extremities: no clubbing, cyanosis, or edema  Neurologic: alert, oriented, moves all extremities    LAB RESULTS:                        9.4    11.24 )-----------( 234      ( 22 Mar 2023 01:43 )             29.4     03-22    144  |  107  |  17  ----------------------------<  127<H>  3.9   |  27  |  0.85    Ca    9.1      22 Mar 2023 01:43  Phos  3.8     03-22  Mg     2.2     03-22    TPro  6.0  /  Alb  3.4  /  TBili  0.2  /  DBili  x   /  AST  226<H>  /  ALT  239<H>  /  AlkPhos  126<H>  03-20    LIVER FUNCTIONS - ( 20 Mar 2023 23:14 )  Alb: 3.4 g/dL / Pro: 6.0 g/dL / ALK PHOS: 126 U/L / ALT: 239 U/L / AST: 226 U/L / GGT: x               MICROBIOLOGY:  RECENT CULTURES:  03-18 @ 17:27 .Blood Blood     No growth to date.      03-18 @ 17:22 .Blood Blood     No growth to date.            RADIOLOGY REVIEWED:

## 2023-03-22 NOTE — PROGRESS NOTE ADULT - SUBJECTIVE AND OBJECTIVE BOX
SUMMARY: 51yo woman transferred from [facility] on 3/10 for suprasellar mass found on MRI for opthalmology work up--etta HA and 3wk of blurry vision. PMH HTN, DM2, HLD.     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     Overnight events: No DDAVP given overnight, patient well compensating.         REVIEW OF SYSTEMS: [] Unable to Assess due to neurologic exam   [ x] All ROS addressed below are non-contributory, except:  Neuro: [ x] Headache [ ] Back pain [ ] Numbness [ ] Weakness [ ] Ataxia [ ] Dizziness [ ] Aphasia [ ] Dysarthria [ x] Visual disturbance  Resp: [ ] Shortness of breath/dyspnea [ ] Orthopnea [ ] Cough  CV: [ ] Chest pain [ ] Palpitation [ ] Lightheadedness [ ] Syncope  Renal: [ ] Thirst [ ] Edema  GI: [ ] Nausea [ ] Emesis [ ] Abdominal pain [ ] Constipation [ ] Diarrhea  Hem: [ ] Hematemesis [ ] bBright red blood per rectum  ID: [ ] Fever [ ] Chills [ ] Dysuria  ENT: [ ] Rhinorrhea    PAST MEDICAL & SURGICAL HISTORY:  Hypertension      Hyperlipidemia      Diabetes mellitus      Allergies    No Known Drug Allergies  S/P TRANSSPHENOIDAL SURGERY. SINONASAL PRECAUTIONS! NO NASAL SWABS OR NG TUBES. (Unknown)    Intolerances    ICU Vital Signs Last 24 Hrs  T(C): 36.4 (22 Mar 2023 07:00), Max: 36.8 (22 Mar 2023 03:00)  T(F): 97.6 (22 Mar 2023 07:00), Max: 98.3 (22 Mar 2023 03:00)  HR: 54 (22 Mar 2023 08:00) (48 - 77)  BP: --  BP(mean): --  ABP: 126/58 (22 Mar 2023 08:00) (104/50 - 144/76)  ABP(mean): 83 (22 Mar 2023 08:00) (71 - 107)  RR: 13 (22 Mar 2023 08:00) (12 - 18)  SpO2: 95% (22 Mar 2023 08:00) (94% - 98%)    O2 Parameters below as of 22 Mar 2023 07:00  Patient On (Oxygen Delivery Method): room air        I&O's Summary    21 Mar 2023 07:01  -  22 Mar 2023 07:00  --------------------------------------------------------  IN: 2750 mL / OUT: 1875 mL / NET: 875 mL        MEDICATIONS  (STANDING):  amLODIPine   Tablet 10 milliGRAM(s) Oral daily  desmopressin 0.05 milliGRAM(s) Oral <User Schedule>  enoxaparin Injectable 40 milliGRAM(s) SubCutaneous <User Schedule>  insulin glargine Injectable (LANTUS) 12 Unit(s) SubCutaneous at bedtime  insulin lispro (ADMELOG) corrective regimen sliding scale   SubCutaneous three times a day before meals  insulin lispro (ADMELOG) corrective regimen sliding scale   SubCutaneous at bedtime  insulin lispro Injectable (ADMELOG) 5 Unit(s) SubCutaneous three times a day before meals  levothyroxine 75 MICROGram(s) Oral daily  pantoprazole    Tablet 40 milliGRAM(s) Oral daily  polyethylene glycol 3350 17 Gram(s) Oral daily  senna 2 Tablet(s) Oral at bedtime    MEDICATIONS  (PRN):  sodium chloride 0.65% Nasal 1 Spray(s) Both Nostrils three times a day PRN Nasal Congestion  traMADol 25 milliGRAM(s) Oral every 4 hours PRN Moderate Pain (4 - 6)  traMADol 50 milliGRAM(s) Oral every 4 hours PRN Severe Pain (7 - 10)              EXAMINATION:  General: No acute distress  HEENT: Anicteric sclerae  Cardiac: [id number]  Lungs: Clear  Abdomen: Soft, non-tender, +BS  Extremities: No c/c/e  Skin/Incision Site: Clean, dry and intact  Neurologic: Easily arousable, alert, oriented x 3,  following commands. Moving all extremities 5/5. Pupils 3 mm Bilaterally reactive.    SUMMARY: 51yo woman transferred from [facility] on 3/10 for suprasellar mass found on MRI for opthalmology work up--etta HA and 3wk of blurry vision. PMH HTN, DM2, HLD.     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     Overnight events: No DDAVP given overnight, patient well compensating.         REVIEW OF SYSTEMS: [] Unable to Assess due to neurologic exam   [ x] All ROS addressed below are non-contributory, except:  Neuro: [ x] Headache [ ] Back pain [ ] Numbness [ ] Weakness [ ] Ataxia [ ] Dizziness [ ] Aphasia [ ] Dysarthria [ x] Visual disturbance  Resp: [ ] Shortness of breath/dyspnea [ ] Orthopnea [ ] Cough  CV: [ ] Chest pain [ ] Palpitation [ ] Lightheadedness [ ] Syncope  Renal: [ ] Thirst [ ] Edema  GI: [ ] Nausea [ ] Emesis [ ] Abdominal pain [ ] Constipation [ ] Diarrhea  Hem: [ ] Hematemesis [ ] bBright red blood per rectum  ID: [ ] Fever [ ] Chills [ ] Dysuria  ENT: [ ] Rhinorrhea    PAST MEDICAL & SURGICAL HISTORY:  Hypertension      Hyperlipidemia      Diabetes mellitus      Allergies    No Known Drug Allergies  S/P TRANSSPHENOIDAL SURGERY. SINONASAL PRECAUTIONS! NO NASAL SWABS OR NG TUBES. (Unknown)    Intolerances    ICU Vital Signs Last 24 Hrs  T(C): 36.4 (22 Mar 2023 07:00), Max: 36.8 (22 Mar 2023 03:00)  T(F): 97.6 (22 Mar 2023 07:00), Max: 98.3 (22 Mar 2023 03:00)  HR: 54 (22 Mar 2023 08:00) (48 - 77)  BP: --  BP(mean): --  ABP: 126/58 (22 Mar 2023 08:00) (104/50 - 144/76)  ABP(mean): 83 (22 Mar 2023 08:00) (71 - 107)  RR: 13 (22 Mar 2023 08:00) (12 - 18)  SpO2: 95% (22 Mar 2023 08:00) (94% - 98%)    O2 Parameters below as of 22 Mar 2023 07:00  Patient On (Oxygen Delivery Method): room air        I&O's Summary    21 Mar 2023 07:01  -  22 Mar 2023 07:00  --------------------------------------------------------  IN: 2750 mL / OUT: 1875 mL / NET: 875 mL        MEDICATIONS  (STANDING):  amLODIPine   Tablet 10 milliGRAM(s) Oral daily  desmopressin 0.05 milliGRAM(s) Oral <User Schedule>  enoxaparin Injectable 40 milliGRAM(s) SubCutaneous <User Schedule>  insulin glargine Injectable (LANTUS) 12 Unit(s) SubCutaneous at bedtime  insulin lispro (ADMELOG) corrective regimen sliding scale   SubCutaneous three times a day before meals  insulin lispro (ADMELOG) corrective regimen sliding scale   SubCutaneous at bedtime  insulin lispro Injectable (ADMELOG) 5 Unit(s) SubCutaneous three times a day before meals  levothyroxine 75 MICROGram(s) Oral daily  pantoprazole    Tablet 40 milliGRAM(s) Oral daily  polyethylene glycol 3350 17 Gram(s) Oral daily  senna 2 Tablet(s) Oral at bedtime    MEDICATIONS  (PRN):  sodium chloride 0.65% Nasal 1 Spray(s) Both Nostrils three times a day PRN Nasal Congestion  traMADol 25 milliGRAM(s) Oral every 4 hours PRN Moderate Pain (4 - 6)  traMADol 50 milliGRAM(s) Oral every 4 hours PRN Severe Pain (7 - 10)              EXAMINATION:  General: No acute distress  HEENT: Anicteric sclerae  Cardiac: [id number]  Lungs: Clear  Abdomen: Soft, non-tender, +BS  Extremities: No c/c/e  Skin/Incision Site: Clean, dry and intact, has small vesicles on the butt   Neurologic: Easily arousable, alert, oriented x 3,  following commands. Moving all extremities 5/5. Pupils 3 mm Bilaterally reactive.         LABS:  Na: 144 (03-22 @ 01:43), 144 (03-21 @ 17:26), 146 (03-21 @ 11:10), 149 (03-21 @ 04:54), 150 (03-20 @ 23:14), 152 (03-20 @ 17:21), 144 (03-20 @ 12:38), 144 (03-20 @ 06:46), 146 (03-20 @ 00:26), 146 (03-19 @ 17:07), 147 (03-19 @ 10:55)  K: 3.9 (03-22 @ 01:43), 4.1 (03-21 @ 17:26), 3.6 (03-21 @ 11:10), 3.7 (03-21 @ 04:54), 3.8 (03-20 @ 23:14), 3.7 (03-20 @ 17:21), 3.7 (03-20 @ 12:38), 3.4 (03-20 @ 06:46), 3.8 (03-20 @ 00:26), 3.9 (03-19 @ 17:07), 3.6 (03-19 @ 10:55)  Cl: 107 (03-22 @ 01:43), 108 (03-21 @ 17:26), 109 (03-21 @ 11:10), 113 (03-21 @ 04:54), 115 (03-20 @ 23:14), 114 (03-20 @ 17:21), 107 (03-20 @ 12:38), 111 (03-20 @ 06:46), 114 (03-20 @ 00:26), 115 (03-19 @ 17:07), 117 (03-19 @ 10:55)  CO2: 27 (03-22 @ 01:43), 24 (03-21 @ 17:26), 27 (03-21 @ 11:10), 27 (03-21 @ 04:54), 26 (03-20 @ 23:14), 26 (03-20 @ 17:21), 25 (03-20 @ 12:38), 25 (03-20 @ 06:46), 24 (03-20 @ 00:26), 24 (03-19 @ 17:07), 22 (03-19 @ 10:55)  BUN: 17 (03-22 @ 01:43), 17 (03-21 @ 17:26), 18 (03-21 @ 11:10), 18 (03-21 @ 04:54), 15 (03-20 @ 23:14), 14 (03-20 @ 17:21), 13 (03-20 @ 12:38), 14 (03-20 @ 06:46), 14 (03-20 @ 00:26), 15 (03-19 @ 17:07), 14 (03-19 @ 10:55)  Cr: 0.85 (03-22 @ 01:43), 0.88 (03-21 @ 17:26), 0.63 (03-21 @ 11:10), 0.75 (03-21 @ 04:54), 0.72 (03-20 @ 23:14), 0.78 (03-20 @ 17:21), 0.57 (03-20 @ 12:38), 0.67 (03-20 @ 06:46), 0.75 (03-20 @ 00:26), 0.62 (03-19 @ 17:07), 0.58 (03-19 @ 10:55)  Glu: 127(03-22 @ 01:43), 167(03-21 @ 17:26), 166(03-21 @ 11:10), 142(03-21 @ 04:54), 158(03-20 @ 23:14), 218(03-20 @ 17:21), 297(03-20 @ 12:38), 100(03-20 @ 06:46), 176(03-20 @ 00:26), 172(03-19 @ 17:07), 149(03-19 @ 10:55)    Hgb: 9.4 (03-22 @ 01:43), 9.6 (03-20 @ 23:14), 8.6 (03-20 @ 00:26)  Hct: 29.4 (03-22 @ 01:43), 29.5 (03-20 @ 23:14), 26.8 (03-20 @ 00:26)  WBC: 11.24 (03-22 @ 01:43), 8.08 (03-20 @ 23:14), 6.11 (03-20 @ 00:26)  Plt: 234 (03-22 @ 01:43), 224 (03-20 @ 23:14), 199 (03-20 @ 00:26)    INR:   PTT:

## 2023-03-23 LAB
ALBUMIN SERPL ELPH-MCNC: 4 G/DL — SIGNIFICANT CHANGE UP (ref 3.3–5)
ALP SERPL-CCNC: 146 U/L — HIGH (ref 40–120)
ALT FLD-CCNC: 288 U/L — HIGH (ref 10–45)
ANION GAP SERPL CALC-SCNC: 10 MMOL/L — SIGNIFICANT CHANGE UP (ref 5–17)
ANION GAP SERPL CALC-SCNC: 13 MMOL/L — SIGNIFICANT CHANGE UP (ref 5–17)
ANION GAP SERPL CALC-SCNC: 9 MMOL/L — SIGNIFICANT CHANGE UP (ref 5–17)
AST SERPL-CCNC: 111 U/L — HIGH (ref 10–40)
BILIRUB SERPL-MCNC: 0.4 MG/DL — SIGNIFICANT CHANGE UP (ref 0.2–1.2)
BUN SERPL-MCNC: 19 MG/DL — SIGNIFICANT CHANGE UP (ref 7–23)
BUN SERPL-MCNC: 19 MG/DL — SIGNIFICANT CHANGE UP (ref 7–23)
BUN SERPL-MCNC: 23 MG/DL — SIGNIFICANT CHANGE UP (ref 7–23)
CALCIUM SERPL-MCNC: 9.4 MG/DL — SIGNIFICANT CHANGE UP (ref 8.4–10.5)
CALCIUM SERPL-MCNC: 9.8 MG/DL — SIGNIFICANT CHANGE UP (ref 8.4–10.5)
CALCIUM SERPL-MCNC: 9.8 MG/DL — SIGNIFICANT CHANGE UP (ref 8.4–10.5)
CHLORIDE SERPL-SCNC: 106 MMOL/L — SIGNIFICANT CHANGE UP (ref 96–108)
CHLORIDE SERPL-SCNC: 107 MMOL/L — SIGNIFICANT CHANGE UP (ref 96–108)
CHLORIDE SERPL-SCNC: 109 MMOL/L — HIGH (ref 96–108)
CO2 SERPL-SCNC: 26 MMOL/L — SIGNIFICANT CHANGE UP (ref 22–31)
CO2 SERPL-SCNC: 28 MMOL/L — SIGNIFICANT CHANGE UP (ref 22–31)
CO2 SERPL-SCNC: 29 MMOL/L — SIGNIFICANT CHANGE UP (ref 22–31)
CREAT SERPL-MCNC: 0.73 MG/DL — SIGNIFICANT CHANGE UP (ref 0.5–1.3)
CREAT SERPL-MCNC: 0.76 MG/DL — SIGNIFICANT CHANGE UP (ref 0.5–1.3)
CREAT SERPL-MCNC: 0.99 MG/DL — SIGNIFICANT CHANGE UP (ref 0.5–1.3)
CULTURE RESULTS: SIGNIFICANT CHANGE UP
CULTURE RESULTS: SIGNIFICANT CHANGE UP
EGFR: 68 ML/MIN/1.73M2 — SIGNIFICANT CHANGE UP
EGFR: 94 ML/MIN/1.73M2 — SIGNIFICANT CHANGE UP
EGFR: 98 ML/MIN/1.73M2 — SIGNIFICANT CHANGE UP
GLUCOSE BLDC GLUCOMTR-MCNC: 138 MG/DL — HIGH (ref 70–99)
GLUCOSE BLDC GLUCOMTR-MCNC: 154 MG/DL — HIGH (ref 70–99)
GLUCOSE BLDC GLUCOMTR-MCNC: 159 MG/DL — HIGH (ref 70–99)
GLUCOSE BLDC GLUCOMTR-MCNC: 231 MG/DL — HIGH (ref 70–99)
GLUCOSE SERPL-MCNC: 141 MG/DL — HIGH (ref 70–99)
GLUCOSE SERPL-MCNC: 152 MG/DL — HIGH (ref 70–99)
GLUCOSE SERPL-MCNC: 203 MG/DL — HIGH (ref 70–99)
OSMOLALITY SERPL: 310 MOSMOL/KG — HIGH (ref 275–300)
OSMOLALITY SERPL: 312 MOSMOL/KG — HIGH (ref 275–300)
OSMOLALITY SERPL: 312 MOSMOL/KG — HIGH (ref 275–300)
OSMOLALITY UR: 389 MOS/KG — SIGNIFICANT CHANGE UP (ref 300–900)
OSMOLALITY UR: 448 MOS/KG — SIGNIFICANT CHANGE UP (ref 300–900)
OSMOLALITY UR: 649 MOS/KG — SIGNIFICANT CHANGE UP (ref 300–900)
POTASSIUM SERPL-MCNC: 4 MMOL/L — SIGNIFICANT CHANGE UP (ref 3.5–5.3)
POTASSIUM SERPL-MCNC: 4.2 MMOL/L — SIGNIFICANT CHANGE UP (ref 3.5–5.3)
POTASSIUM SERPL-MCNC: 4.4 MMOL/L — SIGNIFICANT CHANGE UP (ref 3.5–5.3)
POTASSIUM SERPL-SCNC: 4 MMOL/L — SIGNIFICANT CHANGE UP (ref 3.5–5.3)
POTASSIUM SERPL-SCNC: 4.2 MMOL/L — SIGNIFICANT CHANGE UP (ref 3.5–5.3)
POTASSIUM SERPL-SCNC: 4.4 MMOL/L — SIGNIFICANT CHANGE UP (ref 3.5–5.3)
PROT SERPL-MCNC: 7.1 G/DL — SIGNIFICANT CHANGE UP (ref 6–8.3)
SODIUM SERPL-SCNC: 143 MMOL/L — SIGNIFICANT CHANGE UP (ref 135–145)
SODIUM SERPL-SCNC: 146 MMOL/L — HIGH (ref 135–145)
SODIUM SERPL-SCNC: 148 MMOL/L — HIGH (ref 135–145)
SP GR UR STRIP: 1.01 — LOW (ref 1.01–1.02)
SP GR UR STRIP: 1.01 — SIGNIFICANT CHANGE UP (ref 1.01–1.02)
SP GR UR STRIP: 1.01 — SIGNIFICANT CHANGE UP (ref 1.01–1.02)
SPECIMEN SOURCE: SIGNIFICANT CHANGE UP
SPECIMEN SOURCE: SIGNIFICANT CHANGE UP

## 2023-03-23 PROCEDURE — 99232 SBSQ HOSP IP/OBS MODERATE 35: CPT | Mod: GC

## 2023-03-23 PROCEDURE — 76705 ECHO EXAM OF ABDOMEN: CPT | Mod: 26

## 2023-03-23 PROCEDURE — 99232 SBSQ HOSP IP/OBS MODERATE 35: CPT

## 2023-03-23 RX ADMIN — Medication 5 UNIT(S): at 08:32

## 2023-03-23 RX ADMIN — Medication 10 MILLIGRAM(S): at 17:23

## 2023-03-23 RX ADMIN — SENNA PLUS 2 TABLET(S): 8.6 TABLET ORAL at 21:43

## 2023-03-23 RX ADMIN — Medication 20 MILLIGRAM(S): at 08:23

## 2023-03-23 RX ADMIN — VALACYCLOVIR 1000 MILLIGRAM(S): 500 TABLET, FILM COATED ORAL at 21:50

## 2023-03-23 RX ADMIN — Medication 2: at 12:18

## 2023-03-23 RX ADMIN — Medication 5 MILLIGRAM(S): at 21:43

## 2023-03-23 RX ADMIN — Medication 75 MICROGRAM(S): at 08:23

## 2023-03-23 RX ADMIN — POLYETHYLENE GLYCOL 3350 17 GRAM(S): 17 POWDER, FOR SOLUTION ORAL at 08:24

## 2023-03-23 RX ADMIN — TRAMADOL HYDROCHLORIDE 25 MILLIGRAM(S): 50 TABLET ORAL at 22:27

## 2023-03-23 RX ADMIN — Medication 5 UNIT(S): at 17:16

## 2023-03-23 RX ADMIN — PANTOPRAZOLE SODIUM 40 MILLIGRAM(S): 20 TABLET, DELAYED RELEASE ORAL at 12:21

## 2023-03-23 RX ADMIN — Medication 1: at 17:16

## 2023-03-23 RX ADMIN — VALACYCLOVIR 1000 MILLIGRAM(S): 500 TABLET, FILM COATED ORAL at 08:23

## 2023-03-23 RX ADMIN — ENOXAPARIN SODIUM 40 MILLIGRAM(S): 100 INJECTION SUBCUTANEOUS at 17:22

## 2023-03-23 RX ADMIN — INSULIN GLARGINE 14 UNIT(S): 100 INJECTION, SOLUTION SUBCUTANEOUS at 21:44

## 2023-03-23 RX ADMIN — TRAMADOL HYDROCHLORIDE 25 MILLIGRAM(S): 50 TABLET ORAL at 21:56

## 2023-03-23 RX ADMIN — VALACYCLOVIR 1000 MILLIGRAM(S): 500 TABLET, FILM COATED ORAL at 17:21

## 2023-03-23 RX ADMIN — DESMOPRESSIN ACETATE 0.05 MILLIGRAM(S): 0.1 TABLET ORAL at 08:23

## 2023-03-23 RX ADMIN — DESMOPRESSIN ACETATE 0.05 MILLIGRAM(S): 0.1 TABLET ORAL at 17:22

## 2023-03-23 RX ADMIN — Medication 5 UNIT(S): at 12:19

## 2023-03-23 RX ADMIN — POLYETHYLENE GLYCOL 3350 17 GRAM(S): 17 POWDER, FOR SOLUTION ORAL at 17:21

## 2023-03-23 NOTE — PROGRESS NOTE ADULT - PROBLEM SELECTOR PLAN 6
Started 2/18-3/19  Medication induced? Appeared to need pressors in NSCU, also got some abx and tylenol   Continue to trend LFTs - improving today   Avoid tylenol use  RUQ ordered and pending

## 2023-03-23 NOTE — PROGRESS NOTE ADULT - SUBJECTIVE AND OBJECTIVE BOX
ENDOCRINE FOLLOW UP     Chief Complaint: craniopharyngioma    History:     MEDICATIONS  (STANDING):  bisacodyl 5 milliGRAM(s) Oral at bedtime  desmopressin 0.05 milliGRAM(s) Oral <User Schedule>  enoxaparin Injectable 40 milliGRAM(s) SubCutaneous <User Schedule>  hydrocortisone 20 milliGRAM(s) Oral <User Schedule>  hydrocortisone 10 milliGRAM(s) Oral <User Schedule>  insulin glargine Injectable (LANTUS) 14 Unit(s) SubCutaneous at bedtime  insulin lispro (ADMELOG) corrective regimen sliding scale   SubCutaneous three times a day before meals  insulin lispro (ADMELOG) corrective regimen sliding scale   SubCutaneous at bedtime  insulin lispro Injectable (ADMELOG) 5 Unit(s) SubCutaneous three times a day before meals  levothyroxine 75 MICROGram(s) Oral daily  pantoprazole    Tablet 40 milliGRAM(s) Oral daily  polyethylene glycol 3350 17 Gram(s) Oral two times a day  senna 2 Tablet(s) Oral at bedtime  valACYclovir 1000 milliGRAM(s) Oral every 8 hours    MEDICATIONS  (PRN):  sodium chloride 0.65% Nasal 1 Spray(s) Both Nostrils three times a day PRN Nasal Congestion  traMADol 25 milliGRAM(s) Oral every 4 hours PRN Moderate Pain (4 - 6)  traMADol 50 milliGRAM(s) Oral every 4 hours PRN Severe Pain (7 - 10)      Allergies    No Known Drug Allergies  S/P TRANSSPHENOIDAL SURGERY. SINONASAL PRECAUTIONS! NO NASAL SWABS OR NG TUBES. (Unknown)    Intolerances        ROS: All other systems reviewed and negative    PHYSICAL EXAM:  VITALS: T(C): 36.6 (03-23-23 @ 08:39)  T(F): 97.9 (03-23-23 @ 08:39), Max: 98.8 (03-23-23 @ 00:21)  HR: 78 (03-23-23 @ 08:39) (57 - 98)  BP: 106/65 (03-23-23 @ 08:39) (102/65 - 114/72)  RR:  (18 - 18)  SpO2:  (95% - 98%)  Wt(kg): --  GENERAL: NAD, resting comfortably   EYES: No proptosis,  anicteric  HEENT:  Atraumatic, Normocephalic, moist mucous membranes  RESPIRATORY: Nonlabored respirations on room air, normal rate/effort   CARDIOVASCULAR: Did not appear cyanotic, no lower extremity swelling visualized  GI: Soft, nontender, non distended  NEURO: Answering questions appropriately, moves all extremities spontaneously  PSYCH:  reactive affect, euthymic mood    POCT Blood Glucose.: 138 mg/dL (03-23-23 @ 07:36)  POCT Blood Glucose.: 162 mg/dL (03-22-23 @ 22:54)  POCT Blood Glucose.: 165 mg/dL (03-22-23 @ 21:15)  POCT Blood Glucose.: 110 mg/dL (03-22-23 @ 16:24)  POCT Blood Glucose.: 250 mg/dL (03-22-23 @ 11:40)  POCT Blood Glucose.: 133 mg/dL (03-22-23 @ 05:42)  POCT Blood Glucose.: 120 mg/dL (03-21-23 @ 22:10)  POCT Blood Glucose.: 158 mg/dL (03-21-23 @ 17:26)  POCT Blood Glucose.: 181 mg/dL (03-21-23 @ 12:41)  POCT Blood Glucose.: 135 mg/dL (03-21-23 @ 05:26)  POCT Blood Glucose.: 152 mg/dL (03-20-23 @ 23:15)  POCT Blood Glucose.: 251 mg/dL (03-20-23 @ 17:46)  POCT Blood Glucose.: 252 mg/dL (03-20-23 @ 12:19)      03-23    148<H>  |  109<H>  |  19  ----------------------------<  141<H>  4.0   |  29  |  0.73    eGFR: 98    Ca    9.8      03-23  Mg     2.2     03-22  Phos  3.8     03-22    TPro  7.1  /  Alb  4.0  /  TBili  0.4  /  DBili  x   /  AST  111<H>  /  ALT  288<H>  /  AlkPhos  146<H>  03-23      A1C with Estimated Average Glucose Result: 8.0 % (03-13-23 @ 06:23)      Thyroid Stimulating Hormone, Serum: 1.05 uIU/mL (03-18-23 @ 17:05)  Thyroid Stimulating Hormone, Serum: 1.05 uIU/mL (03-18-23 @ 17:05)  Thyroid Stimulating Hormone, Serum: 4.43 uIU/mL (03-16-23 @ 04:02)  Thyroid Stimulating Hormone, Serum: 3.46 uIU/mL (03-15-23 @ 09:09)  Thyroid Stimulating Hormone, Serum: 8.85 uIU/mL (03-11-23 @ 05:39)   ENDOCRINE FOLLOW UP     Chief Complaint: craniopharyngioma    History:   [facility]  ID [id number] used. The patient reports feeling tired, would like to go home. Endorses polyuria, polydipsia, dehydration. Denies nausea, vomiting,    MEDICATIONS  (STANDING):  bisacodyl 5 milliGRAM(s) Oral at bedtime  desmopressin 0.05 milliGRAM(s) Oral <User Schedule>  enoxaparin Injectable 40 milliGRAM(s) SubCutaneous <User Schedule>  hydrocortisone 20 milliGRAM(s) Oral <User Schedule>  hydrocortisone 10 milliGRAM(s) Oral <User Schedule>  insulin glargine Injectable (LANTUS) 14 Unit(s) SubCutaneous at bedtime  insulin lispro (ADMELOG) corrective regimen sliding scale   SubCutaneous three times a day before meals  insulin lispro (ADMELOG) corrective regimen sliding scale   SubCutaneous at bedtime  insulin lispro Injectable (ADMELOG) 5 Unit(s) SubCutaneous three times a day before meals  levothyroxine 75 MICROGram(s) Oral daily  pantoprazole    Tablet 40 milliGRAM(s) Oral daily  polyethylene glycol 3350 17 Gram(s) Oral two times a day  senna 2 Tablet(s) Oral at bedtime  valACYclovir 1000 milliGRAM(s) Oral every 8 hours    MEDICATIONS  (PRN):  sodium chloride 0.65% Nasal 1 Spray(s) Both Nostrils three times a day PRN Nasal Congestion  traMADol 25 milliGRAM(s) Oral every 4 hours PRN Moderate Pain (4 - 6)  traMADol 50 milliGRAM(s) Oral every 4 hours PRN Severe Pain (7 - 10)      Allergies    No Known Drug Allergies  S/P TRANSSPHENOIDAL SURGERY. SINONASAL PRECAUTIONS! NO NASAL SWABS OR NG TUBES. (Unknown)    Intolerances        ROS: All other systems reviewed and negative    PHYSICAL EXAM:  VITALS: T(C): 36.6 (03-23-23 @ 08:39)  T(F): 97.9 (03-23-23 @ 08:39), Max: 98.8 (03-23-23 @ 00:21)  HR: 78 (03-23-23 @ 08:39) (57 - 98)  BP: 106/65 (03-23-23 @ 08:39) (102/65 - 114/72)  RR:  (18 - 18)  SpO2:  (95% - 98%)  Wt(kg): --  GENERAL: tired appearing, resting comfortably   EYES: No proptosis,  anicteric  HEENT:  Atraumatic, Normocephalic, dry mucous membranes  RESPIRATORY: Nonlabored respirations on room air, normal rate/effort   CARDIOVASCULAR: Did not appear cyanotic, no lower extremity swelling visualized  GI: did not appear distended  NEURO: Answering questions appropriately, moves all extremities spontaneously  PSYCH:  reactive affect, mildly depressed mood    POCT Blood Glucose.: 138 mg/dL (03-23-23 @ 07:36)  POCT Blood Glucose.: 162 mg/dL (03-22-23 @ 22:54)  POCT Blood Glucose.: 165 mg/dL (03-22-23 @ 21:15)  POCT Blood Glucose.: 110 mg/dL (03-22-23 @ 16:24)  POCT Blood Glucose.: 250 mg/dL (03-22-23 @ 11:40)  POCT Blood Glucose.: 133 mg/dL (03-22-23 @ 05:42)  POCT Blood Glucose.: 120 mg/dL (03-21-23 @ 22:10)  POCT Blood Glucose.: 158 mg/dL (03-21-23 @ 17:26)  POCT Blood Glucose.: 181 mg/dL (03-21-23 @ 12:41)  POCT Blood Glucose.: 135 mg/dL (03-21-23 @ 05:26)  POCT Blood Glucose.: 152 mg/dL (03-20-23 @ 23:15)  POCT Blood Glucose.: 251 mg/dL (03-20-23 @ 17:46)  POCT Blood Glucose.: 252 mg/dL (03-20-23 @ 12:19)      03-23    148<H>  |  109<H>  |  19  ----------------------------<  141<H>  4.0   |  29  |  0.73    eGFR: 98    Ca    9.8      03-23  Mg     2.2     03-22  Phos  3.8     03-22    TPro  7.1  /  Alb  4.0  /  TBili  0.4  /  DBili  x   /  AST  111<H>  /  ALT  288<H>  /  AlkPhos  146<H>  03-23      A1C with Estimated Average Glucose Result: 8.0 % (03-13-23 @ 06:23)      Thyroid Stimulating Hormone, Serum: 1.05 uIU/mL (03-18-23 @ 17:05)  Thyroid Stimulating Hormone, Serum: 1.05 uIU/mL (03-18-23 @ 17:05)  Thyroid Stimulating Hormone, Serum: 4.43 uIU/mL (03-16-23 @ 04:02)  Thyroid Stimulating Hormone, Serum: 3.46 uIU/mL (03-15-23 @ 09:09)  Thyroid Stimulating Hormone, Serum: 8.85 uIU/mL (03-11-23 @ 05:39)

## 2023-03-23 NOTE — PROGRESS NOTE ADULT - ASSESSMENT
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  She has had a relatively benign  post op course, managed for DI.   Left sided buttocks and perineal rash, HSV2+  Cont Valtrex 1 gr po TID x 1 week

## 2023-03-23 NOTE — PROVIDER CONTACT NOTE (OTHER) - ACTION/TREATMENT ORDERED:
bleeding. Ntified NP Inder Nolasco (NP) notified. Will call resident. Gauze dressing applied. Hygiene provided.
ENT paged and are to place NG tube with glidoscope.
Provider notified, continue with precautions.

## 2023-03-23 NOTE — PROGRESS NOTE ADULT - ASSESSMENT
52 yr old female with a history of hypertension, hyperlipidemia, dm type 2, no surgeries ever presents with a month of headaches and 3 weeks of blurred vision. Endocrinology consulted for evaluation of sellar mass.    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 Too low to be prolactinoma given size of the sellar mass. Suspect stalk effect. (prolactin diluted 103, undiluted 107)  -Secondary hypogonadism can be addressed as outpatient. (Lh 2.6, estradiol <5)  -3/12 AM ayush 5.9, ACTH 21.5, 3/13 am cortisol (6am) 6.9  - cosyntropin stim test: 3/14 AM cortisol at 8:15am 5.1, cosyntropin at 8:15, 8:50am cortisol 16.9, 9:25am 20.7  - s/p tsp 3/15  - IGF1 3/15 46  - 3/22 8am cortisol 3.4, ACTH 13.5 suggestive of AI  PLAN  - f/u neurosurgery and ophthalmology recommendations  - continue hydrocortisone 20mg at 8am on 3/23 and 10mg at 3pm    DI/SIADH Watch POST OP  - Please eval for signs of DI vs SIADH postop  - continue DDAVP 0.05mg PO bid  - continue q8-12h BMP monitoring  - Strict I/Os, daily weights. if UO >250cc/hour, please check BMP and urine osm, serum osm.  If consistent with DI, patient may require DDAVP.   - May require DDAVP IVP x1 if meets any of these criteria: Na > 145, UO > 250cc/hr, Urine osm < 100 or urine specific gravity < 1.005.  - Signs and symptoms of DI and SIADH post op:  may occur in the 2-3 weeks following surgery.  Patient should check daily weights and if they experience weight gain of 2-3 pounds to call her endocrinologist.  Signs of DI include increased thirst with high urine output.    Primary Hypothyroidism   - Patient with elevated TSH (8)and Low Free T4 (0.5)  - 3/18 TSH 1.05, FT4 1, TT3 49, serum T4 5.4, do not suspect contributing to bradycardia, steroids can cause low T3  PLAN  - continue levothyroxine 75 mcg daily (maintain on empty stomach (at least 1 hour before meals), separate by 4 hours from PPI or calcium supplementation which can inhibit its absorption)    Steroid hyperglycemia  T2DM with hyperglycemia  - HbA1c: 8  - Home Regimen: metformin 1000mg bid  - Endocrinologist: does not have  - patient with hyperglycemia on stress dose steroids  - now on tube feeds  PLAN  - Increase lantus to 14 units tonight (do not hold if npo)   - continue admelog 5 units tid premeal (hold if npo)  - continue low admelog correction scale tid with meals and separate low admelog correction scale at bedtime now that patient is tolerating diet  - carb consistent diet  - fingersticks qid with meals and bedtime  - hypoglycemia protocol prn  - Goal -180  Discharge plan:  - Discharge medications: metformin 1000mg bid + GLP-1 agonist  - Patient to call doctor with persistent high or low BG at home.   - Ensure patient has glucometer, test strips and lancets on discharge.  - Recommend routine outpatient ophthalmology, podiatry and endocrinology f/u    HTN  - Home regimen: lisinopril 2.5mg daily  PLAN  - Can check urine microalbumin outpatient  - Outpatient goal BP <130/80. Management per primary team.    HLD  - Home regimen: atorvastatin 80mg daily  -   PLAN  - resume statin when able    Will schedule an appointment for the patient to follow up.  [address] [place] [address]  Phone Number: [phone]    Discussed with primary team.     Thank you for the interesting consult.    Denny Bishop MD, Endocrinology Fellow  Pager [phone] from 9am to 5pm. After hours and on weekends, please call [phone]. 52 yr old female with a history of hypertension, hyperlipidemia, dm type 2, no surgeries ever presents with a month of headaches and 3 weeks of blurred vision. Endocrinology consulted for evaluation of sellar mass.    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 Too low to be prolactinoma given size of the sellar mass. Suspect stalk effect. (prolactin diluted 103, undiluted 107)  -Secondary hypogonadism can be addressed as outpatient. (Lh 2.6, estradiol <5)  -3/12 AM ayush 5.9, ACTH 21.5, 3/13 am cortisol (6am) 6.9  - cosyntropin stim test: 3/14 AM cortisol at 8:15am 5.1, cosyntropin at 8:15, 8:50am cortisol 16.9, 9:25am 20.7  - s/p tsp 3/15  - IGF1 3/15 46  - 3/22 8am cortisol 3.4, ACTH 13.5 suggestive of AI  PLAN  - f/u neurosurgery and ophthalmology recommendations  - continue hydrocortisone 20mg at 8am on 3/23 and 10mg at 3pm, would discharge hydrocortisone 10mg at 8am and 5mg at 3pm  FOR DISCHARGE  to help with dc planning-  - Please print and give the pt info section for patients under adrenal insufficiency (this will educate them regarding the warning signs of adrenal crisis )  - Patient should take 2-3x of home dose in cases of illness, ever, accidents, and surgery. If unable to take PO, use IM injection as below (Solu-Cortef)  - pt should receive stress dosing (IV hydrocortisone 50mg q8) with any major illness or surgical procedure  - Should pt be unable to tolerate PO and is unable to take hydrocortisone, pt will need an emergency injection. Please discharge with a prescription for 100 mg Solu-Cortef Act-O-Vial and the following instructions:  [email or web address]-[email or web address]-[email or web address]  - pt should obtain a medical alert bracelet or necklace to inform emergency providers of adrenal insufficiency diagnosis [[email or web address].medicalert.[email or web address]]    DI/SIADH Watch POST OP  - Please eval for signs of DI vs SIADH postop  - continue DDAVP 0.05mg PO bid, would encourage PO intake  - continue q8-12h BMP monitoring  - Strict I/Os, daily weights. if UO >250cc/hour, please check BMP and urine osm, serum osm.  If consistent with DI, patient may require DDAVP.   - May require DDAVP IVP x1 if meets any of these criteria: Na > 145, UO > 250cc/hr, Urine osm < 100 or urine specific gravity < 1.005.  - Signs and symptoms of DI and SIADH post op:  may occur in the 2-3 weeks following surgery.  Patient should check daily weights and if they experience weight gain of 2-3 pounds to call her endocrinologist.  Signs of DI include increased thirst with high urine output.    Primary Hypothyroidism   - Patient with elevated TSH (8)and Low Free T4 (0.5)  - 3/18 TSH 1.05, FT4 1, TT3 49, serum T4 5.4, do not suspect contributing to bradycardia, steroids can cause low T3  PLAN  - continue levothyroxine 75 mcg daily (maintain on empty stomach (at least 1 hour before meals), separate by 4 hours from PPI or calcium supplementation which can inhibit its absorption)    Steroid hyperglycemia  T2DM with hyperglycemia  - HbA1c: 8  - Home Regimen: metformin 1000mg bid  - Endocrinologist: does not have  - patient with hyperglycemia on stress dose steroids  - now on tube feeds  PLAN  - Increase lantus to 14 units tonight (do not hold if npo)   - continue admelog 5 units tid premeal (hold if npo)  - continue low admelog correction scale tid with meals and separate low admelog correction scale at bedtime now that patient is tolerating diet  - carb consistent diet  - fingersticks qid with meals and bedtime  - hypoglycemia protocol prn  - Goal -180  Discharge plan:  - Discharge medications: metformin 1000mg bid + GLP-1 agonist  - Patient to call doctor with persistent high or low BG at home.   - Ensure patient has glucometer, test strips and lancets on discharge.  - Recommend routine outpatient ophthalmology, podiatry and endocrinology f/u    HTN  - Home regimen: lisinopril 2.5mg daily  PLAN  - Can check urine microalbumin outpatient  - Outpatient goal BP <130/80. Management per primary team.    HLD  - Home regimen: atorvastatin 80mg daily  -   PLAN  - resume statin when able    Will schedule an appointment for the patient to follow up.  [address] [place] [address]  [id number]  Phone Number: [phone]    Discussed with primary team.     Thank you for the interesting consult.    Denny Bishop MD, Endocrinology Fellow  Pager [phone] from 9am to 5pm. After hours and on weekends, please call [phone].

## 2023-03-23 NOTE — PROGRESS NOTE ADULT - SUBJECTIVE AND OBJECTIVE BOX
[facility] Division of Hospital Medicine  Carla KanuDO remberto   Available via [employer] Teams      Patient is a 52y old  Female who presents with a chief complaint of skull based tumor (22 Mar 2023 10:10)      SUBJECTIVE / OVERNIGHT EVENTS:  Tired, doesn't want to interact, no complaints    MEDICATIONS  (STANDING):  bisacodyl 5 milliGRAM(s) Oral at bedtime  desmopressin 0.05 milliGRAM(s) Oral <User Schedule>  enoxaparin Injectable 40 milliGRAM(s) SubCutaneous <User Schedule>  hydrocortisone 20 milliGRAM(s) Oral <User Schedule>  hydrocortisone 10 milliGRAM(s) Oral <User Schedule>  insulin glargine Injectable (LANTUS) 14 Unit(s) SubCutaneous at bedtime  insulin lispro (ADMELOG) corrective regimen sliding scale   SubCutaneous three times a day before meals  insulin lispro (ADMELOG) corrective regimen sliding scale   SubCutaneous at bedtime  insulin lispro Injectable (ADMELOG) 5 Unit(s) SubCutaneous three times a day before meals  levothyroxine 75 MICROGram(s) Oral daily  pantoprazole    Tablet 40 milliGRAM(s) Oral daily  polyethylene glycol 3350 17 Gram(s) Oral two times a day  senna 2 Tablet(s) Oral at bedtime  valACYclovir 1000 milliGRAM(s) Oral every 8 hours    MEDICATIONS  (PRN):  sodium chloride 0.65% Nasal 1 Spray(s) Both Nostrils three times a day PRN Nasal Congestion  traMADol 25 milliGRAM(s) Oral every 4 hours PRN Moderate Pain (4 - 6)  traMADol 50 milliGRAM(s) Oral every 4 hours PRN Severe Pain (7 - 10)      CAPILLARY BLOOD GLUCOSE      POCT Blood Glucose.: 231 mg/dL (23 Mar 2023 11:19)  POCT Blood Glucose.: 138 mg/dL (23 Mar 2023 07:36)  POCT Blood Glucose.: 162 mg/dL (22 Mar 2023 22:54)  POCT Blood Glucose.: 165 mg/dL (22 Mar 2023 21:15)  POCT Blood Glucose.: 110 mg/dL (22 Mar 2023 16:24)    I&O's Summary    22 Mar 2023 07:01  -  23 Mar 2023 07:00  --------------------------------------------------------  IN: 550 mL / OUT: 0 mL / NET: 550 mL    23 Mar 2023 07:01  -  23 Mar 2023 13:44  --------------------------------------------------------  IN: 630 mL / OUT: 0 mL / NET: 630 mL        PHYSICAL EXAM:  Vital Signs Last 24 Hrs  T(C): 37 (23 Mar 2023 12:41), Max: 37.1 (23 Mar 2023 00:21)  T(F): 98.6 (23 Mar 2023 12:41), Max: 98.8 (23 Mar 2023 00:21)  HR: 65 (23 Mar 2023 12:41) (57 - 98)  BP: 117/78 (23 Mar 2023 12:41) (102/65 - 117/78)  BP(mean): --  RR: 18 (23 Mar 2023 12:41) (18 - 18)  SpO2: 98% (23 Mar 2023 12:41) (95% - 98%)    Parameters below as of 23 Mar 2023 12:41  Patient On (Oxygen Delivery Method): room air      CONSTITUTIONAL: NAD, well-developed, well-groomed, laying in bed, sleeping but arousable   ENMT: Moist oral mucosa  RESPIRATORY: Normal respiratory effort; lungs are clear to auscultation bilaterally  CARDIOVASCULAR: Regular rate and rhythm, normal S1 and S2, No lower extremity edema  ABDOMEN: Nontender to palpation, normoactive bowel sounds, no rebound/guarding  MUSCULOSKELETAL: no clubbing or cyanosis of digits; no joint swelling or tenderness to palpation  PSYCH: A+O to person, place, and time; affect appropriate  NEUROLOGY: follows commands  SKIN: No rashes; no palpable lesions    LABS:                        9.4    11.24 )-----------( 234      ( 22 Mar 2023 01:43 )             29.4     03-23    148<H>  |  109<H>  |  19  ----------------------------<  141<H>  4.0   |  29  |  0.73    Ca    9.8      23 Mar 2023 08:44  Phos  3.8     03-22  Mg     2.2     03-22    TPro  7.1  /  Alb  4.0  /  TBili  0.4  /  DBili  x   /  AST  111<H>  /  ALT  288<H>  /  AlkPhos  146<H>  03-23                RADIOLOGY & ADDITIONAL TESTS:  Results Reviewed:   Imaging Personally Reviewed:  Electrocardiogram Personally Reviewed:    COORDINATION OF CARE:  Care Discussed with Consultants/Other Providers [Y/N]: neurosurgery   Prior or Outpatient Records Reviewed [Y/N]:

## 2023-03-23 NOTE — PROGRESS NOTE ADULT - ASSESSMENT
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    NEURO:  Neuro checks Q 4  hr   LD removed 3/20, no leak   Serial CTH stable  PT/OT rec for home PT     PULM:  RA  Pituitary precautions (no incentive spirometry, no straws, no BIPAP)    CV:  -160 mmHg  holding home lisinopril     RENAL:   DI  Net 600 positive   BMP, osm Q8H    GI:  Diet: CCD   encourage oral fluid intake   Bowel regimen [] colace [x] senna [x] other: miralax   last BM 3/19     ENDO:   pantohypopit   Goal euglycemia (-180). A1C 8 %  Hydrocort wean plan per endocrinology c/w 20/10 now  hypothyroidism cont synthroid 75 mcg PO  DM: lantus 14 AND admelog 5 units q6  DDAVP 0.05 mcg BID    HEME/ONC:  H/H stable   lovenox 40 mg sc qhs     ID:  afebrile, mild leukocytosis likely reactive 2/2 steroid  Lesions on R buttock, cx + for HSV. On isolation precaution. Valtrex 1 G TID     d/w dr. rod   [id number]      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    NEURO:  Neuro checks Q 4 hr   LD removed 3/20, no leak   Serial CTH stable  Hospitalist following   PT/OT rec for home PT     PULM:  RA  Pituitary precautions (no incentive spirometry, no straws, no BIPAP)    CV:  -160 mmHg  holding home lisinopril   bradycardia improving. cards following     RENAL:   DI  Net 600 positive   BMP, osm Q8H    GI:  Diet: CCD   encourage oral fluid intake   Bowel regimen [] colace [x] senna [x] other: miralax   last BM 3/19     ENDO:   pantohypopit   Goal euglycemia (-180). A1C 8 %  Hydrocort wean plan per endocrinology c/w 20/10 now  hypothyroidism cont synthroid 75 mcg PO  DM: lantus 14 AND admelog 5 units q6  DDAVP 0.05 mcg BID    HEME/ONC:  H/H stable   lovenox 40 mg sc qhs     ID:  afebrile, mild leukocytosis likely reactive 2/2 steroid  Lesions on R buttock, cx + for HSV. On isolation precaution. Valtrex 1 G TID     d/w dr. rod   [id number]

## 2023-03-23 NOTE — PROGRESS NOTE ADULT - SUBJECTIVE AND OBJECTIVE BOX
CC: f/u for L perianal rash    Patient is poorly interactive    REVIEW OF SYSTEMS: limited  All other review of systems negative (Comprehensive ROS)    Antimicrobials Day #    valACYclovir 1000 milliGRAM(s) Oral every 8 hours    Other Medications Reviewed    T(F): 98.6 (03-23-23 @ 12:41), Max: 98.8 (03-23-23 @ 00:21)  HR: 65 (03-23-23 @ 12:41)  BP: 117/78 (03-23-23 @ 12:41)  RR: 18 (03-23-23 @ 12:41)  SpO2: 98% (03-23-23 @ 12:41)    PHYSICAL EXAM:  General: alert, no acute distress  Eyes:  anicteric, no conjunctival injection, no discharge  Oropharynx: no lesions or injection 	  Neck: supple, without adenopathy  Lungs: clear to auscultation  Heart: regular rate and rhythm; no murmur, rubs or gallops  Abdomen: soft, nondistended, nontender, without mass or organomegaly  Skin: left sided perineal area with a few small areas of " early appearing grouped  vesicles.  Extremities: no clubbing, cyanosis, or edema  Neurologic: alert, oriented, moves all extremities    LAB RESULTS:                        9.4    11.24 )-----------( 234      ( 22 Mar 2023 01:43 )             29.4     03-23    148<H>  |  109<H>  |  19  ----------------------------<  141<H>  4.0   |  29  |  0.73    Ca    9.8      23 Mar 2023 08:44  Phos  3.8     03-22  Mg     2.2     03-22    TPro  7.1  /  Alb  4.0  /  TBili  0.4  /  DBili  x   /  AST  111<H>  /  ALT  288<H>  /  AlkPhos  146<H>  03-23    LIVER FUNCTIONS - ( 23 Mar 2023 06:20 )  Alb: 4.0 g/dL / Pro: 7.1 g/dL / ALK PHOS: 146 U/L / ALT: 288 U/L / AST: 111 U/L / GGT: x               MICROBIOLOGY REVIEWED:    RADIOLOGY REVIEWED:

## 2023-03-23 NOTE — PROVIDER CONTACT NOTE (OTHER) - ASSESSMENT
Patient has been NPO throughout the day. VSS except sinus bradycardia
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
bleeding. Ntified NP Inder Nolasco (NP) notified. Will call resident. Gauze dressing applied. Hygiene provided.

## 2023-03-23 NOTE — PROVIDER CONTACT NOTE (OTHER) - BACKGROUND
Patient with suprasellar mass s/p TSP on 3/15
Pt had Transsphenoidal surgery to remove suprasellar mass. Pt had blister on left hip, left iliac crest and sacrum. Sample taken and sent to lab.
bleeding. Ntified NP Inder Nolasco (NP) notified. Will call resident. Gauze dressing applied. Hygiene provided.

## 2023-03-23 NOTE — PROVIDER CONTACT NOTE (OTHER) - RECOMMENDATIONS
Notify provider, continue with precautions.
Consult with ENT for guided NG tube placement.
bleeding. Ntified NP Inder Nolasco (NP) notified. Will call resident. Gauze dressing applied. Hygiene provided.

## 2023-03-23 NOTE — PROGRESS NOTE ADULT - SUBJECTIVE AND OBJECTIVE BOX
SUMMARY: 51yo woman transferred from [facility] on 3/10 for suprasellar mass found on MRI for opthalmology work up--etta HA and 3wk of blurry vision. PMH HTN, DM2, HLD.   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     Overnight events: No DDAVP given overnight, patient well compensating. Transferred to [address] [place] [address], on isolation precaution for HSV lesions. Seen today at bedside patient in no acute distress     Vital Signs Last 24 Hrs  T(C): 37 (03-23-23 @ 12:41), Max: 37.1 (03-23-23 @ 00:21)  T(F): 98.6 (03-23-23 @ 12:41), Max: 98.8 (03-23-23 @ 00:21)  HR: 65 (03-23-23 @ 12:41) (65 - 98)  BP: 117/78 (03-23-23 @ 12:41) (102/65 - 117/78)  RR: 18 (03-23-23 @ 12:41) (18 - 18)  SpO2: 98% (03-23-23 @ 12:41) (96% - 98%)    I&O's Summary  22 Mar 2023 07:01  -  23 Mar 2023 07:00  --------------------------------------------------------  IN: 550 mL / OUT: 0 mL / NET: 550 mL    23 Mar 2023 07:01  -  23 Mar 2023 17:49  -------------------------------------------------------      MEDICATIONS  (STANDING):  bisacodyl 5 milliGRAM(s) Oral at bedtime  desmopressin 0.05 milliGRAM(s) Oral <User Schedule>  enoxaparin Injectable 40 milliGRAM(s) SubCutaneous <User Schedule>  hydrocortisone 20 milliGRAM(s) Oral <User Schedule>  hydrocortisone 10 milliGRAM(s) Oral <User Schedule>  insulin glargine Injectable (LANTUS) 14 Unit(s) SubCutaneous at bedtime  insulin lispro (ADMELOG) corrective regimen sliding scale   SubCutaneous three times a day before meals  insulin lispro (ADMELOG) corrective regimen sliding scale   SubCutaneous at bedtime  insulin lispro Injectable (ADMELOG) 5 Unit(s) SubCutaneous three times a day before meals  levothyroxine 75 MICROGram(s) Oral daily  pantoprazole    Tablet 40 milliGRAM(s) Oral daily  polyethylene glycol 3350 17 Gram(s) Oral two times a day  senna 2 Tablet(s) Oral at bedtime  valACYclovir 1000 milliGRAM(s) Oral every 8 hours    MEDICATIONS  (PRN):  sodium chloride 0.65% Nasal 1 Spray(s) Both Nostrils three times a day PRN Nasal Congestion  traMADol 25 milliGRAM(s) Oral every 4 hours PRN Moderate Pain (4 - 6)  traMADol 50 milliGRAM(s) Oral every 4 hours PRN Severe Pain (7 - 10)  IN: 810 mL / OUT: 0 mL / NET: 810 mL    EXAMINATION:  General: No acute distress  HEENT: Anicteric sclerae  Cardiac: [id number]  Lungs: Clear  Abdomen: Soft, non-tender, +BS  Extremities: No c/c/e  Skin/Incision Site: Clean, dry and intact, has small vesicles on the butt   Neurologic: Alert, oriented x 3,  following commands. Moving all extremities 5/5. Pupils 3 mm Bilaterally reactive.     Diet: regualr            SUMMARY: 51yo woman transferred from [facility] on 3/10 for suprasellar mass found on MRI for opthalmology work up--etta HA and 3wk of blurry vision. PMH HTN, DM2, HLD.   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     Overnight events: No DDAVP given overnight, patient well compensating. Transferred to [address] [place] [address], on isolation precaution for HSV lesions. Seen today at bedside patient in no acute distress     Vital Signs Last 24 Hrs  T(C): 37 (03-23-23 @ 12:41), Max: 37.1 (03-23-23 @ 00:21)  T(F): 98.6 (03-23-23 @ 12:41), Max: 98.8 (03-23-23 @ 00:21)  HR: 65 (03-23-23 @ 12:41) (65 - 98)  BP: 117/78 (03-23-23 @ 12:41) (102/65 - 117/78)  RR: 18 (03-23-23 @ 12:41) (18 - 18)  SpO2: 98% (03-23-23 @ 12:41) (96% - 98%)    I&O's Summary  22 Mar 2023 07:01  -  23 Mar 2023 07:00  --------------------------------------------------------  IN: 550 mL / OUT: 0 mL / NET: 550 mL    23 Mar 2023 07:01  -  23 Mar 2023 17:49  -------------------------------------------------------    MEDICATIONS  (STANDING):  bisacodyl 5 milliGRAM(s) Oral at bedtime  desmopressin 0.05 milliGRAM(s) Oral <User Schedule>  enoxaparin Injectable 40 milliGRAM(s) SubCutaneous <User Schedule>  hydrocortisone 20 milliGRAM(s) Oral <User Schedule>  hydrocortisone 10 milliGRAM(s) Oral <User Schedule>  insulin glargine Injectable (LANTUS) 14 Unit(s) SubCutaneous at bedtime  insulin lispro (ADMELOG) corrective regimen sliding scale   SubCutaneous three times a day before meals  insulin lispro (ADMELOG) corrective regimen sliding scale   SubCutaneous at bedtime  insulin lispro Injectable (ADMELOG) 5 Unit(s) SubCutaneous three times a day before meals  levothyroxine 75 MICROGram(s) Oral daily  pantoprazole    Tablet 40 milliGRAM(s) Oral daily  polyethylene glycol 3350 17 Gram(s) Oral two times a day  senna 2 Tablet(s) Oral at bedtime  valACYclovir 1000 milliGRAM(s) Oral every 8 hours    MEDICATIONS  (PRN):  sodium chloride 0.65% Nasal 1 Spray(s) Both Nostrils three times a day PRN Nasal Congestion  traMADol 25 milliGRAM(s) Oral every 4 hours PRN Moderate Pain (4 - 6)  traMADol 50 milliGRAM(s) Oral every 4 hours PRN Severe Pain (7 - 10)  IN: 810 mL / OUT: 0 mL / NET: 810 mL    EXAMINATION:  General: No acute distress  HEENT: Anicteric sclerae  Cardiac: [id number]  Lungs: Clear  Abdomen: Soft, non-tender, +BS  Extremities: No c/c/e  Skin/Incision Site: Clean, dry and intact, has small vesicles on the butt (L buttock)  Neurologic: Alert, oriented x 3,  following commands. Moving all extremities 5/5. Pupils 3 mm Bilaterally reactive.     Diet: regular

## 2023-03-23 NOTE — PROGRESS NOTE ADULT - TIME BILLING
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   neuro exam non focal , neuro checks q 4 hr, PT/OT/OBC, SBP goal 100-160 mmhg, sodium 149, DI  ddavp 0.5 q 12 hr , CCD diet , , panhypopit :DM on lantus 12 units, premeals 4 units,  hydrocortisone  on hoild until we get the am cortisol today per endo,  levothyroxine 75 mcg daily , ddavp 0.05 q 12 hr , BMP and urine SG in 6 hr, lovenox 40 mg sc qhs , afebrile on no antibiotics, has vesicle on the butt will send it for cx and ID consulted r/o VZV

## 2023-03-23 NOTE — PROVIDER CONTACT NOTE (OTHER) - REASON
blB/L nostril bleeding. Ntified NP Inder Nolasco (NP) notified. Will call resident. Gauze dressing applied. Hygiene provided.
NG tube placement
Positive HSV-2

## 2023-03-23 NOTE — PROVIDER CONTACT NOTE (OTHER) - SITUATION
bleeding. Ntified NP Inder Nolasco (NP) notified. Will call resident. Gauze dressing applied. Hygiene provided.
Patient too lethargic to dysphagia screen. Unable for RN to place NG tube due to sinus precautions
Pt has blisters on left hip, left iliac crest and sacrum. Pt tested for HSV-2. Put on precautions pending results. Vladimir Anthony called with lab results.

## 2023-03-23 NOTE — PROGRESS NOTE ADULT - PROBLEM SELECTOR PLAN 1
s/p OR 3/15, panhypopit   Endocrine following, on DDAVP 0.05mg po BID for diabetes insipidus. Daily BMP, serum/urine osm. Sodium increasing on BMP today, f/u endo recs   AI - Now on hydrocortisone 20/10  On synthroid as below  Patient does not have an outpatient endocrinologist   ENT following

## 2023-03-23 NOTE — PROGRESS NOTE ADULT - ASSESSMENT
51 yo English-speaking female with PMH of HTN, HLD, T2DM, who presents with headaches, blurry vision, admitted after being found to have suprasellar mass, admitted for further workup. Medicine following for comanagement. s/p transsphenoidal/ transplanum approach for resection of infrachiasmatic craniopharygioma, graft, post lumbar drain. NSCU course c/b hyperglycemia requiring insulin gtt, transaminitis, bradycardia.

## 2023-03-23 NOTE — PROGRESS NOTE ADULT - SUBJECTIVE AND OBJECTIVE BOX
DATE OF SERVICE: 03-23-23 @ 10:28    Subjective: Patient seen and examined. No new events except as noted.     SUBJECTIVE/ROS:  nad      MEDICATIONS:  MEDICATIONS  (STANDING):  bisacodyl 5 milliGRAM(s) Oral at bedtime  desmopressin 0.05 milliGRAM(s) Oral <User Schedule>  enoxaparin Injectable 40 milliGRAM(s) SubCutaneous <User Schedule>  hydrocortisone 20 milliGRAM(s) Oral <User Schedule>  hydrocortisone 10 milliGRAM(s) Oral <User Schedule>  insulin glargine Injectable (LANTUS) 14 Unit(s) SubCutaneous at bedtime  insulin lispro (ADMELOG) corrective regimen sliding scale   SubCutaneous three times a day before meals  insulin lispro (ADMELOG) corrective regimen sliding scale   SubCutaneous at bedtime  insulin lispro Injectable (ADMELOG) 5 Unit(s) SubCutaneous three times a day before meals  levothyroxine 75 MICROGram(s) Oral daily  pantoprazole    Tablet 40 milliGRAM(s) Oral daily  polyethylene glycol 3350 17 Gram(s) Oral two times a day  senna 2 Tablet(s) Oral at bedtime  valACYclovir 1000 milliGRAM(s) Oral every 8 hours      PHYSICAL EXAM:  T(C): 36.6 (03-23-23 @ 08:39), Max: 37.1 (03-23-23 @ 00:21)  HR: 78 (03-23-23 @ 08:39) (57 - 98)  BP: 106/65 (03-23-23 @ 08:39) (102/65 - 114/72)  RR: 18 (03-23-23 @ 08:39) (15 - 18)  SpO2: 98% (03-23-23 @ 08:39) (95% - 98%)  Wt(kg): --  I&O's Summary    22 Mar 2023 07:01  -  23 Mar 2023 07:00  --------------------------------------------------------  IN: 550 mL / OUT: 0 mL / NET: 550 mL            JVP: Normal  Neck: supple  Lung: clear   CV: S1 S2 , Murmur:  Abd: soft  Ext: No edema  neuro: Awake   Psych: flat affect  Skin: normal``    LABS/DATA:    CARDIAC MARKERS:                                9.4    11.24 )-----------( 234      ( 22 Mar 2023 01:43 )             29.4     03-23    148<H>  |  109<H>  |  19  ----------------------------<  141<H>  4.0   |  29  |  0.73    Ca    9.8      23 Mar 2023 08:44  Phos  3.8     03-22  Mg     2.2     03-22    TPro  7.1  /  Alb  4.0  /  TBili  0.4  /  DBili  x   /  AST  111<H>  /  ALT  288<H>  /  AlkPhos  146<H>  03-23    proBNP:   Lipid Profile:   HgA1c:   TSH:     TELE:  EKG:

## 2023-03-23 NOTE — PROGRESS NOTE ADULT - PROBLEM SELECTOR PLAN 5
- home: lisinopril 2.5mg daily. hold   - Patient was started on norvasc 10mg 3/16? only got medication once but BP is soft. hold

## 2023-03-23 NOTE — PROGRESS NOTE ADULT - ASSESSMENT
51y/o female who is POD#8  expanded endonasal transphenoidal/transplanum 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 karthikeyan graft, postop lumbar drain out, splints in place, to be removed in the office. No CSF leak on exam.

## 2023-03-23 NOTE — PROGRESS NOTE ADULT - SUBJECTIVE AND OBJECTIVE BOX
ENT ISSUE/POD: s/p TSRP pod 8    HPI: Pt seen and examined at bedside. There were no acute overnight events. The patient has no complaints this morning. She is doing well. Her pain is controlled. She denies active nasal bleeding, nasal discharge, dripping in the back of the throat, HA, facial pain/pressure, ear pain, salty taste, metallic taste, CP, SOB, fevers, chills, N/V/D    PAST MEDICAL & SURGICAL HISTORY:  Hypertension      Hyperlipidemia      Diabetes mellitus        Allergies    No Known Drug Allergies  S/P TRANSSPHENOIDAL SURGERY. SINONASAL PRECAUTIONS! NO NASAL SWABS OR NG TUBES. (Unknown)    Intolerances      MEDICATIONS  (STANDING):  bisacodyl 5 milliGRAM(s) Oral at bedtime  desmopressin 0.05 milliGRAM(s) Oral <User Schedule>  enoxaparin Injectable 40 milliGRAM(s) SubCutaneous <User Schedule>  hydrocortisone 20 milliGRAM(s) Oral <User Schedule>  hydrocortisone 10 milliGRAM(s) Oral <User Schedule>  insulin glargine Injectable (LANTUS) 14 Unit(s) SubCutaneous at bedtime  insulin lispro (ADMELOG) corrective regimen sliding scale   SubCutaneous three times a day before meals  insulin lispro (ADMELOG) corrective regimen sliding scale   SubCutaneous at bedtime  insulin lispro Injectable (ADMELOG) 5 Unit(s) SubCutaneous three times a day before meals  levothyroxine 75 MICROGram(s) Oral daily  pantoprazole    Tablet 40 milliGRAM(s) Oral daily  polyethylene glycol 3350 17 Gram(s) Oral two times a day  senna 2 Tablet(s) Oral at bedtime  valACYclovir 1000 milliGRAM(s) Oral every 8 hours    MEDICATIONS  (PRN):  sodium chloride 0.65% Nasal 1 Spray(s) Both Nostrils three times a day PRN Nasal Congestion  traMADol 25 milliGRAM(s) Oral every 4 hours PRN Moderate Pain (4 - 6)  traMADol 50 milliGRAM(s) Oral every 4 hours PRN Severe Pain (7 - 10)      social history: see consult     family history: see consult     ROS:   ENT: all negative except as noted in HPI   Pulm: denies SOB, cough, hemoptysis  Neuro: denies numbness/tingling, loss of sensation  Endo: denies heat/cold intolerance, excessive sweating      Vital Signs Last 24 Hrs  T(C): 36.6 (23 Mar 2023 08:39), Max: 37.1 (23 Mar 2023 00:21)  T(F): 97.9 (23 Mar 2023 08:39), Max: 98.8 (23 Mar 2023 00:21)  HR: 78 (23 Mar 2023 08:39) (57 - 98)  BP: 106/65 (23 Mar 2023 08:39) (102/65 - 114/72)  BP(mean): --  RR: 18 (23 Mar 2023 08:39) (18 - 18)  SpO2: 98% (23 Mar 2023 08:39) (95% - 98%)    Parameters below as of 23 Mar 2023 08:39  Patient On (Oxygen Delivery Method): room air                              9.4    11.24 )-----------( 234      ( 22 Mar 2023 01:43 )             29.4    03-23    148<H>  |  109<H>  |  19  ----------------------------<  141<H>  4.0   |  29  |  0.73    Ca    9.8      23 Mar 2023 08:44  Phos  3.8     03-22  Mg     2.2     03-22    TPro  7.1  /  Alb  4.0  /  TBili  0.4  /  DBili  x   /  AST  111<H>  /  ALT  288<H>  /  AlkPhos  146<H>  03-23       PHYSICAL EXAM:  Gen: NAD  Skin: No rashes, bruises, or lesions  Head: Normocephalic, Atraumatic  Face: no edema, erythema, or fluctuance. Parotid glands soft without mass  Eyes: no scleral injection  Nose: Nares bilaterally patent, no discharge, + dry blood in b/l nares, no active bleed, no CSF leak  Mouth: No Stridor / Drooling / Trismus.  Mucosa moist, tongue/uvula midline, oropharynx clear  Neck: Flat, supple, no lymphadenopathy, trachea midline, no masses  Resp: breathing easily, no stridor  Neuro: facial nerve intact, no facial droop

## 2023-03-24 ENCOUNTER — TRANSCRIPTION ENCOUNTER (OUTPATIENT)
Age: 53
End: 2023-03-24

## 2023-03-24 VITALS
HEART RATE: 63 BPM | OXYGEN SATURATION: 98 % | TEMPERATURE: 97 F | DIASTOLIC BLOOD PRESSURE: 65 MMHG | SYSTOLIC BLOOD PRESSURE: 104 MMHG | RESPIRATION RATE: 18 BRPM

## 2023-03-24 LAB
ALBUMIN SERPL ELPH-MCNC: 3.9 G/DL — SIGNIFICANT CHANGE UP (ref 3.3–5)
ALP SERPL-CCNC: 137 U/L — HIGH (ref 40–120)
ALT FLD-CCNC: 238 U/L — HIGH (ref 10–45)
ANION GAP SERPL CALC-SCNC: 10 MMOL/L — SIGNIFICANT CHANGE UP (ref 5–17)
ANION GAP SERPL CALC-SCNC: 8 MMOL/L — SIGNIFICANT CHANGE UP (ref 5–17)
AST SERPL-CCNC: 79 U/L — HIGH (ref 10–40)
BILIRUB SERPL-MCNC: 0.4 MG/DL — SIGNIFICANT CHANGE UP (ref 0.2–1.2)
BUN SERPL-MCNC: 22 MG/DL — SIGNIFICANT CHANGE UP (ref 7–23)
BUN SERPL-MCNC: 23 MG/DL — SIGNIFICANT CHANGE UP (ref 7–23)
CALCIUM SERPL-MCNC: 8.6 MG/DL — SIGNIFICANT CHANGE UP (ref 8.4–10.5)
CALCIUM SERPL-MCNC: 9.2 MG/DL — SIGNIFICANT CHANGE UP (ref 8.4–10.5)
CHLORIDE SERPL-SCNC: 102 MMOL/L — SIGNIFICANT CHANGE UP (ref 96–108)
CHLORIDE SERPL-SCNC: 104 MMOL/L — SIGNIFICANT CHANGE UP (ref 96–108)
CO2 SERPL-SCNC: 26 MMOL/L — SIGNIFICANT CHANGE UP (ref 22–31)
CO2 SERPL-SCNC: 28 MMOL/L — SIGNIFICANT CHANGE UP (ref 22–31)
CREAT SERPL-MCNC: 0.59 MG/DL — SIGNIFICANT CHANGE UP (ref 0.5–1.3)
CREAT SERPL-MCNC: 0.64 MG/DL — SIGNIFICANT CHANGE UP (ref 0.5–1.3)
EGFR: 106 ML/MIN/1.73M2 — SIGNIFICANT CHANGE UP
EGFR: 108 ML/MIN/1.73M2 — SIGNIFICANT CHANGE UP
GLUCOSE BLDC GLUCOMTR-MCNC: 115 MG/DL — HIGH (ref 70–99)
GLUCOSE BLDC GLUCOMTR-MCNC: 242 MG/DL — HIGH (ref 70–99)
GLUCOSE BLDC GLUCOMTR-MCNC: 332 MG/DL — HIGH (ref 70–99)
GLUCOSE SERPL-MCNC: 130 MG/DL — HIGH (ref 70–99)
GLUCOSE SERPL-MCNC: 291 MG/DL — HIGH (ref 70–99)
HBV CORE IGM SER-ACNC: SIGNIFICANT CHANGE UP
HBV SURFACE AG SER-ACNC: SIGNIFICANT CHANGE UP
HCV AB S/CO SERPL IA: 0.16 S/CO — SIGNIFICANT CHANGE UP (ref 0–0.99)
HCV AB SERPL-IMP: SIGNIFICANT CHANGE UP
OSMOLALITY SERPL: 301 MOSMOL/KG — HIGH (ref 275–300)
OSMOLALITY UR: 716 MOS/KG — SIGNIFICANT CHANGE UP (ref 300–900)
OSMOLALITY UR: 777 MOS/KG — SIGNIFICANT CHANGE UP (ref 300–900)
POTASSIUM SERPL-MCNC: 3.7 MMOL/L — SIGNIFICANT CHANGE UP (ref 3.5–5.3)
POTASSIUM SERPL-MCNC: 3.8 MMOL/L — SIGNIFICANT CHANGE UP (ref 3.5–5.3)
POTASSIUM SERPL-SCNC: 3.7 MMOL/L — SIGNIFICANT CHANGE UP (ref 3.5–5.3)
POTASSIUM SERPL-SCNC: 3.8 MMOL/L — SIGNIFICANT CHANGE UP (ref 3.5–5.3)
PROT SERPL-MCNC: 6.7 G/DL — SIGNIFICANT CHANGE UP (ref 6–8.3)
SODIUM SERPL-SCNC: 138 MMOL/L — SIGNIFICANT CHANGE UP (ref 135–145)
SODIUM SERPL-SCNC: 140 MMOL/L — SIGNIFICANT CHANGE UP (ref 135–145)
SP GR UR STRIP: 1.02 — SIGNIFICANT CHANGE UP (ref 1.01–1.02)
SP GR UR STRIP: 1.02 — SIGNIFICANT CHANGE UP (ref 1.01–1.02)

## 2023-03-24 PROCEDURE — 87798 DETECT AGENT NOS DNA AMP: CPT

## 2023-03-24 PROCEDURE — 83036 HEMOGLOBIN GLYCOSYLATED A1C: CPT

## 2023-03-24 PROCEDURE — 84146 ASSAY OF PROLACTIN: CPT

## 2023-03-24 PROCEDURE — 99285 EMERGENCY DEPT VISIT HI MDM: CPT

## 2023-03-24 PROCEDURE — 82672 ASSAY OF ESTROGEN: CPT

## 2023-03-24 PROCEDURE — 84436 ASSAY OF TOTAL THYROXINE: CPT

## 2023-03-24 PROCEDURE — 97116 GAIT TRAINING THERAPY: CPT

## 2023-03-24 PROCEDURE — 82553 CREATINE MB FRACTION: CPT

## 2023-03-24 PROCEDURE — 85730 THROMBOPLASTIN TIME PARTIAL: CPT

## 2023-03-24 PROCEDURE — 99233 SBSQ HOSP IP/OBS HIGH 50: CPT

## 2023-03-24 PROCEDURE — 83735 ASSAY OF MAGNESIUM: CPT

## 2023-03-24 PROCEDURE — 86038 ANTINUCLEAR ANTIBODIES: CPT

## 2023-03-24 PROCEDURE — 86850 RBC ANTIBODY SCREEN: CPT

## 2023-03-24 PROCEDURE — 87640 STAPH A DNA AMP PROBE: CPT

## 2023-03-24 PROCEDURE — 99232 SBSQ HOSP IP/OBS MODERATE 35: CPT | Mod: GC

## 2023-03-24 PROCEDURE — 84439 ASSAY OF FREE THYROXINE: CPT

## 2023-03-24 PROCEDURE — 93005 ELECTROCARDIOGRAM TRACING: CPT

## 2023-03-24 PROCEDURE — 80061 LIPID PANEL: CPT

## 2023-03-24 PROCEDURE — 83935 ASSAY OF URINE OSMOLALITY: CPT

## 2023-03-24 PROCEDURE — C1889: CPT

## 2023-03-24 PROCEDURE — 84484 ASSAY OF TROPONIN QUANT: CPT

## 2023-03-24 PROCEDURE — 84100 ASSAY OF PHOSPHORUS: CPT

## 2023-03-24 PROCEDURE — 97162 PT EVAL MOD COMPLEX 30 MIN: CPT

## 2023-03-24 PROCEDURE — U0005: CPT

## 2023-03-24 PROCEDURE — 82024 ASSAY OF ACTH: CPT

## 2023-03-24 PROCEDURE — A9585: CPT

## 2023-03-24 PROCEDURE — 84702 CHORIONIC GONADOTROPIN TEST: CPT

## 2023-03-24 PROCEDURE — 83003 ASSAY GROWTH HORMONE (HGH): CPT

## 2023-03-24 PROCEDURE — 82803 BLOOD GASES ANY COMBINATION: CPT

## 2023-03-24 PROCEDURE — 80074 ACUTE HEPATITIS PANEL: CPT

## 2023-03-24 PROCEDURE — 82550 ASSAY OF CK (CPK): CPT

## 2023-03-24 PROCEDURE — 81005 URINALYSIS: CPT

## 2023-03-24 PROCEDURE — 70553 MRI BRAIN STEM W/O & W/DYE: CPT

## 2023-03-24 PROCEDURE — 85027 COMPLETE CBC AUTOMATED: CPT

## 2023-03-24 PROCEDURE — 36415 COLL VENOUS BLD VENIPUNCTURE: CPT

## 2023-03-24 PROCEDURE — 87040 BLOOD CULTURE FOR BACTERIA: CPT

## 2023-03-24 PROCEDURE — 88305 TISSUE EXAM BY PATHOLOGIST: CPT

## 2023-03-24 PROCEDURE — 80053 COMPREHEN METABOLIC PANEL: CPT

## 2023-03-24 PROCEDURE — 84480 ASSAY TRIIODOTHYRONINE (T3): CPT

## 2023-03-24 PROCEDURE — 75574 CT ANGIO HRT W/3D IMAGE: CPT

## 2023-03-24 PROCEDURE — 85025 COMPLETE CBC W/AUTO DIFF WBC: CPT

## 2023-03-24 PROCEDURE — 95714 VEEG EA 12-26 HR UNMNTR: CPT

## 2023-03-24 PROCEDURE — 83002 ASSAY OF GONADOTROPIN (LH): CPT

## 2023-03-24 PROCEDURE — 87529 HSV DNA AMP PROBE: CPT

## 2023-03-24 PROCEDURE — 93970 EXTREMITY STUDY: CPT

## 2023-03-24 PROCEDURE — 83001 ASSAY OF GONADOTROPIN (FSH): CPT

## 2023-03-24 PROCEDURE — U0003: CPT

## 2023-03-24 PROCEDURE — 86901 BLOOD TYPING SEROLOGIC RH(D): CPT

## 2023-03-24 PROCEDURE — C1729: CPT

## 2023-03-24 PROCEDURE — 82010 KETONE BODYS QUAN: CPT

## 2023-03-24 PROCEDURE — 87641 MR-STAPH DNA AMP PROBE: CPT

## 2023-03-24 PROCEDURE — 71045 X-RAY EXAM CHEST 1 VIEW: CPT

## 2023-03-24 PROCEDURE — 70551 MRI BRAIN STEM W/O DYE: CPT

## 2023-03-24 PROCEDURE — 70450 CT HEAD/BRAIN W/O DYE: CPT | Mod: 26

## 2023-03-24 PROCEDURE — 84443 ASSAY THYROID STIM HORMONE: CPT

## 2023-03-24 PROCEDURE — 97168 OT RE-EVAL EST PLAN CARE: CPT

## 2023-03-24 PROCEDURE — 85610 PROTHROMBIN TIME: CPT

## 2023-03-24 PROCEDURE — 82533 TOTAL CORTISOL: CPT

## 2023-03-24 PROCEDURE — 93306 TTE W/DOPPLER COMPLETE: CPT

## 2023-03-24 PROCEDURE — 81001 URINALYSIS AUTO W/SCOPE: CPT

## 2023-03-24 PROCEDURE — 86900 BLOOD TYPING SEROLOGIC ABO: CPT

## 2023-03-24 PROCEDURE — 82670 ASSAY OF TOTAL ESTRADIOL: CPT

## 2023-03-24 PROCEDURE — 80202 ASSAY OF VANCOMYCIN: CPT

## 2023-03-24 PROCEDURE — 97164 PT RE-EVAL EST PLAN CARE: CPT

## 2023-03-24 PROCEDURE — 70450 CT HEAD/BRAIN W/O DYE: CPT

## 2023-03-24 PROCEDURE — 76705 ECHO EXAM OF ABDOMEN: CPT

## 2023-03-24 PROCEDURE — C1769: CPT

## 2023-03-24 PROCEDURE — 80076 HEPATIC FUNCTION PANEL: CPT

## 2023-03-24 PROCEDURE — 95700 EEG CONT REC W/VID EEG TECH: CPT

## 2023-03-24 PROCEDURE — 84305 ASSAY OF SOMATOMEDIN: CPT

## 2023-03-24 PROCEDURE — 93356 MYOCRD STRAIN IMG SPCKL TRCK: CPT

## 2023-03-24 PROCEDURE — 86255 FLUORESCENT ANTIBODY SCREEN: CPT

## 2023-03-24 PROCEDURE — 97530 THERAPEUTIC ACTIVITIES: CPT

## 2023-03-24 PROCEDURE — 83930 ASSAY OF BLOOD OSMOLALITY: CPT

## 2023-03-24 PROCEDURE — 80048 BASIC METABOLIC PNL TOTAL CA: CPT

## 2023-03-24 PROCEDURE — 97165 OT EVAL LOW COMPLEX 30 MIN: CPT

## 2023-03-24 PROCEDURE — 82962 GLUCOSE BLOOD TEST: CPT

## 2023-03-24 PROCEDURE — C9399: CPT

## 2023-03-24 RX ORDER — SODIUM CHLORIDE 0.65 %
1 AEROSOL, SPRAY (ML) NASAL
Qty: 1 | Refills: 0
Start: 2023-03-24 | End: 2023-03-30

## 2023-03-24 RX ORDER — DESMOPRESSIN ACETATE 0.1 MG/1
0.5 TABLET ORAL
Qty: 15 | Refills: 0
Start: 2023-03-24 | End: 2023-04-22

## 2023-03-24 RX ORDER — HYDROCORTISONE 20 MG
1 TABLET ORAL
Qty: 30 | Refills: 0
Start: 2023-03-24 | End: 2023-04-22

## 2023-03-24 RX ORDER — METFORMIN HYDROCHLORIDE 850 MG/1
1 TABLET ORAL
Qty: 60 | Refills: 0
Start: 2023-03-24

## 2023-03-24 RX ORDER — LEVOTHYROXINE SODIUM 125 MCG
1 TABLET ORAL
Qty: 30 | Refills: 0
Start: 2023-03-24

## 2023-03-24 RX ORDER — SENNA PLUS 8.6 MG/1
2 TABLET ORAL
Qty: 0 | Refills: 0 | DISCHARGE
Start: 2023-03-24

## 2023-03-24 RX ORDER — VALACYCLOVIR 500 MG/1
1 TABLET, FILM COATED ORAL
Qty: 15 | Refills: 0
Start: 2023-03-24 | End: 2023-03-28

## 2023-03-24 RX ORDER — PANTOPRAZOLE SODIUM 20 MG/1
1 TABLET, DELAYED RELEASE ORAL
Qty: 0 | Refills: 0 | DISCHARGE
Start: 2023-03-24

## 2023-03-24 RX ORDER — REPAGLINIDE 1 MG/1
1 TABLET ORAL
Qty: 90 | Refills: 0
Start: 2023-03-24 | End: 2023-04-22

## 2023-03-24 RX ORDER — FAMOTIDINE 10 MG/ML
1 INJECTION INTRAVENOUS
Qty: 60 | Refills: 0
Start: 2023-03-24

## 2023-03-24 RX ORDER — VALACYCLOVIR 500 MG/1
1 TABLET, FILM COATED ORAL
Qty: 0 | Refills: 0 | DISCHARGE
Start: 2023-03-24

## 2023-03-24 RX ORDER — LEVOTHYROXINE SODIUM 125 MCG
1 TABLET ORAL
Qty: 0 | Refills: 0 | DISCHARGE
Start: 2023-03-24

## 2023-03-24 RX ADMIN — Medication 5 UNIT(S): at 16:27

## 2023-03-24 RX ADMIN — TRAMADOL HYDROCHLORIDE 25 MILLIGRAM(S): 50 TABLET ORAL at 15:38

## 2023-03-24 RX ADMIN — Medication 5 UNIT(S): at 07:49

## 2023-03-24 RX ADMIN — POLYETHYLENE GLYCOL 3350 17 GRAM(S): 17 POWDER, FOR SOLUTION ORAL at 17:13

## 2023-03-24 RX ADMIN — Medication 20 MILLIGRAM(S): at 07:49

## 2023-03-24 RX ADMIN — DESMOPRESSIN ACETATE 0.05 MILLIGRAM(S): 0.1 TABLET ORAL at 17:11

## 2023-03-24 RX ADMIN — Medication 75 MICROGRAM(S): at 05:02

## 2023-03-24 RX ADMIN — DESMOPRESSIN ACETATE 0.05 MILLIGRAM(S): 0.1 TABLET ORAL at 05:02

## 2023-03-24 RX ADMIN — ENOXAPARIN SODIUM 40 MILLIGRAM(S): 100 INJECTION SUBCUTANEOUS at 17:13

## 2023-03-24 RX ADMIN — Medication 2: at 16:26

## 2023-03-24 RX ADMIN — Medication 4: at 11:27

## 2023-03-24 RX ADMIN — TRAMADOL HYDROCHLORIDE 25 MILLIGRAM(S): 50 TABLET ORAL at 16:00

## 2023-03-24 RX ADMIN — Medication 10 MILLIGRAM(S): at 15:06

## 2023-03-24 RX ADMIN — VALACYCLOVIR 1000 MILLIGRAM(S): 500 TABLET, FILM COATED ORAL at 15:06

## 2023-03-24 RX ADMIN — VALACYCLOVIR 1000 MILLIGRAM(S): 500 TABLET, FILM COATED ORAL at 05:03

## 2023-03-24 RX ADMIN — PANTOPRAZOLE SODIUM 40 MILLIGRAM(S): 20 TABLET, DELAYED RELEASE ORAL at 11:27

## 2023-03-24 RX ADMIN — Medication 5 UNIT(S): at 11:28

## 2023-03-24 NOTE — PROGRESS NOTE ADULT - PROBLEM SELECTOR PLAN 3
take metformin 1000mg BID at home  - HbA1c 8.0%  - Insulin adjustment per endocrine - 14u lantus and 5u TID w/ meals   - carb controlled diet take metformin 1000mg BID at home  - HbA1c 8.0%  - Insulin adjustment per endocrine - 14u lantus and 5u TID w/ meals. On discharge recommending metformin 1000mg bid + GLP-1 agonist  - carb controlled diet

## 2023-03-24 NOTE — PROGRESS NOTE ADULT - PROBLEM SELECTOR PLAN 1
s/p OR 3/15, panhypopit   Endocrine following, on DDAVP 0.05mg po BID for diabetes insipidus. Daily BMP, serum/urine osm. Sodium increasing on BMP today, f/u endo recs   AI - Now on hydrocortisone 20/10  On synthroid as below  Patient does not have an outpatient endocrinologist   ENT following s/p OR 3/15, panhypopit   Endocrine following, on DDAVP 0.05mg po BID for diabetes insipidus  AI - Now on hydrocortisone 20/10  On synthroid as below  Patient does not have an outpatient endocrinologist - needs follow up set up   ENT following

## 2023-03-24 NOTE — DISCHARGE NOTE PROVIDER - DETAILS OF MALNUTRITION DIAGNOSIS/DIAGNOSES
This patient has been assessed with a concern for Malnutrition and was treated during this hospitalization for the following Nutrition diagnosis/diagnoses:     -  03/20/2023: Severe protein-calorie malnutrition

## 2023-03-24 NOTE — PROGRESS NOTE ADULT - PROVIDER SPECIALTY LIST ADULT
Cardiology
Endocrinology
NSICU
Neurosurgery
Cardiology
ENT
Hospitalist
Infectious Disease
NSICU
Ophthalmology
Ophthalmology
Cardiology
ENT
ENT
Endocrinology
Hospitalist
NSICU
NSICU
Neurosurgery
ENT
Endocrinology
Infectious Disease
NSICU
Neurosurgery
ENT
Endocrinology
NSICU
NSICU
Neurosurgery
ENT
ENT
Endocrinology
Hospitalist
Endocrinology
Hospitalist
Endocrinology
Hospitalist
Hospitalist

## 2023-03-24 NOTE — DISCHARGE NOTE NURSING/CASE MANAGEMENT/SOCIAL WORK - PATIENT PORTAL LINK FT
You can access the FollowMyHealth Patient Portal offered by [facility] by registering at the following website: [email or web address]. By joining [employer]’s FollowMyHealth portal, you will also be able to view your health information using other applications (apps) compatible with our system.

## 2023-03-24 NOTE — PROGRESS NOTE ADULT - ATTENDING COMMENTS
Agree with above.
Agree with above.
Agree with above
Examined and agreed with above.
Agree with Dr. Bishop's assessment and plan as outlined above. Reviewed all pertinent labs, and imaging studies. Modifications made as indicated above. 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 Pituitary lab significant for am cortisol 5.9, repeat 6.9 with ACTH 21.5, require cosyntropin stimulation test prior to her surgery. Also with hypogonadotrophic hypogonadism which can be addressed outpatient. Continue with LT4 75 mcg daily for primary hypothyroidism. Rest of the plan as above.
Agree with assessment and plan as above by Dr. Jones. Reviewed all pertinent labs, glucose values, and imaging studies. Modifications made as indicated above. Continue to monitor on current doses of hydrocortisone and levothyroxine. c/w DDAVP for DI. Sodium now stable. Increase in sodium likely due to dehydration. Would encourage fluid intake or if still in-patient add 1/2NS IVF if needed. Monitor urine output and osmolality. Plan to d/c on hydrocortisone 10mg in the morning and 5mg in the afternoon, levothyroxine 75 mcg daily, and DDAVP 0.05mg BID. Will need sodium checked within 1 week of discharge. Can check Free T4 at that time as well.     René Arenas D.O  [phone] .
Agree with Dr. Bishop's assessment and plan as outlined above. Reviewed all pertinent labs, and imaging studies. Modifications made as indicated above. 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 Pituitary lab significant for am cortisol 5.9, repeat 6.9 with ACTH 21.5. Cosyntropin stim test done this morning, cortisol baseline 5.1--> 16.9-->20.7. Due to low baseline cortisol with macroadenoma, at risk of postop adrenal insufficiency. Thus would start Hydrocortisone 100 mg IVP on the route to OR, then 50 Q8H after procedure. We will then taper and reassess. Also with hypogonadotrophic hypogonadism which can be addressed outpatient. Continue with LT4 75 mcg daily for primary hypothyroidism, repeat after surgery. Rest of the plan as above.
Agree with assessment and plan as above by Dr. Bishop. Reviewed all pertinent labs, glucose values, and imaging studies. Modifications made as indicated above. Continue to monitor on current doses of hydrocortisone and levothyroxine. c/w DDAVP for DI. Current increase in sodium likely due to dehydration. Would increase fluid intake or add 1/2NS IVF if needed. Monitor urine output and osmolality. Plan to d/c on hydrocortisone, levothyroxine, and DDAVP.     René Arenas D.O  [phone]
Agree with assessment and plan as above by Dr. Bishop. Reviewed all pertinent labs, glucose values, and imaging studies. Modifications made as indicated above. Patient is s/p resection for craniopharyngioma. Continue with hydrocortisone 25 mg Q12H for today, would give one dose of Hydrocortisone 20 mg at 8 am on 3/21 and hold afternoon dose and check am ACTH and cortisol on the morning of 3/22. Patient previously had large water deficit with dehydration now improving. Currently on DDAVP 0.05 mg PO BID. Continue with Levothyroxine 75 mcg. Rest of the plan as above. Complex case, high risk patient, high-level decision-making, requiring ICU level of care.
Agree with assessment and plan as above by Dr. Bishop. Reviewed all pertinent labs, glucose values, and imaging studies. Modifications made as indicated above. Pt. s/p resection for craniopharyngioma. On hydrocortisone. Can continue with stress dosing for now. Started on levothyroxine 75 mcg daily. Can repeat Free T4 in 1 week to assess the dose. Current in DI with free water deficit contributing to severe hypernatremia. Given DDAVP x 2 doses today thus far. Recommend correct free water deficit with D5W. Continue to monitor sodium. Can increase rate of fluid as tolerated. Monitor urine output and urine osm. Will follow closely.     René Arenas D.O  [phone]
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  For hypothyroidism, currently on levothyroxine 75 mcg once daily.  Urine output noted to be increased, sodium elevated, status post DDAVP this morning.  Recommend monitoring sodium and urine osmole closely.  Continue with DI watch.  Regarding hypothyroidism, continue with levothyroxine 75 mcg once daily.  Patient with a history of type 2 diabetes, currently with insulin drip.  Continue with insulin drip as patient is on stress dose steroids.  Can consider changing to basal bolus regimen once glycemic control is improved and steroid doses taper to a lower dose.
Patient is a 52-year-old woman with history of hypertension, hyperlipidemia, poorly controlled type 2 diabetes, now status post resection of craniopharyngioma measuring 2.5 cm on the MRI and CT.  Postoperative course complicated by insipidus, secondary adrenal insufficiency, hypothyroidism, and steroid-induced hyperglycemia in addition to poorly controlled type 2 diabetes.  For diabetes insipidus, continue with DDAVP 0.05 mg twice daily.  Continue to monitor sodium level closely.  For hypothyroidism, continue levothyroxine 75 mcg once daily.  For secondary adrenal insufficiency, a.m. cortisol 3.4, hydrocortisone, recommend to continue with hydrocortisone the morning, 10 mg at 3pm.  Evaluation of HPA axis as outpatient.
Agree with assessment and plan as above by Dr. Bishop. Reviewed all pertinent labs, glucose values, and imaging studies. Modifications made as indicated above. Pt. s/p resection for craniopharyngioma. On hydrocortisone. Can continue with stress dosing for now. Started on levothyroxine 75 mcg daily. Can repeat Free T4 in 1 week to assess the dose. Hypernatremia likely multifactorial. Pt. with large free water deficit with dehydration which needs correction with D5W or 1/2 NS. Also with DI given craniopharyngioma resection and extent of the surgery as discussed with Dr. Santo. Continue with DDAVP. Continue to monitor sodium. Can increase rate of fluid as tolerated. Monitor urine output and urine osm. Will follow closely.     René Arenas D.O  [phone] .
Agree with assessment and plan as above by Dr. Bishop. Reviewed all pertinent labs, glucose values, and imaging studies. Modifications made as indicated above. Patient is s/p resection for craniopharyngioma. Continue with hydrocortisone 25 mg Q12H for today, would give one dose of Hydrocortisone 20 mg at 8 am on 3/21 and hold afternoon dose and check am ACTH and cortisol on the morning of 3/22. Patient previously had large water deficit with dehydration now improving and sodium normalized. Currently on DDAVP 0.5 mcg IV BID, which is equivalent to 0.05 mg of PO BID, consider switching. Continue with Levothyroxine 75 mcg. Rest of the plan as above. Complex case, high risk patient, high-level decision-making, requiring ICU level of care.

## 2023-03-24 NOTE — PROGRESS NOTE ADULT - PROBLEM SELECTOR PLAN 5
- home: lisinopril 2.5mg daily. hold   - Patient was started on norvasc 10mg 3/16? only got medication once but BP is soft. hold - home: lisinopril 2.5mg daily. hold   - Patient was started on norvasc 10mg 3/16? only got medication once but BP is soft. hold  Hold BP medications on discharge

## 2023-03-24 NOTE — PROGRESS NOTE ADULT - SUBJECTIVE AND OBJECTIVE BOX
ENT ISSUE/POD: s/p TSRP pod #9    HPI: 51yo female s/p TSR suprasellar mass with intraop leak repaired with right fat graft and nasal splints in place, POD #9. Pt seen and examined at bedside. There were no acute overnight events. The patient has no complaints this morning. She is doing well. Her pain is controlled. She denies active nasal bleeding, nasal discharge, dripping in the back of the throat, HA, facial pain/pressure, ear pain, salty taste, metallic taste, CP, SOB, fevers, chills, N/V/D        PAST MEDICAL & SURGICAL HISTORY:  Hypertension      Hyperlipidemia      Diabetes mellitus        Allergies    No Known Drug Allergies  S/P TRANSSPHENOIDAL SURGERY. SINONASAL PRECAUTIONS! NO NASAL SWABS OR NG TUBES. (Unknown)    Intolerances      MEDICATIONS  (STANDING):  bisacodyl 5 milliGRAM(s) Oral at bedtime  desmopressin 0.05 milliGRAM(s) Oral <User Schedule>  enoxaparin Injectable 40 milliGRAM(s) SubCutaneous <User Schedule>  hydrocortisone 20 milliGRAM(s) Oral <User Schedule>  hydrocortisone 10 milliGRAM(s) Oral <User Schedule>  insulin glargine Injectable (LANTUS) 14 Unit(s) SubCutaneous at bedtime  insulin lispro (ADMELOG) corrective regimen sliding scale   SubCutaneous three times a day before meals  insulin lispro (ADMELOG) corrective regimen sliding scale   SubCutaneous at bedtime  insulin lispro Injectable (ADMELOG) 5 Unit(s) SubCutaneous three times a day before meals  levothyroxine 75 MICROGram(s) Oral daily  pantoprazole    Tablet 40 milliGRAM(s) Oral daily  polyethylene glycol 3350 17 Gram(s) Oral two times a day  senna 2 Tablet(s) Oral at bedtime  valACYclovir 1000 milliGRAM(s) Oral every 8 hours    MEDICATIONS  (PRN):  sodium chloride 0.65% Nasal 1 Spray(s) Both Nostrils three times a day PRN Nasal Congestion  traMADol 25 milliGRAM(s) Oral every 4 hours PRN Moderate Pain (4 - 6)  traMADol 50 milliGRAM(s) Oral every 4 hours PRN Severe Pain (7 - 10)      Social History: see consult note    Family history: see consult note    ROS:   ENT: all negative except as noted in HPI   Pulm: denies SOB, cough, hemoptysis  Neuro: denies numbness/tingling, loss of sensation  Endo: denies heat/cold intolerance, excessive sweating      Vital Signs Last 24 Hrs  T(C): 36.3 (24 Mar 2023 05:09), Max: 37 (23 Mar 2023 12:41)  T(F): 97.3 (24 Mar 2023 05:09), Max: 98.6 (23 Mar 2023 12:41)  HR: 60 (24 Mar 2023 05:09) (59 - 83)  BP: 105/63 (24 Mar 2023 05:09) (103/73 - 117/78)  BP(mean): --  RR: 18 (24 Mar 2023 05:09) (18 - 18)  SpO2: 99% (24 Mar 2023 05:09) (96% - 99%)    Parameters below as of 24 Mar 2023 05:09  Patient On (Oxygen Delivery Method): room air         03-23    143  |  106  |  23  ----------------------------<  203<H>  4.4   |  28  |  0.99    Ca    9.4      23 Mar 2023 18:59    TPro  7.1  /  Alb  4.0  /  TBili  0.4  /  DBili  x   /  AST  111<H>  /  ALT  288<H>  /  AlkPhos  146<H>  03-23       PHYSICAL EXAM:  Gen: NAD  Skin: No rashes, bruises, or lesions  Head: Normocephalic, Atraumatic  Face: no edema, erythema, or fluctuance. Parotid glands soft without mass  Eyes: no scleral injection  Nose: No discharge or epistaxis  Mouth: No Stridor / Drooling / Trismus.  Mucosa moist, tongue/uvula midline, oropharynx clear  Neck: Flat, supple, no lymphadenopathy, trachea midline, no masses  Lymphatic: No lymphadenopathy  Resp: breathing easily, no stridor  Neuro: facial nerve intact, no facial droop

## 2023-03-24 NOTE — PROGRESS NOTE ADULT - ATTENDING SUPERVISION STATEMENT
Care After Your Endoscopy  Dr. Alexis Bourgeois  [phone]        Activity  • For the next 24 hours: Do not stay alone, drive a car, use electrical/power tools or appliances, drink alcohol, or sign any legal papers.  • Do not do any strenuous activity for 24 hours (for example: bicycling, jogging, and exercising).     Diet   · You may resume a regular diet.    · Start slow with sips of water and increase your oral intake as tolerated.    Medication:   • Continue home medications.  • Take fiber daily.   • Resume coumadin as of tonight       Special Instructions               You had 0 polyp(s) on today's exam  • Some procedures, such as biopsies or removal of polyps, may cause minimal bleeding for 7-14 days.   • If bleeding becomes excessive, if you have black tarry stools, or you are worried call Dr. Bourgeois.  • If polyps/biopsies were removed, do not take any aspirin, arthritis medication, Ibuprofen, (Nuprin, Advil, Motrin, Aleve) for 10 (ten) days due to possible bleeding.  • You may take Tylenol (acetaminophen).      Call the doctor if you have:  • Fever over 101 degrees (oral).  • Persistent nausea/vomiting (over 12 hours).  • Abnormal pain (sharp, severe abdominal pain).  • Increased pain (bloating, pressure).    Dr. Bourgeois’s office will send a letter with the biopsy results in about a week after your procedure.     
Fellow
Resident
Fellow

## 2023-03-24 NOTE — PROGRESS NOTE ADULT - ASSESSMENT
53 yo [place]iny[place]-speaking female with PMH of HTN, HLD, T2DM, who presents with headaches, blurry vision, admitted after being found to have suprasellar mass, admitted for further workup. Medicine following for comanagement. s/p transsphenoidal/ transplanum approach for resection of infrachiasmatic craniopharygioma, graft, post lumbar drain. NSCU course c/b hyperglycemia requiring insulin gtt, transaminitis, bradycardia.

## 2023-03-24 NOTE — PROGRESS NOTE ADULT - PROBLEM SELECTOR PLAN 4
- hold home atorvastatin 80 (home dose) for transaminitis - hold home atorvastatin 80 (home dose) for transaminitis on discharge. Patient can resume outpatient

## 2023-03-24 NOTE — DISCHARGE NOTE PROVIDER - CARE PROVIDER_API CALL
Vincenzo Santo)  Neurosurgery  General  [address], [address]  [place] [address]  Phone: [phone]  Fax: [phone]  Follow Up Time:    Vincenzo Santo)  Neurosurgery  General  [address], [address]  [place] [address]  Phone: [phone]  Fax: [phone]  Follow Up Time:     Tommy Zhang  CARDIOVASCULAR DISEASE  [address], [address]  [place] [address]  Phone: [phone]  Fax: [phone]  Follow Up Time:    Vincenzo Santo)  Neurosurgery  General  [address], [address]  [place] [address]  Phone: [phone]  Fax: [phone]  Follow Up Time:     Tommy Zhang  CARDIOVASCULAR DISEASE  [address], [address]  [place] [address]  Phone: [phone]  Fax: [phone]  Follow Up Time:     Angel Alvarenga)  Otolaryngology  [address], [address] [place] [address]  Phone: [phone]  Fax: [phone]  Follow Up Time:

## 2023-03-24 NOTE — PROGRESS NOTE ADULT - PROBLEM SELECTOR PROBLEM 6
HLD (hyperlipidemia)
Transaminitis
HLD (hyperlipidemia)
Transaminitis
HTN (hypertension)
Transaminitis
HTN (hypertension)
HTN (hypertension)
HLD (hyperlipidemia)

## 2023-03-24 NOTE — PROGRESS NOTE ADULT - NUTRITIONAL ASSESSMENT
This patient has been assessed with a concern for Malnutrition and has been determined to have a diagnosis/diagnoses of Severe protein-calorie malnutrition.    This patient is being managed with:   Diet Regular-  Consistent Carbohydrate {No Snacks} (CSTCHO)  Entered: Mar 19 2023 10:28AM    

## 2023-03-24 NOTE — PROGRESS NOTE ADULT - SUBJECTIVE AND OBJECTIVE BOX
[facility] Division of Hospital Medicine  Carla BobbyDO   Available via Microsoft Teams      Patient is a 52y old  Female who presents with a chief complaint of suprasellar mass (24 Mar 2023 10:53)      SUBJECTIVE / OVERNIGHT EVENTS:   Rachel [id number]  No constipation, fever, chills, dizziness, CP, SOB, abd pain, N/V    MEDICATIONS  (STANDING):  bisacodyl 5 milliGRAM(s) Oral at bedtime  desmopressin 0.05 milliGRAM(s) Oral <User Schedule>  enoxaparin Injectable 40 milliGRAM(s) SubCutaneous <User Schedule>  hydrocortisone 20 milliGRAM(s) Oral <User Schedule>  hydrocortisone 10 milliGRAM(s) Oral <User Schedule>  insulin glargine Injectable (LANTUS) 14 Unit(s) SubCutaneous at bedtime  insulin lispro (ADMELOG) corrective regimen sliding scale   SubCutaneous three times a day before meals  insulin lispro (ADMELOG) corrective regimen sliding scale   SubCutaneous at bedtime  insulin lispro Injectable (ADMELOG) 5 Unit(s) SubCutaneous three times a day before meals  levothyroxine 75 MICROGram(s) Oral daily  pantoprazole    Tablet 40 milliGRAM(s) Oral daily  polyethylene glycol 3350 17 Gram(s) Oral two times a day  senna 2 Tablet(s) Oral at bedtime  valACYclovir 1000 milliGRAM(s) Oral every 8 hours    MEDICATIONS  (PRN):  sodium chloride 0.65% Nasal 1 Spray(s) Both Nostrils three times a day PRN Nasal Congestion  traMADol 25 milliGRAM(s) Oral every 4 hours PRN Moderate Pain (4 - 6)  traMADol 50 milliGRAM(s) Oral every 4 hours PRN Severe Pain (7 - 10)      CAPILLARY BLOOD GLUCOSE      POCT Blood Glucose.: 332 mg/dL (24 Mar 2023 11:24)  POCT Blood Glucose.: 115 mg/dL (24 Mar 2023 07:39)  POCT Blood Glucose.: 154 mg/dL (23 Mar 2023 21:32)  POCT Blood Glucose.: 159 mg/dL (23 Mar 2023 16:17)    I&O's Summary    23 Mar 2023 07:01  -  24 Mar 2023 07:00  --------------------------------------------------------  IN: 930 mL / OUT: 0 mL / NET: 930 mL        PHYSICAL EXAM:  Vital Signs Last 24 Hrs  T(C): 37 (24 Mar 2023 08:40), Max: 37 (23 Mar 2023 12:41)  T(F): 98.6 (24 Mar 2023 08:40), Max: 98.6 (23 Mar 2023 12:41)  HR: 60 (24 Mar 2023 08:40) (59 - 83)  BP: 103/70 (24 Mar 2023 08:40) (103/70 - 117/78)  BP(mean): --  RR: 18 (24 Mar 2023 08:40) (18 - 18)  SpO2: 98% (24 Mar 2023 08:40) (96% - 99%)    Parameters below as of 24 Mar 2023 08:40  Patient On (Oxygen Delivery Method): room air      CONSTITUTIONAL: NAD, well-developed, well-groomed, laying in bed   ENMT: Moist oral mucosa  RESPIRATORY: Normal respiratory effort; lungs are clear to auscultation bilaterally  CARDIOVASCULAR: Regular rate and rhythm, normal S1 and S2, No lower extremity edema  ABDOMEN: Nontender to palpation, normoactive bowel sounds, no rebound/guarding  MUSCULOSKELETAL: no clubbing or cyanosis of digits; no joint swelling or tenderness to palpation  PSYCH: A+O to person, place, and time; affect appropriate  NEUROLOGY: follows commands, moving all extremities   SKIN: No rashes; no palpable lesions, not turning for my exam since ID already saw buttock lesions    LABS:    03-24    138  |  102  |  22  ----------------------------<  291<H>  3.8   |  28  |  0.59    Ca    8.6      24 Mar 2023 10:42    TPro  6.7  /  Alb  3.9  /  TBili  0.4  /  DBili  x   /  AST  79<H>  /  ALT  238<H>  /  AlkPhos  137<H>  03-24                RADIOLOGY & ADDITIONAL TESTS:  Results Reviewed:   Imaging Personally Reviewed:  Electrocardiogram Personally Reviewed:    COORDINATION OF CARE:  Care Discussed with Consultants/Other Providers [Y/N]: neurosurgery   Prior or Outpatient Records Reviewed [Y/N]:

## 2023-03-24 NOTE — PROGRESS NOTE ADULT - SUBJECTIVE AND OBJECTIVE BOX
SUMMARY: 51yo woman transferred from [facility] on 3/10 for suprasellar mass found on MRI for opthalmology work up--a mo HA and 3wk of blurry vision. PMH HTN, DM2, HLD.   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   Overnight events: No DDAVP given overnight, patient well compensating. Transferred to [address] [place] [address], on contact isolation precaution for HSV lesions. Seen today at bedside patient in no acute distress. Used online translation service #:     Vital Signs Last 24 Hrs  T(C): 37 (03-24-23 @ 08:40), Max: 37 (03-23-23 @ 12:41)  T(F): 98.6 (03-24-23 @ 08:40), Max: 98.6 (03-23-23 @ 12:41)  HR: 60 (03-24-23 @ 08:40) (59 - 83)  BP: 103/70 (03-24-23 @ 08:40) (103/70 - 117/78)  RR: 18 (03-24-23 @ 08:40) (18 - 18)  SpO2: 98% (03-24-23 @ 08:40) (96% - 99%)    I&O's Summary  23 Mar 2023 07:01  -  24 Mar 2023 07:00  --------------------------------------------------------  IN: 930 mL / OUT: 0 mL / NET: 930 mL    MEDICATIONS  (STANDING):  bisacodyl 5 milliGRAM(s) Oral at bedtime  desmopressin 0.05 milliGRAM(s) Oral <User Schedule>  enoxaparin Injectable 40 milliGRAM(s) SubCutaneous <User Schedule>  hydrocortisone 20 milliGRAM(s) Oral <User Schedule>  hydrocortisone 10 milliGRAM(s) Oral <User Schedule>  insulin glargine Injectable (LANTUS) 14 Unit(s) SubCutaneous at bedtime  insulin lispro (ADMELOG) corrective regimen sliding scale   SubCutaneous three times a day before meals  insulin lispro (ADMELOG) corrective regimen sliding scale   SubCutaneous at bedtime  insulin lispro Injectable (ADMELOG) 5 Unit(s) SubCutaneous three times a day before meals  levothyroxine 75 MICROGram(s) Oral daily  pantoprazole    Tablet 40 milliGRAM(s) Oral daily  polyethylene glycol 3350 17 Gram(s) Oral two times a day  senna 2 Tablet(s) Oral at bedtime  valACYclovir 1000 milliGRAM(s) Oral every 8 hours    MEDICATIONS  (PRN):  sodium chloride 0.65% Nasal 1 Spray(s) Both Nostrils three times a day PRN Nasal Congestion  traMADol 25 milliGRAM(s) Oral every 4 hours PRN Moderate Pain (4 - 6)  traMADol 50 milliGRAM(s) Oral every 4 hours PRN Severe Pain (7 - 10)    EXAMINATION:  General: No acute distress  HEENT: Anicteric sclerae  Cardiac: [id number]  Lungs: Clear  Abdomen: Soft, non-tender, +BS  Extremities: No c/c/e  Skin/Incision Site: Clean, dry and intact, has small vesicles on the butt (L buttock)  Neurologic: Alert, oriented x 3,  following commands. Moving all extremities 5/5. Pupils 3 mm Bilaterally reactive.     Diet: regular            SUMMARY: 51yo woman transferred from [facility] on 3/10 for suprasellar mass found on MRI for opthalmology work up--a mo HA and 3wk of blurry vision. PMH HTN, DM2, HLD.   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   Overnight events: No DDAVP given overnight, patient well compensating. Transferred to [address] [place] [address], on contact isolation precaution for HSV lesions. Seen today at bedside patient in no acute distress. Used online translation service #: [id number]    Vital Signs Last 24 Hrs  T(C): 37 (03-24-23 @ 08:40), Max: 37 (03-23-23 @ 12:41)  T(F): 98.6 (03-24-23 @ 08:40), Max: 98.6 (03-23-23 @ 12:41)  HR: 60 (03-24-23 @ 08:40) (59 - 83)  BP: 103/70 (03-24-23 @ 08:40) (103/70 - 117/78)  RR: 18 (03-24-23 @ 08:40) (18 - 18)  SpO2: 98% (03-24-23 @ 08:40) (96% - 99%)    I&O's Summary  23 Mar 2023 07:01  -  24 Mar 2023 07:00  --------------------------------------------------------  IN: 930 mL / OUT: 0 mL / NET: 930 mL    MEDICATIONS  (STANDING):  bisacodyl 5 milliGRAM(s) Oral at bedtime  desmopressin 0.05 milliGRAM(s) Oral <User Schedule>  enoxaparin Injectable 40 milliGRAM(s) SubCutaneous <User Schedule>  hydrocortisone 20 milliGRAM(s) Oral <User Schedule>  hydrocortisone 10 milliGRAM(s) Oral <User Schedule>  insulin glargine Injectable (LANTUS) 14 Unit(s) SubCutaneous at bedtime  insulin lispro (ADMELOG) corrective regimen sliding scale   SubCutaneous three times a day before meals  insulin lispro (ADMELOG) corrective regimen sliding scale   SubCutaneous at bedtime  insulin lispro Injectable (ADMELOG) 5 Unit(s) SubCutaneous three times a day before meals  levothyroxine 75 MICROGram(s) Oral daily  pantoprazole    Tablet 40 milliGRAM(s) Oral daily  polyethylene glycol 3350 17 Gram(s) Oral two times a day  senna 2 Tablet(s) Oral at bedtime  valACYclovir 1000 milliGRAM(s) Oral every 8 hours    MEDICATIONS  (PRN):  sodium chloride 0.65% Nasal 1 Spray(s) Both Nostrils three times a day PRN Nasal Congestion  traMADol 25 milliGRAM(s) Oral every 4 hours PRN Moderate Pain (4 - 6)  traMADol 50 milliGRAM(s) Oral every 4 hours PRN Severe Pain (7 - 10)    EXAMINATION:  General: No acute distress  HEENT: Anicteric sclerae  Cardiac: [id number]  Lungs: Clear  Abdomen: Soft, non-tender, +BS  Extremities: No c/c/e  Skin/Incision Site: Clean, dry and intact, has small vesicles on the butt (L buttock)  Neurologic: Alert, oriented x 3,  following commands. Moving all extremities 5/5. Pupils 3 mm Bilaterally reactive.     Diet: regular            SUMMARY: 53yo woman transferred from [facility] on 3/10 for suprasellar mass found on MRI for opthalmology work up--a mo HA and 3wk of blurry vision. PMH HTN, DM2, HLD.   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   Overnight events: No DDAVP given overnight, patient well compensating. Transferred to [address] [place] [address], on contact isolation precaution for HSV lesions. Patient examined at bedside today with son Dg present, pt in no acute distress. Used online translation service #: [id number]    Vital Signs Last 24 Hrs  T(C): 37 (03-24-23 @ 08:40), Max: 37 (03-23-23 @ 12:41)  T(F): 98.6 (03-24-23 @ 08:40), Max: 98.6 (03-23-23 @ 12:41)  HR: 60 (03-24-23 @ 08:40) (59 - 83)  BP: 103/70 (03-24-23 @ 08:40) (103/70 - 117/78)  RR: 18 (03-24-23 @ 08:40) (18 - 18)  SpO2: 98% (03-24-23 @ 08:40) (96% - 99%)    I&O's Summary  23 Mar 2023 07:01  -  24 Mar 2023 07:00  --------------------------------------------------------  IN: 930 mL / OUT: 0 mL / NET: 930 mL    MEDICATIONS  (STANDING):  bisacodyl 5 milliGRAM(s) Oral at bedtime  desmopressin 0.05 milliGRAM(s) Oral <User Schedule>  enoxaparin Injectable 40 milliGRAM(s) SubCutaneous <User Schedule>  hydrocortisone 20 milliGRAM(s) Oral <User Schedule>  hydrocortisone 10 milliGRAM(s) Oral <User Schedule>  insulin glargine Injectable (LANTUS) 14 Unit(s) SubCutaneous at bedtime  insulin lispro (ADMELOG) corrective regimen sliding scale   SubCutaneous three times a day before meals  insulin lispro (ADMELOG) corrective regimen sliding scale   SubCutaneous at bedtime  insulin lispro Injectable (ADMELOG) 5 Unit(s) SubCutaneous three times a day before meals  levothyroxine 75 MICROGram(s) Oral daily  pantoprazole    Tablet 40 milliGRAM(s) Oral daily  polyethylene glycol 3350 17 Gram(s) Oral two times a day  senna 2 Tablet(s) Oral at bedtime  valACYclovir 1000 milliGRAM(s) Oral every 8 hours    MEDICATIONS  (PRN):  sodium chloride 0.65% Nasal 1 Spray(s) Both Nostrils three times a day PRN Nasal Congestion  traMADol 25 milliGRAM(s) Oral every 4 hours PRN Moderate Pain (4 - 6)  traMADol 50 milliGRAM(s) Oral every 4 hours PRN Severe Pain (7 - 10)    EXAMINATION:  General: No acute distress  HEENT: Anicteric sclerae  Cardiac: [id number]  Lungs: Clear  Abdomen: Soft, non-tender, +BS  Extremities: No c/c/e  Skin/Incision Site: Clean, dry and intact, has small vesicles on the butt (L buttock)  Neurologic: Alert, oriented x 3,  following commands. Moving all extremities 5/5. Pupils 3 mm Bilaterally reactive.     Diet: regular

## 2023-03-24 NOTE — PROGRESS NOTE ADULT - SUBJECTIVE AND OBJECTIVE BOX
DATE OF SERVICE: 03-24-23 @ 08:11    Subjective: Patient seen and examined. No new events except as noted.     SUBJECTIVE/ROS:        MEDICATIONS:  MEDICATIONS  (STANDING):  bisacodyl 5 milliGRAM(s) Oral at bedtime  desmopressin 0.05 milliGRAM(s) Oral <User Schedule>  enoxaparin Injectable 40 milliGRAM(s) SubCutaneous <User Schedule>  hydrocortisone 20 milliGRAM(s) Oral <User Schedule>  hydrocortisone 10 milliGRAM(s) Oral <User Schedule>  insulin glargine Injectable (LANTUS) 14 Unit(s) SubCutaneous at bedtime  insulin lispro (ADMELOG) corrective regimen sliding scale   SubCutaneous three times a day before meals  insulin lispro (ADMELOG) corrective regimen sliding scale   SubCutaneous at bedtime  insulin lispro Injectable (ADMELOG) 5 Unit(s) SubCutaneous three times a day before meals  levothyroxine 75 MICROGram(s) Oral daily  pantoprazole    Tablet 40 milliGRAM(s) Oral daily  polyethylene glycol 3350 17 Gram(s) Oral two times a day  senna 2 Tablet(s) Oral at bedtime  valACYclovir 1000 milliGRAM(s) Oral every 8 hours      PHYSICAL EXAM:  T(C): 36.3 (03-24-23 @ 05:09), Max: 37 (03-23-23 @ 12:41)  HR: 60 (03-24-23 @ 05:09) (59 - 83)  BP: 105/63 (03-24-23 @ 05:09) (103/73 - 117/78)  RR: 18 (03-24-23 @ 05:09) (18 - 18)  SpO2: 99% (03-24-23 @ 05:09) (96% - 99%)  Wt(kg): --  I&O's Summary    23 Mar 2023 07:01  -  24 Mar 2023 07:00  --------------------------------------------------------  IN: 930 mL / OUT: 0 mL / NET: 930 mL            JVP: Normal  Neck: supple  Lung: clear   CV: S1 S2 , Murmur:  Abd: soft  Ext: No edema  neuro: Awake / alert  Psych: flat affect  Skin: normal``    LABS/DATA:    CARDIAC MARKERS:            03-24    140  |  104  |  23  ----------------------------<  130<H>  3.7   |  26  |  0.64    Ca    9.2      24 Mar 2023 06:50    TPro  6.7  /  Alb  3.9  /  TBili  0.4  /  DBili  x   /  AST  79<H>  /  ALT  238<H>  /  AlkPhos  137<H>  03-24    proBNP:   Lipid Profile:   HgA1c:   TSH:     TELE:  EKG:

## 2023-03-24 NOTE — PROGRESS NOTE ADULT - SUBJECTIVE AND OBJECTIVE BOX
CC: f/u for perineal rash    Patient reports: no complaints. No headache or nasal congestion    REVIEW OF SYSTEMS:  All other review of systems negative (Comprehensive ROS)    Antimicrobials Day #  :day 3/7  valACYclovir 1000 milliGRAM(s) Oral every 8 hours    Other Medications Reviewed  MEDICATIONS  (STANDING):  bisacodyl 5 milliGRAM(s) Oral at bedtime  desmopressin 0.05 milliGRAM(s) Oral <User Schedule>  enoxaparin Injectable 40 milliGRAM(s) SubCutaneous <User Schedule>  hydrocortisone 20 milliGRAM(s) Oral <User Schedule>  hydrocortisone 10 milliGRAM(s) Oral <User Schedule>  insulin glargine Injectable (LANTUS) 14 Unit(s) SubCutaneous at bedtime  insulin lispro (ADMELOG) corrective regimen sliding scale   SubCutaneous three times a day before meals  insulin lispro (ADMELOG) corrective regimen sliding scale   SubCutaneous at bedtime  insulin lispro Injectable (ADMELOG) 5 Unit(s) SubCutaneous three times a day before meals  levothyroxine 75 MICROGram(s) Oral daily  pantoprazole    Tablet 40 milliGRAM(s) Oral daily  polyethylene glycol 3350 17 Gram(s) Oral two times a day  senna 2 Tablet(s) Oral at bedtime  valACYclovir 1000 milliGRAM(s) Oral every 8 hours    T(F): 98.6 (03-24-23 @ 08:40), Max: 98.6 (03-23-23 @ 12:41)  HR: 60 (03-24-23 @ 08:40)  BP: 103/70 (03-24-23 @ 08:40)  RR: 18 (03-24-23 @ 08:40)  SpO2: 98% (03-24-23 @ 08:40)  Wt(kg): --    PHYSICAL EXAM:  General: alert, no acute distress  Eyes:  anicteric, no conjunctival injection, no discharge  Oropharynx: no lesions or injection 	  Neck: supple, without adenopathy  Lungs: clear to auscultation  Heart: regular rate and rhythm; no murmur, rubs or gallops  Abdomen: soft, nondistended, nontender, without mass or organomegaly  Skin: left buttocks rash stable, starting to crust  Extremities: no clubbing, cyanosis, or edema  Neurologic: alert, oriented, moves all extremities    LAB RESULTS:    03-24    140  |  104  |  23  ----------------------------<  130<H>  3.7   |  26  |  0.64    Ca    9.2      24 Mar 2023 06:50    TPro  6.7  /  Alb  3.9  /  TBili  0.4  /  DBili  x   /  AST  79<H>  /  ALT  238<H>  /  AlkPhos  137<H>  03-24    LIVER FUNCTIONS - ( 24 Mar 2023 06:50 )  Alb: 3.9 g/dL / Pro: 6.7 g/dL / ALK PHOS: 137 U/L / ALT: 238 U/L / AST: 79 U/L / GGT: x             MICROBIOLOGY:  RECENT CULTURES:      RADIOLOGY REVIEWED:

## 2023-03-24 NOTE — DISCHARGE NOTE PROVIDER - NSDCFUSCHEDAPPT_GEN_ALL_CORE_FT
Cecile Rojas  [facility]  NEUROSURG [address] [facility]  Scheduled Appointment: 03/29/2023    [facility]  ENDOCRIN [address]  Scheduled Appointment: 05/04/2023

## 2023-03-24 NOTE — PROGRESS NOTE ADULT - ASSESSMENT
53yo female s/p TSR suprasellar mass with intraop leak repaired with right fat graft and nasal splints in place, POD #9. Pt doing well with no new complaints. No evidence of CSF leak or epistaxis on exam.

## 2023-03-24 NOTE — PROGRESS NOTE ADULT - THIS PATIENT HAS THE FOLLOWING CONDITION(S)/DIAGNOSES ON THIS ADMISSION:
None
TSP
TSP
None
TSP
None

## 2023-03-24 NOTE — PROGRESS NOTE ADULT - ASSESSMENT
52 yr old female with a history of hypertension, hyperlipidemia, dm type 2, no surgeries ever presents with a month of headaches and 3 weeks of blurred vision. Endocrinology consulted for evaluation of sellar mass.    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 Too low to be prolactinoma given size of the sellar mass. Suspect stalk effect. (prolactin diluted 103, undiluted 107)  -Secondary hypogonadism can be addressed as outpatient. (Lh 2.6, estradiol <5)  -3/12 AM ayush 5.9, ACTH 21.5, 3/13 am cortisol (6am) 6.9  - cosyntropin stim test: 3/14 AM cortisol at 8:15am 5.1, cosyntropin at 8:15, 8:50am cortisol 16.9, 9:25am 20.7  - s/p tsp 3/15  - IGF1 3/15 46  - 3/22 8am cortisol 3.4, ACTH 13.5 suggestive of AI  PLAN  - f/u neurosurgery and ophthalmology recommendations  - continue hydrocortisone 20mg at 8am on 3/23 and 10mg at 3pm, would discharge hydrocortisone 10mg at 8am and 5mg at 3pm  FOR DISCHARGE  to help with dc planning-  - Please print and give the pt info section for patients under adrenal insufficiency (this will educate them regarding the warning signs of adrenal crisis )  - Patient should take 2-3x of home dose in cases of illness, ever, accidents, and surgery. If unable to take PO, use IM injection as below (Solu-Cortef)  - pt should receive stress dosing (IV hydrocortisone 50mg q8) with any major illness or surgical procedure  - Should pt be unable to tolerate PO and is unable to take hydrocortisone, pt will need an emergency injection. Please discharge with a prescription for 100 mg Solu-Cortef Act-O-Vial and the following instructions:  http://www.addisoncrisis.info/emergency-injection/emergency-injection-cortico-steroids-solu-cortef-act-o-vial-[email or web address]-chamber-ampul/  - pt should obtain a medical alert bracelet or necklace to inform emergency providers of adrenal insufficiency diagnosis [http://www.medicalert.org/]  -Sick day rules discussed with patient prior to discharge.    DI/SIADH Watch POST OP  - Please eval for signs of DI vs SIADH postop  - continue DDAVP 0.05mg PO bid, would encourage PO intake  - continue q12h BMP monitoring while inpatient  - Strict I/Os, daily weights. if UO >250cc/hour, please check BMP and urine osm, serum osm.  If consistent with DI, patient may require extra DDAVP.   - May require DDAVP IVP x1 if meets any of these criteria: Na > 145, UO > 250cc/hr, Urine osm < 100 or urine specific gravity < 1.005.  - Signs and symptoms of DI and SIADH post op:  may occur in the 2-3 weeks following surgery.  Patient should check daily weights and if they experience weight gain of 2-3 pounds to call her endocrinologist.  Signs of DI include increased thirst with high urine output. Discussed with patient.    Primary Hypothyroidism   - Patient with elevated TSH (8)and Low Free T4 (0.5)  - 3/18 TSH 1.05, FT4 1, TT3 49, serum T4 5.4, do not suspect contributing to bradycardia, steroids can cause low T3  PLAN  - continue levothyroxine 75 mcg daily (maintain on empty stomach (at least 1 hour before meals), separate by 4 hours from PPI or calcium supplementation which can inhibit its absorption)    Steroid hyperglycemia  T2DM with hyperglycemia  - HbA1c: 8  - Home Regimen: metformin 1000mg bid  - Endocrinologist: does not have  - patient with hyperglycemia on stress dose steroids  Discharge plan:  - Discharge medications: metformin 1000mg bid + prandin 0.5mg tidac   Patient to call doctor with persistent high or low BG at home.   - Ensure patient has glucometer, test strips and lancets on discharge.  - Recommend routine outpatient ophthalmology, podiatry and endocrinology f/u    HTN  - Home regimen: lisinopril 2.5mg daily  PLAN  - Can check urine microalbumin outpatient  - Outpatient goal BP <130/80. Management per primary team.    HLD  - Home regimen: atorvastatin 80mg daily  -   PLAN  - resume statin when able    Will schedule an appointment for the patient to follow up and our office staff will contact her with the date and time.  [address] [place] [address]  Phone Number: [phone]    Discussed with primary team.     Chris Jones DO, Endocrinology Fellow  For follow-up questions, discharge recommendations, or new consults please call answering service at [phone] (weekdays), [phone] (nights/weekends). For nonurgent matters, please email [email or web address].

## 2023-03-24 NOTE — PROGRESS NOTE ADULT - PROBLEM SELECTOR PLAN 7
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

## 2023-03-24 NOTE — PROGRESS NOTE ADULT - PROBLEM SELECTOR PLAN 1
- nasal splints to be removed in outpatient office  - humidified face tent PRN  - nasal saline, 2 sprays to b/l nares 4 times a day.  - monitor for signs of Epistaxis and CSF leak  - avoid nasal trauma, heavy lifting and bending at waist.  - ENT will continue to follow

## 2023-03-24 NOTE — DISCHARGE NOTE PROVIDER - NSDCFUADDAPPT_GEN_ALL_CORE_FT
[facility] [place]   [address] [place] [address]  Phone Number: [phone] APPTS ARE READY TO BE MADE: [x] YES    Best Family or Patient Contact (if needed):    Additional Information about above appointments (if needed):    1: medicine clinic  2: endocrine asap  3:     Other comments or requests:       [facility] [place] [address]  Phone Number: [phone] APPTS ARE READY TO BE MADE: [x] YES    Best Family or Patient Contact (if needed):    Additional Information about above appointments (if needed):    1: medicine clinic  2: endocrine asap  3: gastroenterology     Other comments or requests:       [facility] [place] [address]  Phone Number: [phone] APPTS ARE READY TO BE MADE: [x] YES    Best Family or Patient Contact (if needed):    Additional Information about above appointments (if needed):    1: medicine clinic  2: endocrine asap  3: gastroenterology     Other comments or requests:       [facility] [place] [address]  Phone Number: [phone]    Patient was provided with follow up request details and was advised to call to schedule follow up within specified time frame.

## 2023-03-24 NOTE — DISCHARGE NOTE PROVIDER - NSDCMRMEDTOKEN_GEN_ALL_CORE_FT
levothyroxine 75 mcg (0.075 mg) oral tablet: 1 tab(s) orally once a day  pantoprazole 40 mg oral delayed release tablet: 1 tab(s) orally once a day  valACYclovir 1 g oral tablet: 1 tab(s) orally every 8 hours   levothyroxine 75 mcg (0.075 mg) oral tablet: 1 tab(s) orally once a day  pantoprazole 40 mg oral delayed release tablet: 1 tab(s) orally once a day  senna leaf extract oral tablet: 2 tab(s) orally once a day (at bedtime)   Cortef 10 mg oral tablet: 1 tab(s) orally once a day at 8 AM MDD:1  Cortef 5 mg oral tablet: 1 tab(s) orally once a day  at 3 PM MDD:1   desmopressin 0.1 mg oral tablet: 0.5 tab(s) orally every 12 hours MDD:2  glucometer (per patient&#x27;s insurance): Test blood sugars four times a day. Dispense #1 glucometer.  lancets: 1 application subcutaneously 4 times a day   levothyroxine 75 mcg (0.075 mg) oral tablet: 1 tab(s) orally once a day  metFORMIN 1000 mg oral tablet: 1 tab(s) orally 2 times a day   Pepcid AC Maximum Strength 20 mg oral tablet: 1 tab(s) orally 2 times a day while on steroids  repaglinide 0.5 mg oral tablet: 1 tab(s) orally 3 times a day (before meals)   senna leaf extract oral tablet: 2 tab(s) orally once a day (at bedtime)  test strips (per patient&#x27;s insurance): 1 application subcutaneously 4 times a day. ** Compatible with patient&#x27;s glucometer **  valACYclovir 1 g oral tablet: 1 tab(s) orally every 8 hours MDD:3

## 2023-03-24 NOTE — PROGRESS NOTE ADULT - PROBLEM SELECTOR PLAN 8
DVT ppx: lovenox  Bowel regimen with opioids. Last BM appears to be 3/19   Has some neurogenic bradycardia post op which is improving. Cardio following DVT ppx: lovenox  Bowel regimen with opioids. Last BM appears to be 3/19   Has some neurogenic bradycardia post op which is improved    Discussed discharge with patient and son. Recommended outpatient f/u with [facility] clinic within 1 week to check BMP and LFTs, can also check hepatitis panel outpatient. Also advised close endocrine and neurosurgery follow up.

## 2023-03-24 NOTE — PROGRESS NOTE ADULT - REASON FOR ADMISSION
suprasellar mass
skull based tumor
Craniopharyngioma
Sellar mass
Craniopharyngioma
Craniopharyngioma

## 2023-03-24 NOTE — DISCHARGE NOTE PROVIDER - PROVIDER TOKENS
PROVIDER:[TOKEN:[[id number]]] PROVIDER:[TOKEN:[[id number]]],PROVIDER:[TOKEN:[[id number]]] PROVIDER:[TOKEN:[[id number]]],PROVIDER:[TOKEN:[[id number]]],PROVIDER:[TOKEN:[[id number]]]

## 2023-03-24 NOTE — PROGRESS NOTE ADULT - PROBLEM SELECTOR PLAN 6
Started 2/18-3/19  Medication induced? Appeared to need pressors in NSCU, also got some abx and tylenol   Continue to trend LFTs - improving today   Avoid tylenol use  RUQ ordered and pending Started 2/18-3/19  Medication induced? Appeared to need pressors in NSCU, also got some abx and tylenol   Continue to trend LFTs - improving    Avoid tylenol use  RUQ with only cholelithiasis

## 2023-03-24 NOTE — DISCHARGE NOTE PROVIDER - CARE PROVIDERS DIRECT ADDRESSES
[email or web address],DirectAddress_Unknown ,[email or web address],DirectAddress_Unknown,DirectAddress_Unknown

## 2023-03-24 NOTE — PROGRESS NOTE ADULT - ASSESSMENT
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  She has had a relatively benign  post op course, managed for DI.   Left sided buttocks and perineal rash, HSV2+  Cont Valtrex 1 gr po TID x 1 week  Today id day 3/7  Lesions have not progressed  No signs of uncontrolled infection.  With stable course from an ID viewpoint and no additional ID w/u planned we will stop actively following. Please call if ID issues arise.

## 2023-03-24 NOTE — DISCHARGE NOTE PROVIDER - NSDCCPCAREPLAN_GEN_ALL_CORE_FT
PRINCIPAL DISCHARGE DIAGNOSIS  Diagnosis: Sellar or suprasellar mass  Assessment and Plan of Treatment: You had a expanded endonasal transsphenoidal/transplanum approach for resection of sellar mass on 3/14/2023   Please make all necessary appointments and follow up. Please DO NOT take any Aspirin and NSAIDs (Advil, Aleve, Motrin, Ibuprofen) until cleared by your Neurosurgeon. Please DO NOT do any heavy lifting, bending, twisting and straining. You have surgical staples, they will be removed on follow up with your Neurosurgeon. You may shower, but NO SOAP / NO SHAMPOO. DO NOT do any scrubbing. Pat dry only. Please come to the emergency room for any of the following: altered mental status, seizures, pain uncontrolled by pain medications, fevers, leaking / bleeding from surgical site, chest pain and shortness of breath.        SECONDARY DISCHARGE DIAGNOSES  Diagnosis: HTN (hypertension)  Assessment and Plan of Treatment: Enter shared deCtails here, e.g., 'Noted on CXR 1/1/16.'     PRINCIPAL DISCHARGE DIAGNOSIS  Diagnosis: Sellar or suprasellar mass  Assessment and Plan of Treatment: You had a expanded endonasal transsphenoidal/transplanum approach for resection of sellar mass on 3/14/2023   Please follow up with Dr. Skeltno at his outpatient office within 1 week of discharge   Please make all necessary appointments and follow up. Please DO NOT take any Aspirin and NSAIDs (Advil, Aleve, Motrin, Ibuprofen) until cleared by your Neurosurgeon. Please DO NOT do any heavy lifting, bending, twisting and straining. You have surgical staples, they will be removed on follow up with your Neurosurgeon. You may shower, but NO SOAP / NO SHAMPOO. DO NOT do any scrubbing. Pat dry only. Please come to the emergency room for any of the following: altered mental status, seizures, pain uncontrolled by pain medications, fevers, leaking / bleeding from surgical site, chest pain and shortness of breath.        SECONDARY DISCHARGE DIAGNOSES  Diagnosis: HTN (hypertension)  Assessment and Plan of Treatment: Please follow up with your primary care provider within 1 week of discharge    Diagnosis: Diabetes insipidus  Assessment and Plan of Treatment: Continue taking DDAVP 0.05 mg Twice a day       Diagnosis: Hypothyroidism  Assessment and Plan of Treatment:   Continue taking synthroid 75 mcg daily       Diagnosis: HLD (hyperlipidemia)  Assessment and Plan of Treatment: Continue home atorvastatin    Diagnosis: Transaminitis  Assessment and Plan of Treatment:     Diagnosis: Herpes zoster  Assessment and Plan of Treatment:     Diagnosis: T2DM (type 2 diabetes mellitus)  Assessment and Plan of Treatment: Please follow up with Endocrinologist within 1 week of discharge. The office will call you with an appointment date and time        PRINCIPAL DISCHARGE DIAGNOSIS  Diagnosis: Sellar or suprasellar mass  Assessment and Plan of Treatment: You had a expanded endonasal transsphenoidal/transplanum approach for resection of sellar mass on 3/14/2023   Please follow up with Dr. Skelton at his outpatient office within 1 week of discharge   Please make all necessary appointments and follow up. Please DO NOT take any Aspirin and NSAIDs (Advil, Aleve, Motrin, Ibuprofen) until cleared by your Neurosurgeon. Please DO NOT do any heavy lifting, bending, twisting and straining. You have surgical staples, they will be removed on follow up with your Neurosurgeon. You may shower, but NO SOAP / NO SHAMPOO. DO NOT do any scrubbing. Pat dry only. Please come to the emergency room for any of the following: altered mental status, seizures, pain uncontrolled by pain medications, fevers, leaking / bleeding from surgical site, chest pain and shortness of breath.        SECONDARY DISCHARGE DIAGNOSES  Diagnosis: HTN (hypertension)  Assessment and Plan of Treatment: Please follow up with your primary care provider within 1 week of discharge    Diagnosis: Diabetes insipidus  Assessment and Plan of Treatment: Continue taking DDAVP 0.05 mg Twice a day       Diagnosis: Hypothyroidism  Assessment and Plan of Treatment:   Continue taking synthroid 75 mcg daily       Diagnosis: HLD (hyperlipidemia)  Assessment and Plan of Treatment: Continue home atorvastatin    Diagnosis: Transaminitis  Assessment and Plan of Treatment:     Diagnosis: Herpes zoster  Assessment and Plan of Treatment: Continue taking Valtrex for 5 days (1 Gram 3 Times per day)   Please follow up with your primary care provider in 1 week    Diagnosis: T2DM (type 2 diabetes mellitus)  Assessment and Plan of Treatment: Please follow up with Endocrinologist within 1 week of discharge. The office will call you with an appointment date and time        PRINCIPAL DISCHARGE DIAGNOSIS  Diagnosis: Sellar or suprasellar mass  Assessment and Plan of Treatment: You had a expanded endonasal transsphenoidal/transplanum approach for resection of sellar mass on 3/14/2023   Please follow up with Dr. Skelton at his outpatient office within 1 week of discharge.   Please make all necessary appointments and follow up. Please DO NOT take any Aspirin and NSAIDs (Advil, Aleve, Motrin, Ibuprofen) until cleared by your Neurosurgeon. Please DO NOT do any heavy lifting, bending, twisting and straining. You may shower, but NO SOAP / NO SHAMPOO. DO NOT do any scrubbing. Pat dry only. Please come to the emergency room for any of the following: altered mental status, seizures, pain uncontrolled by pain medications, fevers, leaking / bleeding from surgical site, chest pain and shortness of breath.      SECONDARY DISCHARGE DIAGNOSES  Diagnosis: Diabetes insipidus  Assessment and Plan of Treatment: Continue taking Desmopressin (ddavp) 0.05 mg every 12 hours  Please follow up with endocrinology. You will be contacted with an appointment time   IT IS IMPORTANT THAT YOU FOLLOW UP WITH YOUR PRIMARY CARE PROVIDER TO GET THE FOLLOWING LABS WITHIN 5-7 DAYS OF DISCHARGE:   - BASIC METABOLIC PANEL (BMP)   - COMPREHENSIVE METABOLIC PANEL (CMP)  - HEPATIC PANEL / HEPATITIS PANEL       Diagnosis: Adrenal insufficiency  Assessment and Plan of Treatment: Continue taking Hydrocortisone (CORTEF) 10 mg once daily at 8 AM and Hydrocortisone (CORTEF) 5 mg daily at 3 PM       Diagnosis: Hypothyroidism  Assessment and Plan of Treatment: Continue taking Synthroid (levothyroxine) 75 mcg daily   Follow up with Endocrinology. You will be contacted with an appointment time.    Diagnosis: T2DM (type 2 diabetes mellitus)  Assessment and Plan of Treatment: Continue taking metformin 1000 mg twice a day   Continue taking 0.5 mg Repaglinide before meals   Please follow up with Endocrinology you will be contacted with an appointment time.    Diagnosis: HLD (hyperlipidemia)  Assessment and Plan of Treatment: Follow up with endocrinology. You will be contacted with an appointment time.    Diagnosis: Herpes zoster  Assessment and Plan of Treatment: Continue taking Valtrex for 5 days (1 Gram 3 Times per day)   Please follow up with your primary care provider in 1 week of discharge    Diagnosis: Transaminitis  Assessment and Plan of Treatment: During this hospital stay your Liver Function Labwork (AST/ALT) were elevated. You had a Abdominal Ultrasound on 3/23 showing non-obstructive gallstones. It is important that you follow up with your primary care provider within 1 week of discharge and gastroenterology to repeat the following lab work:   - COMPREHENSIVE METABOLIC PANEL (CMP)    Diagnosis: HTN (hypertension)  Assessment and Plan of Treatment: Please follow up with cardiology and your primary care provider within 1 week of discharge     PRINCIPAL DISCHARGE DIAGNOSIS  Diagnosis: Sellar or suprasellar mass  Assessment and Plan of Treatment: You had a expanded endonasal transsphenoidal/transplanum approach for resection of sellar mass on 3/14/2023   Please follow up with Dr. Skelton at his outpatient office within 1 week of discharge.   Please make all necessary appointments and follow up. Please DO NOT take any Aspirin and NSAIDs (Advil, Aleve, Motrin, Ibuprofen) until cleared by your Neurosurgeon. Please DO NOT do any heavy lifting, bending, twisting and straining. You may shower, but NO SOAP / NO SHAMPOO. DO NOT do any scrubbing. Pat dry only. Please come to the emergency room for any of the following: altered mental status, seizures, pain uncontrolled by pain medications, fevers, leaking / bleeding from surgical site, chest pain and shortness of breath.      SECONDARY DISCHARGE DIAGNOSES  Diagnosis: Diabetes insipidus  Assessment and Plan of Treatment: Continue taking Desmopressin (ddavp) 0.05 mg every 12 hours.  Please follow up with endocrinology. You will be contacted with an appointment time   IT IS IMPORTANT THAT YOU FOLLOW UP WITH YOUR PRIMARY CARE PROVIDER TO GET THE FOLLOWING LABS WITHIN 5-7 DAYS OF DISCHARGE:   - BASIC METABOLIC PANEL (BMP)   - COMPREHENSIVE METABOLIC PANEL (CMP)  - HEPATIC PANEL / HEPATITIS PANEL       Diagnosis: Adrenal insufficiency  Assessment and Plan of Treatment: Continue taking Hydrocortisone (CORTEF) 10 mg once daily at 8 AM and Hydrocortisone (CORTEF) 5 mg daily at 3 PM   People who have adrenal insufficiency  must take hormones to replace the hormones they don't have enough of. But if they get sick, get into an accident, or have surgery, the hormones they normally take are not  enough & have to take extra medicine when they are sick or when they know they are going to have surgery. Double your steroid ( hydrocortisone) dose x 48 hrs If not better in 48 hrs continue double dose & call endocrinologist for further advice. Will need IV stress dose steroids prior to surgery.    Diagnosis: Hypothyroidism  Assessment and Plan of Treatment: Continue taking Synthroid (levothyroxine) 75 mcg daily.  Follow up with Endocrinology. You will be contacted with an appointment time.    Diagnosis: T2DM (type 2 diabetes mellitus)  Assessment and Plan of Treatment: Continue taking metformin 1000 mg twice a day.  Continue taking 0.5 mg Repaglinide before meals   You have been instructed on how to use a glucometer to test your blood glucose levels. Please check your blood glucose level BEFORE EVERY MEAL. If your Blood glucose level is above 300, please contact your primary medical doctor immediately   Please follow up with Endocrinology you will be contacted with an appointment time.    Diagnosis: HLD (hyperlipidemia)  Assessment and Plan of Treatment: Follow up with endocrinology. You will be contacted with an appointment time.    Diagnosis: Herpes zoster  Assessment and Plan of Treatment: Continue taking Valtrex for 5 days (1 Gram 3 Times per day)   Please follow up with your primary care provider in 1 week of discharge    Diagnosis: Transaminitis  Assessment and Plan of Treatment: During this hospital stay your Liver Function Labwork (AST/ALT) were elevated. You had a Abdominal Ultrasound on 3/23 showing non-obstructive gallstones. It is important that you follow up with your primary care provider within 1 week of discharge and gastroenterology to repeat the following lab work:   - COMPREHENSIVE METABOLIC PANEL (CMP)    Diagnosis: HTN (hypertension)  Assessment and Plan of Treatment: Please follow up with cardiology and your primary care provider within 1 week of discharge     PRINCIPAL DISCHARGE DIAGNOSIS  Diagnosis: Sellar or suprasellar mass  Assessment and Plan of Treatment: You had a expanded endonasal transsphenoidal/transplanum approach for resection of sellar mass on 3/14/2023   SKULL BASE PRECAUTIONS:  - No CPAP/BIPAP   - No straws  - No nasal swabbing/nothing enters nose   - No nasal trauma  - Avoid sneezing, sneeze with MOUTH OPEN  Please follow up with Dr. Skelton at his outpatient office within 1 week of discharge.   Please make all necessary appointments and follow up. Please DO NOT take any Aspirin and NSAIDs (Advil, Aleve, Motrin, Ibuprofen) until cleared by your Neurosurgeon. Please DO NOT do any heavy lifting, bending, twisting and straining. You may shower, but NO SOAP / NO SHAMPOO. DO NOT do any scrubbing. Pat dry only. Please come to the emergency room for any of the following: altered mental status, seizures, pain uncontrolled by pain medications, fevers, leaking / bleeding from surgical site, chest pain and shortness of breath.      SECONDARY DISCHARGE DIAGNOSES  Diagnosis: Diabetes insipidus  Assessment and Plan of Treatment: Continue taking Desmopressin (ddavp) 0.05 mg every 12 hours.  Please follow up with endocrinology. You will be contacted with an appointment time   IT IS IMPORTANT THAT YOU FOLLOW UP WITH YOUR PRIMARY CARE PROVIDER TO GET THE FOLLOWING LABS WITHIN 5-7 DAYS OF DISCHARGE:   - BASIC METABOLIC PANEL (BMP)   - COMPREHENSIVE METABOLIC PANEL (CMP)  - HEPATIC PANEL / HEPATITIS PANEL       Diagnosis: Adrenal insufficiency  Assessment and Plan of Treatment: Continue taking Hydrocortisone (CORTEF) 10 mg once daily at 8 AM and Hydrocortisone (CORTEF) 5 mg daily at 3 PM   People who have adrenal insufficiency  must take hormones to replace the hormones they don't have enough of. But if they get sick, get into an accident, or have surgery, the hormones they normally take are not  enough & have to take extra medicine when they are sick or when they know they are going to have surgery. Double your steroid ( hydrocortisone) dose x 48 hrs If not better in 48 hrs continue double dose & call endocrinologist for further advice. Will need IV stress dose steroids prior to surgery.    Diagnosis: Hypothyroidism  Assessment and Plan of Treatment: Continue taking Synthroid (levothyroxine) 75 mcg daily.  Follow up with Endocrinology. You will be contacted with an appointment time.    Diagnosis: T2DM (type 2 diabetes mellitus)  Assessment and Plan of Treatment: Continue taking metformin 1000 mg twice a day.  Continue taking 0.5 mg Repaglinide before meals   You have been instructed on how to use a glucometer to test your blood glucose levels. Please check your blood glucose level BEFORE EVERY MEAL. If your Blood glucose level is above 300, please contact your primary medical doctor immediately   Please follow up with Endocrinology you will be contacted with an appointment time.    Diagnosis: HLD (hyperlipidemia)  Assessment and Plan of Treatment: Follow up with endocrinology. You will be contacted with an appointment time.    Diagnosis: Herpes zoster  Assessment and Plan of Treatment: Continue taking Valtrex for 5 days (1 Gram 3 Times per day)   Please follow up with your primary care provider in 1 week of discharge    Diagnosis: Transaminitis  Assessment and Plan of Treatment: During this hospital stay your Liver Function Labwork (AST/ALT) were elevated. You had a Abdominal Ultrasound on 3/23 showing non-obstructive gallstones. It is important that you follow up with your primary care provider within 1 week of discharge and gastroenterology to repeat the following lab work:   - COMPREHENSIVE METABOLIC PANEL (CMP)    Diagnosis: HTN (hypertension)  Assessment and Plan of Treatment: Please follow up with cardiology and your primary care provider within 1 week of discharge     PRINCIPAL DISCHARGE DIAGNOSIS  Diagnosis: Sellar or suprasellar mass  Assessment and Plan of Treatment: You had a expanded endonasal transsphenoidal/transplanum approach for resection of sellar mass on 3/14/2023   Please avoid nasal trauma, do not blow your nose, use straws or incentive spirometers.  DO NOT HAVE COVID NASAL SWABS PERFORMED AS THIS COULD LEAD TO CSF LEAK. If you notice nasal drainage, post nasal drainage, or metallic/salty taste please notify your neurosurgeon and/or ENT immediately.  Please follow up with Dr. Skelton at his outpatient office within 1 week of discharge.   Please make all necessary appointments and follow up. Please DO NOT take any Aspirin and NSAIDs (Advil, Aleve, Motrin, Ibuprofen) until cleared by your Neurosurgeon. Please DO NOT do any heavy lifting, bending, twisting and straining. You may shower, but NO SOAP / NO SHAMPOO. DO NOT do any scrubbing. Pat dry only. Please come to the emergency room for any of the following: altered mental status, seizures, pain uncontrolled by pain medications, fevers, leaking / bleeding from surgical site, chest pain and shortness of breath.      SECONDARY DISCHARGE DIAGNOSES  Diagnosis: Diabetes insipidus  Assessment and Plan of Treatment: Continue taking Desmopressin (ddavp) 0.05 mg every 12 hours.  Please follow up with endocrinology. You will be contacted with an appointment time   IT IS IMPORTANT THAT YOU FOLLOW UP WITH YOUR PRIMARY CARE PROVIDER TO GET THE FOLLOWING LABS WITHIN 5-7 DAYS OF DISCHARGE:   - BASIC METABOLIC PANEL (BMP)   - COMPREHENSIVE METABOLIC PANEL (CMP)  - HEPATIC PANEL / HEPATITIS PANEL       Diagnosis: Adrenal insufficiency  Assessment and Plan of Treatment: Continue taking Hydrocortisone (CORTEF) 10 mg once daily at 8 AM and Hydrocortisone (CORTEF) 5 mg daily at 3 PM   People who have adrenal insufficiency  must take hormones to replace the hormones they don't have enough of. But if they get sick, get into an accident, or have surgery, the hormones they normally take are not  enough & have to take extra medicine when they are sick or when they know they are going to have surgery. Double your steroid ( hydrocortisone) dose x 48 hrs If not better in 48 hrs continue double dose & call endocrinologist for further advice. Will need IV stress dose steroids prior to surgery.    Diagnosis: Hypothyroidism  Assessment and Plan of Treatment: Continue taking Synthroid (levothyroxine) 75 mcg daily.  Follow up with Endocrinology. You will be contacted with an appointment time.    Diagnosis: T2DM (type 2 diabetes mellitus)  Assessment and Plan of Treatment: Continue taking metformin 1000 mg twice a day.  Continue taking 0.5 mg Repaglinide before meals   You have been instructed on how to use a glucometer to test your blood glucose levels. Please check your blood glucose level BEFORE EVERY MEAL. If your Blood glucose level is above 300, please contact your primary medical doctor immediately   Please follow up with Endocrinology you will be contacted with an appointment time.    Diagnosis: HLD (hyperlipidemia)  Assessment and Plan of Treatment: Follow up with endocrinology. You will be contacted with an appointment time.    Diagnosis: Herpes zoster  Assessment and Plan of Treatment: Continue taking Valtrex for 5 days (1 Gram 3 Times per day)   Please follow up with your primary care provider in 1 week of discharge    Diagnosis: Transaminitis  Assessment and Plan of Treatment: During this hospital stay your Liver Function Labwork (AST/ALT) were elevated. You had a Abdominal Ultrasound on 3/23 showing non-obstructive gallstones. It is important that you follow up with your primary care provider within 1 week of discharge and gastroenterology to repeat the following lab work:   - COMPREHENSIVE METABOLIC PANEL (CMP)    Diagnosis: HTN (hypertension)  Assessment and Plan of Treatment: Please follow up with cardiology and your primary care provider within 1 week of discharge

## 2023-03-24 NOTE — PROGRESS NOTE ADULT - ASSESSMENT
ASSESSMENT: TSP resection of pituitary adenoma, POD4 complicated by hypernatremia from DI   POD 10 Expanded endonasal transsphenoidal/transplanum 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 karthikeyan graft on 3/14/2023, LD which was removed (3/20)    NEURO:  Neuro checks Q 4 hr   LD removed 3/20, no leak, no rhinorrhea no neck stiffness  Serial CTH stable  Hospitalist following   PT/OT rec for home PT     PULM:  RA sat well >98%  Pituitary precautions (no incentive spirometry, no straws, no BIPAP)    CV:  -160 mmHg  holding home lisinopril   bradycardia improving. cards following     RENAL:   DI  Net 900 positive   voiding   BMP, osm Q8H    GI:  Diet: CCD   encourage oral fluid intake   Bowel regimen [] colace [x] senna [x] other: miralax   last BM 3/19     ENDO:   pantohypopit   Goal euglycemia (-180). A1C 8 %  Hydrocort wean plan per endocrinology c/w 20/10 now  hypothyroidism cont synthroid 75 mcg PO  DM: lantus 14 qhs AND admelog 5 units TID  DDAVP 0.05 mcg BID    HEME/ONC:  H/H stable   lovenox 40 mg sc qhs     ID:  afebrile, mild leukocytosis likely reactive 2/2 steroid  Lesions on R buttock, cx + for HSV. On contact isolation precaution. Valtrex 1 G TID 7d  ID following     d/w dr. rod   [id number]     ASSESSMENT: TSP resection of pituitary adenoma, POD4 complicated by hypernatremia from DI   POD 10 Expanded endonasal transsphenoidal/transplanum 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 karthikeyan graft on 3/14/2023, LD which was removed (3/20)    NEURO:  Neuro checks Q 4 hr   LD removed 3/20, no leak, no rhinorrhea no neck stiffness  Serial CTH stable  Hospitalist following   PT/OT rec for home PT     PULM:  RA sat well >98%  Pituitary precautions (no incentive spirometry, no straws, no BIPAP)    CV:  -160 mmHg  holding home lisinopril   bradycardia improving. cards following     RENAL:   DI  Net 900 positive   voiding   BMP, osm Q8H    GI:  Diet: CCD   encourage oral fluid intake   Bowel regimen [] colace [x] senna [x] other: miralax   last BM 3/19     ENDO:   pantohypopit   Goal euglycemia (-180). A1C 8 %  Hydrocort wean plan per endocrinology c/w 20/10 now  hypothyroidism cont synthroid 75 mcg PO  DM: lantus 14 qhs AND admelog 5 units TID  DDAVP 0.05 mcg BID    HEME/ONC:  H/H stable   lovenox 40 mg sc qhs     ID:  afebrile, mild leukocytosis likely reactive 2/2 steroid  Lesions on R buttock, cx + for HSV. On contact isolation precaution. Valtrex 1 G TID 7d  ID following     d/w dr. rod   [id number]

## 2023-03-24 NOTE — PROGRESS NOTE ADULT - SUBJECTIVE AND OBJECTIVE BOX
Chief Complaint: Sellar mass    History: Having polyuria, polydipsia and nocturia. Sodium acceptable. Having HA. Language line solutions  Arron ID [id number].    MEDICATIONS  (STANDING):  bisacodyl 5 milliGRAM(s) Oral at bedtime  desmopressin 0.05 milliGRAM(s) Oral <User Schedule>  enoxaparin Injectable 40 milliGRAM(s) SubCutaneous <User Schedule>  hydrocortisone 20 milliGRAM(s) Oral <User Schedule>  hydrocortisone 10 milliGRAM(s) Oral <User Schedule>  insulin glargine Injectable (LANTUS) 14 Unit(s) SubCutaneous at bedtime  insulin lispro (ADMELOG) corrective regimen sliding scale   SubCutaneous three times a day before meals  insulin lispro (ADMELOG) corrective regimen sliding scale   SubCutaneous at bedtime  insulin lispro Injectable (ADMELOG) 5 Unit(s) SubCutaneous three times a day before meals  levothyroxine 75 MICROGram(s) Oral daily  pantoprazole    Tablet 40 milliGRAM(s) Oral daily  polyethylene glycol 3350 17 Gram(s) Oral two times a day  senna 2 Tablet(s) Oral at bedtime  valACYclovir 1000 milliGRAM(s) Oral every 8 hours    MEDICATIONS  (PRN):  sodium chloride 0.65% Nasal 1 Spray(s) Both Nostrils three times a day PRN Nasal Congestion  traMADol 25 milliGRAM(s) Oral every 4 hours PRN Moderate Pain (4 - 6)  traMADol 50 milliGRAM(s) Oral every 4 hours PRN Severe Pain (7 - 10)        PHYSICAL EXAM:  VITALS: T(C): 36.3 (03-24-23 @ 17:03)  T(F): 97.4 (03-24-23 @ 17:03), Max: 98.6 (03-24-23 @ 08:40)  HR: 63 (03-24-23 @ 17:03) (59 - 83)  BP: 104/65 (03-24-23 @ 17:03) (102/72 - 105/63)  RR:  (18 - 18)  SpO2:  (96% - 99%)  Wt(kg): --  General: Well-developed female, No acute distress, Speaking full sentences.   Eye:  Extraocular movements are intact, No proptosis or lid lag, No scleral icterus.   Respiratory:  Respirations are non-labored, Symmetric chest wall expansion.  Cardiovascular:  Normal rate, Regular rhythm, No edema.  Gastrointestinal:  Soft, Non-tender, Non-distended.   Integumentary:  Warm, dry.    POCT Blood Glucose.: 242 mg/dL (03-24-23 @ 16:13)  POCT Blood Glucose.: 332 mg/dL (03-24-23 @ 11:24)  POCT Blood Glucose.: 115 mg/dL (03-24-23 @ 07:39)  POCT Blood Glucose.: 154 mg/dL (03-23-23 @ 21:32)  POCT Blood Glucose.: 159 mg/dL (03-23-23 @ 16:17)  POCT Blood Glucose.: 231 mg/dL (03-23-23 @ 11:19)  POCT Blood Glucose.: 138 mg/dL (03-23-23 @ 07:36)  POCT Blood Glucose.: 162 mg/dL (03-22-23 @ 22:54)  POCT Blood Glucose.: 165 mg/dL (03-22-23 @ 21:15)  POCT Blood Glucose.: 110 mg/dL (03-22-23 @ 16:24)  POCT Blood Glucose.: 250 mg/dL (03-22-23 @ 11:40)  POCT Blood Glucose.: 133 mg/dL (03-22-23 @ 05:42)  POCT Blood Glucose.: 120 mg/dL (03-21-23 @ 22:10)      03-24    138  |  102  |  22  ----------------------------<  291<H>  3.8   |  28  |  0.59    eGFR: 108    Ca    8.6      03-24  Mg     2.2     03-22  Phos  3.8     03-22    TPro  6.7  /  Alb  3.9  /  TBili  0.4  /  DBili  x   /  AST  79<H>  /  ALT  238<H>  /  AlkPhos  137<H>  03-24          Thyroid Function Tests:  03-18 @ 17:05 TSH 1.05 FreeT4 1.0 T3 49 Anti TPO -- Anti Thyroglobulin Ab -- TSI --  03-16 @ 04:02 TSH 4.43 FreeT4 -- T3 -- Anti TPO -- Anti Thyroglobulin Ab -- TSI --

## 2023-03-24 NOTE — DISCHARGE NOTE PROVIDER - HOSPITAL COURSE
51yo female PMH HTN, DM2, HLD transferred from [facility] on 3/10/2023 for suprasellar mass found on MRI for opthalmology work up--presented with 1 month of headache and 3wk of blurry vision. On admission patient admitted to [address] [place] [address] for preop clearance. Evaluated by hospitalist and cardiologist preoperatively, TTE performed result of EF 75% and was normal. CTA Coronary 3/13 showing minimal nonobstructive coronary artery disease, calcium score 0 and patient was cleared to proceed with surgery from cardiology and medicine.    Evaluated by Endocrinology   Evaluated by opthalmology   Patient had planned 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 with lumbar drain placement. Post op transferred to NSICU for monitoring 51yo female PMH HTN, DM2, HLD transferred from [facility] on 3/10/2023 for suprasellar mass found on MRI for opthalmology work up--presented with 1 month of headache and 3wk of blurry vision. On admission patient admitted to [address] [place] [address] for preop clearance. Evaluated by hospitalist and cardiologist preoperatively, TTE performed result of EF 75% and was normal. CTA Coronary 3/13 showing minimal nonobstructive coronary artery disease, calcium score 0 and patient was cleared to proceed with surgery from cardiology and medicine. Patient had planned 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 with lumbar drain placement on 3/14/2023. Post op transferred to NSICU for monitoring. Evaluated by opthalmology for visual field deficit, Visual acuity: 20/70 OD, 20/70 OS, b/l inferotemporal VF deficits, worse on L, color plates 12/12 OD, 0/12 OS. No papilledema. pt has history of glaucoma recommend follow up outpatient. Evaluated by Endocrinology pre and post operatively labwork revealed hypopituitarism, patient given hydrocortisone and synthroid.   Post operatively patient was slow to wake up and had new Left upper extremity weakness, was placed on video EEG which was negative. Lab work revealed patient was hyperglycemic started on insulin drip and was also in diabetes insipidus started on vasopressin drip  Post op head CT shows pneumocephalus (intracranial air) and small L sided subdural heme. Lumbar drain was clamped. Serial 53yo female PMH HTN, DM2, HLD transferred from [facility] on 3/10/2023 for suprasellar mass found on MRI for opthalmology work up--presented with 1 month of headache and 3wk of blurry vision. On admission patient admitted to [address] [place] [address] for preop clearance. Evaluated by hospitalist and cardiologist preoperatively, TTE performed result of EF 75% and was normal. CTA Coronary 3/13 showing minimal nonobstructive coronary artery disease, calcium score 0 and patient was cleared to proceed with surgery from cardiology and medicine. Patient had planned 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 with lumbar drain placement on 3/14/2023. Post op transferred to NSICU for monitoring. Evaluated by opthalmology for visual field deficit, Visual acuity: 20/70 OD, 20/70 OS, b/l inferotemporal VF deficits, worse on L, color plates 12/12 OD, 0/12 OS. No papilledema. pt has history of glaucoma recommend follow up outpatient. Evaluated by Endocrinology pre and post operatively labwork revealed hypopituitarism, patient given hydrocortisone and synthroid.   Post operatively patient was slow to wake up and had new Left upper extremity weakness, was placed on video EEG which was negative. Lab work revealed patient was hyperglycemic started on insulin drip and was also in diabetes insipidus started on vasopressin drip. Patient transitioned off Insulin Lantus 14 units at night and 5 units before meals, sugar has been better controlled. Transitioned off vasopressin drip to DDAVP twice a day, DI has improved and is stable.   Post op head CT with pneumocephalus (intracranial air) and small L sided subdural heme. Lumbar drain was clamped. Serial 51yo female PMH HTN, DM2, HLD transferred from [facility] on 3/10/2023 for suprasellar mass found on MRI for opthalmology work up--presented with 1 month of headache and 3wk of blurry vision. On admission patient admitted to [address] [place] [address] for preop clearance by hospitalist and cardiologist preop, TTE EF 75% normal. CTA Coronary 3/13 w/ minimal nonobstructive coronary artery disease, calcium score 0 and patient cleared for surgery from cardiology & medicine. Patient had planned 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 with lumbar drain placement on 3/14/2023. Post op transferred to NSICU for monitoring. Evaluated by opthalmology for visual field deficit, Visual acuity: 20/70 OD, 20/70 OS, b/l inferotemporal VF deficits, worse on L, color plates 12/12 OD, 0/12 OS. No papilledema. pt has history of glaucoma recommend follow up outpatient. Evaluated by Endocrinology pre and post operatively labwork revealed hypopituitarism, patient started on hydrocortisone and synthroid.  Post operatively in NSICU patient was slow to wake up and had new Left upper extremity weakness, placed on video EEG which was negative.  Lab work revealed patient was hyperglycemic started on insulin drip and was also in diabetes insipidus started on vasopressin drip. Patient transitioned off Insulin Lantus 14 units at night and 5 units before meals, sugar has been well controlled. Transitioned off vasopressin drip to DDAVP twice a day, DI has improved and is stable as monitored by urine output and sodium levels.  Post op head CT with pneumocephalus (intracranial air) and small L sided subdural heme. Lumbar drain was clamped. Serial CT images after were stable. Lumbar drain remained clamped and was removed on 3/20, with no signs of leaking. ENT also following patient noted nasal splints in place to be removed in office, and continue with nasal spray, avoid heavy bending/lifting or blowing nose/nasal trauma. NG tube placed post op with laryngoscope, patient re-evaluated by speech and swallow and showed capability of swallowing without difficulty tube removed. patient tolerating diet. Patient was transferred out of NSICU to [address] [place] [address] on 3/22. on 3/22 new vesicles noted on patients L buttock, Infectious disease consulted and culture performed was placed on isolation precaution for HSV infection confirmed with culture. Started on valtrex 1 G TID.   While admitted patient evaluated by physical therapy who recommended home with home PT service.   On the day of discharge the patient is neurologically and medically stable for discharge

## 2023-03-24 NOTE — DISCHARGE NOTE NURSING/CASE MANAGEMENT/SOCIAL WORK - NSTRANSFERBELONGINGSDISPO_GEN_A_NUR
To whom it may concern,    Rosemary Mcconnell Is to be off of work until Monday 06/13/22.    Thank you ,   Dr. Jose Martin Christensen                   with patient

## 2023-03-25 LAB
HAV IGM SER-ACNC: SIGNIFICANT CHANGE UP
SMOOTH MUSCLE AB SER-ACNC: SIGNIFICANT CHANGE UP

## 2023-03-27 LAB
ANA PAT FLD IF-IMP: ABNORMAL
ANA TITR SER: ABNORMAL

## 2023-03-28 NOTE — CHART NOTE - NSCHARTNOTEFT_GEN_A_CORE
CAPRINI SCORE [CLOT] Score on Admission for     AGE RELATED RISK FACTORS                                                       MOBILITY RELATED FACTORS  [x ] Age 41-60 years                                            (1 Point)                  [ ] Bed rest                                                        (1 Point)  [ ] Age: 61-74 years                                           (2 Points)                 [ ] Plaster cast                                                   (2 Points)  [ ] Age= 75 years                                              (3 Points)                 [ ] Bed bound for more than 72 hours                 (2 Points)    DISEASE RELATED RISK FACTORS                                               GENDER SPECIFIC FACTORS  [ ] Edema in the lower extremities                       (1 Point)                  [ ] Pregnancy                                                     (1 Point)  [ ] Varicose veins                                               (1 Point)                  [ ] Post-partum < 6 weeks                                   (1 Point)             [ x] BMI > 25 Kg/m2                                            (1 Point)                  [ ] Hormonal therapy  or oral contraception          (1 Point)                 [ ] Sepsis (in the previous month)                        (1 Point)            [ ] History of pregnancy complications                 (1 point)  [ ] Pneumonia or serious lung disease                                            [ ] Unexplained or recurrent                     (1 Point)           (in the previous month)                               (1 Point)  [ ] Abnormal pulmonary function test                     (1 Point)                 SURGERY RELATED RISK FACTORS (include planned surgeries)  [ ] Acute myocardial infarction                              (1 Point)                 [ ]  Section                                             (1 Point)  [ ] Congestive heart failure (in the previous month)  (1 Point)         [ ] Minor surgery                                                  (1 Point)   [ ] Inflammatory bowel disease                             (1 Point)                 [ ] Arthroscopic surgery                                        (2 Points)  [ ] Central venous access                                      (2 Points)                 [ ] General surgery lasting more than 45 minutes   (2 Points)       [ ] Stroke (in the previous month)                          (5 Points)               [ ] Elective arthroplasty                                         (5 Points)            [ ] current or past malignancy                              (2 Points)                                                                                                       HEMATOLOGY RELATED FACTORS                                                 TRAUMA RELATED RISK FACTORS  [ ] Prior episodes of VTE                                     (3 Points)                [ ] Fracture of the hip, pelvis, or leg                       (5 Points)  [ ] Positive family history for VTE                         (3 Points)             [ ] Acute spinal cord injury (in the previous month)  (5 Points)  [ ] Prothrombin 17561 A                                     (3 Points)                [ ] Paralysis  (less than 1 month)                             (5 Points)  [ ] Factor V Leiden                                             (3 Points)                  [ ] Multiple Trauma within 1 month                        (5 Points)  [ ] Lupus anticoagulants                                     (3 Points)                                                           [ ] Anticardiolipin antibodies                               (3 Points)                                                       [ ] High homocysteine in the blood                      (3 Points)                                             [ ] Other congenital or acquired thrombophilia      (3 Points)                                                [ ] Heparin induced thrombocytopenia                  (3 Points)                                          Total Score [    3      ]    Risk:  Very low 0   Low 1 to 2   Moderate 3 to 4   High =5       VTE Prophylaxis Recommendations:  [ x] mechanical pneumatic compression devices                                      [ ] contraindicated: _____________________  [x ] chemo prophylaxis                                                                                  [ ] contraindicated _____________________    **** HIGH LIKELIHOOD DVT PRESENT ON ADMISSION  [ ] (please order LE dopplers within 24 hours of admission)
Lumbar drain cleared for removal per neurosurgery. Drain off-suction and removed without complication.  Sutures applied for hemostasis.  Patient tolerated procedure well. Nurse notified. Assisted with Devon Chen MD
52y female who is s/p expanded endonasal transphenoidal/transplanum 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 karthikeyan graft, postop lumbar drain splints in place POD#2. ENT was called for NGT placement  Procedure Note:  Procedure: NGT placement  Diagnosis: dysphagia  Verbal consent obtained from patient. Lube applied to small keofeed tube. Keofeed advanced through R. nare without resistance under visualization using indirect laryngoscope, nasopore noted in B/L nares. Tip of tube visualized in pyriform sinus. Pt asked to swallow. Tube advanced through esophageal inlet without resistance. Placement confirmed with auscultation of air in stomach. Pending CXR confirmation.
Left message for patient in regards to follow up care with callback information.
Preop Note for OR today 3/14 with Dr. Santo and Dr. Alvarenga (ENT) for expanded endonasal approach for cystic suprasellar mass:    LORRIE REZA  OR Date 3-14-23  MRN [id number]    HPI: 52F Kiswahili-speaking PMHx HTN, HLD, DM2 presented with 3-4 weeks of progressive HA, worsening blurry vision especially in the L eye, weight loss, and intermittent galactorrhea. Initially self-presented to ophthalmologist who discovered suprasellar mass on MRI during workup. MRI shows a multilobulated 2x2.5cm cystic suprasellar lesion with some diffusion restriction indicative of thickened cystic components and calcification in the right-sided cyst (seen both on CTH and as increased susceptibility on the SWI sequence). Optic chiasm is superiorly displaced L>R with the lesion centered infrachiasmatically. There is an area of enhancement inferiorly in the sella that appears to be normal pituitary gland with the stalk possibly paramedian to the right. She is currently undergoing endocrinological eval of her HPA axis but given borderline cortisol values will likely need stress dose steroid dosing periop.    Exam: AOx3, bitemporal hemianopsia L>R, PERRL, EOMI, no facial, OCHOA 5/5, no drift, SILT.   Ophtho: VA 20/70 OD, 20/70 OS, b/l inferotemporal VF deficits, worse on L, color plates 12/12 OD, 0/12 OS. No papilledema.     Meds: Levothyroxine 75mcg daily.     Labs: Preoperative labs wnl, plts 243, INR 1.20, Na 141. HCG neg 3/13. T&S is active from 3/13. COVID and MRSA/MSSA neg 3/13. LED neg 3/11. AM cortisol 3/13 6.9, 3/12 5.9, borderline low. Undergoing cosyntropin ACTH stim test this AM, however given borderline values will likely give stress dose hydrocortisone perioperatively. Prolactin level 107 indicative of stalk effect, TSH elevated 8.85 with low free thyroxine 0.5, now supplemented on synthroid.     Preop/Operative Plan: The patient is planned for an expanded endonasal transsphenoidal approach for resection of a cystic suprasellar lesion concerning for craniopharyngioma with possible preoperative lumbar drain and abdominal fat graft today 3/14 with Dr. Santo using neuronavigation and neuromonitoring. Postoperatively the patient will be transferred to the neuro ICU for further management. She was cleared by Dr. Zhang 3/14; TTE was wnl (LVEF 75%) and CTA coronary 3/13 showed minimal nonobstructive coronary artery disease.

## 2023-03-28 NOTE — CHART NOTE - NSCHARTNOTESELECT_GEN_ALL_CORE
D/C Appt/Event Note
ENT/Event Note
Neurosurgery Preop Note/Event Note
LD Removed/Event Note
VTE Risk Assessment neurosurgery PA/Event Note

## 2023-03-29 ENCOUNTER — NON-APPOINTMENT (OUTPATIENT)
Age: 53
End: 2023-03-29

## 2023-03-29 ENCOUNTER — APPOINTMENT (OUTPATIENT)
Dept: NEUROSURGERY | Facility: CLINIC | Age: 53
End: 2023-03-29
Payer: COMMERCIAL

## 2023-03-29 VITALS
SYSTOLIC BLOOD PRESSURE: 111 MMHG | HEART RATE: 95 BPM | WEIGHT: 157 LBS | BODY MASS INDEX: 25.23 KG/M2 | DIASTOLIC BLOOD PRESSURE: 77 MMHG | HEIGHT: 66 IN | TEMPERATURE: 98.1 F | OXYGEN SATURATION: 98 %

## 2023-03-29 DIAGNOSIS — Z86.19 PERSONAL HISTORY OF OTHER INFECTIOUS AND PARASITIC DISEASES: ICD-10-CM

## 2023-03-29 DIAGNOSIS — Z86.69 PERSONAL HISTORY OF OTHER DISEASES OF THE NERVOUS SYSTEM AND SENSE ORGANS: ICD-10-CM

## 2023-03-29 DIAGNOSIS — Z78.9 OTHER SPECIFIED HEALTH STATUS: ICD-10-CM

## 2023-03-29 DIAGNOSIS — Z86.39 PERSONAL HISTORY OF OTHER ENDOCRINE, NUTRITIONAL AND METABOLIC DISEASE: ICD-10-CM

## 2023-03-29 DIAGNOSIS — Z86.03 PERSONAL HISTORY OF NEOPLASM OF UNCERTAIN BEHAVIOR: ICD-10-CM

## 2023-03-29 DIAGNOSIS — Z86.79 PERSONAL HISTORY OF OTHER DISEASES OF THE CIRCULATORY SYSTEM: ICD-10-CM

## 2023-03-29 PROCEDURE — 99024 POSTOP FOLLOW-UP VISIT: CPT

## 2023-03-30 ENCOUNTER — NON-APPOINTMENT (OUTPATIENT)
Age: 53
End: 2023-03-30

## 2023-03-30 PROBLEM — Z86.79 HISTORY OF HYPERTENSION: Status: RESOLVED | Noted: 2023-03-30 | Resolved: 2023-03-30

## 2023-03-30 PROBLEM — Z86.03 HISTORY OF CRANIOPHARYNGIOMA: Status: RESOLVED | Noted: 2023-03-29 | Resolved: 2023-03-30

## 2023-03-30 PROBLEM — Z78.9 NON-SMOKER: Status: ACTIVE | Noted: 2023-03-30

## 2023-03-30 PROBLEM — Z86.69 HISTORY OF GLAUCOMA: Status: RESOLVED | Noted: 2023-03-30 | Resolved: 2023-03-30

## 2023-03-30 PROBLEM — Z86.19 HISTORY OF HERPES SIMPLEX INFECTION: Status: RESOLVED | Noted: 2023-03-30 | Resolved: 2023-03-30

## 2023-03-30 PROBLEM — Z86.39 HISTORY OF DIABETES MELLITUS: Status: RESOLVED | Noted: 2023-03-30 | Resolved: 2023-03-30

## 2023-03-30 LAB
ANION GAP SERPL CALC-SCNC: 14 MMOL/L
BUN SERPL-MCNC: 15 MG/DL
CALCIUM SERPL-MCNC: 10.1 MG/DL
CHLORIDE SERPL-SCNC: 103 MMOL/L
CO2 SERPL-SCNC: 26 MMOL/L
CREAT SERPL-MCNC: 0.62 MG/DL
EGFR: 106 ML/MIN/1.73M2
GLUCOSE SERPL-MCNC: 274 MG/DL
POTASSIUM SERPL-SCNC: 4.4 MMOL/L
SODIUM SERPL-SCNC: 143 MMOL/L

## 2023-03-30 NOTE — REASON FOR VISIT
[Family Member] : family member [Time Spent: ____ minutes] : Total time spent using  services: [unfilled] minutes. The patient's primary language is not English thus required  services. [de-identified] : 3/14/23 [Interpreters_IDNumber] : [id number] [TWNoteComboBox1] : [place] citizen

## 2023-03-30 NOTE — ASSESSMENT
[FreeTextEntry1] : IMPRESSION:\par 1. 52 year old lady is recovering well s/p 3/14/23 exxpanded endoscopic endonasal transplanar gross total resection of a suprasellar craniopharyngioma. \par \par PLAN:\par 1. F/U with ENT and endocrinology as scheduled.\par 2. F/U with Dr. Santo and NP in 1 month\par 3. BMP ordered for today\par \par \par  ON 3/29/23

## 2023-03-30 NOTE — PHYSICAL EXAM
[General Appearance - Alert] : alert [General Appearance - Well Nourished] : well nourished [General Appearance - Well-Appearing] : healthy appearing [Oriented To Time, Place, And Person] : oriented to person, place, and time [Affect] : the affect was normal [Memory Recent] : recent memory was not impaired [Person] : oriented to person [Place] : oriented to place [Time] : oriented to time [] : no respiratory distress [Respiration, Rhythm And Depth] : normal respiratory rhythm and effort [Exaggerated Use Of Accessory Muscles For Inspiration] : no accessory muscle use [Heart Rate And Rhythm] : heart rate was normal and rhythm regular

## 2023-03-30 NOTE — HISTORY OF PRESENT ILLNESS
[FreeTextEntry1] : Savanna Carter woman presented with several-month history of left-sided visual blurriness and loss.  However, there is also some degree of visual acuity decrease on the right side, confirmed by neuro-ophthalmologic  exam.  Her endocrine exam is grossly intact except for a marginal cosyntropin test where endocrinologist feels that after one hour wait, she might have some degree of hypocortisolemia.  MRI reveals a partially calcified heterogeneously enhancing mass  above the pituitary gland with origin from the stalk with irregular borders, compressing the optic chiasm, and getting to the inferior aspect of the hypothalamus and posteriorly sitting on the brainstem.  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   Considering the patient's symptoms and optic chiasm compression, specifically the left optic nerve elevation and compression, we decided for surgical resection of this lesion through an expanded endoscopic skull base transplanar approach.  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  (Unlisted skull base code).  Today she presents with her son who speaks English however  services was utilized.  Pt reports occasional headaches 6/10 that are managed with Tylenol and Oxycodone PRN.  She is pending f/u with ENT and Endocrine.\par \par Pt takes Levothyroxine 75 mcg\par  Desmopressin 0.1 mg  half tab ever 12 hours \par  Cortef 5 mg in the afternoon and 10 mg in the morning at 8 am.\par \par Pt denies excessive thirst or urination.\par \par

## 2023-04-04 ENCOUNTER — APPOINTMENT (OUTPATIENT)
Dept: OTOLARYNGOLOGY | Facility: CLINIC | Age: 53
End: 2023-04-04
Payer: MEDICAID

## 2023-04-04 VITALS — SYSTOLIC BLOOD PRESSURE: 120 MMHG | HEART RATE: 96 BPM | DIASTOLIC BLOOD PRESSURE: 81 MMHG

## 2023-04-04 PROCEDURE — 99024 POSTOP FOLLOW-UP VISIT: CPT

## 2023-04-04 PROCEDURE — 31237 NSL/SINS NDSC SURG BX POLYPC: CPT | Mod: 50

## 2023-04-04 NOTE — REASON FOR VISIT
[Subsequent Evaluation] : a subsequent evaluation for [Family Member] : family member [Other: ______] : provided by NILAY [FreeTextEntry2] : craniopharyngioma s/p resection 03/14  [Interpreters_IDNumber] : [id number] [Interpreters_FullName] : juan [TWNoteComboBox1] : Mauritian

## 2023-04-04 NOTE — HISTORY OF PRESENT ILLNESS
[de-identified] : 52 year old female hx craniopharyngioma s/p resection 03/14 presents for post op visit\par Patient presented to ED with several months of progressive Left visual blurriness and loss - imaged noted large suprasellar mass\par \par NATURE OF OPERATIONS: 1.  Expanded endonasal transplanar approach and resection of suprasellar mass\par 2. Multilayered skull base reconstruction with fascia karthikeyan and rightsided vascularized pedicled nasoseptal flap\par 3. Fascia karthikeyan harvest from the right thigh\par 4. Lumbar drain placement\par 5. Use of stereotactic image navigation extradural.\par \par Path: Supersellar mass, excision\par - Cyst contents, cholesterol clefts, giant cell reaction, chronic inflammation, dystrophic calcification and minute clusters of ghost cells, consistent with secondary changes associated with craniopharyngioma although there is no viable craniopharyngioma tissue on the tissue available for examination; clinical correlation, including neuroimaging studies, is highly recommended\par  \par Reports appropriate post op congestion and facial pain\par Intermittent headaches and sore throat \par Using saline rinses 2 times a day \par Reporting left eye blurriness has resolved since surgery \par No signs of CSF leak\par No recent fevers\par  \par

## 2023-04-07 PROCEDURE — 64999E: CUSTOM | Mod: 62

## 2023-04-07 PROCEDURE — 64999E: CUSTOM | Mod: 62,22

## 2023-04-07 PROCEDURE — 62272 THER SPI PNXR DRG CSF: CPT

## 2023-04-07 PROCEDURE — 61782 SCAN PROC CRANIAL EXTRA: CPT

## 2023-04-07 PROCEDURE — 20922 REMOVAL OF FASCIA FOR GRAFT: CPT

## 2023-04-07 PROCEDURE — 15740 ISLAND PEDICLE FLAP GRAFT: CPT

## 2023-04-10 RX ORDER — FAMOTIDINE 20 MG/1
20 TABLET ORAL DAILY
Qty: 30 | Refills: 0 | Status: ACTIVE | COMMUNITY
Start: 2023-04-10 | End: 1900-01-01

## 2023-04-21 ENCOUNTER — APPOINTMENT (OUTPATIENT)
Dept: OTOLARYNGOLOGY | Facility: CLINIC | Age: 53
End: 2023-04-21

## 2023-04-28 ENCOUNTER — APPOINTMENT (OUTPATIENT)
Dept: NEUROSURGERY | Facility: CLINIC | Age: 53
End: 2023-04-28
Payer: SELF-PAY

## 2023-04-28 PROCEDURE — 99024 POSTOP FOLLOW-UP VISIT: CPT

## 2023-05-03 LAB
ACTH SER-ACNC: 3 PG/ML
ANION GAP SERPL CALC-SCNC: 16 MMOL/L
BUN SERPL-MCNC: 13 MG/DL
CALCIUM SERPL-MCNC: 10.3 MG/DL
CHLORIDE SERPL-SCNC: 103 MMOL/L
CO2 SERPL-SCNC: 26 MMOL/L
CORTIS SERPL-MCNC: 6.5 UG/DL
CORTIS SERPL-MCNC: 6.8 UG/DL
CREAT SERPL-MCNC: 0.69 MG/DL
EGFR: 104 ML/MIN/1.73M2
FSH SERPL-MCNC: 0.7 IU/L
GH SERPL-MCNC: 0.13 NG/ML
GLUCOSE SERPL-MCNC: 259 MG/DL
HCT VFR BLD CALC: 38.3 %
HGB BLD-MCNC: 11.6 G/DL
IGF-1 INTERP: NORMAL
IGF-I BLD-MCNC: 104 NG/ML
LH SERPL-ACNC: <0.3 IU/L
MCHC RBC-ENTMCNC: 28.6 PG
MCHC RBC-ENTMCNC: 30.3 GM/DL
MCV RBC AUTO: 94.3 FL
PLATELET # BLD AUTO: 316 K/UL
POTASSIUM SERPL-SCNC: 4.3 MMOL/L
PROLACTIN SERPL-MCNC: 67.2 NG/ML
RBC # BLD: 4.06 M/UL
RBC # FLD: 14.6 %
SODIUM SERPL-SCNC: 145 MMOL/L
T3FREE SERPL-MCNC: 2.34 PG/ML
TESTOST SERPL-MCNC: <2.5 NG/DL
TSH SERPL-ACNC: 0.17 UIU/ML
WBC # FLD AUTO: 7.83 K/UL

## 2023-05-04 ENCOUNTER — OUTPATIENT (OUTPATIENT)
Dept: OUTPATIENT SERVICES | Facility: HOSPITAL | Age: 53
LOS: 1 days | End: 2023-05-04
Payer: SELF-PAY

## 2023-05-04 ENCOUNTER — APPOINTMENT (OUTPATIENT)
Dept: ENDOCRINOLOGY | Facility: HOSPITAL | Age: 53
End: 2023-05-04
Payer: COMMERCIAL

## 2023-05-04 VITALS
BODY MASS INDEX: 27.32 KG/M2 | DIASTOLIC BLOOD PRESSURE: 76 MMHG | TEMPERATURE: 97 F | RESPIRATION RATE: 14 BRPM | WEIGHT: 170 LBS | HEART RATE: 78 BPM | SYSTOLIC BLOOD PRESSURE: 117 MMHG | HEIGHT: 66 IN

## 2023-05-04 DIAGNOSIS — E06.9 THYROIDITIS, UNSPECIFIED: ICD-10-CM

## 2023-05-04 LAB
A1C WITH ESTIMATED AVERAGE GLUCOSE RESULT: 8.1 % — HIGH (ref 4–5.6)
ALBUMIN SERPL ELPH-MCNC: 4.2 G/DL — SIGNIFICANT CHANGE UP (ref 3.3–5)
ALP SERPL-CCNC: 118 U/L — SIGNIFICANT CHANGE UP (ref 40–120)
ALT FLD-CCNC: 56 U/L — HIGH (ref 10–45)
ANION GAP SERPL CALC-SCNC: 14 MMOL/L — SIGNIFICANT CHANGE UP (ref 5–17)
AST SERPL-CCNC: 37 U/L — SIGNIFICANT CHANGE UP (ref 10–40)
BILIRUB SERPL-MCNC: <0.2 MG/DL — SIGNIFICANT CHANGE UP (ref 0.2–1.2)
BUN SERPL-MCNC: 15 MG/DL — SIGNIFICANT CHANGE UP (ref 7–23)
CALCIUM SERPL-MCNC: 9.4 MG/DL — SIGNIFICANT CHANGE UP (ref 8.4–10.5)
CHLORIDE SERPL-SCNC: 99 MMOL/L — SIGNIFICANT CHANGE UP (ref 96–108)
CHOLEST SERPL-MCNC: 206 MG/DL — HIGH
CO2 SERPL-SCNC: 26 MMOL/L — SIGNIFICANT CHANGE UP (ref 22–31)
CREAT SERPL-MCNC: 0.6 MG/DL — SIGNIFICANT CHANGE UP (ref 0.5–1.3)
EGFR: 107 ML/MIN/1.73M2 — SIGNIFICANT CHANGE UP
ESTIMATED AVERAGE GLUCOSE: 186 MG/DL — HIGH (ref 68–114)
FSH SERPL-MCNC: 0.7 IU/L — SIGNIFICANT CHANGE UP
GLUCOSE SERPL-MCNC: 181 MG/DL — HIGH (ref 70–99)
HDLC SERPL-MCNC: 41 MG/DL — LOW
LH SERPL-ACNC: <0.3 IU/L — SIGNIFICANT CHANGE UP
LIPID PNL WITH DIRECT LDL SERPL: SIGNIFICANT CHANGE UP MG/DL
NON HDL CHOLESTEROL: 165 MG/DL — HIGH
POTASSIUM SERPL-MCNC: 4.2 MMOL/L — SIGNIFICANT CHANGE UP (ref 3.5–5.3)
POTASSIUM SERPL-SCNC: 4.2 MMOL/L — SIGNIFICANT CHANGE UP (ref 3.5–5.3)
PROLACTIN SERPL-MCNC: 70.2 NG/ML — HIGH (ref 3.4–24.1)
PROLACTIN SERPL-MCNC: 70.7 NG/ML — HIGH (ref 3.4–24.1)
PROT SERPL-MCNC: 7 G/DL — SIGNIFICANT CHANGE UP (ref 6–8.3)
SODIUM SERPL-SCNC: 140 MMOL/L — SIGNIFICANT CHANGE UP (ref 135–145)
T4 FREE SERPL-MCNC: 0.9 NG/DL — SIGNIFICANT CHANGE UP (ref 0.9–1.8)
TRIGL SERPL-MCNC: 402 MG/DL — HIGH
VIT B12 SERPL-MCNC: 742 PG/ML — SIGNIFICANT CHANGE UP (ref 232–1245)

## 2023-05-04 PROCEDURE — 84146 ASSAY OF PROLACTIN: CPT

## 2023-05-04 PROCEDURE — 82043 UR ALBUMIN QUANTITATIVE: CPT

## 2023-05-04 PROCEDURE — 84305 ASSAY OF SOMATOMEDIN: CPT

## 2023-05-04 PROCEDURE — 80061 LIPID PANEL: CPT

## 2023-05-04 PROCEDURE — G0463: CPT

## 2023-05-04 PROCEDURE — 84439 ASSAY OF FREE THYROXINE: CPT

## 2023-05-04 PROCEDURE — 83001 ASSAY OF GONADOTROPIN (FSH): CPT

## 2023-05-04 PROCEDURE — 83002 ASSAY OF GONADOTROPIN (LH): CPT

## 2023-05-04 PROCEDURE — ZZZZZ: CPT | Mod: GC

## 2023-05-04 PROCEDURE — 83036 HEMOGLOBIN GLYCOSYLATED A1C: CPT

## 2023-05-04 PROCEDURE — 36415 COLL VENOUS BLD VENIPUNCTURE: CPT

## 2023-05-04 PROCEDURE — 80053 COMPREHEN METABOLIC PANEL: CPT

## 2023-05-04 PROCEDURE — 82607 VITAMIN B-12: CPT

## 2023-05-04 RX ORDER — REPAGLINIDE 0.5 MG/1
0.5 TABLET ORAL
Refills: 0 | Status: DISCONTINUED | COMMUNITY
End: 2023-05-04

## 2023-05-04 RX ORDER — FAMOTIDINE 20 MG/1
20 TABLET, FILM COATED ORAL
Refills: 0 | Status: DISCONTINUED | COMMUNITY
End: 2023-05-04

## 2023-05-04 RX ORDER — HYDROCORTISONE 10 MG/1
10 TABLET ORAL
Refills: 0 | Status: DISCONTINUED | COMMUNITY
End: 2023-05-04

## 2023-05-04 NOTE — HISTORY OF PRESENT ILLNESS
[FreeTextEntry1] : Savanna Carter woman presented with several-month history of left-sided visual blurriness and loss. However, there is also some degree of visual acuity decrease on the right side, confirmed by neuro-ophthalmologic exam. Her endocrine exam is grossly intact except for a marginal cosyntropin test where endocrinologist feels that after one hour wait, she might have some degree of hypocortisolemia. MRI reveals a partially calcified heterogeneously enhancing mass above the pituitary gland with origin from the stalk with irregular borders, compressing the optic chiasm, and getting to the inferior aspect of the hypothalamus and posteriorly sitting on the brainstem. 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 Considering the patient's symptoms and optic chiasm compression, specifically the left optic nerve elevation and compression, we decided for surgical resection of this lesion through an expanded endoscopic skull base transplanar approach. 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 (Unlisted skull base code). Today she presents with her son who speaks English however  services was utilized. Pt reports occasional headaches 6/10 that are managed with Tylenol and Oxycodone PRN. She is pending f/u with ENT and Endocrine.\par \par Pt takes Levothyroxine 75 mcg\par  Desmopressin 0.1 mg half tab ever 12 hours \par  Cortef 5 mg in the afternoon and 10 mg in the morning at 8 am.\par \par Patient presents today feeling well chief complaint of headaches which come and go.

## 2023-05-04 NOTE — PHYSICAL EXAM
[Alert] : alert [No Acute Distress] : no acute distress [No Neck Mass] : no neck mass was observed [No LAD] : no lymphadenopathy [Thyroid Not Enlarged] : the thyroid was not enlarged [No Thyroid Nodules] : no palpable thyroid nodules [Clear to Auscultation] : lungs were clear to auscultation bilaterally [Normal Rate] : heart rate was normal [Not Tender] : non-tender [Soft] : abdomen soft [No Stigmata of Cushings Syndrome] : no stigmata of Cushings Syndrome [Oriented x3] : oriented to person, place, and time

## 2023-05-04 NOTE — ASSESSMENT
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 Today she presents with her son who speaks English however  services was utilized. Pt reports occasional headaches 6/10 that are managed with Tylenol and Oxycodone PRN. She is pending f/u with ENT and Endocrine.\par \par Pt takes Levothyroxine 75 mcg\par  Desmopressin 0.1 mg half tab ever 12 hours \par  Cortef 5 mg in the afternoon and 10 mg in the morning at 8 am.\par \par \par PLAN:\par follow up with endocrine as planned\par mri brain wwo contrast in 1year

## 2023-05-05 DIAGNOSIS — D44.4 NEOPLASM OF UNCERTAIN BEHAVIOR OF CRANIOPHARYNGEAL DUCT: ICD-10-CM

## 2023-05-05 DIAGNOSIS — E89.89 OTHER POSTPROCEDURAL ENDOCRINE AND METABOLIC COMPLICATIONS AND DISORDERS: ICD-10-CM

## 2023-05-05 DIAGNOSIS — E03.8 OTHER SPECIFIED HYPOTHYROIDISM: ICD-10-CM

## 2023-05-05 DIAGNOSIS — E11.65 TYPE 2 DIABETES MELLITUS WITH HYPERGLYCEMIA: ICD-10-CM

## 2023-05-05 DIAGNOSIS — E23.0 HYPOPITUITARISM: ICD-10-CM

## 2023-05-05 DIAGNOSIS — E27.49 OTHER ADRENOCORTICAL INSUFFICIENCY: ICD-10-CM

## 2023-05-05 LAB
ALBUMIN, RANDOM URINE: <1.2 MG/DL — SIGNIFICANT CHANGE UP
ALBUMIN/CREATININE RATIO (ACR): SIGNIFICANT CHANGE UP MG/G (ref 0–30)
CREAT ?TM UR-MCNC: 51 MG/DL — SIGNIFICANT CHANGE UP
INSULIN-LIKE GROWTH FACTOR 1 INTERPRETATION: SIGNIFICANT CHANGE UP
INSULIN-LIKE GROWTH FACTOR 1: 113 NG/ML — SIGNIFICANT CHANGE UP (ref 52–233)

## 2023-05-05 NOTE — REVIEW OF SYSTEMS
[Fatigue] : fatigue [Recent Weight Gain (___ Lbs)] : recent weight gain: [unfilled] lbs [Negative] : Endocrine [Decreased Appetite] : appetite not decreased [Recent Weight Loss (___ Lbs)] : no recent weight loss

## 2023-05-05 NOTE — END OF VISIT
[] : Fellow [FreeTextEntry3] : 52 F with history of T2D, craniopharyngioma s/p resection on 3/14/2023 with postop complicated by severe DI here for follow up. Pathology showing Cyst contents, cholesterol clefts, giant cell reaction, chronic inflammation, dystrophic calcification and minute clusters of ghost cells, consistent with secondary changes associated with craniopharyngioma although there is no viable craniopharyngioma tissue. \par #Craniopharyngioma\par # Panhypopituitarism: repeat pituitary hormones including FT4, CMP, PRL, LH, FSH, estradiol and IGF-1 \par # Secondary AI: On HC 10mg 8AM, 5mg 3pm, doing okay on this dose. \par # Central DI: on desmopressin 0.1 mg BID. \par # Secondary hypothyroidism: continue with LT4 75 mcg \par # T2D: A1C 8 03/2023.  On Metformin 1 g BID and Prandin 0.5 mg TIDAC. Add GLP1RA for additional glycemic control and weight loss effect. Check A1C, CMP, ACR and lipids\par # Menopause: early menopause at age 41. Will need DEXA.  [Time Spent: ___ minutes] : I have spent [unfilled] minutes of time on the encounter.

## 2023-05-05 NOTE — HISTORY OF PRESENT ILLNESS
[FreeTextEntry1] :  Lashon [id number]\par Accompanied by daughter\par \par 51 y/o F with hx of T2DM, craniopharyngioma s/p resection on 3/14/2023, course c/b severe DI requiring vasopressin gtt\par -Surgical Path shows Cyst contents, cholesterol clefts, giant cell reaction, chronic inflammation, dystrophic calcification and minute clusters of ghost cells, consistent with secondary changes associated with craniopharyngioma although there is no viable craniopharyngioma tissue \par -2 day post op MRI unable to visualize tumor tissue (no contrast as patient could not tolerate test any longer)\par \par Postop hypopituitarism \par -AI: On HC 10mg 8AM, 5mg 3pm - good appetite, no baseline dizziness. (+) balance/gait issues. (+) orthostatic symptoms (+) fatigue/low energy\par -DI: On Desmopressin 0.1 mg BID (taking full tab, not half as prescribed) - nocturia 1x/night, 6 times in the day; has polydypsia some days\par -Hypothyroid - on LT4 75 mcg daily, takes as prescribed. (+) tiredness/low energy, (+) weight gain\par \par Menopause age 41 - never had a DEXA\par \par Also with uncontrolled T2DM. Diagnosed 2013\par A1c 8% in March 2023. Discharged with Metformin 1g BID and Prandin 0.5 mg before meals \par FS 3x/day - fasting 100-120s, 20 mins post meal 130-150s. Never < 70 or > 200. BMP glucoses persistently in the mid 200s\par States diet is well balanced\par Never seen ophtho, no vision issues. No neuropathy. No macrovascular complications

## 2023-05-05 NOTE — ASSESSMENT
[FreeTextEntry1] : 51 y/o F with hx of T2DM, craniopharyngioma s/p resection on 3/14/2023, course c/b severe DI requiring vasopressin gtt\par \par #Craniopharyngioma\par #Panhypopituitarism\par Craniopharyngioma s/p resection 3/14/2023, course c/b severe DI requiring vasopressin gtt. With post op panhypopit\par Now well overall - No polyuria, some polydipsia. C/o fatigue/low energy. Good appetite and 20 lbs weight gain\par \par -Continue HC 10mg 8AM and 5 mg 3pm, LT4 75 mcg daily, and DDAVP 0.1 mg BID (Rx daily but taking it BID, symptoms controlled, BMP stable)\par -Check CMP, FT4, PRL, LH, FSH, and IGF-1\par -Elevated PRL 67 - possibly stalk effect - check PRL, macroprolactin, dilution study - residual tumor?\par -Surgical path report mentions no visible craniopharyngioma tissue - no staining results available\par -2 day post op MRI unable to perform contrast due to patient inability to tolerate - will repeat MRI in 1 year per NSGY\par -Appreciate NSGY follow up\par -Sick day rules discussed. Solu-cortef injection discussed and prescribed. All meds refilled \par \par #Uncontrolled T2DM\par A1c 8% March 2023\par On Metformin 1g BID + Prandin 0.5 mg qAC. FS values discrepant from BMP glucoses (mid 200s)\par -Nearly 20 lb weight gain since hospitalization\par -Add Trulicity 0.75 mg subq/weekly - sent to [facility] - discussed abdominal symptoms, pancreatitis risk. No hx of thyroid cancers or pancreatitis \par -Continue Metformin 1g BID and Prandin 0.5 mg before meals\par -Needs ophtho follow up. Foot exam wnl\par -Check A1c, CMP, Alb/Cr, lipids today\par -Optimal dietary recommendations discussed\par \par #Early menopause\par Menopause age 41\par Will check DEXA scan \par \par Will f/u in 3 months \par \par Discussed with Dr. Luis\par \par Sagrario Chwo MD\par Endocrine Fellow

## 2023-05-10 ENCOUNTER — APPOINTMENT (OUTPATIENT)
Dept: OTOLARYNGOLOGY | Facility: CLINIC | Age: 53
End: 2023-05-10
Payer: COMMERCIAL

## 2023-05-10 VITALS
BODY MASS INDEX: 25.23 KG/M2 | OXYGEN SATURATION: 97 % | HEART RATE: 78 BPM | WEIGHT: 157 LBS | DIASTOLIC BLOOD PRESSURE: 79 MMHG | HEIGHT: 66 IN | SYSTOLIC BLOOD PRESSURE: 116 MMHG

## 2023-05-10 LAB
MONOMERIC PROLACTIN (ICMA)*: 73.5 NG/ML — HIGH
PERCENT MACROPROLACTIN: 11 % — SIGNIFICANT CHANGE UP
PROLACTIN, SERUM (ICMA)*: 82.6 NG/ML — HIGH

## 2023-05-10 PROCEDURE — 31237 NSL/SINS NDSC SURG BX POLYPC: CPT | Mod: 50

## 2023-05-10 NOTE — HISTORY OF PRESENT ILLNESS
[de-identified] : 52 year old female hx craniopharyngioma s/p resection 03/14/23 presents for post op \par LCV 04/04/23 at which time debridement \par Using saline rinses 2x a day \par Occasional left sided pressure\par Denies nasal congestion, anterior rhinorrhea, PND or bleeding \par Denies recent fevers\par Vision is good- no blurry or double vision \par \par NATURE OF OPERATIONS: 1. Expanded endonasal transplanar approach and resection of suprasellar mass\par 2. Multilayered skull base reconstruction with fascia karthikeyan and right_sided vascularized pedicled nasoseptal flap\par 3. Fascia karthikeyan harvest from the right thigh\par 4. Lumbar drain placement\par 5. Use of stereotactic image navigation extradural.\par \par Path: Supersellar mass, excision\par - Cyst contents, cholesterol clefts, giant cell reaction, chronic inflammation, dystrophic calcification and minute clusters of ghost cells, consistent with secondary changes associated with craniopharyngioma although there is no viable craniopharyngioma tissue on the tissue available for examination; clinical correlation, including neuroimaging studies, is highly recommended

## 2023-05-10 NOTE — REASON FOR VISIT
[Subsequent Evaluation] : a subsequent evaluation for [Family Member] : family member [Other: ______] : provided by NILAY [FreeTextEntry2] : craniopharyngioma s/p resection 03/14 [Interpreters_IDNumber] : [id number] [Interpreters_FullName] : kiah [TWNoteComboBox1] : Tongan

## 2023-06-08 ENCOUNTER — APPOINTMENT (OUTPATIENT)
Dept: RADIOLOGY | Facility: CLINIC | Age: 53
End: 2023-06-08

## 2023-07-12 ENCOUNTER — APPOINTMENT (OUTPATIENT)
Dept: OTOLARYNGOLOGY | Facility: CLINIC | Age: 53
End: 2023-07-12
Payer: COMMERCIAL

## 2023-07-12 VITALS
OXYGEN SATURATION: 96 % | HEIGHT: 66 IN | SYSTOLIC BLOOD PRESSURE: 117 MMHG | BODY MASS INDEX: 25.23 KG/M2 | DIASTOLIC BLOOD PRESSURE: 82 MMHG | WEIGHT: 157 LBS | HEART RATE: 81 BPM

## 2023-07-12 PROCEDURE — 99024 POSTOP FOLLOW-UP VISIT: CPT

## 2023-07-12 PROCEDURE — 31237 NSL/SINS NDSC SURG BX POLYPC: CPT | Mod: 50

## 2023-07-12 NOTE — REASON FOR VISIT
[Subsequent Evaluation] : a subsequent evaluation for [Family Member] : family member [Source: ______] : History obtained from [unfilled] [FreeTextEntry2] : craniopharyngioma  [Interpreters_IDNumber] : [id number] [Interpreters_FullName] : JULIO [TWNoteComboBox1] : Cook Islander

## 2023-07-12 NOTE — HISTORY OF PRESENT ILLNESS
[de-identified] : 52 year old female with hx of craniopharyngioma s/p resection 03/14/23 presents for follow-up \par LCV 05/10/23 at which time was debrided.\par Continues to have left sided pressure\par New nasal congestion, anterior rhinorrhea, PND for two weeks \par Occasional clear mucus \par Doing sinus rinses 3 times weekly \par No nasal sprays or allergy medications. \par Provides some relief to symptoms\par Poor sense of smell/taste. \par Reports feeling feverish two days ago\par Did not check temperature. \par Resolved now.

## 2023-07-20 ENCOUNTER — APPOINTMENT (OUTPATIENT)
Dept: ENDOCRINOLOGY | Facility: HOSPITAL | Age: 53
End: 2023-07-20

## 2023-07-27 ENCOUNTER — RESULT CHARGE (OUTPATIENT)
Age: 53
End: 2023-07-27

## 2023-07-27 ENCOUNTER — OUTPATIENT (OUTPATIENT)
Dept: OUTPATIENT SERVICES | Facility: HOSPITAL | Age: 53
LOS: 1 days | End: 2023-07-27
Payer: SELF-PAY

## 2023-07-27 ENCOUNTER — APPOINTMENT (OUTPATIENT)
Dept: ENDOCRINOLOGY | Facility: HOSPITAL | Age: 53
End: 2023-07-27
Payer: COMMERCIAL

## 2023-07-27 VITALS
BODY MASS INDEX: 25.88 KG/M2 | DIASTOLIC BLOOD PRESSURE: 84 MMHG | HEIGHT: 66 IN | TEMPERATURE: 96.5 F | SYSTOLIC BLOOD PRESSURE: 124 MMHG | HEART RATE: 91 BPM | WEIGHT: 161 LBS

## 2023-07-27 DIAGNOSIS — E06.9 THYROIDITIS, UNSPECIFIED: ICD-10-CM

## 2023-07-27 LAB
GLUCOSE BLDC GLUCOMTR-MCNC: 325
GLUCOSE BLDC GLUCOMTR-MCNC: 325 MG/DL — HIGH (ref 70–99)

## 2023-07-27 PROCEDURE — ZZZZZ: CPT | Mod: GC

## 2023-07-27 PROCEDURE — G0463: CPT

## 2023-07-27 PROCEDURE — 82962 GLUCOSE BLOOD TEST: CPT

## 2023-07-27 RX ORDER — HYDROCORTISONE SODIUM SUCCINATE 100 MG/2ML
100 INJECTION, POWDER, FOR SOLUTION INTRAMUSCULAR; INTRAVENOUS
Qty: 2 | Refills: 5 | Status: ACTIVE | COMMUNITY
Start: 2023-05-04 | End: 1900-01-01

## 2023-07-27 NOTE — ASSESSMENT
[Diabetes Foot Care] : diabetes foot care [Carbohydrate Consistent Diet] : carbohydrate consistent diet [Hypoglycemia Management] : hypoglycemia management [FreeTextEntry1] : 53 y/o F with hx of T2DM, craniopharyngioma s/p resection on 3/14/2023, course c/b severe DI requiring vasopressin gtt here for follow up of hypopanpituitaryism and T2DM. \par \par #Craniopharyngioma \par #Panhypopituitarism \par Craniopharyngioma s/p resection 3/14/2023, course c/b severe DI requiring vasopressin gtt. With post op panhypopituitarism \par --Now reporting worsening nocturia and polydypsia as well as fatigue/low energy, poor appetite and 4 lbs weight gain \par --c/w HC 10mg 8AM and 5 mg 3pm – will check AM cortisol prior to adjustments \par --c/w LT4 75 mcg daily, will check free T4 \par --increase DDAVP PM dose to 0.2mg to decrease nocturia, c/w DDAVP 0.1mg in the AM and DDAVP 0.1 mg BID – will check serum sodium and urine osm \par --prolactin 70.7 - pt denies galactorrhea, will recheck prolactin levels \par --LH/FSH low - no need to recheck LH, FSH, IGF-1 \par --Surgical path report mentions no visible craniopharyngioma tissue - no staining results available \par --2 day post op MRI unable to perform contrast due to patient inability to tolerate - will repeat MRI in 1 year (may 2024) per NSGY \par -Appreciate NSGY follow up \par -Sick day rules discussed. Solu-cortef injection discussed and prescribed. All meds refilled  \par \par #Uncontrolled T2DM \par  A1c 8.1% May 2023, FS >200s \par --c/w Prandin 0.5mg aAC \par --restart metformin 1g BID \par --increase Trulicity form 0.75mg to 1.5mg weekly \par --Pt with ophtho follow up in July 2023 – no retinopathy \par --Foot exam wnl \par --May 2023: Urine Cr 51, Alb <1.2 \par -Optimal dietary recommendations discussed \par \par #Early menopause \par Menopause age 41 \par Will check DEXA scan \par \par #HLD \par , , HDL 41, LDL unable to be calclulation \par --restart atorvastatin 80 mg  \par --c/w Zetia 10mg  \par --recheck lipid panel today \par --Optimal dietary recommendations discussed \par \par RTC in 3 months\par \par Discussed case w/ Dr. Malone\par \par \par Jesús Fox MD\par Endocrinology Fellow - PGY-4\par

## 2023-07-27 NOTE — HISTORY OF PRESENT ILLNESS
[FreeTextEntry1] : 51 y/o F with hx of T2DM, craniopharyngioma s/p resection on 3/14/2023, course c/b severe DI requiring vasopressin gtt. \par \par --Surgical Path shows Cyst contents, cholesterol clefts, giant cell reaction, chronic inflammation, dystrophic calcification and minute clusters of ghost cells, consistent with secondary changes associated with craniopharyngioma although there is no viable craniopharyngioma tissue  \par --2 day post op MRI unable to visualize tumor tissue (no contrast as patient could not tolerate test any longer) \par \par Patient reports fatigue, sleepiness started last week – she was diagnosed with sinus infection, started bactrim for 2 days and felt ill and stopped the bactrim. Pt also reports poor appetite since taking the bactrim. Pt gained 4 lbs since last visit \par \par #Postop hypopituitarism  \par -AI: On HC 10mg 8AM, 5mg 3pm – poor appetite, no baseline dizziness. (+) balance/gait issues. (+) orthostatic symptoms (+) fatigue/low energy-  states worse in the morning, improves with AM hydrocortisone, but still feels fatigued throughout the day, also slightly improved with PM hydrocortisone \par -DI: On Desmopressin 0.1 mg BID - nocturia 7x/night increase from 1x/night, also many times during the day; has polydypsia every day \par -Hypothyroid - on LT4 75 mcg daily, May 2023 Free T4 0.9, takes as prescribed. (+) tiredness/low energy, (+) weight gain, (+)diarrhea – 2 days ago, 4-5x/day, (+) dry skin, neg for constipation, palpitations \par -LH <0.3, FSH 0.7 \par -prolactin 70.7 - pt denies galactorrhea,  \par -IGF-1: 113 \par \par Menopause age 41 - never had a DEXA, unable to obtain due to insurance\par \par #uncontrolled T2DM. Diagnosed 2013 \par A1c 8% in March 2023 -> A1c 8.1% May 2023  \par --patient is supposed to be taking Metformin 1g BID, Prandin 0.5 mg before meals.  Trulicity 0.75 mg subq/weekly  \par --Pt thought she was supposed to stop taking metformin when starting Trulicity, so she has not been taking metformin 1g BID for months   \par --FS 3x/day - pt reports 200s fasting and throughout the day (increase from last visit) \par --FS Never < 70 \par --Diet: Beans, eggs, chicken, avoids breads, vegetables, avoids pasta, avoids juice, soda, coffee without sugar, water \par --Last week saw ophthomologist in [place], no retinopathy, No neuropathy. No macrovascular complications \par --May 2023: Urine Cr 51, Alb <1.2 \par \par #HLD \par , , HDL 41, LDL unable to be calclulation \par --patient is supposed to be taking atorvastaitn 80 mg and Zetia 10mg  \par --patient has not been taking atorvastatin  80mg since she though she was supposed to stop taking it since starting the Zetia 10mg  [Finger Stick] : Finger Stick: Yes

## 2023-07-27 NOTE — REASON FOR VISIT
[Follow - Up] : a follow-up visit [DM Type 2] : DM Type 2 [Pituitary Evaluation/ Disorder] : pituitary evaluation/disorder [Hypogonadism] : hypogonadism [Other: ______] : provided by NILAY [Time Spent: ____ minutes] : Total time spent using  services: [unfilled] minutes. The patient's primary language is not English thus required  services. [Interpreters_FullName] : Raj [TWNoteComboBox1] : [place]

## 2023-07-27 NOTE — PHYSICAL EXAM
[Alert] : alert [Well Nourished] : well nourished [No Acute Distress] : no acute distress [Well Developed] : well developed [Normal Sclera/Conjunctiva] : normal sclera/conjunctiva [EOMI] : extra ocular movement intact [No Proptosis] : no proptosis [Normal Oropharynx] : the oropharynx was normal [Thyroid Not Enlarged] : the thyroid was not enlarged [No Thyroid Nodules] : no palpable thyroid nodules [No Respiratory Distress] : no respiratory distress [No Accessory Muscle Use] : no accessory muscle use [Clear to Auscultation] : lungs were clear to auscultation bilaterally [Normal S1, S2] : normal S1 and S2 [Normal Rate] : heart rate was normal [Regular Rhythm] : with a regular rhythm [No Edema] : no peripheral edema [Pedal Pulses Normal] : the pedal pulses are present [Normal Bowel Sounds] : normal bowel sounds [Not Tender] : non-tender [Not Distended] : not distended [Soft] : abdomen soft [Normal Anterior Cervical Nodes] : no anterior cervical lymphadenopathy [Normal Posterior Cervical Nodes] : no posterior cervical lymphadenopathy [No Spinal Tenderness] : no spinal tenderness [Spine Straight] : spine straight [No Stigmata of Cushings Syndrome] : no stigmata of Cushings Syndrome [Normal Gait] : normal gait [Normal Strength/Tone] : muscle strength and tone were normal [No Rash] : no rash [Acanthosis Nigricans] : no acanthosis nigricans [Normal] : normal [Full ROM] : with full range of motion [Diminished Throughout Both Feet] : normal tactile sensation with monofilament testing throughout both feet [Normal Reflexes] : deep tendon reflexes were 2+ and symmetric [No Tremors] : no tremors [Normal Sensation on Monofilament Testing] : normal sensation on monofilament testing of lower extremities [Oriented x3] : oriented to person, place, and time

## 2023-07-27 NOTE — REVIEW OF SYSTEMS
[Fatigue] : fatigue [Decreased Appetite] : decreased appetite [Recent Weight Gain (___ Lbs)] : recent weight gain: [unfilled] lbs [Fever] : no fever [Chills] : no chills [Nausea] : no nausea [Constipation] : no constipation [Vomiting] : no vomiting [Diarrhea] : diarrhea [Dizziness] : dizziness [Poor Balance] : poor balance [Polydipsia] : polydipsia [Cold Intolerance] : no cold intolerance [Heat Intolerance] : no heat intolerance [Hot Flashes] : no hot flashes [Negative] : Integumentary

## 2023-07-31 ENCOUNTER — OUTPATIENT (OUTPATIENT)
Dept: OUTPATIENT SERVICES | Facility: HOSPITAL | Age: 53
LOS: 1 days | End: 2023-07-31
Payer: SELF-PAY

## 2023-07-31 DIAGNOSIS — E06.9 THYROIDITIS, UNSPECIFIED: ICD-10-CM

## 2023-07-31 LAB
A1C WITH ESTIMATED AVERAGE GLUCOSE RESULT: 13.5 % — HIGH (ref 4–5.6)
ALBUMIN SERPL ELPH-MCNC: 4.3 G/DL — SIGNIFICANT CHANGE UP (ref 3.3–5)
ALP SERPL-CCNC: 118 U/L — SIGNIFICANT CHANGE UP (ref 40–120)
ALT FLD-CCNC: 50 U/L — HIGH (ref 10–45)
ANION GAP SERPL CALC-SCNC: 12 MMOL/L — SIGNIFICANT CHANGE UP (ref 5–17)
AST SERPL-CCNC: 44 U/L — HIGH (ref 10–40)
BILIRUB SERPL-MCNC: 0.3 MG/DL — SIGNIFICANT CHANGE UP (ref 0.2–1.2)
BUN SERPL-MCNC: 9 MG/DL — SIGNIFICANT CHANGE UP (ref 7–23)
CALCIUM SERPL-MCNC: 9.4 MG/DL — SIGNIFICANT CHANGE UP (ref 8.4–10.5)
CHLORIDE SERPL-SCNC: 98 MMOL/L — SIGNIFICANT CHANGE UP (ref 96–108)
CHOLEST SERPL-MCNC: 152 MG/DL — SIGNIFICANT CHANGE UP
CO2 SERPL-SCNC: 26 MMOL/L — SIGNIFICANT CHANGE UP (ref 22–31)
CORTIS AM PEAK SERPL-MCNC: 0.9 UG/DL — LOW (ref 6–18.4)
CREAT SERPL-MCNC: 0.71 MG/DL — SIGNIFICANT CHANGE UP (ref 0.5–1.3)
EGFR: 102 ML/MIN/1.73M2 — SIGNIFICANT CHANGE UP
ESTIMATED AVERAGE GLUCOSE: 341 MG/DL — HIGH (ref 68–114)
GLUCOSE SERPL-MCNC: 350 MG/DL — HIGH (ref 70–99)
HDLC SERPL-MCNC: 33 MG/DL — LOW
LIPID PNL WITH DIRECT LDL SERPL: 70 MG/DL — SIGNIFICANT CHANGE UP
NON HDL CHOLESTEROL: 118 MG/DL — SIGNIFICANT CHANGE UP
OSMOLALITY UR: 583 MOSM/KG — SIGNIFICANT CHANGE UP (ref 50–1200)
POTASSIUM SERPL-MCNC: 4.3 MMOL/L — SIGNIFICANT CHANGE UP (ref 3.5–5.3)
POTASSIUM SERPL-SCNC: 4.3 MMOL/L — SIGNIFICANT CHANGE UP (ref 3.5–5.3)
PROLACTIN SERPL-MCNC: 73.2 NG/ML — HIGH (ref 3.4–24.1)
PROT SERPL-MCNC: 7.2 G/DL — SIGNIFICANT CHANGE UP (ref 6–8.3)
SODIUM SERPL-SCNC: 137 MMOL/L — SIGNIFICANT CHANGE UP (ref 135–145)
T4 FREE SERPL-MCNC: 0.9 NG/DL — SIGNIFICANT CHANGE UP (ref 0.9–1.8)
TRIGL SERPL-MCNC: 302 MG/DL — HIGH

## 2023-07-31 PROCEDURE — 84146 ASSAY OF PROLACTIN: CPT

## 2023-07-31 PROCEDURE — 83036 HEMOGLOBIN GLYCOSYLATED A1C: CPT

## 2023-07-31 PROCEDURE — 84439 ASSAY OF FREE THYROXINE: CPT

## 2023-07-31 PROCEDURE — 83935 ASSAY OF URINE OSMOLALITY: CPT

## 2023-07-31 PROCEDURE — 80053 COMPREHEN METABOLIC PANEL: CPT

## 2023-07-31 PROCEDURE — 80061 LIPID PANEL: CPT

## 2023-07-31 PROCEDURE — 82533 TOTAL CORTISOL: CPT

## 2023-08-08 ENCOUNTER — NON-APPOINTMENT (OUTPATIENT)
Age: 53
End: 2023-08-08

## 2023-08-10 DIAGNOSIS — E27.49 OTHER ADRENOCORTICAL INSUFFICIENCY: ICD-10-CM

## 2023-08-10 DIAGNOSIS — E23.0 HYPOPITUITARISM: ICD-10-CM

## 2023-08-10 DIAGNOSIS — E03.8 OTHER SPECIFIED HYPOTHYROIDISM: ICD-10-CM

## 2023-08-10 DIAGNOSIS — E78.5 HYPERLIPIDEMIA, UNSPECIFIED: ICD-10-CM

## 2023-08-10 DIAGNOSIS — E11.65 TYPE 2 DIABETES MELLITUS WITH HYPERGLYCEMIA: ICD-10-CM

## 2023-08-10 DIAGNOSIS — E89.89 OTHER POSTPROCEDURAL ENDOCRINE AND METABOLIC COMPLICATIONS AND DISORDERS: ICD-10-CM

## 2023-08-10 DIAGNOSIS — D44.4 NEOPLASM OF UNCERTAIN BEHAVIOR OF CRANIOPHARYNGEAL DUCT: ICD-10-CM

## 2023-09-06 ENCOUNTER — APPOINTMENT (OUTPATIENT)
Dept: OTOLARYNGOLOGY | Facility: CLINIC | Age: 53
End: 2023-09-06
Payer: COMMERCIAL

## 2023-09-06 PROCEDURE — 99212 OFFICE O/P EST SF 10 MIN: CPT

## 2023-09-06 NOTE — HISTORY OF PRESENT ILLNESS
[de-identified] : 52 year old female hx craniopharyngioma s/p resection 03/14/23 presents for follow up LCV 7/12/23 at which time debrided, prescribed Bactrim  Did not complete due to GI upset with taking medications  States overall doing well  No recent nasal congestion, anterior rhinorrhea, PND, facial pain/pressure, epistaxis   Using saline rinses 2x a day

## 2023-09-06 NOTE — REASON FOR VISIT
[Subsequent Evaluation] : a subsequent evaluation for [Other: ______] : provided by NILAY [FreeTextEntry2] : craniopharyngioma s/p resection 03/14/23 [Interpreters_IDNumber] : [id number] [Interpreters_FullName] : noah [TWNoteComboBox1] : Czech

## 2023-09-12 ENCOUNTER — APPOINTMENT (OUTPATIENT)
Dept: ENDOCRINOLOGY | Facility: CLINIC | Age: 53
End: 2023-09-12

## 2023-10-19 ENCOUNTER — RESULT CHARGE (OUTPATIENT)
Age: 53
End: 2023-10-19

## 2023-10-19 ENCOUNTER — APPOINTMENT (OUTPATIENT)
Dept: ENDOCRINOLOGY | Facility: HOSPITAL | Age: 53
End: 2023-10-19
Payer: COMMERCIAL

## 2023-10-19 ENCOUNTER — OUTPATIENT (OUTPATIENT)
Dept: OUTPATIENT SERVICES | Facility: HOSPITAL | Age: 53
LOS: 1 days | End: 2023-10-19
Payer: SELF-PAY

## 2023-10-19 ENCOUNTER — TRANSCRIPTION ENCOUNTER (OUTPATIENT)
Age: 53
End: 2023-10-19

## 2023-10-19 VITALS
RESPIRATION RATE: 14 BRPM | DIASTOLIC BLOOD PRESSURE: 76 MMHG | SYSTOLIC BLOOD PRESSURE: 123 MMHG | HEART RATE: 88 BPM | HEIGHT: 66 IN | TEMPERATURE: 98 F | WEIGHT: 160 LBS | BODY MASS INDEX: 25.71 KG/M2

## 2023-10-19 DIAGNOSIS — E06.9 THYROIDITIS, UNSPECIFIED: ICD-10-CM

## 2023-10-19 LAB
GLUCOSE BLDC GLUCOMTR-MCNC: 449 MG/DL — HIGH (ref 70–99)
GLUCOSE BLDC GLUCOMTR-MCNC: 449 MG/DL — HIGH (ref 70–99)

## 2023-10-19 PROCEDURE — ZZZZZ: CPT | Mod: GC

## 2023-10-19 PROCEDURE — G0463: CPT

## 2023-10-19 PROCEDURE — 82962 GLUCOSE BLOOD TEST: CPT

## 2023-10-19 RX ORDER — DESMOPRESSIN ACETATE 0.1 MG/1
0.1 TABLET ORAL
Refills: 0 | Status: COMPLETED | COMMUNITY
End: 2023-10-19

## 2023-10-19 RX ORDER — METFORMIN HYDROCHLORIDE 1000 MG/1
1000 TABLET, FILM COATED, EXTENDED RELEASE ORAL
Qty: 180 | Refills: 3 | Status: COMPLETED | COMMUNITY
Start: 2023-05-18 | End: 2023-10-19

## 2023-10-19 RX ORDER — LEVOTHYROXINE SODIUM 0.07 MG/1
75 TABLET ORAL
Refills: 0 | Status: COMPLETED | COMMUNITY
End: 2023-10-19

## 2023-10-19 RX ORDER — SENNOSIDES 8.6 MG TABLETS 8.6 MG/1
TABLET ORAL
Refills: 0 | Status: COMPLETED | COMMUNITY
End: 2023-10-19

## 2023-10-19 RX ORDER — VALACYCLOVIR 1 G/1
1 TABLET, FILM COATED ORAL
Refills: 0 | Status: COMPLETED | COMMUNITY
End: 2023-10-19

## 2023-10-19 RX ORDER — SULFAMETHOXAZOLE AND TRIMETHOPRIM 800; 160 MG/1; MG/1
800-160 TABLET ORAL TWICE DAILY
Qty: 20 | Refills: 0 | Status: COMPLETED | COMMUNITY
Start: 2023-07-12 | End: 2023-10-19

## 2023-10-19 RX ORDER — METFORMIN HYDROCHLORIDE 500 MG/1
500 TABLET, COATED ORAL
Refills: 0 | Status: COMPLETED | COMMUNITY
End: 2023-10-19

## 2023-10-19 RX ORDER — HYDROCORTISONE 5 MG/1
5 TABLET ORAL
Refills: 0 | Status: COMPLETED | COMMUNITY
End: 2023-10-19

## 2023-10-20 LAB — GLUCOSE BLDC GLUCOMTR-MCNC: 449

## 2023-10-23 ENCOUNTER — OUTPATIENT (OUTPATIENT)
Dept: OUTPATIENT SERVICES | Facility: HOSPITAL | Age: 53
LOS: 1 days | End: 2023-10-23
Payer: SELF-PAY

## 2023-10-23 DIAGNOSIS — E11.65 TYPE 2 DIABETES MELLITUS WITH HYPERGLYCEMIA: ICD-10-CM

## 2023-10-23 DIAGNOSIS — E06.9 THYROIDITIS, UNSPECIFIED: ICD-10-CM

## 2023-10-23 DIAGNOSIS — E23.0 HYPOPITUITARISM: ICD-10-CM

## 2023-10-23 DIAGNOSIS — E78.5 HYPERLIPIDEMIA, UNSPECIFIED: ICD-10-CM

## 2023-10-23 LAB
A1C WITH ESTIMATED AVERAGE GLUCOSE RESULT: 15.2 % — HIGH (ref 4–5.6)
A1C WITH ESTIMATED AVERAGE GLUCOSE RESULT: 15.2 % — HIGH (ref 4–5.6)
ACTH SER-ACNC: 4.4 PG/ML — LOW (ref 7.2–63.3)
ACTH SER-ACNC: 4.4 PG/ML — LOW (ref 7.2–63.3)
ANION GAP SERPL CALC-SCNC: 14 MMOL/L — SIGNIFICANT CHANGE UP (ref 5–17)
ANION GAP SERPL CALC-SCNC: 14 MMOL/L — SIGNIFICANT CHANGE UP (ref 5–17)
BUN SERPL-MCNC: 11 MG/DL — SIGNIFICANT CHANGE UP (ref 7–23)
BUN SERPL-MCNC: 11 MG/DL — SIGNIFICANT CHANGE UP (ref 7–23)
CALCIUM SERPL-MCNC: 10.1 MG/DL — SIGNIFICANT CHANGE UP (ref 8.4–10.5)
CALCIUM SERPL-MCNC: 10.1 MG/DL — SIGNIFICANT CHANGE UP (ref 8.4–10.5)
CHLORIDE SERPL-SCNC: 97 MMOL/L — SIGNIFICANT CHANGE UP (ref 96–108)
CHLORIDE SERPL-SCNC: 97 MMOL/L — SIGNIFICANT CHANGE UP (ref 96–108)
CHOLEST SERPL-MCNC: 189 MG/DL — SIGNIFICANT CHANGE UP
CHOLEST SERPL-MCNC: 189 MG/DL — SIGNIFICANT CHANGE UP
CO2 SERPL-SCNC: 29 MMOL/L — SIGNIFICANT CHANGE UP (ref 22–31)
CO2 SERPL-SCNC: 29 MMOL/L — SIGNIFICANT CHANGE UP (ref 22–31)
CORTIS AM PEAK SERPL-MCNC: 0.4 UG/DL — LOW (ref 6–18.4)
CORTIS AM PEAK SERPL-MCNC: 0.4 UG/DL — LOW (ref 6–18.4)
CREAT SERPL-MCNC: 0.76 MG/DL — SIGNIFICANT CHANGE UP (ref 0.5–1.3)
CREAT SERPL-MCNC: 0.76 MG/DL — SIGNIFICANT CHANGE UP (ref 0.5–1.3)
EGFR: 93 ML/MIN/1.73M2 — SIGNIFICANT CHANGE UP
EGFR: 93 ML/MIN/1.73M2 — SIGNIFICANT CHANGE UP
ESTIMATED AVERAGE GLUCOSE: 390 MG/DL — HIGH (ref 68–114)
ESTIMATED AVERAGE GLUCOSE: 390 MG/DL — HIGH (ref 68–114)
GLUCOSE SERPL-MCNC: 386 MG/DL — HIGH (ref 70–99)
GLUCOSE SERPL-MCNC: 386 MG/DL — HIGH (ref 70–99)
HDLC SERPL-MCNC: 36 MG/DL — LOW
HDLC SERPL-MCNC: 36 MG/DL — LOW
LIPID PNL WITH DIRECT LDL SERPL: 89 MG/DL — SIGNIFICANT CHANGE UP
LIPID PNL WITH DIRECT LDL SERPL: 89 MG/DL — SIGNIFICANT CHANGE UP
NON HDL CHOLESTEROL: 154 MG/DL — HIGH
NON HDL CHOLESTEROL: 154 MG/DL — HIGH
POTASSIUM SERPL-MCNC: 4 MMOL/L — SIGNIFICANT CHANGE UP (ref 3.5–5.3)
POTASSIUM SERPL-MCNC: 4 MMOL/L — SIGNIFICANT CHANGE UP (ref 3.5–5.3)
POTASSIUM SERPL-SCNC: 4 MMOL/L — SIGNIFICANT CHANGE UP (ref 3.5–5.3)
POTASSIUM SERPL-SCNC: 4 MMOL/L — SIGNIFICANT CHANGE UP (ref 3.5–5.3)
SODIUM SERPL-SCNC: 140 MMOL/L — SIGNIFICANT CHANGE UP (ref 135–145)
SODIUM SERPL-SCNC: 140 MMOL/L — SIGNIFICANT CHANGE UP (ref 135–145)
T4 FREE SERPL-MCNC: 1 NG/DL — SIGNIFICANT CHANGE UP (ref 0.9–1.8)
T4 FREE SERPL-MCNC: 1 NG/DL — SIGNIFICANT CHANGE UP (ref 0.9–1.8)
TRIGL SERPL-MCNC: 395 MG/DL — HIGH
TRIGL SERPL-MCNC: 395 MG/DL — HIGH

## 2023-10-23 PROCEDURE — 82024 ASSAY OF ACTH: CPT

## 2023-10-23 PROCEDURE — 82533 TOTAL CORTISOL: CPT

## 2023-10-23 PROCEDURE — 80048 BASIC METABOLIC PNL TOTAL CA: CPT

## 2023-10-23 PROCEDURE — 80061 LIPID PANEL: CPT

## 2023-10-23 PROCEDURE — 84439 ASSAY OF FREE THYROXINE: CPT

## 2023-10-23 PROCEDURE — 83036 HEMOGLOBIN GLYCOSYLATED A1C: CPT

## 2023-10-28 LAB
MONOMERIC PROLACTIN (ICMA)*: 72.4 NG/ML — HIGH
MONOMERIC PROLACTIN (ICMA)*: 72.4 NG/ML — HIGH
PERCENT MACROPROLACTIN: 12 % — SIGNIFICANT CHANGE UP
PERCENT MACROPROLACTIN: 12 % — SIGNIFICANT CHANGE UP
PROLACTIN, SERUM (ICMA)*: 82.5 NG/ML — HIGH
PROLACTIN, SERUM (ICMA)*: 82.5 NG/ML — HIGH

## 2023-12-13 ENCOUNTER — APPOINTMENT (OUTPATIENT)
Dept: OTOLARYNGOLOGY | Facility: CLINIC | Age: 53
End: 2023-12-13
Payer: SELF-PAY

## 2023-12-13 VITALS
TEMPERATURE: 98.2 F | SYSTOLIC BLOOD PRESSURE: 124 MMHG | OXYGEN SATURATION: 95 % | HEART RATE: 78 BPM | HEIGHT: 66 IN | WEIGHT: 160 LBS | BODY MASS INDEX: 25.71 KG/M2 | DIASTOLIC BLOOD PRESSURE: 77 MMHG

## 2023-12-13 DIAGNOSIS — G93.89 OTHER SPECIFIED DISORDERS OF BRAIN: ICD-10-CM

## 2023-12-13 PROCEDURE — 99024 POSTOP FOLLOW-UP VISIT: CPT

## 2023-12-13 NOTE — REASON FOR VISIT
[Other: ______] : provided by NILAY [Subsequent Evaluation] : a subsequent evaluation for [Family Member] : family member [FreeTextEntry2] : craniopharyngioma s/p resection 03/14/23  [Interpreters_IDNumber] : [id number] [Interpreters_FullName] : gautam [TWNoteComboBox1] : Bolivian

## 2023-12-13 NOTE — HISTORY OF PRESENT ILLNESS
[de-identified] : 53 year old female hx craniopharyngioma s/p resection 03/14/23 presents for follow up LCV 9/6/23 at which debridement  States overall doing well Denies recent nasal congestion, anterior rhinorrhea or PND, facial pain and pressure  No recent sinus infections Sense of smell has returned  Using saline rinses about 3x a week  No recent f/u with neurosurgery

## 2023-12-21 ENCOUNTER — RESULT CHARGE (OUTPATIENT)
Age: 53
End: 2023-12-21

## 2023-12-21 ENCOUNTER — OUTPATIENT (OUTPATIENT)
Dept: OUTPATIENT SERVICES | Facility: HOSPITAL | Age: 53
LOS: 1 days | End: 2023-12-21
Payer: SELF-PAY

## 2023-12-21 ENCOUNTER — APPOINTMENT (OUTPATIENT)
Dept: ENDOCRINOLOGY | Facility: HOSPITAL | Age: 53
End: 2023-12-21
Payer: COMMERCIAL

## 2023-12-21 VITALS
HEART RATE: 80 BPM | DIASTOLIC BLOOD PRESSURE: 83 MMHG | BODY MASS INDEX: 25.71 KG/M2 | RESPIRATION RATE: 14 BRPM | SYSTOLIC BLOOD PRESSURE: 133 MMHG | WEIGHT: 160 LBS | HEIGHT: 66 IN | TEMPERATURE: 96.8 F

## 2023-12-21 DIAGNOSIS — E06.9 THYROIDITIS, UNSPECIFIED: ICD-10-CM

## 2023-12-21 LAB
GLUCOSE BLDC GLUCOMTR-MCNC: 387
GLUCOSE BLDC GLUCOMTR-MCNC: 387 MG/DL — HIGH (ref 70–99)
GLUCOSE BLDC GLUCOMTR-MCNC: 387 MG/DL — HIGH (ref 70–99)

## 2023-12-21 PROCEDURE — 82962 GLUCOSE BLOOD TEST: CPT

## 2023-12-21 PROCEDURE — ZZZZZ: CPT | Mod: GC

## 2023-12-21 PROCEDURE — G0463: CPT

## 2023-12-21 NOTE — PROGRESS NOTE ADULT - ASSESSMENT
Chest pain   abnormal EKG  HLD      mildly elevated troponin   CP resolved   repeat trop is normal   Echo shows normal LV function   coronary cta shows no significant CAD   fair plus

## 2023-12-26 NOTE — ASSESSMENT
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 With post-op panhypopituitarism --c/w HC 10mg 8AM and 5 mg 3p, will check AM cortisol while holding AM hydrocortisone prior to adjustments --FT4 1.0 in 10/2023, c/w LT4 from 75 mcg daily to 8x per week (so one day she will take 2 tablets), --Na wnl - c/w DDAVP PM dose to 0.2mg to decrease nocturia, c/w DDAVP 0.1mg in the AM --prolactin 73.7  - pt denies galactorrhea, --LH/FSH low - no need to recheck LH, FSH, IGF-1 --Surgical path report mentions no visible craniopharyngioma tissue - no staining results available --2 day post op MRI unable to perform contrast due to patient inability to tolerate - will repeat MRI in 1 year (may 2024) per NSGY -Appreciate NSGY follow up -Sick day rules discussed. Solu-cortef injection discussed and prescribed. All meds refilled  #Uncontrolled T2DM A1c 8.1% May 2023 -> 13.5% in 7/2023 -> 15.2% 10/2023 - a1c not at goal, however, FS avg has improved since last visit --increase Prandin to 1mg aAC --increase Trulicity form 1.5mg to 3.0mg weekly - c/w metformin 1g BID - start Jardiance 10mg daily (nurses called today and said it is approved and will deliver it tomorrow) - pt denies hx of UTI or yeast infections --Foot exam wnl --May 2023: Urine Cr 51, Alb <1.2 -Optimal dietary recommendations discussed  #HTG/HLD , , LDL 70 --c/w atorvastaitn 80 mg and Zetia 10mg - with improvement in diabetes, we will expect an improvement in HTG   --Optimal dietary recommendations discussed  RTC in 3 months for Diabetes control  Discussed case w/ Dr. Karen Fox MD Endocrinology Fellow - PGY-4

## 2023-12-26 NOTE — REVIEW OF SYSTEMS
[Fatigue] : fatigue [Negative] : Heme/Lymph [Decreased Appetite] : appetite not decreased [Recent Weight Gain (___ Lbs)] : no recent weight gain [Recent Weight Loss (___ Lbs)] : no recent weight loss

## 2023-12-26 NOTE — END OF VISIT
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 NO change to HC, DDAVP.  Inc Lt4 for goal of free t4 in the middle range. Check labs as per fellow's note.  A1c not at goal, plan as per fellow's note. Rest of the plan as per fellow's note. > 40 mins spent.

## 2023-12-26 NOTE — HISTORY OF PRESENT ILLNESS
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 Patient here for follow up of T2DM.  #Craniopharyngioma s/p resection  --Surgical Path shows Cyst contents, cholesterol clefts, giant cell reaction, chronic inflammation, dystrophic calcification and minute clusters of ghost cells, consistent with secondary changes associated with craniopharyngioma although there is no viable craniopharyngioma tissue. No staining available  --2 day post op MRI unable to visualize tumor tissue (no contrast as patient could not tolerate test any longer)   Labs 10/2023 ACTH 4.4 (low) AM cortisol 0.4 (low) - patient held evening hydrocort night before and AM hydrocortisone day of FT4 0.9 -> 1.0 Na 140  7/2023 UOsm 583 Prolactin 70.7 -> 73.7  Pt has all medications with her, son helps her with medications.  #Postop hypopituitarism   -AI: On HC 10mg 8AM, 5mg 3pm - good appetite, no baseline dizziness. (+) balance/gait issues. (+) orthostatic symptoms (+) fatigue/low energy - same as last visit- states worse in the morning, improves with AM hydrocortisone, but still feels fatigued throughout the day, also slightly improved with PM hydrocortisone  -DI: On Desmopressin 0.1 mg in AM and 0.2mg in PM (increased from 0.1mg BID at last in 7/2023 due to nocturia 7x/day) - denies nocturia - . reports normal amount of urination during the day (~5x/day), reports thirst intact -Hypothyroid - on LT4 75 mcg 8x/week, 10/2023 Free T4 1.0, takes as prescribed. (+) tiredness/low energy, (-) weight gain/loss, (-) constipation/diarrhea (-) palpitations  -LH <0.3, FSH 0.7  -prolactin  73.7 - pt denies galactorrhea -IGF-1: 113  - due for  repeat MRI in 1 year (may 2024) per NSGY  Menopause age 41 - never had a DEXA, reports unable to obtain due to insurance  #uncontrolled T2DM. Diagnosed 2013  A1c 8.1% May 2023 -> 13.5% in 7/2023 -> 15.2% 10/2023 Today FS 200s --patient is taking Metformin 1g BID, Prandin 0.5 mg TID before meals, Trulicity (was supposed to be 3mg but [facility], thus they sent 1.5mg pens and patient has been using 1 pen weekly - pt received Trulicity in 10/2023 has been taking it weekly, last dose Sun 12/17/23 -  She is compliant with metformin, prandin, Trulicty - pt is supposed to be taking Jardiance 10mg daily but has not received Jardiance, showed me a letter stating that they do not have proof of income - nurses called the medication assistance program and patient IS approved and she will be getting the medication tomorrow - pt denies hx of UTI or yeast infections  FS: 14 day avergae: 226 (improved from, 272 30 day avg, 393 90 day avg) - initially patient said she checks sugar 3x/day, but per glucometer review she has not checked it in 1 week, per glucometer numbers: 200s in at 11am-12pm after breakfast, does not check it fasting.  She states she forgot to check it --FS Never < 70  --Diet: Beans, eggs, chicken, avoids breads, vegetables, avoids pasta, avoids juice, soda, coffee without sugar, water  --Last saw ophthomologist in 10/2023 [place], no retinopathy, No neuropathy. No macrovascular complications  --May 2023: Alb/Cr ratio wnl - LDL 89 in 10/2023 - patient is taking atorvastaitn 80 mg and Zetia 10mg - GFR 93 in 10/2023 - /83 today --pt has never been on insulin in the past  #HTG/HTG 10/2023: , , LDL 89 - patient is taking atorvastatin 80 mg and Zetia 10mg - patient denies high fat/sat fat foods (meat, eggs, fried foods). Denies sweets/ice cream

## 2023-12-26 NOTE — REASON FOR VISIT
[Follow - Up] : a follow-up visit [DM Type 2] : DM Type 2 [[employer] ] : provided by [employer]   [Time Spent: ____ minutes] : Total time spent using  services: [unfilled] minutes. The patient's primary language is not English thus required  services. [Interpreters_IDNumber] : [id number]   [Interpreters_FullName] : Rosa [TWNoteComboBox1] : Ghanaian

## 2024-01-03 DIAGNOSIS — E11.65 TYPE 2 DIABETES MELLITUS WITH HYPERGLYCEMIA: ICD-10-CM

## 2024-01-03 DIAGNOSIS — E23.0 HYPOPITUITARISM: ICD-10-CM

## 2024-01-03 DIAGNOSIS — E78.5 HYPERLIPIDEMIA, UNSPECIFIED: ICD-10-CM

## 2024-03-01 ENCOUNTER — OUTPATIENT (OUTPATIENT)
Dept: OUTPATIENT SERVICES | Facility: HOSPITAL | Age: 54
LOS: 1 days | End: 2024-03-01
Payer: SELF-PAY

## 2024-03-01 DIAGNOSIS — E11.65 TYPE 2 DIABETES MELLITUS WITH HYPERGLYCEMIA: ICD-10-CM

## 2024-03-01 DIAGNOSIS — E78.5 HYPERLIPIDEMIA, UNSPECIFIED: ICD-10-CM

## 2024-03-01 DIAGNOSIS — E23.0 HYPOPITUITARISM: ICD-10-CM

## 2024-03-01 LAB
A1C WITH ESTIMATED AVERAGE GLUCOSE RESULT: 9.2 % — HIGH (ref 4–5.6)
ALBUMIN SERPL ELPH-MCNC: 4.5 G/DL — SIGNIFICANT CHANGE UP (ref 3.3–5)
ALP SERPL-CCNC: 94 U/L — SIGNIFICANT CHANGE UP (ref 40–120)
ALT FLD-CCNC: 47 U/L — HIGH (ref 10–45)
ANION GAP SERPL CALC-SCNC: 11 MMOL/L — SIGNIFICANT CHANGE UP (ref 5–17)
AST SERPL-CCNC: 33 U/L — SIGNIFICANT CHANGE UP (ref 10–40)
BILIRUB SERPL-MCNC: 0.3 MG/DL — SIGNIFICANT CHANGE UP (ref 0.2–1.2)
BUN SERPL-MCNC: 15 MG/DL — SIGNIFICANT CHANGE UP (ref 7–23)
CALCIUM SERPL-MCNC: 10 MG/DL — SIGNIFICANT CHANGE UP (ref 8.4–10.5)
CHLORIDE SERPL-SCNC: 103 MMOL/L — SIGNIFICANT CHANGE UP (ref 96–108)
CHOLEST SERPL-MCNC: 71 MG/DL — SIGNIFICANT CHANGE UP
CO2 SERPL-SCNC: 31 MMOL/L — SIGNIFICANT CHANGE UP (ref 22–31)
CREAT SERPL-MCNC: 0.76 MG/DL — SIGNIFICANT CHANGE UP (ref 0.5–1.3)
EGFR: 93 ML/MIN/1.73M2 — SIGNIFICANT CHANGE UP
ESTIMATED AVERAGE GLUCOSE: 217 MG/DL — HIGH (ref 68–114)
GLUCOSE SERPL-MCNC: 132 MG/DL — HIGH (ref 70–99)
HDLC SERPL-MCNC: 32 MG/DL — LOW
LIPID PNL WITH DIRECT LDL SERPL: 21 MG/DL — SIGNIFICANT CHANGE UP
NON HDL CHOLESTEROL: 39 MG/DL — SIGNIFICANT CHANGE UP
POTASSIUM SERPL-MCNC: 4.4 MMOL/L — SIGNIFICANT CHANGE UP (ref 3.5–5.3)
POTASSIUM SERPL-SCNC: 4.4 MMOL/L — SIGNIFICANT CHANGE UP (ref 3.5–5.3)
PROLACTIN SERPL-MCNC: 94.6 NG/ML — HIGH (ref 3.4–24.1)
PROT SERPL-MCNC: 7.1 G/DL — SIGNIFICANT CHANGE UP (ref 6–8.3)
SODIUM SERPL-SCNC: 144 MMOL/L — SIGNIFICANT CHANGE UP (ref 135–145)
T4 FREE SERPL-MCNC: 1.1 NG/DL — SIGNIFICANT CHANGE UP (ref 0.9–1.8)
TRIGL SERPL-MCNC: 83 MG/DL — SIGNIFICANT CHANGE UP
VIT B12 SERPL-MCNC: 879 PG/ML — SIGNIFICANT CHANGE UP (ref 232–1245)

## 2024-03-01 PROCEDURE — 84439 ASSAY OF FREE THYROXINE: CPT

## 2024-03-01 PROCEDURE — 36415 COLL VENOUS BLD VENIPUNCTURE: CPT

## 2024-03-01 PROCEDURE — 80061 LIPID PANEL: CPT

## 2024-03-01 PROCEDURE — 80053 COMPREHEN METABOLIC PANEL: CPT

## 2024-03-01 PROCEDURE — 84146 ASSAY OF PROLACTIN: CPT

## 2024-03-01 PROCEDURE — 83036 HEMOGLOBIN GLYCOSYLATED A1C: CPT

## 2024-03-01 PROCEDURE — 82607 VITAMIN B-12: CPT

## 2024-03-07 ENCOUNTER — NON-APPOINTMENT (OUTPATIENT)
Age: 54
End: 2024-03-07

## 2024-03-14 ENCOUNTER — OUTPATIENT (OUTPATIENT)
Dept: OUTPATIENT SERVICES | Facility: HOSPITAL | Age: 54
LOS: 1 days | End: 2024-03-14
Payer: SELF-PAY

## 2024-03-14 ENCOUNTER — APPOINTMENT (OUTPATIENT)
Dept: ENDOCRINOLOGY | Facility: HOSPITAL | Age: 54
End: 2024-03-14
Payer: COMMERCIAL

## 2024-03-14 VITALS — HEART RATE: 63 BPM | DIASTOLIC BLOOD PRESSURE: 81 MMHG | TEMPERATURE: 97.1 F | SYSTOLIC BLOOD PRESSURE: 115 MMHG

## 2024-03-14 DIAGNOSIS — E11.65 TYPE 2 DIABETES MELLITUS WITH HYPERGLYCEMIA: ICD-10-CM

## 2024-03-14 DIAGNOSIS — E03.8 OTHER SPECIFIED HYPOTHYROIDISM: ICD-10-CM

## 2024-03-14 DIAGNOSIS — D44.4 NEOPLASM OF UNCERTAIN BEHAVIOR OF CRANIOPHARYNGEAL DUCT: ICD-10-CM

## 2024-03-14 DIAGNOSIS — E27.49 OTHER ADRENOCORTICAL INSUFFICIENCY: ICD-10-CM

## 2024-03-14 DIAGNOSIS — Z00.00 ENCOUNTER FOR GENERAL ADULT MEDICAL EXAMINATION W/OUT ABNORMAL FINDINGS: ICD-10-CM

## 2024-03-14 DIAGNOSIS — E89.89 OTHER POSTPROCEDURAL ENDOCRINE AND METABOLIC COMPLICATIONS AND DISORDERS: ICD-10-CM

## 2024-03-14 DIAGNOSIS — E78.5 HYPERLIPIDEMIA, UNSPECIFIED: ICD-10-CM

## 2024-03-14 DIAGNOSIS — Z00.00 ENCOUNTER FOR GENERAL ADULT MEDICAL EXAMINATION WITHOUT ABNORMAL FINDINGS: ICD-10-CM

## 2024-03-14 DIAGNOSIS — E23.0 HYPOPITUITARISM: ICD-10-CM

## 2024-03-14 DIAGNOSIS — E06.9 THYROIDITIS, UNSPECIFIED: ICD-10-CM

## 2024-03-14 PROCEDURE — ZZZZZ: CPT | Mod: GC

## 2024-03-14 PROCEDURE — G0463: CPT

## 2024-03-14 RX ORDER — EZETIMIBE 10 MG/1
10 TABLET ORAL
Qty: 90 | Refills: 3 | Status: DISCONTINUED | COMMUNITY
Start: 2023-05-11 | End: 2024-03-14

## 2024-03-14 RX ORDER — DULAGLUTIDE 0.75 MG/.5ML
0.75 INJECTION, SOLUTION SUBCUTANEOUS
Qty: 4 | Refills: 0 | Status: DISCONTINUED | COMMUNITY
Start: 2023-12-21 | End: 2024-03-14

## 2024-03-14 NOTE — ASSESSMENT
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 With post-op panhypopituitarism --Surgical path report mentions no visible craniopharyngioma tissue - no staining results available --2 day post op MRI unable to perform contrast due to patient inability to tolerate - PLAN: --c/w HC 10mg 8AM and 5 mg 3pm (ACTH and AM cortisol LOW in 10/2023 after holding morning HC day of and afternoon HC day before) --FT4 1.1 in 3/2024, - increase Levothyroxine from 8x/week to 9x a week (1 pill 5 days a week, then 2 pills on 2 days of the week) --Na wnl - c/w DDAVP  0.1mg in the AM and 0.2mg in the PM - repeat BMP, UOsm 2 weeks prior to next appt --prolactin 73.7 -> 94.6 (3/2024), asympotmatic - check prolacitn and macorprolactin 2 weeks prior to next appt in 6/2024 --LH/FSH low - no need to recheck LH, FSH --plan for repeat MRI in 5/2024 per NSGY - phone number provided --Will repeat TSH and IGF1 to assess for recovery of pituitary function --DEXA scan ordered, menopause at 41 -Sick day rules discussed. Solu-cortef injection discussed and prescribed. All meds refilled  #Uncontrolled T2DM A1c 8.1% May 2023 -> 13.5% in 7/2023 -> 15.2% 10/2023 -> 9.2% (3/2024) - a1c not at goal, however, improved - Increase Trulicity from 3mg to 4.5mg ([facility] only has 1.5mg Trulicity pens, thus will do 3 pens at once weekly)   - Increase repaglinide to 2mg before meals (HOLD IF NPO)  - Increase Jardiance from 10mg to 25mg daily (called patient assistance progrem ([facility] - [phone] to give verbal Rx, sent 3 month supply with 3 refills)  - c/w metformin 1g BID - MACR wnl in 5/2023 - repeat A1c, lipid panel and CMP prior to next appt in 6/2024 -Optimal dietary recommendations discussed  #HTG/HLD 10/2023: , , LDL 89 3/2024: TG 83, TChol 71, LDL 21 --c/w atorvastaitn 80 mg  --Stop taking ezetimibe medication given significantly improvement in Lipid panel  - with improvement in diabetes, we will expect an improvement in HTG   --Optimal dietary recommendations discussed  RTC in 3 months for Diabetes control and hypopit  Discussed case w/ Dr. Toby Fox MD Endocrinology Fellow - PGY-4

## 2024-03-14 NOTE — PHYSICAL EXAM
[Well Nourished] : well nourished [Alert] : alert [Well Developed] : well developed [No Acute Distress] : no acute distress [EOMI] : extra ocular movement intact [Normal Sclera/Conjunctiva] : normal sclera/conjunctiva [No Proptosis] : no proptosis [Thyroid Not Enlarged] : the thyroid was not enlarged [Normal Oropharynx] : the oropharynx was normal [No Thyroid Nodules] : no palpable thyroid nodules [No Respiratory Distress] : no respiratory distress [No Accessory Muscle Use] : no accessory muscle use [Normal S1, S2] : normal S1 and S2 [Clear to Auscultation] : lungs were clear to auscultation bilaterally [Regular Rhythm] : with a regular rhythm [Normal Rate] : heart rate was normal [Pedal Pulses Normal] : the pedal pulses are present [No Edema] : no peripheral edema [Normal Bowel Sounds] : normal bowel sounds [Not Tender] : non-tender [Not Distended] : not distended [Normal Anterior Cervical Nodes] : no anterior cervical lymphadenopathy [Soft] : abdomen soft [No Spinal Tenderness] : no spinal tenderness [Spine Straight] : spine straight [No Stigmata of Cushings Syndrome] : no stigmata of Cushings Syndrome [Normal Gait] : normal gait [Normal Strength/Tone] : muscle strength and tone were normal [No Rash] : no rash [Normal Reflexes] : deep tendon reflexes were 2+ and symmetric [No Tremors] : no tremors [Oriented x3] : oriented to person, place, and time [Acanthosis Nigricans] : no acanthosis nigricans

## 2024-03-14 NOTE — END OF VISIT
[] : Fellow [Time Spent: ___ minutes] : I have spent [unfilled] minutes of time on the encounter. [FreeTextEntry3] : 53 F with history of craniopharygioma s/p surgery with panhypopit and T2D here for follow up.  1. Secondary AI, HC 10 mg in the morning and 5 mg in the afternoon. Denies dizziness and balance issue. Rechecked HPA in Oct 2023, confirmed to still have secondary AI. Clinically well. Continue with HC 10/5 mg daily.  2. central DI: on 0.1 mg in the morning and 0.2 mg in the evening. Sodium 141 recently. No polyuria or polydipsia. Continue with the same dose of DDAVP.  3. Central hypothyroidism: levothyroxine 75 mcg daily 8 times per week. FT4 was 1.1.  Increase levothyroxine to 75 mcg 9 times per week.  4. Hyperprolactinemia: prolactin 91, will repeat macroprolactin next blood draw.  5. T2D: A1C most recent 9.2, improved from 15. Currently on prandin 1 mg TID, trulicity 3 mg weekly, metformin 1 g BID, jardiance 10 mg daily. Checks glucose a few times per day. Fasting 140-200s. Before lunch 200s. Increase prandin to 2 mg TID before meals, trulicity to 4.5 mg weekly, jardiance to 25 mg daily. Continue with metformin 1 g BID. Repeat A1C in 3 months.  6. HLD: LDL 21, atorvastatin 80 mg daily. TG 85. Continue with atorvastatin 80 mg daily and repeat lipid profile in 3 months. If LDL not at goal, add zetia.  Repeat MRI pituitary (ordered). Rest of the plan as above.

## 2024-03-14 NOTE — HISTORY OF PRESENT ILLNESS
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 Patient here for follow up of T2DM and hypopituitarisim.  #Craniopharyngioma s/p resection  --Surgical Path shows Cyst contents, cholesterol clefts, giant cell reaction, chronic inflammation, dystrophic calcification and minute clusters of ghost cells, consistent with secondary changes associated with craniopharyngioma although there is no viable craniopharyngioma tissue. No staining available  --2 day post op MRI unable to visualize tumor tissue (no contrast as patient could not tolerate test any longer)   2023: UOsm 583, Prolactin 73.7 10/2023: ACTH 4.4 (low), AM cortisol 0.4 (low) - patient held evening hydrocort night before and morning of, FT4 1.0, Na 140 3/2024: Prolactin 94.6 (high), FT4 1.1 (wnl), Na 141 (wnl)  Pt has all medications with her, son helps her with medications.  #Postop hypopituitarism   --AI: On HC 10mg 8AM, 5mg 3pm - good appetite, no baseline dizziness. reports improvement in fatigue and orthostatic/balance symptoms (previously complained of that. --DI: On Desmopressin 0.1 mg in AM and 0.2mg in PM (increased from 0.1mg BID at last in 2023 due to nocturia 7x/day) - denies nocturia - . reports normal amount of urination during the day (~5x/day), reports thirst intact --Hypothyroid - on LT4 75 mcg 8x/week, 3/2024 Free T4 1.1, takes as prescribed. (-) tiredness/low energy, (-) weight gain/loss, (-) constipation/diarrhea (-) palpitations  --LH <0.3, FSH 0.7  --prolactin  73.7 -> 94.6 (3/2024) - pt denies galactorrhea. Reported hx of galactorhea for 2 years BEFORE the surgery, but has not had since --IGF-1: 113  --due for repeat MRI in 1 year (may 2024) per NSGY. Order is in, phone number provided  Menopause age 41 - never had a DEXA, reports unable to obtain due to insurance  #uncontrolled T2DM. Diagnosed   A1c 8.1% May 2023 -> 13.5% in 2023 -> 15.2% (10/2023) -> 9.2% (3/2024) 30 day av AM: 140s - 200s, before lunch: 200s before dinner: 130s-230s after meal: 200s 1 episode of 400s in 3/11/24 at 5pm Current Regimen: --Prandin to 1mg aAC --Trulicity 3.0mg weekly (2 1.5mg pens at the same time weekly) - metformin 1g BID - Jardiance 10mg daily   --compliance: Patient has been compliant with the changes made above --side effects: denies N/V, diarrhea, UTI or yeast infections  --FS Never < 70  --Diet: Beans, eggs, chicken, avoids breads, vegetables, avoids pasta, avoids juice, soda, coffee without sugar, water  --[facility]. Has appt in 3/28/24 --No Microvascular complications.  --No macrovascular complications  --MACR wnl in 2023 --VIt B12 wnl in 3/2024 --GFR 93 in 3/2024 --LDL 21, TChol 71 in 3/2024 - patient is taking atorvastaitn 80 mg and Zetia 10mg - ran out of Zetia 1 week ago - /81 today - not on ACE/ARB --pt has never been on insulin in the past  #HTG/HTG 10/2023: , , LDL 89 3/2024: TG 83, TChol 71, LDL 21 - patient is taking atorvastatin 80 mg and Zetia 10mg - ran out of Zetia 1 week ago - patient denies high fat/sat fat foods (meat, eggs, fried foods). Denies sweets/ice cream

## 2024-03-14 NOTE — REVIEW OF SYSTEMS
[Negative] : Endocrine [Fatigue] : no fatigue [Recent Weight Gain (___ Lbs)] : no recent weight gain [Decreased Appetite] : appetite not decreased [Recent Weight Loss (___ Lbs)] : no recent weight loss

## 2024-03-14 NOTE — REASON FOR VISIT
[Follow - Up] : a follow-up visit [DM Type 2] : DM Type 2 [[employer] ] : provided by [employer]   [Pituitary Evaluation/ Disorder] : pituitary evaluation/disorder [Time Spent: ____ minutes] : Total time spent using  services: [unfilled] minutes. The patient's primary language is not English thus required  services. [Interpreters_IDNumber] : [id number]  [Interpreters_FullName] : Jim [TWNoteComboBox1] : Uzbek

## 2024-03-23 NOTE — PROGRESS NOTE ADULT - ASSESSMENT
52 yr old female with a history of hypertension, hyperlipidemia, dm type 2, no surgeries ever presents with a month of headaches and 3 weeks of blurred vision. Endocrinology consulted for evaluation of sellar mass.    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 Too low to be prolactinoma given size of the sellar mass. Suspect stalk effect. (prolactin diluted 103, undiluted 107)  -Secondary hypogonadism can be addressed as outpatient. (Lh 2.6, estradiol <5)  -3/12 AM ayush 5.9, ACTH 21.5, 3/13 am cortisol (6am) 6.9  - cosyntropin stim test: 3/14 AM cortisol at 8:15am 5.1, cosyntropin at 8:15, 8:50am cortisol 16.9, 9:25am 20.7  - s/p tsp 3/15  - IGF1 3/15 46  PLAN  - f/u neurosurgery and ophthalmology recommendations  - continue hydrocortisone 25mg q12h on 3/20, would give one dose hydrocortisone 20mg PO at 8am on 3/21, hold afternoon dose on 3/21, repeat 8am ACTH and SERUM cortisol NOT FREE cortisol on 3/22 to reassess adrenal axis     DI/SIADH Watch POST OP  - Please eval for signs of DI vs SIADH postop  - continueDDAVP 0.05mg PO bid  - continue q8-12h BMP monitoring  - Strict I/Os, daily weights. if UO >250cc/hour, please check BMP and urine osm, serum osm.  If consistent with DI, patient may require DDAVP.   - May require DDAVP IVP x1 if meets any of these criteria: Na > 145, UO > 250cc/hr, Urine osm < 100 or urine specific gravity < 1.005.  - Signs and symptoms of DI and SIADH post op:  may occur in the 2-3 weeks following surgery.  Patient should check daily weights and if they experience weight gain of 2-3 pounds to call her endocrinologist.  Signs of DI include increased thirst with high urine output.    Primary Hypothyroidism   - Patient with elevated TSH (8)and Low Free T4 (0.5)  - 3/18 TSH 1.05, FT4 1, TT3 49, serum T4 5.4, do not suspect contributing to bradycardia, steroids can cause low T3  PLAN  - continue levothyroxine 75 mcg daily (maintain on empty stomach (at least 1 hour before meals), separate by 4 hours from PPI or calcium supplementation which can inhibit its absorption)    Steroid hyperglycemia  T2DM with hyperglycemia  - HbA1c: 8  - Home Regimen: metformin 1000mg bid  - Endocrinologist: does not have  - patient with hyperglycemia on stress dose steroids  - now on tube feeds  PLAN  - Increase lantus to 12 units tonight (do not hold if npo)   - start admelog 5 units tid premeal (hold if npo)  - continue low admelog correction scale tid with meals and separate low admelog correction scale at bedtime now that patient is tolerating diet  - carb consistent diet  - fingersticks qid with meals and bedtime  - hypoglycemia protocol prn  - Goal -180  Discharge plan:  - Discharge medications: metformin 1000mg bid + GLP-1 agonist  - Patient to call doctor with persistent high or low BG at home.   - Ensure patient has glucometer, test strips and lancets on discharge.  - Recommend routine outpatient ophthalmology, podiatry and endocrinology f/u    HTN  - Home regimen: lisinopril 2.5mg daily  PLAN  - Can check urine microalbumin outpatient  - Outpatient goal BP <130/80. Management per primary team.    HLD  - Home regimen: atorvastatin 80mg daily  -   PLAN  - resume statin when able    Will schedule an appointment for the patient to follow up.  [address] [place] [address]  Phone Number: [phone]    Discussed with primary team.     Thank you for the interesting consult.    Denny Bishop MD, Endocrinology Fellow  Pager [phone] from 9am to 5pm. After hours and on weekends, please call [phone]. 52 yr old female with a history of hypertension, hyperlipidemia, dm type 2, no surgeries ever presents with a month of headaches and 3 weeks of blurred vision. Endocrinology consulted for evaluation of sellar mass.    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 Too low to be prolactinoma given size of the sellar mass. Suspect stalk effect. (prolactin diluted 103, undiluted 107)  -Secondary hypogonadism can be addressed as outpatient. (Lh 2.6, estradiol <5)  -3/12 AM ayush 5.9, ACTH 21.5, 3/13 am cortisol (6am) 6.9  - cosyntropin stim test: 3/14 AM cortisol at 8:15am 5.1, cosyntropin at 8:15, 8:50am cortisol 16.9, 9:25am 20.7  - s/p tsp 3/15  - IGF1 3/15 46  - 3/22 8am cortisol 3.4 suggestive of AI  PLAN  - f/u neurosurgery and ophthalmology recommendations  - given low am cortisol, suggestive of AI, would give one dose hydrocortisone 20mg now, one dose hydrocortisone 10mg at 6pm, then resume hydrocortisone 20mg at 8am on 3/23 and 10mg at 3pm    DI/SIADH Watch POST OP  - Please eval for signs of DI vs SIADH postop  - continue DDAVP 0.05mg PO bid  - continue q8-12h BMP monitoring  - Strict I/Os, daily weights. if UO >250cc/hour, please check BMP and urine osm, serum osm.  If consistent with DI, patient may require DDAVP.   - May require DDAVP IVP x1 if meets any of these criteria: Na > 145, UO > 250cc/hr, Urine osm < 100 or urine specific gravity < 1.005.  - Signs and symptoms of DI and SIADH post op:  may occur in the 2-3 weeks following surgery.  Patient should check daily weights and if they experience weight gain of 2-3 pounds to call her endocrinologist.  Signs of DI include increased thirst with high urine output.    Primary Hypothyroidism   - Patient with elevated TSH (8)and Low Free T4 (0.5)  - 3/18 TSH 1.05, FT4 1, TT3 49, serum T4 5.4, do not suspect contributing to bradycardia, steroids can cause low T3  PLAN  - continue levothyroxine 75 mcg daily (maintain on empty stomach (at least 1 hour before meals), separate by 4 hours from PPI or calcium supplementation which can inhibit its absorption)    Steroid hyperglycemia  T2DM with hyperglycemia  - HbA1c: 8  - Home Regimen: metformin 1000mg bid  - Endocrinologist: does not have  - patient with hyperglycemia on stress dose steroids  - now on tube feeds  PLAN  - Increase lantus to 14 units tonight (do not hold if npo)   - continue admelog 5 units tid premeal (hold if npo)  - continue low admelog correction scale tid with meals and separate low admelog correction scale at bedtime now that patient is tolerating diet  - carb consistent diet  - fingersticks qid with meals and bedtime  - hypoglycemia protocol prn  - Goal -180  Discharge plan:  - Discharge medications: metformin 1000mg bid + GLP-1 agonist  - Patient to call doctor with persistent high or low BG at home.   - Ensure patient has glucometer, test strips and lancets on discharge.  - Recommend routine outpatient ophthalmology, podiatry and endocrinology f/u    HTN  - Home regimen: lisinopril 2.5mg daily  PLAN  - Can check urine microalbumin outpatient  - Outpatient goal BP <130/80. Management per primary team.    HLD  - Home regimen: atorvastatin 80mg daily  -   PLAN  - resume statin when able    Will schedule an appointment for the patient to follow up.  [address] [place] [address]  Phone Number: [phone]    Discussed with primary team.     Thank you for the interesting consult.    Denny Bishop MD, Endocrinology Fellow  Pager [phone] from 9am to 5pm. After hours and on weekends, please call [phone]. Negative

## 2024-03-28 ENCOUNTER — APPOINTMENT (OUTPATIENT)
Dept: OPHTHALMOLOGY | Facility: CLINIC | Age: 54
End: 2024-03-28

## 2024-05-15 NOTE — OCCUPATIONAL THERAPY INITIAL EVALUATION ADULT - LEVEL OF INDEPENDENCE: SIT/STAND, REHAB EVAL
Portal message was sent to patient along with referral and providers/scheduling information.     Thank you.   Maricel MANSFIELD   Referral Specialist  
contact guard
independent

## 2024-05-21 NOTE — DIETITIAN NUTRITION RISK NOTIFICATION - TREATMENT: THE FOLLOWING DIET HAS BEEN RECOMMENDED
Diet, Regular:   Consistent Carbohydrate {No Snacks} (CSTCHO) (03-19-23 @ 10:29) [Active]      
22-May-1968

## 2024-05-22 ENCOUNTER — APPOINTMENT (OUTPATIENT)
Dept: RADIOLOGY | Facility: CLINIC | Age: 54
End: 2024-05-22
Payer: COMMERCIAL

## 2024-05-22 ENCOUNTER — APPOINTMENT (OUTPATIENT)
Dept: MRI IMAGING | Facility: CLINIC | Age: 54
End: 2024-05-22
Payer: COMMERCIAL

## 2024-05-22 ENCOUNTER — OUTPATIENT (OUTPATIENT)
Dept: OUTPATIENT SERVICES | Facility: HOSPITAL | Age: 54
LOS: 1 days | End: 2024-05-22
Payer: SELF-PAY

## 2024-05-22 DIAGNOSIS — E27.49 OTHER ADRENOCORTICAL INSUFFICIENCY: ICD-10-CM

## 2024-05-22 DIAGNOSIS — D44.4 NEOPLASM OF UNCERTAIN BEHAVIOR OF CRANIOPHARYNGEAL DUCT: ICD-10-CM

## 2024-05-22 DIAGNOSIS — E23.0 HYPOPITUITARISM: ICD-10-CM

## 2024-05-22 DIAGNOSIS — E11.65 TYPE 2 DIABETES MELLITUS WITH HYPERGLYCEMIA: ICD-10-CM

## 2024-05-22 DIAGNOSIS — E78.5 HYPERLIPIDEMIA, UNSPECIFIED: ICD-10-CM

## 2024-05-22 DIAGNOSIS — Z00.00 ENCOUNTER FOR GENERAL ADULT MEDICAL EXAMINATION WITHOUT ABNORMAL FINDINGS: ICD-10-CM

## 2024-05-22 DIAGNOSIS — E03.8 OTHER SPECIFIED HYPOTHYROIDISM: ICD-10-CM

## 2024-05-22 DIAGNOSIS — E89.89 OTHER POSTPROCEDURAL ENDOCRINE AND METABOLIC COMPLICATIONS AND DISORDERS: ICD-10-CM

## 2024-05-22 LAB
A1C WITH ESTIMATED AVERAGE GLUCOSE RESULT: 8.2 % — HIGH (ref 4–5.6)
ALBUMIN SERPL ELPH-MCNC: 4.4 G/DL — SIGNIFICANT CHANGE UP (ref 3.3–5)
ALBUMIN, RANDOM URINE: 2.5 MG/DL — SIGNIFICANT CHANGE UP
ALBUMIN/CREATININE RATIO (ACR): 21 MG/G — SIGNIFICANT CHANGE UP (ref 0–30)
ALP SERPL-CCNC: 80 U/L — SIGNIFICANT CHANGE UP (ref 40–120)
ALT FLD-CCNC: 31 U/L — SIGNIFICANT CHANGE UP (ref 10–45)
ANION GAP SERPL CALC-SCNC: 10 MMOL/L — SIGNIFICANT CHANGE UP (ref 5–17)
AST SERPL-CCNC: 27 U/L — SIGNIFICANT CHANGE UP (ref 10–40)
BILIRUB SERPL-MCNC: 0.3 MG/DL — SIGNIFICANT CHANGE UP (ref 0.2–1.2)
BUN SERPL-MCNC: 11 MG/DL — SIGNIFICANT CHANGE UP (ref 7–23)
CALCIUM SERPL-MCNC: 10.1 MG/DL — SIGNIFICANT CHANGE UP (ref 8.4–10.5)
CHLORIDE SERPL-SCNC: 110 MMOL/L — HIGH (ref 96–108)
CHOLEST SERPL-MCNC: 98 MG/DL — SIGNIFICANT CHANGE UP
CO2 SERPL-SCNC: 31 MMOL/L — SIGNIFICANT CHANGE UP (ref 22–31)
CREAT ?TM UR-MCNC: 116 MG/DL — SIGNIFICANT CHANGE UP
CREAT SERPL-MCNC: 0.81 MG/DL — SIGNIFICANT CHANGE UP (ref 0.5–1.3)
EGFR: 86 ML/MIN/1.73M2 — SIGNIFICANT CHANGE UP
ESTIMATED AVERAGE GLUCOSE: 189 MG/DL — HIGH (ref 68–114)
GLUCOSE SERPL-MCNC: 113 MG/DL — HIGH (ref 70–99)
HDLC SERPL-MCNC: 39 MG/DL — LOW
LIPID PNL WITH DIRECT LDL SERPL: 37 MG/DL — SIGNIFICANT CHANGE UP
NON HDL CHOLESTEROL: 59 MG/DL — SIGNIFICANT CHANGE UP
OSMOLALITY UR: 601 MOSM/KG — SIGNIFICANT CHANGE UP (ref 50–1200)
POTASSIUM SERPL-MCNC: 3.7 MMOL/L — SIGNIFICANT CHANGE UP (ref 3.5–5.3)
POTASSIUM SERPL-SCNC: 3.7 MMOL/L — SIGNIFICANT CHANGE UP (ref 3.5–5.3)
PROLACTIN SERPL-MCNC: 102 NG/ML — HIGH (ref 3.4–24.1)
PROT SERPL-MCNC: 7 G/DL — SIGNIFICANT CHANGE UP (ref 6–8.3)
SODIUM SERPL-SCNC: 151 MMOL/L — HIGH (ref 135–145)
T4 FREE SERPL-MCNC: 1.4 NG/DL — SIGNIFICANT CHANGE UP (ref 0.9–1.8)
TRIGL SERPL-MCNC: 126 MG/DL — SIGNIFICANT CHANGE UP
TSH SERPL-MCNC: 0.02 UIU/ML — LOW (ref 0.27–4.2)

## 2024-05-22 PROCEDURE — 84439 ASSAY OF FREE THYROXINE: CPT

## 2024-05-22 PROCEDURE — 84305 ASSAY OF SOMATOMEDIN: CPT

## 2024-05-22 PROCEDURE — 80061 LIPID PANEL: CPT

## 2024-05-22 PROCEDURE — 70553 MRI BRAIN STEM W/O & W/DYE: CPT | Mod: 26

## 2024-05-22 PROCEDURE — 80053 COMPREHEN METABOLIC PANEL: CPT

## 2024-05-22 PROCEDURE — 77085 DXA BONE DENSITY AXL VRT FX: CPT

## 2024-05-22 PROCEDURE — 84443 ASSAY THYROID STIM HORMONE: CPT

## 2024-05-22 PROCEDURE — 36415 COLL VENOUS BLD VENIPUNCTURE: CPT

## 2024-05-22 PROCEDURE — 77085 DXA BONE DENSITY AXL VRT FX: CPT | Mod: 26

## 2024-05-22 PROCEDURE — A9585: CPT

## 2024-05-22 PROCEDURE — 83935 ASSAY OF URINE OSMOLALITY: CPT

## 2024-05-22 PROCEDURE — 82043 UR ALBUMIN QUANTITATIVE: CPT

## 2024-05-22 PROCEDURE — 83036 HEMOGLOBIN GLYCOSYLATED A1C: CPT

## 2024-05-22 PROCEDURE — 70553 MRI BRAIN STEM W/O & W/DYE: CPT

## 2024-05-22 PROCEDURE — 84146 ASSAY OF PROLACTIN: CPT

## 2024-05-23 RX ORDER — HYDROCORTISONE 10 MG/1
10 TABLET ORAL
Qty: 1 | Refills: 3 | Status: ACTIVE | COMMUNITY
Start: 2023-04-10 | End: 1900-01-01

## 2024-05-23 RX ORDER — HYDROCORTISONE 5 MG/1
5 TABLET ORAL
Qty: 90 | Refills: 3 | Status: ACTIVE | COMMUNITY
Start: 2023-04-10 | End: 1900-01-01

## 2024-05-24 ENCOUNTER — NON-APPOINTMENT (OUTPATIENT)
Age: 54
End: 2024-05-24

## 2024-05-24 LAB — IGF BP1 SERPL-MCNC: 88 NG/ML — SIGNIFICANT CHANGE UP (ref 65–216)

## 2024-05-30 ENCOUNTER — NON-APPOINTMENT (OUTPATIENT)
Age: 54
End: 2024-05-30

## 2024-05-30 LAB
MONOMERIC PROLACTIN (ICMA)*: 91.8 NG/ML — HIGH
PERCENT MACROPROLACTIN: 8 % — SIGNIFICANT CHANGE UP
PROLACTIN, SERUM (ICMA)*: 100 NG/ML — HIGH

## 2024-06-06 ENCOUNTER — OUTPATIENT (OUTPATIENT)
Dept: OUTPATIENT SERVICES | Facility: HOSPITAL | Age: 54
LOS: 1 days | End: 2024-06-06
Payer: SELF-PAY

## 2024-06-06 ENCOUNTER — APPOINTMENT (OUTPATIENT)
Dept: ENDOCRINOLOGY | Facility: HOSPITAL | Age: 54
End: 2024-06-06
Payer: COMMERCIAL

## 2024-06-06 VITALS
TEMPERATURE: 97 F | HEART RATE: 74 BPM | RESPIRATION RATE: 14 BRPM | WEIGHT: 153 LBS | BODY MASS INDEX: 24.59 KG/M2 | DIASTOLIC BLOOD PRESSURE: 71 MMHG | OXYGEN SATURATION: 98 % | HEIGHT: 66 IN | SYSTOLIC BLOOD PRESSURE: 105 MMHG

## 2024-06-06 DIAGNOSIS — E06.9 THYROIDITIS, UNSPECIFIED: ICD-10-CM

## 2024-06-06 DIAGNOSIS — D44.4 NEOPLASM OF UNCERTAIN BEHAVIOR OF CRANIOPHARYNGEAL DUCT: ICD-10-CM

## 2024-06-06 DIAGNOSIS — E23.2 OTHER POSTPROCEDURAL ENDOCRINE AND METABOLIC COMPLICATIONS AND DISORDERS: ICD-10-CM

## 2024-06-06 DIAGNOSIS — E23.0 HYPOPITUITARISM: ICD-10-CM

## 2024-06-06 DIAGNOSIS — E03.8 OTHER SPECIFIED HYPOTHYROIDISM: ICD-10-CM

## 2024-06-06 DIAGNOSIS — E11.65 TYPE 2 DIABETES MELLITUS WITH HYPERGLYCEMIA: ICD-10-CM

## 2024-06-06 DIAGNOSIS — E78.5 HYPERLIPIDEMIA, UNSPECIFIED: ICD-10-CM

## 2024-06-06 DIAGNOSIS — E89.89 OTHER POSTPROCEDURAL ENDOCRINE AND METABOLIC COMPLICATIONS AND DISORDERS: ICD-10-CM

## 2024-06-06 DIAGNOSIS — E27.49 OTHER ADRENOCORTICAL INSUFFICIENCY: ICD-10-CM

## 2024-06-06 PROCEDURE — G0463: CPT

## 2024-06-06 PROCEDURE — ZZZZZ: CPT | Mod: GC

## 2024-06-06 RX ORDER — ISOPROPYL ALCOHOL 70 ML/100ML
SWAB TOPICAL
Qty: 2 | Refills: 6 | Status: ACTIVE | COMMUNITY
Start: 2024-06-06 | End: 1900-01-01

## 2024-06-06 RX ORDER — METFORMIN HYDROCHLORIDE 1000 MG/1
1000 TABLET, COATED ORAL
Qty: 1 | Refills: 3 | Status: ACTIVE | COMMUNITY
Start: 2023-04-10 | End: 1900-01-01

## 2024-06-06 RX ORDER — ATORVASTATIN CALCIUM 40 MG/1
40 TABLET, FILM COATED ORAL
Qty: 1 | Refills: 3 | Status: ACTIVE | COMMUNITY
Start: 2023-07-27 | End: 1900-01-01

## 2024-06-06 RX ORDER — BLOOD SUGAR DIAGNOSTIC
STRIP MISCELLANEOUS
Qty: 200 | Refills: 7 | Status: ACTIVE | COMMUNITY
Start: 2024-06-06 | End: 1900-01-01

## 2024-06-06 RX ORDER — DESMOPRESSIN ACETATE 0.1 MG/1
0.1 TABLET ORAL
Qty: 270 | Refills: 3 | Status: ACTIVE | COMMUNITY
Start: 2023-04-10 | End: 1900-01-01

## 2024-06-06 RX ORDER — DULAGLUTIDE 4.5 MG/.5ML
4.5 INJECTION, SOLUTION SUBCUTANEOUS
Qty: 3 | Refills: 3 | Status: DISCONTINUED | COMMUNITY
Start: 2024-05-08 | End: 2024-06-06

## 2024-06-06 RX ORDER — EMPAGLIFLOZIN 10 MG/1
10 TABLET, FILM COATED ORAL DAILY
Refills: 0 | Status: DISCONTINUED | COMMUNITY
Start: 2023-10-19 | End: 2024-06-06

## 2024-06-06 RX ORDER — DULAGLUTIDE 3 MG/.5ML
3 INJECTION, SOLUTION SUBCUTANEOUS
Qty: 1 | Refills: 6 | Status: ACTIVE | COMMUNITY
Start: 2023-05-04 | End: 1900-01-01

## 2024-06-06 RX ORDER — LANCETS 33 GAUGE
EACH MISCELLANEOUS
Qty: 1 | Refills: 6 | Status: ACTIVE | COMMUNITY
Start: 2024-06-06 | End: 1900-01-01

## 2024-06-06 RX ORDER — LEVOTHYROXINE SODIUM 0.07 MG/1
75 TABLET ORAL
Qty: 1 | Refills: 3 | Status: ACTIVE | COMMUNITY
Start: 2023-04-10 | End: 1900-01-01

## 2024-06-06 RX ORDER — EMPAGLIFLOZIN 25 MG/1
25 TABLET, FILM COATED ORAL
Qty: 90 | Refills: 3 | Status: ACTIVE | COMMUNITY
Start: 2024-06-06

## 2024-06-06 RX ORDER — REPAGLINIDE 1 MG/1
1 TABLET ORAL 3 TIMES DAILY
Qty: 270 | Refills: 1 | Status: ACTIVE | COMMUNITY
Start: 2023-04-10 | End: 1900-01-01

## 2024-06-07 NOTE — END OF VISIT
[] : Fellow [Time Spent: ___ minutes] : I have spent [unfilled] minutes of time on the encounter. [FreeTextEntry3] : 53 F with history of T2D, craniopharngioma s/p resection with central DI here for follow up.  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  # Adrenal insufficiency: currently on HC 10 mg in the morning and 5 mg in the afternoon. Good appetite, no dizziness. # Central hypothyroidism: currently on LT4 75 mcg 9 times per week. FT4 1.4. TSH 0.02.  # Hyperprolactinemia: prolactin 102.  # Central DI: sodium 151. Urine osm 601, currently 0.1 mg in the morning and 0.2 mg in the evening. Repeat BMP in 2 weeks.  # T2D: A1C 8.2. Currently on metformin 1 g BID, trulicity 4.5 mg weekly, jardiance 25 mg daily, prandin 0.5 mg TID. Has been having nausea, vomiting and diarrhea after each injection lasting for a few days. Glucose fasting 110-140, postprandial 150-190s. Can decrease Trulicity to 3 mg weekly increae Prandin 1 mg TID.  # HLD: LDL 37. Continue with atorvastatin 80 mg QHS.  Rest of the plan as above.

## 2024-06-07 NOTE — HISTORY OF PRESENT ILLNESS
[FreeTextEntry1] : 54 y/o F with hx of T2DM, craniopharyngioma s/p resection on 3/14/2023, course c/b severe DI requiring vasopressin gtt. Patient here for follow up of T2DM and hypopituitarisim.  #Craniopharyngioma s/p resection  --Surgical Path shows Cyst contents, cholesterol clefts, giant cell reaction, chronic inflammation, dystrophic calcification and minute clusters of ghost cells, consistent with secondary changes associated with craniopharyngioma although there is no viable craniopharyngioma tissue. No staining available  --2 day post op MRI unable to visualize tumor tissue (no contrast as patient could not tolerate test any longer)   2023: UOsm 583, Prolactin 73.7 10/2023: ACTH 4.4 (low), AM cortisol 0.4 (low) - patient held evening hydrocort night before and morning of, FT4 1.0, Na 140 3/2024: Prolactin 94.6 (high), FT4 1.1 (wnl), Na 141 (wnl)  Pt has all medications with her, son helps her with medications.  2024 pituitary labs: Pituitary labs: Na 151 (high) Cl 110 (high) Camila 601 Prolactin 102 (increased from 94.6 (3/2024) <- 73 (2023)) TSH 0.02 - checked to assess for pituitary recovery FT4 1.4 IGF1 88 - checked to assess for pituitary recovery Macroprolactin 8%, monomeric prolactin 98   #Postop hypopituitarism   --AI: On HC 10mg 8AM, 5mg 3pm - good appetite, no baseline dizziness. reports improvement in fatigue and orthostatic/balance symptoms (previously complained of that at the first visit in  --DI: On Desmopressin 0.1 mg in AM and 0.2mg in PM (increased from 0.1mg BID at last in 2023 due to nocturia 7x/day) - denies nocturia -  reports normal amount of urination during the day (~5x/day), reports thirst intact, drinks 3 bottles of water a day --Hypothyroid - on LT4 75 mcg 9x/week,  takes as prescribed. (-) tiredness/low energy, (-) weight gain/loss, (-) constipation/diarrhea (-) palpitations  --LH <0.3, FSH 0.7  --prolactin  73.7 -> 94.6 (3/2024) -> 102 (2024) pt denies galactorrhea. Reported hx of galactorhea for 2 years BEFORE the surgery, but has not had since --IGF-1: 88 in 2024  1 year post surg MRI 2024: Postsurgical changes related to transsphenoidal or sellar/suprasellar lesion resection. No evidence for recurrent lesion. 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  Menopause age 41  DEXA 2024 - normal, T scores Spine -0.5, FN -0.6, TH -0.4  #uncontrolled T2DM. Diagnosed   A1c 8.1% May 2023 -> 13.5% in 2023 -> 15.2% (10/2023) -> 9.2% (3/2024) -> 8.2% 2024  Current Regimen: --Trulicity 4.5mg weekly (injects on saturday, was taking 3 1.5mg pens at the same time weekly, but pharmacy received 4.5mg weekly in stock and is doing that now since 4 weeks ago) --Prandin 0.5mg TID with meals (supposed to increase to 2mg TID with meals but patient did not receive higher dose) - metformin 1g BID - Jardiance 25mg daily   --compliance: Patient has been compliant with the medications --side effects: reports vomitting/diarrhea since starting Trulicity 4.5mg weekly about 4 weeks ago - happens the day after injection (4 times vomitting on , then improves the next days. Then has diarrhea for 3 days after each injection and then resolves after 3 days. Is occuring every week and minimally improving. Denies UTI or yeast infections  30 day av AM: 110s-140s before dinner: 150s-190s  --FS Never < 70  --Diet: Beans, eggs, chicken, avoids breads, vegetables, avoids pasta, avoids juice, soda, coffee without sugar, water  --Last saw ophthomologist in 3/20[place], no retinopathy. --No Microvascular complications.  --No macrovascular complications  --MACR wnl in 2024 --VIt B12 wnl in 3/2024 --GFR 86 in 2024 --LDL 37 in 2024 - patient is taking atorvastaitn 80 mg daily (was previously on Zetia but that was d/clem in  d/t low LDL) - /71 today - not on ACE/ARB --pt has never been on insulin in the past  #HLD/HTG 10/2023: , , LDL 89 3/2024: TG 83, TChol 71, LDL 21 2024: , TChol 98, LDL  37 - patient is taking atorvastatin 80 mg (was previously on Zetia but that was d/clem in  d/t low LDL) - patient denies high fat/sat fat foods (meat, eggs, fried foods). Denies sweets/ice cream

## 2024-06-07 NOTE — REVIEW OF SYSTEMS
[Negative] : Heme/Lymph [Fatigue] : no fatigue [Decreased Appetite] : appetite not decreased [Recent Weight Gain (___ Lbs)] : no recent weight gain [Recent Weight Loss (___ Lbs)] : no recent weight loss

## 2024-06-07 NOTE — REASON FOR VISIT
[Follow - Up] : a follow-up visit [DM Type 2] : DM Type 2 [Pituitary Evaluation/ Disorder] : pituitary evaluation/disorder [[employer] ] : provided by [employer]   [Time Spent: ____ minutes] : Total time spent using  services: [unfilled] minutes. The patient's primary language is not English thus required  services. [Interpreters_IDNumber] : [id number] [Interpreters_FullName] : Wilda [TWNoteComboBox1] : [place]

## 2024-06-07 NOTE — ASSESSMENT
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 With post-op panhypopituitarism --Surgical path report mentions no visible craniopharyngioma tissue - no staining results available --2 day post op MRI unable to perform contrast due to patient inability to tolerate - 1 year post surg MRI 5/2024 no signs of recurrence PLAN: --c/w HC 10mg 8AM and 5 mg 3pm (ACTH and AM cortisol LOW in 10/2023 after holding morning HC day of and afternoon HC day before) --FT4 1.4 in 5/2024, - c/w Levothyroxine 9x/week (1 pill 5 days a week, then 2 pills on 2 days of the week) --Na 151 with high Lilly (601) - this suggests DEHYDRATION as opposed to DI (which we would expect LOW Lilly) - dehydration can be 2/2 nausea, vomitting and diarrhea patient has been experiencing with higher trulicity dose - thus c/w DDAVP  0.1mg in the AM and 0.2mg in the PM - encourage oral hydration and repeat BMP, UOsm, Lilly in 2 weeks --prolactin 73.7 -> 94.6 (3/2024) -> 102 (5/2024), Macroprolactin 8% n 5/2024 - this elevated prolactin levels are likely 2/2 stalk effect post-op - thus will continue to monitor regularly - no indication for treatment since patient denies gallactorhea and patient is already post-menopausal (menopause age 41) --LH/FSH low - no need to recheck LH, FSH - 1 year post surg MRI 5/2024 no signs of recurrence - NSGY referral for follow up - Sick day rules discussed. Solu-cortef injection discussed and prescribed. All meds refilled  #Uncontrolled T2DM A1c 8.1% May 2023 -> 13.5% in 7/2023 -> 15.2% 10/2023 -> 9.2% (3/2024) -> 8.2% 5/2024 - a1c not at goal, however, improved PLAN - decrease Trulicity from 4.5mg to 3mg given Nausea/vomitting/diarrhea - Increase repaglinide to 1mg before meals (HOLD IF NPO)  - c/w Jardiance from 10mg to 25mg daily (3/2024: called patient assistance progrem ([facility] - [phone] to give verbal Rx, sent 3 month supply with 3 refills - will need to send verbal Rx again on 3/2025) - c/w metformin 1g BID - MACR wnl in 5/2024 - Optimal dietary recommendations discussed  #HTG/HLD 10/2023: , , LDL 89 3/2024: TG 83, TChol 71, LDL 21 6/2024: , TChol 98, LDL 37 --given low lipid levels - will decrease Atorvastatin from 80mg to 40mg --Optimal dietary recommendations discussed - repeat lipid panel prior to next appt  #DEXA wnl 5/2024  RTC in 4 months for Diabetes control and hypopit  Discussed case w/ Dr. Toby Fox MD Endocrinology Fellow - PGY-4

## 2024-06-18 ENCOUNTER — OUTPATIENT (OUTPATIENT)
Dept: OUTPATIENT SERVICES | Facility: HOSPITAL | Age: 54
LOS: 1 days | End: 2024-06-18
Payer: SELF-PAY

## 2024-06-18 DIAGNOSIS — D44.4 NEOPLASM OF UNCERTAIN BEHAVIOR OF CRANIOPHARYNGEAL DUCT: ICD-10-CM

## 2024-06-18 DIAGNOSIS — E03.8 OTHER SPECIFIED HYPOTHYROIDISM: ICD-10-CM

## 2024-06-18 DIAGNOSIS — E23.0 HYPOPITUITARISM: ICD-10-CM

## 2024-06-18 DIAGNOSIS — E89.89 OTHER POSTPROCEDURAL ENDOCRINE AND METABOLIC COMPLICATIONS AND DISORDERS: ICD-10-CM

## 2024-06-18 DIAGNOSIS — E11.65 TYPE 2 DIABETES MELLITUS WITH HYPERGLYCEMIA: ICD-10-CM

## 2024-06-18 DIAGNOSIS — E78.5 HYPERLIPIDEMIA, UNSPECIFIED: ICD-10-CM

## 2024-06-18 DIAGNOSIS — E27.49 OTHER ADRENOCORTICAL INSUFFICIENCY: ICD-10-CM

## 2024-06-18 LAB
ANION GAP SERPL CALC-SCNC: 13 MMOL/L — SIGNIFICANT CHANGE UP (ref 5–17)
BUN SERPL-MCNC: 23 MG/DL — SIGNIFICANT CHANGE UP (ref 7–23)
CALCIUM SERPL-MCNC: 10.1 MG/DL — SIGNIFICANT CHANGE UP (ref 8.4–10.5)
CHLORIDE SERPL-SCNC: 111 MMOL/L — HIGH (ref 96–108)
CO2 SERPL-SCNC: 29 MMOL/L — SIGNIFICANT CHANGE UP (ref 22–31)
CREAT SERPL-MCNC: 0.99 MG/DL — SIGNIFICANT CHANGE UP (ref 0.5–1.3)
EGFR: 68 ML/MIN/1.73M2 — SIGNIFICANT CHANGE UP
GLUCOSE SERPL-MCNC: 115 MG/DL — HIGH (ref 70–99)
OSMOLALITY UR: 675 MOSM/KG — SIGNIFICANT CHANGE UP (ref 50–1200)
POTASSIUM SERPL-MCNC: 3.9 MMOL/L — SIGNIFICANT CHANGE UP (ref 3.5–5.3)
POTASSIUM SERPL-SCNC: 3.9 MMOL/L — SIGNIFICANT CHANGE UP (ref 3.5–5.3)
SODIUM SERPL-SCNC: 154 MMOL/L — HIGH (ref 135–145)
SODIUM UR-SCNC: 36 MMOL/L — SIGNIFICANT CHANGE UP

## 2024-06-18 PROCEDURE — 84300 ASSAY OF URINE SODIUM: CPT

## 2024-06-18 PROCEDURE — 80048 BASIC METABOLIC PNL TOTAL CA: CPT

## 2024-06-18 PROCEDURE — 83935 ASSAY OF URINE OSMOLALITY: CPT

## 2024-06-18 PROCEDURE — 36415 COLL VENOUS BLD VENIPUNCTURE: CPT

## 2024-06-21 ENCOUNTER — NON-APPOINTMENT (OUTPATIENT)
Age: 54
End: 2024-06-21

## 2024-09-06 ENCOUNTER — OUTPATIENT (OUTPATIENT)
Dept: OUTPATIENT SERVICES | Facility: HOSPITAL | Age: 54
LOS: 1 days | End: 2024-09-06
Payer: SELF-PAY

## 2024-09-06 DIAGNOSIS — E78.5 HYPERLIPIDEMIA, UNSPECIFIED: ICD-10-CM

## 2024-09-06 DIAGNOSIS — E89.89 OTHER POSTPROCEDURAL ENDOCRINE AND METABOLIC COMPLICATIONS AND DISORDERS: ICD-10-CM

## 2024-09-06 DIAGNOSIS — E23.0 HYPOPITUITARISM: ICD-10-CM

## 2024-09-06 DIAGNOSIS — E27.49 OTHER ADRENOCORTICAL INSUFFICIENCY: ICD-10-CM

## 2024-09-06 DIAGNOSIS — D44.4 NEOPLASM OF UNCERTAIN BEHAVIOR OF CRANIOPHARYNGEAL DUCT: ICD-10-CM

## 2024-09-06 LAB
A1C WITH ESTIMATED AVERAGE GLUCOSE RESULT: 9.4 % — HIGH (ref 4–5.6)
ANION GAP SERPL CALC-SCNC: 11 MMOL/L — SIGNIFICANT CHANGE UP (ref 5–17)
BUN SERPL-MCNC: 13 MG/DL — SIGNIFICANT CHANGE UP (ref 7–23)
CALCIUM SERPL-MCNC: 9.4 MG/DL — SIGNIFICANT CHANGE UP (ref 8.4–10.5)
CHLORIDE SERPL-SCNC: 106 MMOL/L — SIGNIFICANT CHANGE UP (ref 96–108)
CHOLEST SERPL-MCNC: 113 MG/DL — SIGNIFICANT CHANGE UP
CO2 SERPL-SCNC: 28 MMOL/L — SIGNIFICANT CHANGE UP (ref 22–31)
CREAT SERPL-MCNC: 0.77 MG/DL — SIGNIFICANT CHANGE UP (ref 0.5–1.3)
EGFR: 92 ML/MIN/1.73M2 — SIGNIFICANT CHANGE UP
ESTIMATED AVERAGE GLUCOSE: 223 MG/DL — HIGH (ref 68–114)
GLUCOSE SERPL-MCNC: 118 MG/DL — HIGH (ref 70–99)
HDLC SERPL-MCNC: 37 MG/DL — LOW
LIPID PNL WITH DIRECT LDL SERPL: 45 MG/DL — SIGNIFICANT CHANGE UP
NON HDL CHOLESTEROL: 76 MG/DL — SIGNIFICANT CHANGE UP
OSMOLALITY UR: 493 MOSM/KG — SIGNIFICANT CHANGE UP (ref 50–1200)
POTASSIUM SERPL-MCNC: 3.7 MMOL/L — SIGNIFICANT CHANGE UP (ref 3.5–5.3)
POTASSIUM SERPL-SCNC: 3.7 MMOL/L — SIGNIFICANT CHANGE UP (ref 3.5–5.3)
PROLACTIN SERPL-MCNC: 89.3 NG/ML — HIGH (ref 3.4–24.1)
SODIUM SERPL-SCNC: 145 MMOL/L — SIGNIFICANT CHANGE UP (ref 135–145)
SODIUM UR-SCNC: 88 MMOL/L — SIGNIFICANT CHANGE UP
T4 FREE SERPL-MCNC: 1.2 NG/DL — SIGNIFICANT CHANGE UP (ref 0.9–1.8)
TRIGL SERPL-MCNC: 187 MG/DL — HIGH

## 2024-09-06 PROCEDURE — 83935 ASSAY OF URINE OSMOLALITY: CPT

## 2024-09-06 PROCEDURE — 80061 LIPID PANEL: CPT

## 2024-09-06 PROCEDURE — 80048 BASIC METABOLIC PNL TOTAL CA: CPT

## 2024-09-06 PROCEDURE — 84300 ASSAY OF URINE SODIUM: CPT

## 2024-09-06 PROCEDURE — 84439 ASSAY OF FREE THYROXINE: CPT

## 2024-09-06 PROCEDURE — 84146 ASSAY OF PROLACTIN: CPT

## 2024-09-06 PROCEDURE — 83036 HEMOGLOBIN GLYCOSYLATED A1C: CPT

## 2024-09-12 ENCOUNTER — NON-APPOINTMENT (OUTPATIENT)
Age: 54
End: 2024-09-12

## 2024-09-19 ENCOUNTER — APPOINTMENT (OUTPATIENT)
Dept: ENDOCRINOLOGY | Facility: HOSPITAL | Age: 54
End: 2024-09-19
Payer: COMMERCIAL

## 2024-09-19 VITALS
HEIGHT: 66 IN | SYSTOLIC BLOOD PRESSURE: 114 MMHG | TEMPERATURE: 96.7 F | OXYGEN SATURATION: 97 % | HEART RATE: 72 BPM | DIASTOLIC BLOOD PRESSURE: 77 MMHG | BODY MASS INDEX: 24.91 KG/M2 | WEIGHT: 155 LBS | RESPIRATION RATE: 18 BRPM

## 2024-09-19 DIAGNOSIS — E11.65 TYPE 2 DIABETES MELLITUS WITH HYPERGLYCEMIA: ICD-10-CM

## 2024-09-19 DIAGNOSIS — E03.8 OTHER SPECIFIED HYPOTHYROIDISM: ICD-10-CM

## 2024-09-19 DIAGNOSIS — E78.5 HYPERLIPIDEMIA, UNSPECIFIED: ICD-10-CM

## 2024-09-19 DIAGNOSIS — E23.0 HYPOPITUITARISM: ICD-10-CM

## 2024-09-19 DIAGNOSIS — E89.89 OTHER POSTPROCEDURAL ENDOCRINE AND METABOLIC COMPLICATIONS AND DISORDERS: ICD-10-CM

## 2024-09-19 DIAGNOSIS — E27.49 OTHER ADRENOCORTICAL INSUFFICIENCY: ICD-10-CM

## 2024-09-19 DIAGNOSIS — E23.2 OTHER POSTPROCEDURAL ENDOCRINE AND METABOLIC COMPLICATIONS AND DISORDERS: ICD-10-CM

## 2024-09-19 PROCEDURE — ZZZZZ: CPT

## 2024-09-19 RX ORDER — PEN NEEDLE, DIABETIC 29 G X1/2"
32G X 4 MM NEEDLE, DISPOSABLE MISCELLANEOUS
Qty: 1 | Refills: 3 | Status: ACTIVE | COMMUNITY
Start: 2024-09-19 | End: 1900-01-01

## 2024-09-19 RX ORDER — INSULIN GLARGINE 100 [IU]/ML
100 INJECTION, SOLUTION SUBCUTANEOUS
Qty: 1 | Refills: 6 | Status: ACTIVE | COMMUNITY
Start: 2024-09-19 | End: 1900-01-01

## 2024-09-19 NOTE — ASSESSMENT
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 With post-op panhypopituitarism --Surgical path report mentions no visible craniopharyngioma tissue - no staining results available --2 day post op MRI unable to perform contrast due to patient inability to tolerate - 1 year post surg MRI 5/2024 no signs of recurrence PLAN: --c/w HC 10mg 8AM and 5 mg 3pm (ACTH and AM cortisol LOW in 10/2023 after holding morning HC day of and afternoon HC day before) --FT4 1.2 in 9/2024, - c/w Levothyroxine 9x/week (1 pill 5 days a week, then 2 pills on 2 days of the week) --Na 145 in 9/2024 - c/w DDAVP  0.1mg in the AM and 0.2mg in the PM - encourage oral hydration --prolactin 73.7 -> 94.6 (3/2024) -> 102 (5/2024) -> 89.3 (9/2024), Macroprolactin 8% n 5/2024 - this elevated prolactin levels are likely 2/2 stalk effect post-op - thus will continue to monitor regularly - no indication for treatment since patient denies gallactorhea and patient is already post-menopausal (menopause age 41) --LH/FSH low - no need to recheck LH, FSH - 1 year post surg MRI 5/2024 no signs of recurrence - NSGY referral for follow up - will task team to call to make appt - Sick day rules discussed. Solu-cortef injection discussed and prescribed. All meds refilled  #Uncontrolled T2DM A1c 8.1% May 2023 -> 13.5% in 7/2023 -> 15.2% 10/2023 -> 9.2% (3/2024) -> 8.2% 5/2024 -> 9.4% in 9/2024 - a1c not at goal, worsened since decreasing Trulicity PLAN - STOP Jardiance 25mg given active UTI - START Basaglar 8 units at bedtime - nurse provided patient and son insulin pen teaching today - c/w Trulicity 3mg (had Nausea/vomitting/diarrhea with 4.5mg) - c/w repaglinide 1mg before meals (HOLD IF NPO)  - consider increasing at next visit if post-prandial glucose levels are still elevated - c/w metformin 1g BID - of note patient was obtaining Jardiance through patient assistance progrem ( [employer] - [phone] - gave  verbal Rx on 3/2024, sent 3 month supply with 3 refills - will need to send verbal Rx again on 3/2025 if decision is to continue) - MACR wnl in 5/2024 - Optimal dietary recommendations discussed  #HTG/HLD 10/2023: , , LDL 89 3/2024: TG 83, TChol 71, LDL 21 6/2024: , TChol 98, LDL 37 - decreased atorvatatin from 80mg to 40mg in 5/2024 9/2024: LDL 45 --c/w  Atorvastatin 40mg --Optimal dietary recommendations discussed  #DEXA wnl 5/2024  RTC in 4 months for Diabetes control and hypopit  Discussed case w/ Dr. Jelani Fox MD Endocrinology Fellow - PGY-4

## 2024-09-19 NOTE — HISTORY OF PRESENT ILLNESS
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 Patient here for follow up of T2DM and hypopituitarisim.  #Craniopharyngioma s/p resection  --Surgical Path shows Cyst contents, cholesterol clefts, giant cell reaction, chronic inflammation, dystrophic calcification and minute clusters of ghost cells, consistent with secondary changes associated with craniopharyngioma although there is no viable craniopharyngioma tissue. No staining available  --2 day post op MRI unable to visualize tumor tissue (no contrast as patient could not tolerate test any longer)   7/2023: UOsm 583, Prolactin 73.7 10/2023: ACTH 4.4 (low), AM cortisol 0.4 (low) - patient held evening hydrocort night before and morning of, FT4 1.0, Na 140 3/2024: Prolactin 94.6 (high), FT4 1.1 (wnl), Na 141 (wnl)  Pt has all medications with her, son helps her with medications.  5/2024 pituitary labs: Pituitary labs: Na 151 (high) Cl 110 (high) Camila 601 Prolactin 102 (increased from 94.6 (3/2024) <- 73 (7/2023)) TSH 0.02 - checked to assess for pituitary recovery FT4 1.4 IGF1 88 - checked to assess for pituitary recovery Macroprolactin 8%, monomeric prolactin 98   6/2024: Na 154 but urine osm elevated - suggestive of dehydration. Reports at that time patient had vomiting and diarrhea - resolved after decreasing Trulicity from 4.5mg to 3mg   Labs 9/6/24 UOsm 493 <- 675 (6/2024) Camila 88 <- 36 (6/2024) Na 145 <- 154 (6/2024) FT4 1.2 <- 1.4 (5/2024) Prolactin 89.3 <- 102 (5/2024)  #Postop hypopituitarism   --AI: On HC 10mg 8AM, 5mg 3pm - good appetite, no baseline dizziness. reports improvement in fatigue and orthostatic/balance symptoms (previously complained of that at the first visit in 2023). BP today 114/77 --DI: On Desmopressin 0.1 mg in AM and 0.2mg in PM (increased from 0.1mg BID at last in 7/2023 due to nocturia 7x/day) - denies nocturia -  reports normal amount of urination during the day (~5x/day), reports thirst intact, drinks 6 bottles of water a day. Reports bad smelling urine and dysuria x 3 weeks - was told UTI by PCP 1 week ago, was supposed to get Abx but did not   --Hypothyroid - on LT4 75 mcg 9x/week (1 pill 5 days a week, then 2 pills on 2 days of the week),  takes as prescribed. (-) tiredness/low energy, (-) weight gain/loss, (-) constipation/diarrhea (-) palpitations  --LH <0.3, FSH 0.7  --prolactin  73.7 -> 94.6 (3/2024) -> 102 (5/2024) -> 89.3 (9/2024) pt denies galactorrhea. Reported hx of galactorhea for 2 years BEFORE the surgery, but has not had since --IGF-1: 88 in 5/2024  1 year post surg MRI 5/2024: Postsurgical changes related to transsphenoidal or sellar/suprasellar lesion resection. No evidence for recurrent lesion. 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 - denies any visual changes, headache - last saw NSGY in 4/2023, has not followed up since  Menopause age 41  DEXA 5/2024 - normal, T scores Spine -0.5, FN -0.6, TH -0.4  #uncontrolled T2DM. Diagnosed 2013  A1c 8.1% May 2023 -> 13.5% in 7/2023 -> 15.2% (10/2023) -> 9.2% (3/2024) -> 8.2% 5/2024 -> 9.4% in 9/2024   Current Regimen: --Trulicity 3mg weekly --Prandin 1mg TID with meals - metformin 1g BID - Jardiance 25mg daily   --compliance: Patient has been compliant with the medications --side effects: reports vomitting/diarrhea with Trulicity 4.5mg weekly - resolved after decreasing to 3mg. ALso notes new UTI that started 3 weeks ago - was supposed to get Abx by PCP  Checks mainly 1x/day, has glucometer with her AM: 150s-230s  --FS Never < 70  --Diet: Beans, eggs, chicken, avoids breads, vegetables, avoids pasta, avoids juice, soda, coffee without sugar, water. No sweets --Last saw ophthomologist in 3/2024 [place], no retinopathy., has appt 11/2024 --No Microvascular complications.  --No macrovascular complications  --MACR wnl in 5/2024 - not on ACE/ARB --VIt B12 wnl in 3/2024 --GFR 92 in 9/2024 - /77 today - not on ACE/ARB --pt has never been on insulin in the past  #HLD/HTG 10/2023: , , LDL 89 3/2024: TG 83, TChol 71, LDL 21 6/2024: , TChol 98, LDL  37 - decreased atorvastatin from 80mg to 40mg in 5/2024 9/2024: LDL 45 - was previously on Zetia but that was d/clem in 2024 d/t low LDL - patient compliant with atorvastatin 40mg cerna - patient denies high fat/sat fat foods (meat, eggs, fried foods). Denies sweets/ice cream

## 2024-09-19 NOTE — END OF VISIT
[] : Fellow [Time Spent: ___ minutes] : I have spent [unfilled] minutes of time on the encounter which excludes teaching and separately reported services. [FreeTextEntry3] : 53 F with history of T2D, craniopharngioma s/p resection with central DI here for follow up.  Continue current medications for panhypopit as listed above. Na has improved. For DM2 care, pt agreeable to starting basal insulin. Stop jardiance given urinary complaints for now. Continue metformin/trulicity/prandin. Needs to check more glucose time points to assess this regimen. Unable to tolerate higher trulicity dosing    Straight, Heterosexual

## 2024-09-19 NOTE — PHYSICAL EXAM
[Alert] : alert [Well Nourished] : well nourished [No Acute Distress] : no acute distress [Well Developed] : well developed [Normal Sclera/Conjunctiva] : normal sclera/conjunctiva [EOMI] : extra ocular movement intact [No Proptosis] : no proptosis [Normal Oropharynx] : the oropharynx was normal [Thyroid Not Enlarged] : the thyroid was not enlarged [No Thyroid Nodules] : no palpable thyroid nodules [No Respiratory Distress] : no respiratory distress [No Accessory Muscle Use] : no accessory muscle use [Clear to Auscultation] : lungs were clear to auscultation bilaterally [Normal S1, S2] : normal S1 and S2 [Normal Rate] : heart rate was normal [Regular Rhythm] : with a regular rhythm [No Edema] : no peripheral edema [Pedal Pulses Normal] : the pedal pulses are present [Normal Bowel Sounds] : normal bowel sounds [Not Tender] : non-tender [Not Distended] : not distended [Soft] : abdomen soft [Normal Anterior Cervical Nodes] : no anterior cervical lymphadenopathy [No Spinal Tenderness] : no spinal tenderness [Spine Straight] : spine straight [No Stigmata of Cushings Syndrome] : no stigmata of Cushings Syndrome [Normal Gait] : normal gait [Normal Strength/Tone] : muscle strength and tone were normal [No Rash] : no rash [Normal Reflexes] : deep tendon reflexes were 2+ and symmetric [No Tremors] : no tremors [Oriented x3] : oriented to person, place, and time [Right foot was examined, including] : right foot ~C was examined, including visual inspection with sensory and pulse exams [Left foot was examined, including] : left foot ~C was examined, including visual inspection with sensory and pulse exams [Normal Sensation on Monofilament Testing] : normal sensation on monofilament testing of lower extremities [Acanthosis Nigricans] : no acanthosis nigricans

## 2024-09-19 NOTE — REASON FOR VISIT
[Follow - Up] : a follow-up visit [DM Type 2] : DM Type 2 [Pituitary Evaluation/ Disorder] : pituitary evaluation/disorder [[employer] ] : provided by [employer]   [Interpreters_IDNumber] : [id number] [Interpreters_FullName] : Wilda [TWNoteComboBox1] : [place]

## 2024-09-19 NOTE — REVIEW OF SYSTEMS
[Negative] : Heme/Lymph [Dysuria] : dysuria [Fatigue] : no fatigue [Decreased Appetite] : appetite not decreased [Recent Weight Gain (___ Lbs)] : no recent weight gain [Recent Weight Loss (___ Lbs)] : no recent weight loss [Polyuria] : no polyuria [Irregular Menses] : regular menses

## 2024-09-25 ENCOUNTER — NON-APPOINTMENT (OUTPATIENT)
Age: 54
End: 2024-09-25

## 2024-12-12 ENCOUNTER — APPOINTMENT (OUTPATIENT)
Dept: OTOLARYNGOLOGY | Facility: CLINIC | Age: 54
End: 2024-12-12

## 2025-01-30 ENCOUNTER — APPOINTMENT (OUTPATIENT)
Dept: ENDOCRINOLOGY | Facility: HOSPITAL | Age: 55
End: 2025-01-30

## 2025-02-20 ENCOUNTER — APPOINTMENT (OUTPATIENT)
Dept: ENDOCRINOLOGY | Facility: HOSPITAL | Age: 55
End: 2025-02-20

## 2025-02-20 ENCOUNTER — RESULT CHARGE (OUTPATIENT)
Age: 55
End: 2025-02-20

## 2025-02-20 ENCOUNTER — OUTPATIENT (OUTPATIENT)
Dept: OUTPATIENT SERVICES | Facility: HOSPITAL | Age: 55
LOS: 1 days | End: 2025-02-20
Payer: SELF-PAY

## 2025-02-20 VITALS
WEIGHT: 155 LBS | DIASTOLIC BLOOD PRESSURE: 69 MMHG | HEART RATE: 98 BPM | RESPIRATION RATE: 18 BRPM | HEIGHT: 66 IN | SYSTOLIC BLOOD PRESSURE: 104 MMHG | TEMPERATURE: 98.3 F | BODY MASS INDEX: 24.91 KG/M2 | OXYGEN SATURATION: 96 %

## 2025-02-20 DIAGNOSIS — E78.5 HYPERLIPIDEMIA, UNSPECIFIED: ICD-10-CM

## 2025-02-20 DIAGNOSIS — E23.0 HYPOPITUITARISM: ICD-10-CM

## 2025-02-20 DIAGNOSIS — E34.9 ENDOCRINE DISORDER, UNSPECIFIED: ICD-10-CM

## 2025-02-20 DIAGNOSIS — E11.65 TYPE 2 DIABETES MELLITUS WITH HYPERGLYCEMIA: ICD-10-CM

## 2025-02-20 DIAGNOSIS — E89.89 OTHER POSTPROCEDURAL ENDOCRINE AND METABOLIC COMPLICATIONS AND DISORDERS: ICD-10-CM

## 2025-02-20 DIAGNOSIS — D44.4 NEOPLASM OF UNCERTAIN BEHAVIOR OF CRANIOPHARYNGEAL DUCT: ICD-10-CM

## 2025-02-20 DIAGNOSIS — E23.2 OTHER POSTPROCEDURAL ENDOCRINE AND METABOLIC COMPLICATIONS AND DISORDERS: ICD-10-CM

## 2025-02-20 DIAGNOSIS — E03.8 OTHER SPECIFIED HYPOTHYROIDISM: ICD-10-CM

## 2025-02-20 LAB
ANION GAP SERPL CALC-SCNC: 15 MMOL/L — SIGNIFICANT CHANGE UP (ref 5–17)
BUN SERPL-MCNC: 26 MG/DL — HIGH (ref 7–23)
CALCIUM SERPL-MCNC: 10.3 MG/DL — SIGNIFICANT CHANGE UP (ref 8.4–10.5)
CHLORIDE SERPL-SCNC: 101 MMOL/L — SIGNIFICANT CHANGE UP (ref 96–108)
CO2 SERPL-SCNC: 26 MMOL/L — SIGNIFICANT CHANGE UP (ref 22–31)
CREAT SERPL-MCNC: 0.81 MG/DL — SIGNIFICANT CHANGE UP (ref 0.5–1.3)
EGFR: 86 ML/MIN/1.73M2 — SIGNIFICANT CHANGE UP
GLUCOSE SERPL-MCNC: 145 MG/DL — HIGH (ref 70–99)
HBA1C MFR BLD HPLC: 9.5
POTASSIUM SERPL-MCNC: 4.4 MMOL/L — SIGNIFICANT CHANGE UP (ref 3.5–5.3)
POTASSIUM SERPL-SCNC: 4.4 MMOL/L — SIGNIFICANT CHANGE UP (ref 3.5–5.3)
SODIUM SERPL-SCNC: 142 MMOL/L — SIGNIFICANT CHANGE UP (ref 135–145)
VIT B12 SERPL-MCNC: 738 PG/ML — SIGNIFICANT CHANGE UP (ref 232–1245)

## 2025-02-20 PROCEDURE — G0463: CPT

## 2025-02-20 PROCEDURE — 82607 VITAMIN B-12: CPT

## 2025-02-20 PROCEDURE — 83935 ASSAY OF URINE OSMOLALITY: CPT

## 2025-02-20 PROCEDURE — ZZZZZ: CPT | Mod: GC

## 2025-02-20 PROCEDURE — 84300 ASSAY OF URINE SODIUM: CPT

## 2025-02-20 PROCEDURE — 82043 UR ALBUMIN QUANTITATIVE: CPT

## 2025-02-20 PROCEDURE — 36415 COLL VENOUS BLD VENIPUNCTURE: CPT

## 2025-02-20 PROCEDURE — 80048 BASIC METABOLIC PNL TOTAL CA: CPT

## 2025-02-20 RX ORDER — BLOOD-GLUCOSE SENSOR
EACH MISCELLANEOUS
Qty: 2 | Refills: 11 | Status: ACTIVE | COMMUNITY
Start: 2025-02-20 | End: 1900-01-01

## 2025-02-20 RX ORDER — BLOOD-GLUCOSE,RECEIVER,CONT
EACH MISCELLANEOUS
Qty: 1 | Refills: 0 | Status: ACTIVE | COMMUNITY
Start: 2025-02-20 | End: 1900-01-01

## 2025-02-20 RX ORDER — DULAGLUTIDE 3 MG/.5ML
3 INJECTION, SOLUTION SUBCUTANEOUS
Qty: 1 | Refills: 3 | Status: ACTIVE | COMMUNITY
Start: 2025-02-20 | End: 1900-01-01

## 2025-02-20 RX ORDER — INSULIN LISPRO 100 [IU]/ML
100 INJECTION, SOLUTION INTRAVENOUS; SUBCUTANEOUS
Qty: 3 | Refills: 3 | Status: ACTIVE | COMMUNITY
Start: 2025-02-20 | End: 1900-01-01

## 2025-02-20 RX ORDER — INSULIN GLARGINE 100 [IU]/ML
100 INJECTION, SOLUTION SUBCUTANEOUS
Qty: 1 | Refills: 3 | Status: ACTIVE | COMMUNITY
Start: 2025-02-20 | End: 1900-01-01

## 2025-02-20 RX ORDER — EMPAGLIFLOZIN 25 MG/1
25 TABLET, FILM COATED ORAL
Qty: 90 | Refills: 1 | Status: ACTIVE | COMMUNITY
Start: 2025-02-20

## 2025-02-20 RX ORDER — EMPAGLIFLOZIN 25 MG/1
25 TABLET, FILM COATED ORAL
Qty: 90 | Refills: 1 | Status: DISCONTINUED | COMMUNITY
Start: 2025-02-20 | End: 2025-02-20

## 2025-02-21 LAB
ALBUMIN, RANDOM URINE: 1.2 MG/DL — SIGNIFICANT CHANGE UP
ALBUMIN/CREATININE RATIO (ACR): SIGNIFICANT CHANGE UP MG/G (ref 0–30)
CREAT ?TM UR-MCNC: 68 MG/DL — SIGNIFICANT CHANGE UP
OSMOLALITY UR: 685 MOSM/KG — SIGNIFICANT CHANGE UP (ref 50–1200)
SODIUM UR-SCNC: 32 MMOL/L — SIGNIFICANT CHANGE UP

## 2025-02-24 DIAGNOSIS — D44.4 NEOPLASM OF UNCERTAIN BEHAVIOR OF CRANIOPHARYNGEAL DUCT: ICD-10-CM

## 2025-02-24 DIAGNOSIS — E23.0 HYPOPITUITARISM: ICD-10-CM

## 2025-02-24 DIAGNOSIS — E11.65 TYPE 2 DIABETES MELLITUS WITH HYPERGLYCEMIA: ICD-10-CM

## 2025-02-24 DIAGNOSIS — E03.8 OTHER SPECIFIED HYPOTHYROIDISM: ICD-10-CM

## 2025-02-24 DIAGNOSIS — E78.5 HYPERLIPIDEMIA, UNSPECIFIED: ICD-10-CM

## 2025-02-24 DIAGNOSIS — E89.89 OTHER POSTPROCEDURAL ENDOCRINE AND METABOLIC COMPLICATIONS AND DISORDERS: ICD-10-CM

## 2025-04-07 ENCOUNTER — APPOINTMENT (OUTPATIENT)
Dept: NEUROSURGERY | Facility: HOSPITAL | Age: 55
End: 2025-04-07

## 2025-05-22 ENCOUNTER — RESULT CHARGE (OUTPATIENT)
Age: 55
End: 2025-05-22

## 2025-05-22 ENCOUNTER — OUTPATIENT (OUTPATIENT)
Dept: OUTPATIENT SERVICES | Facility: HOSPITAL | Age: 55
LOS: 1 days | End: 2025-05-22
Payer: SELF-PAY

## 2025-05-22 ENCOUNTER — APPOINTMENT (OUTPATIENT)
Dept: ENDOCRINOLOGY | Facility: HOSPITAL | Age: 55
End: 2025-05-22
Payer: COMMERCIAL

## 2025-05-22 VITALS
OXYGEN SATURATION: 95 % | RESPIRATION RATE: 18 BRPM | HEART RATE: 91 BPM | BODY MASS INDEX: 25.23 KG/M2 | WEIGHT: 157 LBS | TEMPERATURE: 97.9 F | SYSTOLIC BLOOD PRESSURE: 114 MMHG | HEIGHT: 66 IN | DIASTOLIC BLOOD PRESSURE: 77 MMHG

## 2025-05-22 DIAGNOSIS — E27.49 OTHER ADRENOCORTICAL INSUFFICIENCY: ICD-10-CM

## 2025-05-22 DIAGNOSIS — E23.0 HYPOPITUITARISM: ICD-10-CM

## 2025-05-22 DIAGNOSIS — E06.9 THYROIDITIS, UNSPECIFIED: ICD-10-CM

## 2025-05-22 DIAGNOSIS — E78.5 HYPERLIPIDEMIA, UNSPECIFIED: ICD-10-CM

## 2025-05-22 DIAGNOSIS — E11.65 TYPE 2 DIABETES MELLITUS WITH HYPERGLYCEMIA: ICD-10-CM

## 2025-05-22 DIAGNOSIS — E03.8 OTHER SPECIFIED HYPOTHYROIDISM: ICD-10-CM

## 2025-05-22 DIAGNOSIS — E23.2 OTHER POSTPROCEDURAL ENDOCRINE AND METABOLIC COMPLICATIONS AND DISORDERS: ICD-10-CM

## 2025-05-22 DIAGNOSIS — E89.89 OTHER POSTPROCEDURAL ENDOCRINE AND METABOLIC COMPLICATIONS AND DISORDERS: ICD-10-CM

## 2025-05-22 LAB
ALBUMIN, RANDOM URINE: <1.2 MG/DL — SIGNIFICANT CHANGE UP
ALBUMIN/CREATININE RATIO (ACR): SIGNIFICANT CHANGE UP MG/G (ref 0–30)
ANION GAP SERPL CALC-SCNC: 20 MMOL/L — HIGH (ref 5–17)
BUN SERPL-MCNC: 12 MG/DL — SIGNIFICANT CHANGE UP (ref 7–23)
CALCIUM SERPL-MCNC: 9.9 MG/DL — SIGNIFICANT CHANGE UP (ref 8.4–10.5)
CHLORIDE SERPL-SCNC: 99 MMOL/L — SIGNIFICANT CHANGE UP (ref 96–108)
CHOLEST SERPL-MCNC: 117 MG/DL — SIGNIFICANT CHANGE UP
CO2 SERPL-SCNC: 23 MMOL/L — SIGNIFICANT CHANGE UP (ref 22–31)
CREAT ?TM UR-MCNC: 48 MG/DL — SIGNIFICANT CHANGE UP
CREAT SERPL-MCNC: 0.62 MG/DL — SIGNIFICANT CHANGE UP (ref 0.5–1.3)
EGFR: 105 ML/MIN/1.73M2 — SIGNIFICANT CHANGE UP
EGFR: 105 ML/MIN/1.73M2 — SIGNIFICANT CHANGE UP
GLUCOSE SERPL-MCNC: 162 MG/DL — HIGH (ref 70–99)
HBA1C MFR BLD HPLC: 7.2
HDLC SERPL-MCNC: 29 MG/DL — LOW
LDLC SERPL-MCNC: 45 MG/DL — SIGNIFICANT CHANGE UP
LIPID PNL WITH DIRECT LDL SERPL: 45 MG/DL — SIGNIFICANT CHANGE UP
NONHDLC SERPL-MCNC: 88 MG/DL — SIGNIFICANT CHANGE UP
OSMOLALITY UR: 614 MOSM/KG — SIGNIFICANT CHANGE UP (ref 50–1200)
POTASSIUM SERPL-MCNC: 4.5 MMOL/L — SIGNIFICANT CHANGE UP (ref 3.5–5.3)
POTASSIUM SERPL-SCNC: 4.5 MMOL/L — SIGNIFICANT CHANGE UP (ref 3.5–5.3)
PROLACTIN SERPL-MCNC: 59.7 NG/ML — HIGH (ref 3.4–24.1)
SODIUM SERPL-SCNC: 142 MMOL/L — SIGNIFICANT CHANGE UP (ref 135–145)
T4 FREE SERPL-MCNC: 1.4 NG/DL — SIGNIFICANT CHANGE UP (ref 0.9–1.8)
TRIGL SERPL-MCNC: 279 MG/DL — HIGH

## 2025-05-22 PROCEDURE — 82043 UR ALBUMIN QUANTITATIVE: CPT

## 2025-05-22 PROCEDURE — 36415 COLL VENOUS BLD VENIPUNCTURE: CPT

## 2025-05-22 PROCEDURE — 83935 ASSAY OF URINE OSMOLALITY: CPT

## 2025-05-22 PROCEDURE — 80061 LIPID PANEL: CPT

## 2025-05-22 PROCEDURE — 84439 ASSAY OF FREE THYROXINE: CPT

## 2025-05-22 PROCEDURE — 80048 BASIC METABOLIC PNL TOTAL CA: CPT

## 2025-05-22 PROCEDURE — G0463: CPT

## 2025-05-22 PROCEDURE — ZZZZZ: CPT | Mod: GC

## 2025-05-22 PROCEDURE — 84146 ASSAY OF PROLACTIN: CPT

## 2025-05-27 DIAGNOSIS — E89.89 OTHER POSTPROCEDURAL ENDOCRINE AND METABOLIC COMPLICATIONS AND DISORDERS: ICD-10-CM

## 2025-05-27 DIAGNOSIS — E23.0 HYPOPITUITARISM: ICD-10-CM

## 2025-05-27 DIAGNOSIS — E78.5 HYPERLIPIDEMIA, UNSPECIFIED: ICD-10-CM

## 2025-05-27 DIAGNOSIS — E27.9 DISORDER OF ADRENAL GLAND, UNSPECIFIED: ICD-10-CM

## 2025-05-27 DIAGNOSIS — E03.8 OTHER SPECIFIED HYPOTHYROIDISM: ICD-10-CM

## 2025-05-27 DIAGNOSIS — E11.65 TYPE 2 DIABETES MELLITUS WITH HYPERGLYCEMIA: ICD-10-CM

## 2025-05-31 LAB
MONOMERIC PROLACTIN (ICMA)*: 49.4 NG/ML — HIGH
PERCENT MACROPROLACTIN: 18 % — SIGNIFICANT CHANGE UP
PROLACTIN, SERUM (ICMA)*: 60.3 NG/ML — HIGH

## 2025-09-11 ENCOUNTER — RESULT CHARGE (OUTPATIENT)
Age: 55
End: 2025-09-11

## 2025-09-11 ENCOUNTER — APPOINTMENT (OUTPATIENT)
Dept: ENDOCRINOLOGY | Facility: HOSPITAL | Age: 55
End: 2025-09-11

## 2025-09-11 VITALS
HEIGHT: 65 IN | HEART RATE: 99 BPM | WEIGHT: 160 LBS | BODY MASS INDEX: 26.66 KG/M2 | OXYGEN SATURATION: 95 % | TEMPERATURE: 97.9 F | RESPIRATION RATE: 18 BRPM | SYSTOLIC BLOOD PRESSURE: 108 MMHG | DIASTOLIC BLOOD PRESSURE: 70 MMHG

## 2025-09-11 DIAGNOSIS — E03.8 OTHER SPECIFIED HYPOTHYROIDISM: ICD-10-CM

## 2025-09-11 DIAGNOSIS — D44.4 NEOPLASM OF UNCERTAIN BEHAVIOR OF CRANIOPHARYNGEAL DUCT: ICD-10-CM

## 2025-09-11 DIAGNOSIS — E11.65 TYPE 2 DIABETES MELLITUS WITH HYPERGLYCEMIA: ICD-10-CM

## 2025-09-11 DIAGNOSIS — E23.2 OTHER POSTPROCEDURAL ENDOCRINE AND METABOLIC COMPLICATIONS AND DISORDERS: ICD-10-CM

## 2025-09-11 DIAGNOSIS — E27.49 OTHER ADRENOCORTICAL INSUFFICIENCY: ICD-10-CM

## 2025-09-11 DIAGNOSIS — E89.89 OTHER POSTPROCEDURAL ENDOCRINE AND METABOLIC COMPLICATIONS AND DISORDERS: ICD-10-CM

## 2025-09-11 DIAGNOSIS — E78.5 HYPERLIPIDEMIA, UNSPECIFIED: ICD-10-CM

## 2025-09-11 DIAGNOSIS — E22.1 HYPERPROLACTINEMIA: ICD-10-CM

## 2025-09-11 DIAGNOSIS — G93.89 OTHER SPECIFIED DISORDERS OF BRAIN: ICD-10-CM

## 2025-09-11 DIAGNOSIS — E23.0 HYPOPITUITARISM: ICD-10-CM

## 2025-09-11 LAB
ESTIMATED AVERAGE GLUCOSE: 229 MG/DL
HBA1C MFR BLD HPLC: 9.6 %
OSMOLALITY UR: 588 MOSM/KG
PROLACTIN SERPL-MCNC: 41.4 NG/ML
SODIUM SERPL-SCNC: 137 MMOL/L
T4 FREE SERPL-MCNC: 1.2 NG/DL
TSH SERPL-ACNC: 0.06 UIU/ML

## 2025-09-11 PROCEDURE — ZZZZZ: CPT | Mod: GC

## 2025-09-12 LAB — HBA1C MFR BLD HPLC: 9.7

## (undated) DEVICE — SUCTION COAGULATOR HAND CONTROL 10FR X 6"

## (undated) DEVICE — VISITEC 4X4

## (undated) DEVICE — PREP CHLORAPREP HI-LITE ORANGE 26ML

## (undated) DEVICE — DRSG SPLINT INTRA NASAL .25MM STANDARD THIN

## (undated) DEVICE — Device

## (undated) DEVICE — DRAPE 3/4 SHEET W REINFORCEMENT 56X77"

## (undated) DEVICE — DRSG STERISTRIPS 0.5 X 4"

## (undated) DEVICE — DRAPE INSTRUMENT POUCH 6.75" X 11"

## (undated) DEVICE — DRSG MEROCEL STANDARD NO STRING 8CM X 2CM

## (undated) DEVICE — DRAIN LIMITORR DRAIN SYSTEM 30ML

## (undated) DEVICE — DRAPE SURGICAL #1010

## (undated) DEVICE — SUT VICRYL 3-0 18" X-1 (POP-OFF)

## (undated) DEVICE — TUBING SUCTION 20FT

## (undated) DEVICE — ELCTR SUBDERMAL NDL 27G X 1/2" WITH TWISTED PAIR

## (undated) DEVICE — FOLEY TRAY 16FR LF URINE METER SURESTEP

## (undated) DEVICE — ELCTR BOVIE TIP NEEDLE INSULATED 2.8" EDGE

## (undated) DEVICE — SPECIMEN CONTAINER 100ML

## (undated) DEVICE — SYR LUER LOK 10CC

## (undated) DEVICE — DRAPE TOWEL BLUE 17" X 24"

## (undated) DEVICE — DRSG TELFA 3 X 8

## (undated) DEVICE — BUR MEDTRONIC ENT TRANSNASAL SKULL BASE DIAMOND ROUND 15 DEGREE 4.5MM X 13CM

## (undated) DEVICE — DRAPE 1/2 SHEET 40X57"

## (undated) DEVICE — MEDICATION LABELS W MARKER

## (undated) DEVICE — APPLICATOR Q TIP 6" WOOD STEM

## (undated) DEVICE — GLV 7.5 PROTEXIS (WHITE)

## (undated) DEVICE — DRSG TELFA .5 X 3

## (undated) DEVICE — ELCTR 4-DISC 20MM 49" (RED, BLUE, GREEN, BLACK)

## (undated) DEVICE — STORZ DURA MICRO KNIFE INSERT POINTED

## (undated) DEVICE — VENODYNE/SCD SLEEVE CALF LARGE

## (undated) DEVICE — SOL IRR POUR NS 0.9% 500ML

## (undated) DEVICE — SPHERE MARKER (5 SPHERES)

## (undated) DEVICE — CLEANING SHEATH ENDO-SCRUB FOR STORZ 7210AA TELESCOPE 4MM 0 DEGREE

## (undated) DEVICE — ELCTR SUBDERMAL CORKSCREW NDL 1.2MM

## (undated) DEVICE — DRAPE MAYO STAND 30"

## (undated) DEVICE — MINI DOPPLER PROBE

## (undated) DEVICE — GLV 8 PROTEXIS (WHITE)

## (undated) DEVICE — NDL SPINAL 25G X 3.5" (BLUE)

## (undated) DEVICE — DRAPE MINOR PROCEDURE

## (undated) DEVICE — MIDAS REX MR8 CLEARVIEW TN BALL 4MM X 13CM

## (undated) DEVICE — POSITIONER FOAM EGG CRATE ULNAR 2PCS (PINK)

## (undated) DEVICE — BLADE MEDTRONIC ENT TRICUT ROTATABLE STRAIGHT 4MM X 11CM

## (undated) DEVICE — WOUND IRR SURGIPHOR

## (undated) DEVICE — IRRIGATION TRAY W PISTON SYRINGE 60ML

## (undated) DEVICE — GLV 7 PROTEXIS (WHITE)

## (undated) DEVICE — PREP BETADINE SPONGE STICKS

## (undated) DEVICE — DRAIN JACKSON PRATT 7MM FLAT FULL NO TROCAR

## (undated) DEVICE — BIPOLAR FORCEP SYMMETRY BAYONET 7" X 1.5MM SMOOTH (SILVER)

## (undated) DEVICE — DRSG NASOPORE 4CM FIRM

## (undated) DEVICE — DRAPE CAMERA VIDEO 7"X96"

## (undated) DEVICE — SOL IRR POUR H2O 250ML

## (undated) DEVICE — GLV 6.5 PROTEXIS (WHITE)

## (undated) DEVICE — ELCTR SUBDERMAL NDL CLASSIC 1.5M X 59" (6 COLOR)

## (undated) DEVICE — GOWN TRIMAX LG

## (undated) DEVICE — DRSG CURITY GAUZE SPONGE 4 X 4" 12-PLY

## (undated) DEVICE — STAPLER SKIN VISI-STAT 35 WIDE

## (undated) DEVICE — STORZ DURA MICRO KNIFE INSERT SICKLE-SHAPED

## (undated) DEVICE — VAGINAL PACKING 2 X 6"

## (undated) DEVICE — NDL COUNTER DBL BLADEGUARD

## (undated) DEVICE — MARKING PEN W RULER

## (undated) DEVICE — TUBING IRRIGATION NASAL BURR

## (undated) DEVICE — APPLICATOR EXTENDED TIP 8CM

## (undated) DEVICE — DRAIN RESERVOIR FOR JACKSON PRATT 100CC CARDINAL

## (undated) DEVICE — DRSG XEROFORM 1 X 8"

## (undated) DEVICE — PACK LUMBAR LAMI

## (undated) DEVICE — GLV 8.5 PROTEXIS (WHITE)

## (undated) DEVICE — TUBING MR8 IRRIGATION CV LOW-PRFL

## (undated) DEVICE — BUR MEDTRONIC ENT TRANSNASAL SKULL BASE FLUTED 15 DEGREE 3.0MM X 13CM

## (undated) DEVICE — MIDAS REX MR8 CLEARVIEW TN MATCH HEAD 3MM X 13CM DIAMOND

## (undated) DEVICE — DRSG TAPE HYPAFIX 4"

## (undated) DEVICE — SOL ANTI FOG

## (undated) DEVICE — WARMING BLANKET LOWER ADULT

## (undated) DEVICE — SUT CHROMIC GUT 4-0 18" P-13